# Patient Record
Sex: MALE | Race: WHITE | NOT HISPANIC OR LATINO | Employment: OTHER | URBAN - NONMETROPOLITAN AREA
[De-identification: names, ages, dates, MRNs, and addresses within clinical notes are randomized per-mention and may not be internally consistent; named-entity substitution may affect disease eponyms.]

---

## 2020-01-01 ENCOUNTER — APPOINTMENT (INPATIENT)
Dept: GASTROENTEROLOGY | Facility: HOSPITAL | Age: 85
DRG: 329 | End: 2020-01-01
Attending: INTERNAL MEDICINE
Payer: MEDICARE

## 2020-01-01 ENCOUNTER — APPOINTMENT (INPATIENT)
Dept: CT IMAGING | Facility: HOSPITAL | Age: 85
DRG: 329 | End: 2020-01-01
Payer: MEDICARE

## 2020-01-01 ENCOUNTER — APPOINTMENT (INPATIENT)
Dept: ULTRASOUND IMAGING | Facility: HOSPITAL | Age: 85
DRG: 329 | End: 2020-01-01
Payer: MEDICARE

## 2020-01-01 ENCOUNTER — ANESTHESIA EVENT (INPATIENT)
Dept: GASTROENTEROLOGY | Facility: HOSPITAL | Age: 85
DRG: 329 | End: 2020-01-01
Payer: MEDICARE

## 2020-01-01 ENCOUNTER — APPOINTMENT (INPATIENT)
Dept: RADIOLOGY | Facility: HOSPITAL | Age: 85
DRG: 329 | End: 2020-01-01
Payer: MEDICARE

## 2020-01-01 ENCOUNTER — ANESTHESIA (INPATIENT)
Dept: GASTROENTEROLOGY | Facility: HOSPITAL | Age: 85
DRG: 329 | End: 2020-01-01
Payer: MEDICARE

## 2020-01-01 ENCOUNTER — ANESTHESIA (INPATIENT)
Dept: PERIOP | Facility: HOSPITAL | Age: 85
DRG: 329 | End: 2020-01-01
Payer: MEDICARE

## 2020-01-01 ENCOUNTER — ANESTHESIA EVENT (INPATIENT)
Dept: PERIOP | Facility: HOSPITAL | Age: 85
DRG: 329 | End: 2020-01-01
Payer: MEDICARE

## 2020-01-01 ENCOUNTER — HOSPITAL ENCOUNTER (INPATIENT)
Facility: HOSPITAL | Age: 85
LOS: 25 days | DRG: 329 | End: 2020-07-11
Attending: EMERGENCY MEDICINE | Admitting: STUDENT IN AN ORGANIZED HEALTH CARE EDUCATION/TRAINING PROGRAM
Payer: MEDICARE

## 2020-01-01 DIAGNOSIS — K92.2 GASTROINTESTINAL HEMORRHAGE, UNSPECIFIED GASTROINTESTINAL HEMORRHAGE TYPE: ICD-10-CM

## 2020-01-01 DIAGNOSIS — K56.600 PARTIAL BOWEL OBSTRUCTION (HCC): ICD-10-CM

## 2020-01-01 DIAGNOSIS — D64.9 SYMPTOMATIC ANEMIA: ICD-10-CM

## 2020-01-01 DIAGNOSIS — K57.10 DUODENAL DIVERTICULUM: ICD-10-CM

## 2020-01-01 DIAGNOSIS — K31.5 DUODENAL OBSTRUCTION: ICD-10-CM

## 2020-01-01 DIAGNOSIS — N17.9 ACUTE KIDNEY INJURY (HCC): ICD-10-CM

## 2020-01-01 DIAGNOSIS — I46.9 CARDIAC ARREST (HCC): ICD-10-CM

## 2020-01-01 DIAGNOSIS — R93.5 ABNORMAL CT OF THE ABDOMEN: Primary | ICD-10-CM

## 2020-01-01 DIAGNOSIS — D49.0 COLON NEOPLASM: ICD-10-CM

## 2020-01-01 DIAGNOSIS — T17.908A ASPIRATION INTO AIRWAY, INITIAL ENCOUNTER: ICD-10-CM

## 2020-01-01 LAB
ABO GROUP BLD BPU: NORMAL
ABO GROUP BLD: NORMAL
ABO GROUP BLD: NORMAL
ALBUMIN SERPL BCP-MCNC: 1.8 G/DL (ref 3.5–5)
ALBUMIN SERPL BCP-MCNC: 1.9 G/DL (ref 3.5–5)
ALBUMIN SERPL BCP-MCNC: 2 G/DL (ref 3.5–5)
ALBUMIN SERPL BCP-MCNC: 2 G/DL (ref 3.5–5)
ALBUMIN SERPL BCP-MCNC: 2.1 G/DL (ref 3.5–5)
ALBUMIN SERPL BCP-MCNC: 2.1 G/DL (ref 3.5–5)
ALBUMIN SERPL BCP-MCNC: 2.2 G/DL (ref 3.5–5)
ALBUMIN SERPL BCP-MCNC: 2.3 G/DL (ref 3.5–5)
ALBUMIN SERPL BCP-MCNC: 2.8 G/DL (ref 3.5–5)
ALBUMIN SERPL BCP-MCNC: 2.9 G/DL (ref 3.5–5)
ALP SERPL-CCNC: 48 U/L (ref 46–116)
ALP SERPL-CCNC: 50 U/L (ref 46–116)
ALP SERPL-CCNC: 51 U/L (ref 46–116)
ALP SERPL-CCNC: 54 U/L (ref 46–116)
ALP SERPL-CCNC: 55 U/L (ref 46–116)
ALP SERPL-CCNC: 59 U/L (ref 46–116)
ALP SERPL-CCNC: 61 U/L (ref 46–116)
ALP SERPL-CCNC: 65 U/L (ref 46–116)
ALP SERPL-CCNC: 67 U/L (ref 46–116)
ALP SERPL-CCNC: 73 U/L (ref 46–116)
ALP SERPL-CCNC: 79 U/L (ref 46–116)
ALP SERPL-CCNC: 83 U/L (ref 46–116)
ALT SERPL W P-5'-P-CCNC: 12 U/L (ref 12–78)
ALT SERPL W P-5'-P-CCNC: 14 U/L (ref 12–78)
ALT SERPL W P-5'-P-CCNC: 15 U/L (ref 12–78)
ALT SERPL W P-5'-P-CCNC: 15 U/L (ref 12–78)
ALT SERPL W P-5'-P-CCNC: 16 U/L (ref 12–78)
ALT SERPL W P-5'-P-CCNC: 17 U/L (ref 12–78)
ALT SERPL W P-5'-P-CCNC: 17 U/L (ref 12–78)
ALT SERPL W P-5'-P-CCNC: 20 U/L (ref 12–78)
ALT SERPL W P-5'-P-CCNC: 8 U/L (ref 12–78)
ALT SERPL W P-5'-P-CCNC: 9 U/L (ref 12–78)
ANION GAP SERPL CALCULATED.3IONS-SCNC: 10 MMOL/L (ref 4–13)
ANION GAP SERPL CALCULATED.3IONS-SCNC: 11 MMOL/L (ref 4–13)
ANION GAP SERPL CALCULATED.3IONS-SCNC: 12 MMOL/L (ref 4–13)
ANION GAP SERPL CALCULATED.3IONS-SCNC: 13 MMOL/L (ref 4–13)
ANION GAP SERPL CALCULATED.3IONS-SCNC: 14 MMOL/L (ref 4–13)
ANION GAP SERPL CALCULATED.3IONS-SCNC: 4 MMOL/L (ref 4–13)
ANION GAP SERPL CALCULATED.3IONS-SCNC: 5 MMOL/L (ref 4–13)
ANION GAP SERPL CALCULATED.3IONS-SCNC: 6 MMOL/L (ref 4–13)
ANION GAP SERPL CALCULATED.3IONS-SCNC: 7 MMOL/L (ref 4–13)
ANION GAP SERPL CALCULATED.3IONS-SCNC: 8 MMOL/L (ref 4–13)
ANION GAP SERPL CALCULATED.3IONS-SCNC: 9 MMOL/L (ref 4–13)
APTT PPP: 152 SECONDS (ref 23–37)
APTT PPP: 36 SECONDS (ref 23–37)
AST SERPL W P-5'-P-CCNC: 10 U/L (ref 5–45)
AST SERPL W P-5'-P-CCNC: 10 U/L (ref 5–45)
AST SERPL W P-5'-P-CCNC: 11 U/L (ref 5–45)
AST SERPL W P-5'-P-CCNC: 12 U/L (ref 5–45)
AST SERPL W P-5'-P-CCNC: 12 U/L (ref 5–45)
AST SERPL W P-5'-P-CCNC: 13 U/L (ref 5–45)
AST SERPL W P-5'-P-CCNC: 19 U/L (ref 5–45)
AST SERPL W P-5'-P-CCNC: 20 U/L (ref 5–45)
AST SERPL W P-5'-P-CCNC: 25 U/L (ref 5–45)
AST SERPL W P-5'-P-CCNC: 25 U/L (ref 5–45)
AST SERPL W P-5'-P-CCNC: 30 U/L (ref 5–45)
AST SERPL W P-5'-P-CCNC: 54 U/L (ref 5–45)
ATRIAL RATE: 394 BPM
BACTERIA UR QL AUTO: ABNORMAL /HPF
BASE EXCESS BLDA CALC-SCNC: -8 MMOL/L (ref -2–3)
BASOPHILS # BLD AUTO: 0.01 THOUSANDS/ΜL (ref 0–0.1)
BASOPHILS # BLD AUTO: 0.02 THOUSANDS/ΜL (ref 0–0.1)
BASOPHILS # BLD MANUAL: 0 THOUSAND/UL (ref 0–0.1)
BASOPHILS NFR BLD AUTO: 0 % (ref 0–1)
BASOPHILS NFR BLD AUTO: 1 % (ref 0–1)
BASOPHILS NFR BLD AUTO: 1 % (ref 0–1)
BASOPHILS NFR MAR MANUAL: 0 % (ref 0–1)
BILIRUB SERPL-MCNC: 0.51 MG/DL (ref 0.2–1)
BILIRUB SERPL-MCNC: 0.53 MG/DL (ref 0.2–1)
BILIRUB SERPL-MCNC: 0.58 MG/DL (ref 0.2–1)
BILIRUB SERPL-MCNC: 0.59 MG/DL (ref 0.2–1)
BILIRUB SERPL-MCNC: 0.61 MG/DL (ref 0.2–1)
BILIRUB SERPL-MCNC: 0.65 MG/DL (ref 0.2–1)
BILIRUB SERPL-MCNC: 0.7 MG/DL (ref 0.2–1)
BILIRUB SERPL-MCNC: 0.7 MG/DL (ref 0.2–1)
BILIRUB SERPL-MCNC: 0.74 MG/DL (ref 0.2–1)
BILIRUB SERPL-MCNC: 0.79 MG/DL (ref 0.2–1)
BILIRUB SERPL-MCNC: 0.93 MG/DL (ref 0.2–1)
BILIRUB SERPL-MCNC: 1.5 MG/DL (ref 0.2–1)
BILIRUB UR QL STRIP: NEGATIVE
BLD GP AB SCN SERPL QL: NEGATIVE
BLD GP AB SCN SERPL QL: NEGATIVE
BPU ID: NORMAL
BUN SERPL-MCNC: 10 MG/DL (ref 5–25)
BUN SERPL-MCNC: 12 MG/DL (ref 5–25)
BUN SERPL-MCNC: 13 MG/DL (ref 5–25)
BUN SERPL-MCNC: 18 MG/DL (ref 5–25)
BUN SERPL-MCNC: 20 MG/DL (ref 5–25)
BUN SERPL-MCNC: 20 MG/DL (ref 5–25)
BUN SERPL-MCNC: 21 MG/DL (ref 5–25)
BUN SERPL-MCNC: 22 MG/DL (ref 5–25)
BUN SERPL-MCNC: 28 MG/DL (ref 5–25)
BUN SERPL-MCNC: 29 MG/DL (ref 5–25)
BUN SERPL-MCNC: 30 MG/DL (ref 5–25)
BUN SERPL-MCNC: 30 MG/DL (ref 5–25)
BUN SERPL-MCNC: 31 MG/DL (ref 5–25)
BUN SERPL-MCNC: 31 MG/DL (ref 5–25)
BUN SERPL-MCNC: 33 MG/DL (ref 5–25)
BUN SERPL-MCNC: 33 MG/DL (ref 5–25)
BUN SERPL-MCNC: 35 MG/DL (ref 5–25)
BUN SERPL-MCNC: 35 MG/DL (ref 5–25)
BUN SERPL-MCNC: 36 MG/DL (ref 5–25)
BUN SERPL-MCNC: 36 MG/DL (ref 5–25)
BUN SERPL-MCNC: 37 MG/DL (ref 5–25)
BUN SERPL-MCNC: 38 MG/DL (ref 5–25)
BUN SERPL-MCNC: 39 MG/DL (ref 5–25)
BUN SERPL-MCNC: 41 MG/DL (ref 5–25)
BUN SERPL-MCNC: 42 MG/DL (ref 5–25)
BUN SERPL-MCNC: 42 MG/DL (ref 5–25)
BUN SERPL-MCNC: 43 MG/DL (ref 5–25)
BUN SERPL-MCNC: 49 MG/DL (ref 5–25)
BUN SERPL-MCNC: 58 MG/DL (ref 5–25)
BUN SERPL-MCNC: 61 MG/DL (ref 5–25)
CA-I BLD-SCNC: 1.2 MMOL/L (ref 1.12–1.32)
CALCIUM SERPL-MCNC: 7.4 MG/DL (ref 8.3–10.1)
CALCIUM SERPL-MCNC: 7.5 MG/DL (ref 8.3–10.1)
CALCIUM SERPL-MCNC: 7.5 MG/DL (ref 8.3–10.1)
CALCIUM SERPL-MCNC: 7.7 MG/DL (ref 8.3–10.1)
CALCIUM SERPL-MCNC: 7.8 MG/DL (ref 8.3–10.1)
CALCIUM SERPL-MCNC: 7.9 MG/DL (ref 8.3–10.1)
CALCIUM SERPL-MCNC: 7.9 MG/DL (ref 8.3–10.1)
CALCIUM SERPL-MCNC: 8 MG/DL (ref 8.3–10.1)
CALCIUM SERPL-MCNC: 8.1 MG/DL (ref 8.3–10.1)
CALCIUM SERPL-MCNC: 8.2 MG/DL (ref 8.3–10.1)
CALCIUM SERPL-MCNC: 8.4 MG/DL (ref 8.3–10.1)
CALCIUM SERPL-MCNC: 8.4 MG/DL (ref 8.3–10.1)
CALCIUM SERPL-MCNC: 8.6 MG/DL (ref 8.3–10.1)
CEA SERPL-MCNC: 17.9 NG/ML (ref 0–3)
CHLORIDE SERPL-SCNC: 101 MMOL/L (ref 100–108)
CHLORIDE SERPL-SCNC: 101 MMOL/L (ref 100–108)
CHLORIDE SERPL-SCNC: 103 MMOL/L (ref 100–108)
CHLORIDE SERPL-SCNC: 103 MMOL/L (ref 100–108)
CHLORIDE SERPL-SCNC: 104 MMOL/L (ref 100–108)
CHLORIDE SERPL-SCNC: 104 MMOL/L (ref 100–108)
CHLORIDE SERPL-SCNC: 105 MMOL/L (ref 100–108)
CHLORIDE SERPL-SCNC: 106 MMOL/L (ref 100–108)
CHLORIDE SERPL-SCNC: 107 MMOL/L (ref 100–108)
CHLORIDE SERPL-SCNC: 108 MMOL/L (ref 100–108)
CHLORIDE SERPL-SCNC: 109 MMOL/L (ref 100–108)
CHLORIDE SERPL-SCNC: 109 MMOL/L (ref 100–108)
CHLORIDE SERPL-SCNC: 110 MMOL/L (ref 100–108)
CHLORIDE SERPL-SCNC: 111 MMOL/L (ref 100–108)
CHLORIDE SERPL-SCNC: 99 MMOL/L (ref 100–108)
CLARITY UR: CLEAR
CO2 SERPL-SCNC: 20 MMOL/L (ref 21–32)
CO2 SERPL-SCNC: 20 MMOL/L (ref 21–32)
CO2 SERPL-SCNC: 21 MMOL/L (ref 21–32)
CO2 SERPL-SCNC: 21 MMOL/L (ref 21–32)
CO2 SERPL-SCNC: 22 MMOL/L (ref 21–32)
CO2 SERPL-SCNC: 22 MMOL/L (ref 21–32)
CO2 SERPL-SCNC: 23 MMOL/L (ref 21–32)
CO2 SERPL-SCNC: 24 MMOL/L (ref 21–32)
CO2 SERPL-SCNC: 24 MMOL/L (ref 21–32)
CO2 SERPL-SCNC: 25 MMOL/L (ref 21–32)
CO2 SERPL-SCNC: 26 MMOL/L (ref 21–32)
CO2 SERPL-SCNC: 27 MMOL/L (ref 21–32)
CO2 SERPL-SCNC: 29 MMOL/L (ref 21–32)
CO2 SERPL-SCNC: 30 MMOL/L (ref 21–32)
CO2 SERPL-SCNC: 30 MMOL/L (ref 21–32)
CO2 SERPL-SCNC: 31 MMOL/L (ref 21–32)
CO2 SERPL-SCNC: 31 MMOL/L (ref 21–32)
CO2 SERPL-SCNC: 33 MMOL/L (ref 21–32)
CO2 SERPL-SCNC: 33 MMOL/L (ref 21–32)
COLOR UR: YELLOW
CREAT SERPL-MCNC: 1.3 MG/DL (ref 0.6–1.3)
CREAT SERPL-MCNC: 1.33 MG/DL (ref 0.6–1.3)
CREAT SERPL-MCNC: 1.45 MG/DL (ref 0.6–1.3)
CREAT SERPL-MCNC: 1.45 MG/DL (ref 0.6–1.3)
CREAT SERPL-MCNC: 1.51 MG/DL (ref 0.6–1.3)
CREAT SERPL-MCNC: 1.59 MG/DL (ref 0.6–1.3)
CREAT SERPL-MCNC: 1.76 MG/DL (ref 0.6–1.3)
CREAT SERPL-MCNC: 1.88 MG/DL (ref 0.6–1.3)
CREAT SERPL-MCNC: 1.93 MG/DL (ref 0.6–1.3)
CREAT SERPL-MCNC: 1.95 MG/DL (ref 0.6–1.3)
CREAT SERPL-MCNC: 1.98 MG/DL (ref 0.6–1.3)
CREAT SERPL-MCNC: 2.01 MG/DL (ref 0.6–1.3)
CREAT SERPL-MCNC: 2.06 MG/DL (ref 0.6–1.3)
CREAT SERPL-MCNC: 2.13 MG/DL (ref 0.6–1.3)
CREAT SERPL-MCNC: 2.14 MG/DL (ref 0.6–1.3)
CREAT SERPL-MCNC: 2.18 MG/DL (ref 0.6–1.3)
CREAT SERPL-MCNC: 2.18 MG/DL (ref 0.6–1.3)
CREAT SERPL-MCNC: 2.19 MG/DL (ref 0.6–1.3)
CREAT SERPL-MCNC: 2.19 MG/DL (ref 0.6–1.3)
CREAT SERPL-MCNC: 2.21 MG/DL (ref 0.6–1.3)
CREAT SERPL-MCNC: 2.21 MG/DL (ref 0.6–1.3)
CREAT SERPL-MCNC: 2.26 MG/DL (ref 0.6–1.3)
CREAT SERPL-MCNC: 2.3 MG/DL (ref 0.6–1.3)
CREAT SERPL-MCNC: 2.3 MG/DL (ref 0.6–1.3)
CREAT SERPL-MCNC: 2.31 MG/DL (ref 0.6–1.3)
CREAT SERPL-MCNC: 2.33 MG/DL (ref 0.6–1.3)
CREAT SERPL-MCNC: 2.38 MG/DL (ref 0.6–1.3)
CREAT SERPL-MCNC: 2.46 MG/DL (ref 0.6–1.3)
CREAT SERPL-MCNC: 2.81 MG/DL (ref 0.6–1.3)
CREAT SERPL-MCNC: 3.05 MG/DL (ref 0.6–1.3)
CREAT UR-MCNC: 29.6 MG/DL
CREAT UR-MCNC: 73 MG/DL
CROSSMATCH: NORMAL
EOSINOPHIL # BLD AUTO: 0.01 THOUSAND/ΜL (ref 0–0.61)
EOSINOPHIL # BLD AUTO: 0.04 THOUSAND/ΜL (ref 0–0.61)
EOSINOPHIL # BLD AUTO: 0.05 THOUSAND/ΜL (ref 0–0.61)
EOSINOPHIL # BLD AUTO: 0.05 THOUSAND/ΜL (ref 0–0.61)
EOSINOPHIL # BLD AUTO: 0.07 THOUSAND/ΜL (ref 0–0.61)
EOSINOPHIL # BLD AUTO: 0.08 THOUSAND/ΜL (ref 0–0.61)
EOSINOPHIL # BLD AUTO: 0.1 THOUSAND/ΜL (ref 0–0.61)
EOSINOPHIL # BLD AUTO: 0.11 THOUSAND/ΜL (ref 0–0.61)
EOSINOPHIL # BLD AUTO: 0.12 THOUSAND/ΜL (ref 0–0.61)
EOSINOPHIL # BLD AUTO: 0.14 THOUSAND/ΜL (ref 0–0.61)
EOSINOPHIL # BLD AUTO: 0.15 THOUSAND/ΜL (ref 0–0.61)
EOSINOPHIL # BLD MANUAL: 0 THOUSAND/UL (ref 0–0.4)
EOSINOPHIL NFR BLD AUTO: 0 % (ref 0–6)
EOSINOPHIL NFR BLD AUTO: 1 % (ref 0–6)
EOSINOPHIL NFR BLD AUTO: 2 % (ref 0–6)
EOSINOPHIL NFR BLD AUTO: 3 % (ref 0–6)
EOSINOPHIL NFR BLD AUTO: 4 % (ref 0–6)
EOSINOPHIL NFR BLD AUTO: 5 % (ref 0–6)
EOSINOPHIL NFR BLD MANUAL: 0 % (ref 0–6)
EOSINOPHIL NFR URNS MANUAL: 0 %
ERYTHROCYTE [DISTWIDTH] IN BLOOD BY AUTOMATED COUNT: 15.9 % (ref 11.6–15.1)
ERYTHROCYTE [DISTWIDTH] IN BLOOD BY AUTOMATED COUNT: 17.5 % (ref 11.6–15.1)
ERYTHROCYTE [DISTWIDTH] IN BLOOD BY AUTOMATED COUNT: 18.4 % (ref 11.6–15.1)
ERYTHROCYTE [DISTWIDTH] IN BLOOD BY AUTOMATED COUNT: 19 % (ref 11.6–15.1)
ERYTHROCYTE [DISTWIDTH] IN BLOOD BY AUTOMATED COUNT: 19.1 % (ref 11.6–15.1)
ERYTHROCYTE [DISTWIDTH] IN BLOOD BY AUTOMATED COUNT: 19.1 % (ref 11.6–15.1)
ERYTHROCYTE [DISTWIDTH] IN BLOOD BY AUTOMATED COUNT: 19.4 % (ref 11.6–15.1)
ERYTHROCYTE [DISTWIDTH] IN BLOOD BY AUTOMATED COUNT: 20 % (ref 11.6–15.1)
ERYTHROCYTE [DISTWIDTH] IN BLOOD BY AUTOMATED COUNT: 20.2 % (ref 11.6–15.1)
ERYTHROCYTE [DISTWIDTH] IN BLOOD BY AUTOMATED COUNT: 20.3 % (ref 11.6–15.1)
ERYTHROCYTE [DISTWIDTH] IN BLOOD BY AUTOMATED COUNT: 20.3 % (ref 11.6–15.1)
ERYTHROCYTE [DISTWIDTH] IN BLOOD BY AUTOMATED COUNT: 20.6 % (ref 11.6–15.1)
ERYTHROCYTE [DISTWIDTH] IN BLOOD BY AUTOMATED COUNT: 21.1 % (ref 11.6–15.1)
ERYTHROCYTE [DISTWIDTH] IN BLOOD BY AUTOMATED COUNT: 22.3 % (ref 11.6–15.1)
ERYTHROCYTE [DISTWIDTH] IN BLOOD BY AUTOMATED COUNT: 23.4 % (ref 11.6–15.1)
ERYTHROCYTE [DISTWIDTH] IN BLOOD BY AUTOMATED COUNT: 23.8 % (ref 11.6–15.1)
ERYTHROCYTE [DISTWIDTH] IN BLOOD BY AUTOMATED COUNT: 23.8 % (ref 11.6–15.1)
ERYTHROCYTE [DISTWIDTH] IN BLOOD BY AUTOMATED COUNT: 23.9 % (ref 11.6–15.1)
ERYTHROCYTE [DISTWIDTH] IN BLOOD BY AUTOMATED COUNT: 24 % (ref 11.6–15.1)
ERYTHROCYTE [DISTWIDTH] IN BLOOD BY AUTOMATED COUNT: 24.4 % (ref 11.6–15.1)
ERYTHROCYTE [DISTWIDTH] IN BLOOD BY AUTOMATED COUNT: 24.6 % (ref 11.6–15.1)
EST. AVERAGE GLUCOSE BLD GHB EST-MCNC: 189 MG/DL
FERRITIN SERPL-MCNC: 11 NG/ML (ref 8–388)
FIO2 GAS DIL.REBREATH: 100 L
FOLATE SERPL-MCNC: >20 NG/ML (ref 3.1–17.5)
GFR SERPL CREATININE-BSD FRML MDRD: 18 ML/MIN/1.73SQ M
GFR SERPL CREATININE-BSD FRML MDRD: 20 ML/MIN/1.73SQ M
GFR SERPL CREATININE-BSD FRML MDRD: 23 ML/MIN/1.73SQ M
GFR SERPL CREATININE-BSD FRML MDRD: 24 ML/MIN/1.73SQ M
GFR SERPL CREATININE-BSD FRML MDRD: 25 ML/MIN/1.73SQ M
GFR SERPL CREATININE-BSD FRML MDRD: 26 ML/MIN/1.73SQ M
GFR SERPL CREATININE-BSD FRML MDRD: 27 ML/MIN/1.73SQ M
GFR SERPL CREATININE-BSD FRML MDRD: 29 ML/MIN/1.73SQ M
GFR SERPL CREATININE-BSD FRML MDRD: 29 ML/MIN/1.73SQ M
GFR SERPL CREATININE-BSD FRML MDRD: 30 ML/MIN/1.73SQ M
GFR SERPL CREATININE-BSD FRML MDRD: 31 ML/MIN/1.73SQ M
GFR SERPL CREATININE-BSD FRML MDRD: 32 ML/MIN/1.73SQ M
GFR SERPL CREATININE-BSD FRML MDRD: 34 ML/MIN/1.73SQ M
GFR SERPL CREATININE-BSD FRML MDRD: 39 ML/MIN/1.73SQ M
GFR SERPL CREATININE-BSD FRML MDRD: 42 ML/MIN/1.73SQ M
GFR SERPL CREATININE-BSD FRML MDRD: 44 ML/MIN/1.73SQ M
GFR SERPL CREATININE-BSD FRML MDRD: 44 ML/MIN/1.73SQ M
GFR SERPL CREATININE-BSD FRML MDRD: 48 ML/MIN/1.73SQ M
GFR SERPL CREATININE-BSD FRML MDRD: 50 ML/MIN/1.73SQ M
GLUCOSE SERPL-MCNC: 100 MG/DL (ref 65–140)
GLUCOSE SERPL-MCNC: 103 MG/DL (ref 65–140)
GLUCOSE SERPL-MCNC: 104 MG/DL (ref 65–140)
GLUCOSE SERPL-MCNC: 106 MG/DL (ref 65–140)
GLUCOSE SERPL-MCNC: 1070 MG/DL (ref 65–140)
GLUCOSE SERPL-MCNC: 112 MG/DL (ref 65–140)
GLUCOSE SERPL-MCNC: 114 MG/DL (ref 65–140)
GLUCOSE SERPL-MCNC: 116 MG/DL (ref 65–140)
GLUCOSE SERPL-MCNC: 119 MG/DL (ref 65–140)
GLUCOSE SERPL-MCNC: 124 MG/DL (ref 65–140)
GLUCOSE SERPL-MCNC: 129 MG/DL (ref 65–140)
GLUCOSE SERPL-MCNC: 130 MG/DL (ref 65–140)
GLUCOSE SERPL-MCNC: 131 MG/DL (ref 65–140)
GLUCOSE SERPL-MCNC: 131 MG/DL (ref 65–140)
GLUCOSE SERPL-MCNC: 132 MG/DL (ref 65–140)
GLUCOSE SERPL-MCNC: 138 MG/DL (ref 65–140)
GLUCOSE SERPL-MCNC: 139 MG/DL (ref 65–140)
GLUCOSE SERPL-MCNC: 139 MG/DL (ref 65–140)
GLUCOSE SERPL-MCNC: 145 MG/DL (ref 65–140)
GLUCOSE SERPL-MCNC: 145 MG/DL (ref 65–140)
GLUCOSE SERPL-MCNC: 147 MG/DL (ref 65–140)
GLUCOSE SERPL-MCNC: 152 MG/DL (ref 65–140)
GLUCOSE SERPL-MCNC: 153 MG/DL (ref 65–140)
GLUCOSE SERPL-MCNC: 153 MG/DL (ref 65–140)
GLUCOSE SERPL-MCNC: 154 MG/DL (ref 65–140)
GLUCOSE SERPL-MCNC: 155 MG/DL (ref 65–140)
GLUCOSE SERPL-MCNC: 157 MG/DL (ref 65–140)
GLUCOSE SERPL-MCNC: 161 MG/DL (ref 65–140)
GLUCOSE SERPL-MCNC: 162 MG/DL (ref 65–140)
GLUCOSE SERPL-MCNC: 163 MG/DL (ref 65–140)
GLUCOSE SERPL-MCNC: 164 MG/DL (ref 65–140)
GLUCOSE SERPL-MCNC: 164 MG/DL (ref 65–140)
GLUCOSE SERPL-MCNC: 165 MG/DL (ref 65–140)
GLUCOSE SERPL-MCNC: 167 MG/DL (ref 65–140)
GLUCOSE SERPL-MCNC: 168 MG/DL (ref 65–140)
GLUCOSE SERPL-MCNC: 170 MG/DL (ref 65–140)
GLUCOSE SERPL-MCNC: 171 MG/DL (ref 65–140)
GLUCOSE SERPL-MCNC: 175 MG/DL (ref 65–140)
GLUCOSE SERPL-MCNC: 175 MG/DL (ref 65–140)
GLUCOSE SERPL-MCNC: 178 MG/DL (ref 65–140)
GLUCOSE SERPL-MCNC: 178 MG/DL (ref 65–140)
GLUCOSE SERPL-MCNC: 179 MG/DL (ref 65–140)
GLUCOSE SERPL-MCNC: 182 MG/DL (ref 65–140)
GLUCOSE SERPL-MCNC: 183 MG/DL (ref 65–140)
GLUCOSE SERPL-MCNC: 188 MG/DL (ref 65–140)
GLUCOSE SERPL-MCNC: 188 MG/DL (ref 65–140)
GLUCOSE SERPL-MCNC: 190 MG/DL (ref 65–140)
GLUCOSE SERPL-MCNC: 190 MG/DL (ref 65–140)
GLUCOSE SERPL-MCNC: 191 MG/DL (ref 65–140)
GLUCOSE SERPL-MCNC: 194 MG/DL (ref 65–140)
GLUCOSE SERPL-MCNC: 201 MG/DL (ref 65–140)
GLUCOSE SERPL-MCNC: 201 MG/DL (ref 65–140)
GLUCOSE SERPL-MCNC: 202 MG/DL (ref 65–140)
GLUCOSE SERPL-MCNC: 202 MG/DL (ref 65–140)
GLUCOSE SERPL-MCNC: 203 MG/DL (ref 65–140)
GLUCOSE SERPL-MCNC: 204 MG/DL (ref 65–140)
GLUCOSE SERPL-MCNC: 206 MG/DL (ref 65–140)
GLUCOSE SERPL-MCNC: 208 MG/DL (ref 65–140)
GLUCOSE SERPL-MCNC: 209 MG/DL (ref 65–140)
GLUCOSE SERPL-MCNC: 209 MG/DL (ref 65–140)
GLUCOSE SERPL-MCNC: 210 MG/DL (ref 65–140)
GLUCOSE SERPL-MCNC: 213 MG/DL (ref 65–140)
GLUCOSE SERPL-MCNC: 215 MG/DL (ref 65–140)
GLUCOSE SERPL-MCNC: 215 MG/DL (ref 65–140)
GLUCOSE SERPL-MCNC: 216 MG/DL (ref 65–140)
GLUCOSE SERPL-MCNC: 216 MG/DL (ref 65–140)
GLUCOSE SERPL-MCNC: 217 MG/DL (ref 65–140)
GLUCOSE SERPL-MCNC: 218 MG/DL (ref 65–140)
GLUCOSE SERPL-MCNC: 218 MG/DL (ref 65–140)
GLUCOSE SERPL-MCNC: 219 MG/DL (ref 65–140)
GLUCOSE SERPL-MCNC: 221 MG/DL (ref 65–140)
GLUCOSE SERPL-MCNC: 221 MG/DL (ref 65–140)
GLUCOSE SERPL-MCNC: 222 MG/DL (ref 65–140)
GLUCOSE SERPL-MCNC: 223 MG/DL (ref 65–140)
GLUCOSE SERPL-MCNC: 226 MG/DL (ref 65–140)
GLUCOSE SERPL-MCNC: 232 MG/DL (ref 65–140)
GLUCOSE SERPL-MCNC: 232 MG/DL (ref 65–140)
GLUCOSE SERPL-MCNC: 234 MG/DL (ref 65–140)
GLUCOSE SERPL-MCNC: 235 MG/DL (ref 65–140)
GLUCOSE SERPL-MCNC: 236 MG/DL (ref 65–140)
GLUCOSE SERPL-MCNC: 236 MG/DL (ref 65–140)
GLUCOSE SERPL-MCNC: 238 MG/DL (ref 65–140)
GLUCOSE SERPL-MCNC: 239 MG/DL (ref 65–140)
GLUCOSE SERPL-MCNC: 240 MG/DL (ref 65–140)
GLUCOSE SERPL-MCNC: 242 MG/DL (ref 65–140)
GLUCOSE SERPL-MCNC: 244 MG/DL (ref 65–140)
GLUCOSE SERPL-MCNC: 245 MG/DL (ref 65–140)
GLUCOSE SERPL-MCNC: 246 MG/DL (ref 65–140)
GLUCOSE SERPL-MCNC: 246 MG/DL (ref 65–140)
GLUCOSE SERPL-MCNC: 248 MG/DL (ref 65–140)
GLUCOSE SERPL-MCNC: 249 MG/DL (ref 65–140)
GLUCOSE SERPL-MCNC: 253 MG/DL (ref 65–140)
GLUCOSE SERPL-MCNC: 255 MG/DL (ref 65–140)
GLUCOSE SERPL-MCNC: 256 MG/DL (ref 65–140)
GLUCOSE SERPL-MCNC: 257 MG/DL (ref 65–140)
GLUCOSE SERPL-MCNC: 260 MG/DL (ref 65–140)
GLUCOSE SERPL-MCNC: 261 MG/DL (ref 65–140)
GLUCOSE SERPL-MCNC: 261 MG/DL (ref 65–140)
GLUCOSE SERPL-MCNC: 263 MG/DL (ref 65–140)
GLUCOSE SERPL-MCNC: 265 MG/DL (ref 65–140)
GLUCOSE SERPL-MCNC: 267 MG/DL (ref 65–140)
GLUCOSE SERPL-MCNC: 269 MG/DL (ref 65–140)
GLUCOSE SERPL-MCNC: 270 MG/DL (ref 65–140)
GLUCOSE SERPL-MCNC: 270 MG/DL (ref 65–140)
GLUCOSE SERPL-MCNC: 271 MG/DL (ref 65–140)
GLUCOSE SERPL-MCNC: 276 MG/DL (ref 65–140)
GLUCOSE SERPL-MCNC: 277 MG/DL (ref 65–140)
GLUCOSE SERPL-MCNC: 28 MG/DL (ref 65–140)
GLUCOSE SERPL-MCNC: 280 MG/DL (ref 65–140)
GLUCOSE SERPL-MCNC: 282 MG/DL (ref 65–140)
GLUCOSE SERPL-MCNC: 283 MG/DL (ref 65–140)
GLUCOSE SERPL-MCNC: 286 MG/DL (ref 65–140)
GLUCOSE SERPL-MCNC: 289 MG/DL (ref 65–140)
GLUCOSE SERPL-MCNC: 290 MG/DL (ref 65–140)
GLUCOSE SERPL-MCNC: 294 MG/DL (ref 65–140)
GLUCOSE SERPL-MCNC: 295 MG/DL (ref 65–140)
GLUCOSE SERPL-MCNC: 296 MG/DL (ref 65–140)
GLUCOSE SERPL-MCNC: 297 MG/DL (ref 65–140)
GLUCOSE SERPL-MCNC: 298 MG/DL (ref 65–140)
GLUCOSE SERPL-MCNC: 298 MG/DL (ref 65–140)
GLUCOSE SERPL-MCNC: 302 MG/DL (ref 65–140)
GLUCOSE SERPL-MCNC: 304 MG/DL (ref 65–140)
GLUCOSE SERPL-MCNC: 304 MG/DL (ref 65–140)
GLUCOSE SERPL-MCNC: 305 MG/DL (ref 65–140)
GLUCOSE SERPL-MCNC: 310 MG/DL (ref 65–140)
GLUCOSE SERPL-MCNC: 317 MG/DL (ref 65–140)
GLUCOSE SERPL-MCNC: 322 MG/DL (ref 65–140)
GLUCOSE SERPL-MCNC: 326 MG/DL (ref 65–140)
GLUCOSE SERPL-MCNC: 327 MG/DL (ref 65–140)
GLUCOSE SERPL-MCNC: 341 MG/DL (ref 65–140)
GLUCOSE SERPL-MCNC: 344 MG/DL (ref 65–140)
GLUCOSE SERPL-MCNC: 348 MG/DL (ref 65–140)
GLUCOSE SERPL-MCNC: 383 MG/DL (ref 65–140)
GLUCOSE SERPL-MCNC: 384 MG/DL (ref 65–140)
GLUCOSE SERPL-MCNC: 427 MG/DL (ref 65–140)
GLUCOSE SERPL-MCNC: 49 MG/DL (ref 65–140)
GLUCOSE SERPL-MCNC: 510 MG/DL (ref 65–140)
GLUCOSE SERPL-MCNC: 669 MG/DL (ref 65–140)
GLUCOSE SERPL-MCNC: 76 MG/DL (ref 65–140)
GLUCOSE SERPL-MCNC: 85 MG/DL (ref 65–140)
GLUCOSE SERPL-MCNC: 87 MG/DL (ref 65–140)
GLUCOSE SERPL-MCNC: 95 MG/DL (ref 65–140)
GLUCOSE SERPL-MCNC: 968 MG/DL (ref 65–140)
GLUCOSE SERPL-MCNC: >500 MG/DL (ref 65–140)
GLUCOSE SERPL-MCNC: >500 MG/DL (ref 65–140)
GLUCOSE UR STRIP-MCNC: NEGATIVE MG/DL
HBA1C MFR BLD: 8.2 %
HCO3 BLDA-SCNC: 19.5 MMOL/L (ref 22–28)
HCT VFR BLD AUTO: 25.8 % (ref 36.5–49.3)
HCT VFR BLD AUTO: 28.4 % (ref 36.5–49.3)
HCT VFR BLD AUTO: 30.3 % (ref 36.5–49.3)
HCT VFR BLD AUTO: 32 % (ref 36.5–49.3)
HCT VFR BLD AUTO: 32 % (ref 36.5–49.3)
HCT VFR BLD AUTO: 32.9 % (ref 36.5–49.3)
HCT VFR BLD AUTO: 33 % (ref 36.5–49.3)
HCT VFR BLD AUTO: 33.8 % (ref 36.5–49.3)
HCT VFR BLD AUTO: 34 % (ref 36.5–49.3)
HCT VFR BLD AUTO: 34 % (ref 36.5–49.3)
HCT VFR BLD AUTO: 34.4 % (ref 36.5–49.3)
HCT VFR BLD AUTO: 35.5 % (ref 36.5–49.3)
HCT VFR BLD AUTO: 35.6 % (ref 36.5–49.3)
HCT VFR BLD AUTO: 36.6 % (ref 36.5–49.3)
HCT VFR BLD AUTO: 36.7 % (ref 36.5–49.3)
HCT VFR BLD AUTO: 37 % (ref 36.5–49.3)
HCT VFR BLD AUTO: 37.2 % (ref 36.5–49.3)
HCT VFR BLD AUTO: 37.8 % (ref 36.5–49.3)
HCT VFR BLD AUTO: 38.1 % (ref 36.5–49.3)
HCT VFR BLD AUTO: 38.2 % (ref 36.5–49.3)
HCT VFR BLD AUTO: 38.5 % (ref 36.5–49.3)
HCT VFR BLD AUTO: 41.8 % (ref 36.5–49.3)
HCT VFR BLD CALC: 32 % (ref 36.5–49.3)
HGB BLD-MCNC: 10 G/DL (ref 12–17)
HGB BLD-MCNC: 10.2 G/DL (ref 12–17)
HGB BLD-MCNC: 10.3 G/DL (ref 12–17)
HGB BLD-MCNC: 10.4 G/DL (ref 12–17)
HGB BLD-MCNC: 10.5 G/DL (ref 12–17)
HGB BLD-MCNC: 10.8 G/DL (ref 12–17)
HGB BLD-MCNC: 10.8 G/DL (ref 12–17)
HGB BLD-MCNC: 11 G/DL (ref 12–17)
HGB BLD-MCNC: 11.1 G/DL (ref 12–17)
HGB BLD-MCNC: 11.9 G/DL (ref 12–17)
HGB BLD-MCNC: 7.1 G/DL (ref 12–17)
HGB BLD-MCNC: 8.2 G/DL (ref 12–17)
HGB BLD-MCNC: 8.9 G/DL (ref 12–17)
HGB BLD-MCNC: 9.3 G/DL (ref 12–17)
HGB BLD-MCNC: 9.5 G/DL (ref 12–17)
HGB BLD-MCNC: 9.8 G/DL (ref 12–17)
HGB BLD-MCNC: 9.8 G/DL (ref 12–17)
HGB BLD-MCNC: 9.9 G/DL (ref 12–17)
HGB BLD-MCNC: 9.9 G/DL (ref 12–17)
HGB BLDA-MCNC: 10.9 G/DL (ref 12–17)
HGB UR QL STRIP.AUTO: ABNORMAL
HOLD SPECIMEN: NORMAL
HYPERCHROMIA BLD QL SMEAR: PRESENT
IMM GRANULOCYTES # BLD AUTO: 0.02 THOUSAND/UL (ref 0–0.2)
IMM GRANULOCYTES # BLD AUTO: 0.03 THOUSAND/UL (ref 0–0.2)
IMM GRANULOCYTES # BLD AUTO: 0.04 THOUSAND/UL (ref 0–0.2)
IMM GRANULOCYTES # BLD AUTO: 0.04 THOUSAND/UL (ref 0–0.2)
IMM GRANULOCYTES # BLD AUTO: 0.06 THOUSAND/UL (ref 0–0.2)
IMM GRANULOCYTES # BLD AUTO: 0.07 THOUSAND/UL (ref 0–0.2)
IMM GRANULOCYTES NFR BLD AUTO: 0 % (ref 0–2)
IMM GRANULOCYTES NFR BLD AUTO: 0 % (ref 0–2)
IMM GRANULOCYTES NFR BLD AUTO: 1 % (ref 0–2)
IMM GRANULOCYTES NFR BLD AUTO: 2 % (ref 0–2)
INR PPP: 1.15 (ref 0.84–1.19)
INR PPP: 1.22 (ref 0.84–1.19)
INR PPP: 1.41 (ref 0.84–1.19)
IRON SATN MFR SERPL: 8 %
IRON SERPL-MCNC: 29 UG/DL (ref 65–175)
KETONES UR STRIP-MCNC: NEGATIVE MG/DL
LACTATE SERPL-SCNC: 4.3 MMOL/L (ref 0.5–2)
LEUKOCYTE ESTERASE UR QL STRIP: NEGATIVE
LIPASE SERPL-CCNC: 123 U/L (ref 73–393)
LIPASE SERPL-CCNC: 247 U/L (ref 73–393)
LYMPHOCYTES # BLD AUTO: 0.5 THOUSANDS/ΜL (ref 0.6–4.47)
LYMPHOCYTES # BLD AUTO: 0.56 THOUSANDS/ΜL (ref 0.6–4.47)
LYMPHOCYTES # BLD AUTO: 0.57 THOUSANDS/ΜL (ref 0.6–4.47)
LYMPHOCYTES # BLD AUTO: 0.6 THOUSANDS/ΜL (ref 0.6–4.47)
LYMPHOCYTES # BLD AUTO: 0.63 THOUSANDS/ΜL (ref 0.6–4.47)
LYMPHOCYTES # BLD AUTO: 0.64 THOUSANDS/ΜL (ref 0.6–4.47)
LYMPHOCYTES # BLD AUTO: 0.66 THOUSANDS/ΜL (ref 0.6–4.47)
LYMPHOCYTES # BLD AUTO: 0.67 THOUSANDS/ΜL (ref 0.6–4.47)
LYMPHOCYTES # BLD AUTO: 0.72 THOUSANDS/ΜL (ref 0.6–4.47)
LYMPHOCYTES # BLD AUTO: 0.77 THOUSANDS/ΜL (ref 0.6–4.47)
LYMPHOCYTES # BLD AUTO: 0.77 THOUSANDS/ΜL (ref 0.6–4.47)
LYMPHOCYTES # BLD AUTO: 0.82 THOUSANDS/ΜL (ref 0.6–4.47)
LYMPHOCYTES # BLD AUTO: 0.85 THOUSANDS/ΜL (ref 0.6–4.47)
LYMPHOCYTES # BLD AUTO: 0.94 THOUSANDS/ΜL (ref 0.6–4.47)
LYMPHOCYTES # BLD AUTO: 3.65 THOUSAND/UL (ref 0.6–4.47)
LYMPHOCYTES # BLD AUTO: 32 % (ref 14–44)
LYMPHOCYTES NFR BLD AUTO: 11 % (ref 14–44)
LYMPHOCYTES NFR BLD AUTO: 13 % (ref 14–44)
LYMPHOCYTES NFR BLD AUTO: 13 % (ref 14–44)
LYMPHOCYTES NFR BLD AUTO: 14 % (ref 14–44)
LYMPHOCYTES NFR BLD AUTO: 15 % (ref 14–44)
LYMPHOCYTES NFR BLD AUTO: 17 % (ref 14–44)
LYMPHOCYTES NFR BLD AUTO: 17 % (ref 14–44)
LYMPHOCYTES NFR BLD AUTO: 18 % (ref 14–44)
LYMPHOCYTES NFR BLD AUTO: 18 % (ref 14–44)
LYMPHOCYTES NFR BLD AUTO: 19 % (ref 14–44)
LYMPHOCYTES NFR BLD AUTO: 21 % (ref 14–44)
MAGNESIUM SERPL-MCNC: 1.7 MG/DL (ref 1.6–2.6)
MAGNESIUM SERPL-MCNC: 1.8 MG/DL (ref 1.6–2.6)
MAGNESIUM SERPL-MCNC: 1.9 MG/DL (ref 1.6–2.6)
MAGNESIUM SERPL-MCNC: 2 MG/DL (ref 1.6–2.6)
MAGNESIUM SERPL-MCNC: 2.2 MG/DL (ref 1.6–2.6)
MAGNESIUM SERPL-MCNC: 2.3 MG/DL (ref 1.6–2.6)
MAGNESIUM SERPL-MCNC: 2.3 MG/DL (ref 1.6–2.6)
MCH RBC QN AUTO: 19.8 PG (ref 26.8–34.3)
MCH RBC QN AUTO: 22.1 PG (ref 26.8–34.3)
MCH RBC QN AUTO: 22.1 PG (ref 26.8–34.3)
MCH RBC QN AUTO: 22.2 PG (ref 26.8–34.3)
MCH RBC QN AUTO: 22.3 PG (ref 26.8–34.3)
MCH RBC QN AUTO: 22.4 PG (ref 26.8–34.3)
MCH RBC QN AUTO: 22.5 PG (ref 26.8–34.3)
MCH RBC QN AUTO: 22.6 PG (ref 26.8–34.3)
MCH RBC QN AUTO: 22.7 PG (ref 26.8–34.3)
MCH RBC QN AUTO: 22.8 PG (ref 26.8–34.3)
MCH RBC QN AUTO: 22.9 PG (ref 26.8–34.3)
MCH RBC QN AUTO: 22.9 PG (ref 26.8–34.3)
MCH RBC QN AUTO: 23 PG (ref 26.8–34.3)
MCH RBC QN AUTO: 23.1 PG (ref 26.8–34.3)
MCH RBC QN AUTO: 23.4 PG (ref 26.8–34.3)
MCHC RBC AUTO-ENTMCNC: 27.5 G/DL (ref 31.4–37.4)
MCHC RBC AUTO-ENTMCNC: 28.3 G/DL (ref 31.4–37.4)
MCHC RBC AUTO-ENTMCNC: 28.4 G/DL (ref 31.4–37.4)
MCHC RBC AUTO-ENTMCNC: 28.5 G/DL (ref 31.4–37.4)
MCHC RBC AUTO-ENTMCNC: 28.6 G/DL (ref 31.4–37.4)
MCHC RBC AUTO-ENTMCNC: 28.8 G/DL (ref 31.4–37.4)
MCHC RBC AUTO-ENTMCNC: 29 G/DL (ref 31.4–37.4)
MCHC RBC AUTO-ENTMCNC: 29.1 G/DL (ref 31.4–37.4)
MCHC RBC AUTO-ENTMCNC: 29.4 G/DL (ref 31.4–37.4)
MCHC RBC AUTO-ENTMCNC: 29.4 G/DL (ref 31.4–37.4)
MCHC RBC AUTO-ENTMCNC: 29.5 G/DL (ref 31.4–37.4)
MCHC RBC AUTO-ENTMCNC: 29.7 G/DL (ref 31.4–37.4)
MCHC RBC AUTO-ENTMCNC: 30 G/DL (ref 31.4–37.4)
MCHC RBC AUTO-ENTMCNC: 30 G/DL (ref 31.4–37.4)
MCHC RBC AUTO-ENTMCNC: 30.1 G/DL (ref 31.4–37.4)
MCV RBC AUTO: 72 FL (ref 82–98)
MCV RBC AUTO: 74 FL (ref 82–98)
MCV RBC AUTO: 74 FL (ref 82–98)
MCV RBC AUTO: 76 FL (ref 82–98)
MCV RBC AUTO: 77 FL (ref 82–98)
MCV RBC AUTO: 78 FL (ref 82–98)
MCV RBC AUTO: 79 FL (ref 82–98)
MCV RBC AUTO: 79 FL (ref 82–98)
MCV RBC AUTO: 81 FL (ref 82–98)
MCV RBC AUTO: 81 FL (ref 82–98)
MCV RBC AUTO: 82 FL (ref 82–98)
METAMYELOCYTES NFR BLD MANUAL: 2 % (ref 0–1)
MICROCYTES BLD QL AUTO: PRESENT
MONOCYTES # BLD AUTO: 0.26 THOUSAND/ΜL (ref 0.17–1.22)
MONOCYTES # BLD AUTO: 0.31 THOUSAND/ΜL (ref 0.17–1.22)
MONOCYTES # BLD AUTO: 0.33 THOUSAND/ΜL (ref 0.17–1.22)
MONOCYTES # BLD AUTO: 0.34 THOUSAND/UL (ref 0–1.22)
MONOCYTES # BLD AUTO: 0.37 THOUSAND/ΜL (ref 0.17–1.22)
MONOCYTES # BLD AUTO: 0.41 THOUSAND/ΜL (ref 0.17–1.22)
MONOCYTES # BLD AUTO: 0.43 THOUSAND/ΜL (ref 0.17–1.22)
MONOCYTES # BLD AUTO: 0.46 THOUSAND/ΜL (ref 0.17–1.22)
MONOCYTES # BLD AUTO: 0.47 THOUSAND/ΜL (ref 0.17–1.22)
MONOCYTES # BLD AUTO: 0.49 THOUSAND/ΜL (ref 0.17–1.22)
MONOCYTES # BLD AUTO: 0.51 THOUSAND/ΜL (ref 0.17–1.22)
MONOCYTES # BLD AUTO: 0.57 THOUSAND/ΜL (ref 0.17–1.22)
MONOCYTES # BLD AUTO: 0.57 THOUSAND/ΜL (ref 0.17–1.22)
MONOCYTES NFR BLD AUTO: 10 % (ref 4–12)
MONOCYTES NFR BLD AUTO: 11 % (ref 4–12)
MONOCYTES NFR BLD AUTO: 11 % (ref 4–12)
MONOCYTES NFR BLD AUTO: 12 % (ref 4–12)
MONOCYTES NFR BLD AUTO: 7 % (ref 4–12)
MONOCYTES NFR BLD AUTO: 7 % (ref 4–12)
MONOCYTES NFR BLD AUTO: 8 % (ref 4–12)
MONOCYTES NFR BLD AUTO: 9 % (ref 4–12)
MONOCYTES NFR BLD: 3 % (ref 4–12)
MYELOCYTES NFR BLD MANUAL: 1 % (ref 0–1)
NEUTROPHILS # BLD AUTO: 1.94 THOUSANDS/ΜL (ref 1.85–7.62)
NEUTROPHILS # BLD AUTO: 2.02 THOUSANDS/ΜL (ref 1.85–7.62)
NEUTROPHILS # BLD AUTO: 2.35 THOUSANDS/ΜL (ref 1.85–7.62)
NEUTROPHILS # BLD AUTO: 2.77 THOUSANDS/ΜL (ref 1.85–7.62)
NEUTROPHILS # BLD AUTO: 2.84 THOUSANDS/ΜL (ref 1.85–7.62)
NEUTROPHILS # BLD AUTO: 2.87 THOUSANDS/ΜL (ref 1.85–7.62)
NEUTROPHILS # BLD AUTO: 3.21 THOUSANDS/ΜL (ref 1.85–7.62)
NEUTROPHILS # BLD AUTO: 3.24 THOUSANDS/ΜL (ref 1.85–7.62)
NEUTROPHILS # BLD AUTO: 3.34 THOUSANDS/ΜL (ref 1.85–7.62)
NEUTROPHILS # BLD AUTO: 3.79 THOUSANDS/ΜL (ref 1.85–7.62)
NEUTROPHILS # BLD AUTO: 3.81 THOUSANDS/ΜL (ref 1.85–7.62)
NEUTROPHILS # BLD AUTO: 3.98 THOUSANDS/ΜL (ref 1.85–7.62)
NEUTROPHILS # BLD AUTO: 4.1 THOUSANDS/ΜL (ref 1.85–7.62)
NEUTROPHILS # BLD AUTO: 4.59 THOUSANDS/ΜL (ref 1.85–7.62)
NEUTROPHILS # BLD MANUAL: 7.08 THOUSAND/UL (ref 1.85–7.62)
NEUTS BAND NFR BLD MANUAL: 2 % (ref 0–8)
NEUTS SEG NFR BLD AUTO: 60 % (ref 43–75)
NEUTS SEG NFR BLD AUTO: 62 % (ref 43–75)
NEUTS SEG NFR BLD AUTO: 64 % (ref 43–75)
NEUTS SEG NFR BLD AUTO: 68 % (ref 43–75)
NEUTS SEG NFR BLD AUTO: 70 % (ref 43–75)
NEUTS SEG NFR BLD AUTO: 71 % (ref 43–75)
NEUTS SEG NFR BLD AUTO: 72 % (ref 43–75)
NEUTS SEG NFR BLD AUTO: 74 % (ref 43–75)
NEUTS SEG NFR BLD AUTO: 74 % (ref 43–75)
NEUTS SEG NFR BLD AUTO: 75 % (ref 43–75)
NEUTS SEG NFR BLD AUTO: 75 % (ref 43–75)
NEUTS SEG NFR BLD AUTO: 76 % (ref 43–75)
NEUTS SEG NFR BLD AUTO: 79 % (ref 43–75)
NITRITE UR QL STRIP: NEGATIVE
NON-SQ EPI CELLS URNS QL MICRO: ABNORMAL /HPF
NRBC BLD AUTO-RTO: 0 /100 WBCS
NRBC BLD AUTO-RTO: 1 /100 WBCS
NT-PROBNP SERPL-MCNC: 4028 PG/ML
OB PNL GAST: POSITIVE
OVALOCYTES BLD QL SMEAR: PRESENT
PCO2 BLD: 21 MMOL/L (ref 21–32)
PCO2 BLD: 45.4 MM HG (ref 36–44)
PH BLD: 7.24 [PH] (ref 7.35–7.45)
PH UR STRIP.AUTO: 5 [PH]
PHOSPHATE SERPL-MCNC: 2.7 MG/DL (ref 2.3–4.1)
PHOSPHATE SERPL-MCNC: 2.9 MG/DL (ref 2.3–4.1)
PHOSPHATE SERPL-MCNC: 3.1 MG/DL (ref 2.3–4.1)
PHOSPHATE SERPL-MCNC: 3.2 MG/DL (ref 2.3–4.1)
PHOSPHATE SERPL-MCNC: 3.2 MG/DL (ref 2.3–4.1)
PHOSPHATE SERPL-MCNC: 3.3 MG/DL (ref 2.3–4.1)
PHOSPHATE SERPL-MCNC: 3.5 MG/DL (ref 2.3–4.1)
PHOSPHATE SERPL-MCNC: 3.6 MG/DL (ref 2.3–4.1)
PHOSPHATE SERPL-MCNC: 5.7 MG/DL (ref 2.3–4.1)
PHOSPHATE SERPL-MCNC: 6.1 MG/DL (ref 2.3–4.1)
PHOSPHATE SERPL-MCNC: 7.5 MG/DL (ref 2.3–4.1)
PLATELET # BLD AUTO: 165 THOUSANDS/UL (ref 149–390)
PLATELET # BLD AUTO: 171 THOUSANDS/UL (ref 149–390)
PLATELET # BLD AUTO: 174 THOUSANDS/UL (ref 149–390)
PLATELET # BLD AUTO: 177 THOUSANDS/UL (ref 149–390)
PLATELET # BLD AUTO: 184 THOUSANDS/UL (ref 149–390)
PLATELET # BLD AUTO: 187 THOUSANDS/UL (ref 149–390)
PLATELET # BLD AUTO: 189 THOUSANDS/UL (ref 149–390)
PLATELET # BLD AUTO: 198 THOUSANDS/UL (ref 149–390)
PLATELET # BLD AUTO: 202 THOUSANDS/UL (ref 149–390)
PLATELET # BLD AUTO: 206 THOUSANDS/UL (ref 149–390)
PLATELET # BLD AUTO: 208 THOUSANDS/UL (ref 149–390)
PLATELET # BLD AUTO: 209 THOUSANDS/UL (ref 149–390)
PLATELET # BLD AUTO: 212 THOUSANDS/UL (ref 149–390)
PLATELET # BLD AUTO: 215 THOUSANDS/UL (ref 149–390)
PLATELET # BLD AUTO: 216 THOUSANDS/UL (ref 149–390)
PLATELET # BLD AUTO: 222 THOUSANDS/UL (ref 149–390)
PLATELET # BLD AUTO: 236 THOUSANDS/UL (ref 149–390)
PLATELET # BLD AUTO: 239 THOUSANDS/UL (ref 149–390)
PLATELET # BLD AUTO: 253 THOUSANDS/UL (ref 149–390)
PLATELET # BLD AUTO: 256 THOUSANDS/UL (ref 149–390)
PLATELET # BLD AUTO: 260 THOUSANDS/UL (ref 149–390)
PLATELET BLD QL SMEAR: ADEQUATE
PMV BLD AUTO: 10.3 FL (ref 8.9–12.7)
PMV BLD AUTO: 8.5 FL (ref 8.9–12.7)
PMV BLD AUTO: 8.8 FL (ref 8.9–12.7)
PMV BLD AUTO: 8.9 FL (ref 8.9–12.7)
PMV BLD AUTO: 8.9 FL (ref 8.9–12.7)
PMV BLD AUTO: 9 FL (ref 8.9–12.7)
PMV BLD AUTO: 9.1 FL (ref 8.9–12.7)
PMV BLD AUTO: 9.2 FL (ref 8.9–12.7)
PMV BLD AUTO: 9.2 FL (ref 8.9–12.7)
PMV BLD AUTO: 9.3 FL (ref 8.9–12.7)
PMV BLD AUTO: 9.3 FL (ref 8.9–12.7)
PMV BLD AUTO: 9.4 FL (ref 8.9–12.7)
PMV BLD AUTO: 9.4 FL (ref 8.9–12.7)
PMV BLD AUTO: 9.5 FL (ref 8.9–12.7)
PMV BLD AUTO: 9.6 FL (ref 8.9–12.7)
PMV BLD AUTO: 9.8 FL (ref 8.9–12.7)
PMV BLD AUTO: 9.9 FL (ref 8.9–12.7)
PO2 BLD: 48 MM HG (ref 75–129)
POLYCHROMASIA BLD QL SMEAR: PRESENT
POTASSIUM BLD-SCNC: 3.7 MMOL/L (ref 3.5–5.3)
POTASSIUM SERPL-SCNC: 3.1 MMOL/L (ref 3.5–5.3)
POTASSIUM SERPL-SCNC: 3.5 MMOL/L (ref 3.5–5.3)
POTASSIUM SERPL-SCNC: 3.6 MMOL/L (ref 3.5–5.3)
POTASSIUM SERPL-SCNC: 3.7 MMOL/L (ref 3.5–5.3)
POTASSIUM SERPL-SCNC: 3.7 MMOL/L (ref 3.5–5.3)
POTASSIUM SERPL-SCNC: 3.8 MMOL/L (ref 3.5–5.3)
POTASSIUM SERPL-SCNC: 3.9 MMOL/L (ref 3.5–5.3)
POTASSIUM SERPL-SCNC: 4 MMOL/L (ref 3.5–5.3)
POTASSIUM SERPL-SCNC: 4.1 MMOL/L (ref 3.5–5.3)
POTASSIUM SERPL-SCNC: 4.2 MMOL/L (ref 3.5–5.3)
POTASSIUM SERPL-SCNC: 4.3 MMOL/L (ref 3.5–5.3)
POTASSIUM SERPL-SCNC: 4.5 MMOL/L (ref 3.5–5.3)
POTASSIUM SERPL-SCNC: 4.6 MMOL/L (ref 3.5–5.3)
POTASSIUM SERPL-SCNC: 4.6 MMOL/L (ref 3.5–5.3)
POTASSIUM SERPL-SCNC: 4.7 MMOL/L (ref 3.5–5.3)
POTASSIUM SERPL-SCNC: 4.7 MMOL/L (ref 3.5–5.3)
POTASSIUM SERPL-SCNC: 5 MMOL/L (ref 3.5–5.3)
POTASSIUM SERPL-SCNC: 5.4 MMOL/L (ref 3.5–5.3)
POTASSIUM SERPL-SCNC: 6.9 MMOL/L (ref 3.5–5.3)
POTASSIUM SERPL-SCNC: 7 MMOL/L (ref 3.5–5.3)
PROCALCITONIN SERPL-MCNC: 0.47 NG/ML
PROT SERPL-MCNC: 4.6 G/DL (ref 6.4–8.2)
PROT SERPL-MCNC: 4.7 G/DL (ref 6.4–8.2)
PROT SERPL-MCNC: 4.7 G/DL (ref 6.4–8.2)
PROT SERPL-MCNC: 4.8 G/DL (ref 6.4–8.2)
PROT SERPL-MCNC: 5.1 G/DL (ref 6.4–8.2)
PROT SERPL-MCNC: 5.1 G/DL (ref 6.4–8.2)
PROT SERPL-MCNC: 5.2 G/DL (ref 6.4–8.2)
PROT SERPL-MCNC: 5.2 G/DL (ref 6.4–8.2)
PROT SERPL-MCNC: 5.4 G/DL (ref 6.4–8.2)
PROT SERPL-MCNC: 5.5 G/DL (ref 6.4–8.2)
PROT SERPL-MCNC: 6.4 G/DL (ref 6.4–8.2)
PROT SERPL-MCNC: 6.5 G/DL (ref 6.4–8.2)
PROT UR STRIP-MCNC: NEGATIVE MG/DL
PROTHROMBIN TIME: 14.7 SECONDS (ref 11.6–14.5)
PROTHROMBIN TIME: 15.4 SECONDS (ref 11.6–14.5)
PROTHROMBIN TIME: 17.3 SECONDS (ref 11.6–14.5)
QRS AXIS: -11 DEGREES
QRS AXIS: -12 DEGREES
QRS AXIS: -12 DEGREES
QRS AXIS: 16 DEGREES
QRSD INTERVAL: 68 MS
QRSD INTERVAL: 72 MS
QT INTERVAL: 338 MS
QT INTERVAL: 338 MS
QT INTERVAL: 348 MS
QT INTERVAL: 372 MS
QTC INTERVAL: 426 MS
QTC INTERVAL: 427 MS
QTC INTERVAL: 427 MS
QTC INTERVAL: 439 MS
RBC # BLD AUTO: 3.59 MILLION/UL (ref 3.88–5.62)
RBC # BLD AUTO: 4.01 MILLION/UL (ref 3.88–5.62)
RBC # BLD AUTO: 4.15 MILLION/UL (ref 3.88–5.62)
RBC # BLD AUTO: 4.2 MILLION/UL (ref 3.88–5.62)
RBC # BLD AUTO: 4.32 MILLION/UL (ref 3.88–5.62)
RBC # BLD AUTO: 4.35 MILLION/UL (ref 3.88–5.62)
RBC # BLD AUTO: 4.4 MILLION/UL (ref 3.88–5.62)
RBC # BLD AUTO: 4.42 MILLION/UL (ref 3.88–5.62)
RBC # BLD AUTO: 4.45 MILLION/UL (ref 3.88–5.62)
RBC # BLD AUTO: 4.47 MILLION/UL (ref 3.88–5.62)
RBC # BLD AUTO: 4.47 MILLION/UL (ref 3.88–5.62)
RBC # BLD AUTO: 4.62 MILLION/UL (ref 3.88–5.62)
RBC # BLD AUTO: 4.64 MILLION/UL (ref 3.88–5.62)
RBC # BLD AUTO: 4.69 MILLION/UL (ref 3.88–5.62)
RBC # BLD AUTO: 4.76 MILLION/UL (ref 3.88–5.62)
RBC # BLD AUTO: 4.8 MILLION/UL (ref 3.88–5.62)
RBC # BLD AUTO: 4.82 MILLION/UL (ref 3.88–5.62)
RBC # BLD AUTO: 4.84 MILLION/UL (ref 3.88–5.62)
RBC # BLD AUTO: 4.92 MILLION/UL (ref 3.88–5.62)
RBC # BLD AUTO: 4.94 MILLION/UL (ref 3.88–5.62)
RBC # BLD AUTO: 5.29 MILLION/UL (ref 3.88–5.62)
RBC #/AREA URNS AUTO: ABNORMAL /HPF
RBC MORPH BLD: PRESENT
RH BLD: POSITIVE
RH BLD: POSITIVE
SAO2 % BLD FROM PO2: 76 % (ref 60–85)
SARS-COV-2 RNA RESP QL NAA+PROBE: NEGATIVE
SARS-COV-2 RNA RESP QL NAA+PROBE: NEGATIVE
SODIUM 24H UR-SCNC: 69 MOL/L
SODIUM BLD-SCNC: 140 MMOL/L (ref 136–145)
SODIUM SERPL-SCNC: 134 MMOL/L (ref 136–145)
SODIUM SERPL-SCNC: 136 MMOL/L (ref 136–145)
SODIUM SERPL-SCNC: 137 MMOL/L (ref 136–145)
SODIUM SERPL-SCNC: 138 MMOL/L (ref 136–145)
SODIUM SERPL-SCNC: 139 MMOL/L (ref 136–145)
SODIUM SERPL-SCNC: 140 MMOL/L (ref 136–145)
SODIUM SERPL-SCNC: 141 MMOL/L (ref 136–145)
SODIUM SERPL-SCNC: 141 MMOL/L (ref 136–145)
SODIUM SERPL-SCNC: 142 MMOL/L (ref 136–145)
SODIUM SERPL-SCNC: 143 MMOL/L (ref 136–145)
SODIUM SERPL-SCNC: 144 MMOL/L (ref 136–145)
SODIUM SERPL-SCNC: 144 MMOL/L (ref 136–145)
SODIUM SERPL-SCNC: 145 MMOL/L (ref 136–145)
SODIUM SERPL-SCNC: 146 MMOL/L (ref 136–145)
SODIUM SERPL-SCNC: 146 MMOL/L (ref 136–145)
SP GR UR STRIP.AUTO: 1.01 (ref 1–1.03)
SPECIMEN EXPIRATION DATE: NORMAL
SPECIMEN EXPIRATION DATE: NORMAL
SPECIMEN SOURCE: ABNORMAL
T WAVE AXIS: 23 DEGREES
T WAVE AXIS: 23 DEGREES
T WAVE AXIS: 28 DEGREES
T WAVE AXIS: 35 DEGREES
TIBC SERPL-MCNC: 355 UG/DL (ref 250–450)
TOTAL CELLS COUNTED SPEC: 100
TRIGL SERPL-MCNC: 118 MG/DL
TROPONIN I SERPL-MCNC: <0.02 NG/ML
TROPONIN I SERPL-MCNC: <0.02 NG/ML
UNIT DISPENSE STATUS: NORMAL
UNIT PRODUCT CODE: NORMAL
UNIT RH: NORMAL
UROBILINOGEN UR QL STRIP.AUTO: 0.2 E.U./DL
UUN 24H UR-MCNC: 127 MG/DL
UUN 24H UR-MCNC: 322 MG/DL
VENTRICULAR RATE: 79 BPM
VENTRICULAR RATE: 96 BPM
VIT B12 SERPL-MCNC: 816 PG/ML (ref 100–900)
WBC # BLD AUTO: 11.42 THOUSAND/UL (ref 4.31–10.16)
WBC # BLD AUTO: 3.11 THOUSAND/UL (ref 4.31–10.16)
WBC # BLD AUTO: 3.14 THOUSAND/UL (ref 4.31–10.16)
WBC # BLD AUTO: 3.29 THOUSAND/UL (ref 4.31–10.16)
WBC # BLD AUTO: 3.7 THOUSAND/UL (ref 4.31–10.16)
WBC # BLD AUTO: 4.03 THOUSAND/UL (ref 4.31–10.16)
WBC # BLD AUTO: 4.09 THOUSAND/UL (ref 4.31–10.16)
WBC # BLD AUTO: 4.23 THOUSAND/UL (ref 4.31–10.16)
WBC # BLD AUTO: 4.38 THOUSAND/UL (ref 4.31–10.16)
WBC # BLD AUTO: 4.41 THOUSAND/UL (ref 4.31–10.16)
WBC # BLD AUTO: 4.75 THOUSAND/UL (ref 4.31–10.16)
WBC # BLD AUTO: 4.76 THOUSAND/UL (ref 4.31–10.16)
WBC # BLD AUTO: 5 THOUSAND/UL (ref 4.31–10.16)
WBC # BLD AUTO: 5.03 THOUSAND/UL (ref 4.31–10.16)
WBC # BLD AUTO: 5.24 THOUSAND/UL (ref 4.31–10.16)
WBC # BLD AUTO: 5.31 THOUSAND/UL (ref 4.31–10.16)
WBC # BLD AUTO: 5.39 THOUSAND/UL (ref 4.31–10.16)
WBC # BLD AUTO: 5.48 THOUSAND/UL (ref 4.31–10.16)
WBC # BLD AUTO: 5.53 THOUSAND/UL (ref 4.31–10.16)
WBC # BLD AUTO: 5.83 THOUSAND/UL (ref 4.31–10.16)
WBC # BLD AUTO: 8.32 THOUSAND/UL (ref 4.31–10.16)
WBC #/AREA URNS AUTO: ABNORMAL /HPF

## 2020-01-01 PROCEDURE — 85025 COMPLETE CBC W/AUTO DIFF WBC: CPT

## 2020-01-01 PROCEDURE — 84484 ASSAY OF TROPONIN QUANT: CPT | Performed by: PHYSICIAN ASSISTANT

## 2020-01-01 PROCEDURE — 84100 ASSAY OF PHOSPHORUS: CPT | Performed by: NURSE PRACTITIONER

## 2020-01-01 PROCEDURE — 86920 COMPATIBILITY TEST SPIN: CPT

## 2020-01-01 PROCEDURE — 82728 ASSAY OF FERRITIN: CPT | Performed by: FAMILY MEDICINE

## 2020-01-01 PROCEDURE — 82948 REAGENT STRIP/BLOOD GLUCOSE: CPT

## 2020-01-01 PROCEDURE — C9113 INJ PANTOPRAZOLE SODIUM, VIA: HCPCS | Performed by: STUDENT IN AN ORGANIZED HEALTH CARE EDUCATION/TRAINING PROGRAM

## 2020-01-01 PROCEDURE — 97116 GAIT TRAINING THERAPY: CPT

## 2020-01-01 PROCEDURE — 36569 INSJ PICC 5 YR+ W/O IMAGING: CPT | Performed by: INTERNAL MEDICINE

## 2020-01-01 PROCEDURE — 99232 SBSQ HOSP IP/OBS MODERATE 35: CPT | Performed by: NURSE PRACTITIONER

## 2020-01-01 PROCEDURE — P9016 RBC LEUKOCYTES REDUCED: HCPCS

## 2020-01-01 PROCEDURE — 3E0436Z INTRODUCTION OF NUTRITIONAL SUBSTANCE INTO CENTRAL VEIN, PERCUTANEOUS APPROACH: ICD-10-PCS | Performed by: FAMILY MEDICINE

## 2020-01-01 PROCEDURE — 99232 SBSQ HOSP IP/OBS MODERATE 35: CPT | Performed by: FAMILY MEDICINE

## 2020-01-01 PROCEDURE — 99233 SBSQ HOSP IP/OBS HIGH 50: CPT | Performed by: INTERNAL MEDICINE

## 2020-01-01 PROCEDURE — 85014 HEMATOCRIT: CPT | Performed by: FAMILY MEDICINE

## 2020-01-01 PROCEDURE — 99232 SBSQ HOSP IP/OBS MODERATE 35: CPT | Performed by: PHYSICIAN ASSISTANT

## 2020-01-01 PROCEDURE — 83036 HEMOGLOBIN GLYCOSYLATED A1C: CPT | Performed by: FAMILY MEDICINE

## 2020-01-01 PROCEDURE — 99232 SBSQ HOSP IP/OBS MODERATE 35: CPT | Performed by: INTERNAL MEDICINE

## 2020-01-01 PROCEDURE — 85025 COMPLETE CBC W/AUTO DIFF WBC: CPT | Performed by: NURSE PRACTITIONER

## 2020-01-01 PROCEDURE — 83735 ASSAY OF MAGNESIUM: CPT | Performed by: NURSE PRACTITIONER

## 2020-01-01 PROCEDURE — 74176 CT ABD & PELVIS W/O CONTRAST: CPT

## 2020-01-01 PROCEDURE — 80053 COMPREHEN METABOLIC PANEL: CPT | Performed by: NURSE PRACTITIONER

## 2020-01-01 PROCEDURE — NC001 PR NO CHARGE: Performed by: INTERNAL MEDICINE

## 2020-01-01 PROCEDURE — 97535 SELF CARE MNGMENT TRAINING: CPT

## 2020-01-01 PROCEDURE — 80048 BASIC METABOLIC PNL TOTAL CA: CPT

## 2020-01-01 PROCEDURE — C9113 INJ PANTOPRAZOLE SODIUM, VIA: HCPCS | Performed by: FAMILY MEDICINE

## 2020-01-01 PROCEDURE — 85025 COMPLETE CBC W/AUTO DIFF WBC: CPT | Performed by: INTERNAL MEDICINE

## 2020-01-01 PROCEDURE — 80048 BASIC METABOLIC PNL TOTAL CA: CPT | Performed by: PHYSICIAN ASSISTANT

## 2020-01-01 PROCEDURE — NC001 PR NO CHARGE: Performed by: PHYSICIAN ASSISTANT

## 2020-01-01 PROCEDURE — 82330 ASSAY OF CALCIUM: CPT

## 2020-01-01 PROCEDURE — 71250 CT THORAX DX C-: CPT

## 2020-01-01 PROCEDURE — 88341 IMHCHEM/IMCYTCHM EA ADD ANTB: CPT | Performed by: PATHOLOGY

## 2020-01-01 PROCEDURE — 80053 COMPREHEN METABOLIC PANEL: CPT | Performed by: INTERNAL MEDICINE

## 2020-01-01 PROCEDURE — 85027 COMPLETE CBC AUTOMATED: CPT | Performed by: NURSE PRACTITIONER

## 2020-01-01 PROCEDURE — 88305 TISSUE EXAM BY PATHOLOGIST: CPT | Performed by: PATHOLOGY

## 2020-01-01 PROCEDURE — 88309 TISSUE EXAM BY PATHOLOGIST: CPT | Performed by: PATHOLOGY

## 2020-01-01 PROCEDURE — 82570 ASSAY OF URINE CREATININE: CPT | Performed by: NURSE PRACTITIONER

## 2020-01-01 PROCEDURE — 99233 SBSQ HOSP IP/OBS HIGH 50: CPT | Performed by: NURSE PRACTITIONER

## 2020-01-01 PROCEDURE — 97164 PT RE-EVAL EST PLAN CARE: CPT

## 2020-01-01 PROCEDURE — 99222 1ST HOSP IP/OBS MODERATE 55: CPT | Performed by: PHYSICIAN ASSISTANT

## 2020-01-01 PROCEDURE — 85025 COMPLETE CBC W/AUTO DIFF WBC: CPT | Performed by: PHYSICIAN ASSISTANT

## 2020-01-01 PROCEDURE — 97166 OT EVAL MOD COMPLEX 45 MIN: CPT

## 2020-01-01 PROCEDURE — 85730 THROMBOPLASTIN TIME PARTIAL: CPT | Performed by: INTERNAL MEDICINE

## 2020-01-01 PROCEDURE — 0DTF0ZZ RESECTION OF RIGHT LARGE INTESTINE, OPEN APPROACH: ICD-10-PCS | Performed by: STUDENT IN AN ORGANIZED HEALTH CARE EDUCATION/TRAINING PROGRAM

## 2020-01-01 PROCEDURE — 94002 VENT MGMT INPAT INIT DAY: CPT

## 2020-01-01 PROCEDURE — 71045 X-RAY EXAM CHEST 1 VIEW: CPT

## 2020-01-01 PROCEDURE — 02H633Z INSERTION OF INFUSION DEVICE INTO RIGHT ATRIUM, PERCUTANEOUS APPROACH: ICD-10-PCS | Performed by: ANESTHESIOLOGY

## 2020-01-01 PROCEDURE — 0DBK8ZX EXCISION OF ASCENDING COLON, VIA NATURAL OR ARTIFICIAL OPENING ENDOSCOPIC, DIAGNOSTIC: ICD-10-PCS | Performed by: INTERNAL MEDICINE

## 2020-01-01 PROCEDURE — 85027 COMPLETE CBC AUTOMATED: CPT | Performed by: FAMILY MEDICINE

## 2020-01-01 PROCEDURE — 97163 PT EVAL HIGH COMPLEX 45 MIN: CPT

## 2020-01-01 PROCEDURE — 99292 CRITICAL CARE ADDL 30 MIN: CPT | Performed by: PHYSICIAN ASSISTANT

## 2020-01-01 PROCEDURE — 99291 CRITICAL CARE FIRST HOUR: CPT | Performed by: PHYSICIAN ASSISTANT

## 2020-01-01 PROCEDURE — 84540 ASSAY OF URINE/UREA-N: CPT | Performed by: NURSE PRACTITIONER

## 2020-01-01 PROCEDURE — 76770 US EXAM ABDO BACK WALL COMP: CPT

## 2020-01-01 PROCEDURE — 85025 COMPLETE CBC W/AUTO DIFF WBC: CPT | Performed by: EMERGENCY MEDICINE

## 2020-01-01 PROCEDURE — 93010 ELECTROCARDIOGRAM REPORT: CPT | Performed by: INTERNAL MEDICINE

## 2020-01-01 PROCEDURE — 83690 ASSAY OF LIPASE: CPT | Performed by: EMERGENCY MEDICINE

## 2020-01-01 PROCEDURE — 86900 BLOOD TYPING SEROLOGIC ABO: CPT | Performed by: EMERGENCY MEDICINE

## 2020-01-01 PROCEDURE — 94760 N-INVAS EAR/PLS OXIMETRY 1: CPT

## 2020-01-01 PROCEDURE — 82271 OCCULT BLOOD OTHER SOURCES: CPT

## 2020-01-01 PROCEDURE — 36415 COLL VENOUS BLD VENIPUNCTURE: CPT | Performed by: EMERGENCY MEDICINE

## 2020-01-01 PROCEDURE — 85018 HEMOGLOBIN: CPT | Performed by: FAMILY MEDICINE

## 2020-01-01 PROCEDURE — 84132 ASSAY OF SERUM POTASSIUM: CPT

## 2020-01-01 PROCEDURE — 83880 ASSAY OF NATRIURETIC PEPTIDE: CPT | Performed by: EMERGENCY MEDICINE

## 2020-01-01 PROCEDURE — 82803 BLOOD GASES ANY COMBINATION: CPT

## 2020-01-01 PROCEDURE — 88342 IMHCHEM/IMCYTCHM 1ST ANTB: CPT | Performed by: PATHOLOGY

## 2020-01-01 PROCEDURE — 99024 POSTOP FOLLOW-UP VISIT: CPT

## 2020-01-01 PROCEDURE — 86900 BLOOD TYPING SEROLOGIC ABO: CPT | Performed by: STUDENT IN AN ORGANIZED HEALTH CARE EDUCATION/TRAINING PROGRAM

## 2020-01-01 PROCEDURE — 80048 BASIC METABOLIC PNL TOTAL CA: CPT | Performed by: NURSE PRACTITIONER

## 2020-01-01 PROCEDURE — 80048 BASIC METABOLIC PNL TOTAL CA: CPT | Performed by: FAMILY MEDICINE

## 2020-01-01 PROCEDURE — 85014 HEMATOCRIT: CPT

## 2020-01-01 PROCEDURE — 3E0436Z INTRODUCTION OF NUTRITIONAL SUBSTANCE INTO CENTRAL VEIN, PERCUTANEOUS APPROACH: ICD-10-PCS | Performed by: INTERNAL MEDICINE

## 2020-01-01 PROCEDURE — 84100 ASSAY OF PHOSPHORUS: CPT | Performed by: PHYSICIAN ASSISTANT

## 2020-01-01 PROCEDURE — 83605 ASSAY OF LACTIC ACID: CPT | Performed by: PHYSICIAN ASSISTANT

## 2020-01-01 PROCEDURE — 83735 ASSAY OF MAGNESIUM: CPT | Performed by: INTERNAL MEDICINE

## 2020-01-01 PROCEDURE — 80048 BASIC METABOLIC PNL TOTAL CA: CPT | Performed by: INTERNAL MEDICINE

## 2020-01-01 PROCEDURE — 85610 PROTHROMBIN TIME: CPT | Performed by: INTERNAL MEDICINE

## 2020-01-01 PROCEDURE — 94640 AIRWAY INHALATION TREATMENT: CPT

## 2020-01-01 PROCEDURE — 87635 SARS-COV-2 COVID-19 AMP PRB: CPT | Performed by: INTERNAL MEDICINE

## 2020-01-01 PROCEDURE — 5A1935Z RESPIRATORY VENTILATION, LESS THAN 24 CONSECUTIVE HOURS: ICD-10-PCS | Performed by: STUDENT IN AN ORGANIZED HEALTH CARE EDUCATION/TRAINING PROGRAM

## 2020-01-01 PROCEDURE — 99285 EMERGENCY DEPT VISIT HI MDM: CPT

## 2020-01-01 PROCEDURE — 83550 IRON BINDING TEST: CPT | Performed by: FAMILY MEDICINE

## 2020-01-01 PROCEDURE — 97110 THERAPEUTIC EXERCISES: CPT

## 2020-01-01 PROCEDURE — 0DB68ZX EXCISION OF STOMACH, VIA NATURAL OR ARTIFICIAL OPENING ENDOSCOPIC, DIAGNOSTIC: ICD-10-PCS | Performed by: INTERNAL MEDICINE

## 2020-01-01 PROCEDURE — 83540 ASSAY OF IRON: CPT | Performed by: FAMILY MEDICINE

## 2020-01-01 PROCEDURE — 84484 ASSAY OF TROPONIN QUANT: CPT | Performed by: EMERGENCY MEDICINE

## 2020-01-01 PROCEDURE — 85025 COMPLETE CBC W/AUTO DIFF WBC: CPT | Performed by: STUDENT IN AN ORGANIZED HEALTH CARE EDUCATION/TRAINING PROGRAM

## 2020-01-01 PROCEDURE — 0D968ZX DRAINAGE OF STOMACH, VIA NATURAL OR ARTIFICIAL OPENING ENDOSCOPIC, DIAGNOSTIC: ICD-10-PCS | Performed by: INTERNAL MEDICINE

## 2020-01-01 PROCEDURE — 85610 PROTHROMBIN TIME: CPT | Performed by: STUDENT IN AN ORGANIZED HEALTH CARE EDUCATION/TRAINING PROGRAM

## 2020-01-01 PROCEDURE — 44160 REMOVAL OF COLON: CPT | Performed by: PHYSICIAN ASSISTANT

## 2020-01-01 PROCEDURE — 97530 THERAPEUTIC ACTIVITIES: CPT

## 2020-01-01 PROCEDURE — 0BH18EZ INSERTION OF ENDOTRACHEAL AIRWAY INTO TRACHEA, VIA NATURAL OR ARTIFICIAL OPENING ENDOSCOPIC: ICD-10-PCS | Performed by: STUDENT IN AN ORGANIZED HEALTH CARE EDUCATION/TRAINING PROGRAM

## 2020-01-01 PROCEDURE — 84300 ASSAY OF URINE SODIUM: CPT | Performed by: INTERNAL MEDICINE

## 2020-01-01 PROCEDURE — 82378 CARCINOEMBRYONIC ANTIGEN: CPT

## 2020-01-01 PROCEDURE — 85610 PROTHROMBIN TIME: CPT | Performed by: FAMILY MEDICINE

## 2020-01-01 PROCEDURE — 80053 COMPREHEN METABOLIC PANEL: CPT | Performed by: EMERGENCY MEDICINE

## 2020-01-01 PROCEDURE — 87635 SARS-COV-2 COVID-19 AMP PRB: CPT | Performed by: FAMILY MEDICINE

## 2020-01-01 PROCEDURE — C9290 INJ, BUPIVACAINE LIPOSOME: HCPCS | Performed by: STUDENT IN AN ORGANIZED HEALTH CARE EDUCATION/TRAINING PROGRAM

## 2020-01-01 PROCEDURE — 81210 BRAF GENE: CPT | Performed by: PATHOLOGY

## 2020-01-01 PROCEDURE — 83735 ASSAY OF MAGNESIUM: CPT | Performed by: FAMILY MEDICINE

## 2020-01-01 PROCEDURE — 93005 ELECTROCARDIOGRAM TRACING: CPT

## 2020-01-01 PROCEDURE — 99223 1ST HOSP IP/OBS HIGH 75: CPT | Performed by: NURSE PRACTITIONER

## 2020-01-01 PROCEDURE — 85007 BL SMEAR W/DIFF WBC COUNT: CPT | Performed by: PHYSICIAN ASSISTANT

## 2020-01-01 PROCEDURE — 74022 RADEX COMPL AQT ABD SERIES: CPT

## 2020-01-01 PROCEDURE — 31500 INSERT EMERGENCY AIRWAY: CPT | Performed by: PHYSICIAN ASSISTANT

## 2020-01-01 PROCEDURE — 81001 URINALYSIS AUTO W/SCOPE: CPT | Performed by: NURSE PRACTITIONER

## 2020-01-01 PROCEDURE — 85025 COMPLETE CBC W/AUTO DIFF WBC: CPT | Performed by: FAMILY MEDICINE

## 2020-01-01 PROCEDURE — 84145 PROCALCITONIN (PCT): CPT | Performed by: PHYSICIAN ASSISTANT

## 2020-01-01 PROCEDURE — 82947 ASSAY GLUCOSE BLOOD QUANT: CPT

## 2020-01-01 PROCEDURE — 80053 COMPREHEN METABOLIC PANEL: CPT | Performed by: FAMILY MEDICINE

## 2020-01-01 PROCEDURE — 87205 SMEAR GRAM STAIN: CPT | Performed by: INTERNAL MEDICINE

## 2020-01-01 PROCEDURE — 99233 SBSQ HOSP IP/OBS HIGH 50: CPT | Performed by: FAMILY MEDICINE

## 2020-01-01 PROCEDURE — 86901 BLOOD TYPING SEROLOGIC RH(D): CPT | Performed by: EMERGENCY MEDICINE

## 2020-01-01 PROCEDURE — 83735 ASSAY OF MAGNESIUM: CPT | Performed by: PHYSICIAN ASSISTANT

## 2020-01-01 PROCEDURE — 36430 TRANSFUSION BLD/BLD COMPNT: CPT

## 2020-01-01 PROCEDURE — 82570 ASSAY OF URINE CREATININE: CPT | Performed by: INTERNAL MEDICINE

## 2020-01-01 PROCEDURE — 85027 COMPLETE CBC AUTOMATED: CPT | Performed by: PHYSICIAN ASSISTANT

## 2020-01-01 PROCEDURE — 83690 ASSAY OF LIPASE: CPT | Performed by: FAMILY MEDICINE

## 2020-01-01 PROCEDURE — 86923 COMPATIBILITY TEST ELECTRIC: CPT

## 2020-01-01 PROCEDURE — 97168 OT RE-EVAL EST PLAN CARE: CPT

## 2020-01-01 PROCEDURE — 86850 RBC ANTIBODY SCREEN: CPT | Performed by: EMERGENCY MEDICINE

## 2020-01-01 PROCEDURE — 99223 1ST HOSP IP/OBS HIGH 75: CPT | Performed by: FAMILY MEDICINE

## 2020-01-01 PROCEDURE — 82746 ASSAY OF FOLIC ACID SERUM: CPT | Performed by: FAMILY MEDICINE

## 2020-01-01 PROCEDURE — 84478 ASSAY OF TRIGLYCERIDES: CPT | Performed by: INTERNAL MEDICINE

## 2020-01-01 PROCEDURE — 86850 RBC ANTIBODY SCREEN: CPT | Performed by: STUDENT IN AN ORGANIZED HEALTH CARE EDUCATION/TRAINING PROGRAM

## 2020-01-01 PROCEDURE — 84295 ASSAY OF SERUM SODIUM: CPT

## 2020-01-01 PROCEDURE — 86901 BLOOD TYPING SEROLOGIC RH(D): CPT | Performed by: STUDENT IN AN ORGANIZED HEALTH CARE EDUCATION/TRAINING PROGRAM

## 2020-01-01 PROCEDURE — 99285 EMERGENCY DEPT VISIT HI MDM: CPT | Performed by: EMERGENCY MEDICINE

## 2020-01-01 PROCEDURE — 82607 VITAMIN B-12: CPT | Performed by: FAMILY MEDICINE

## 2020-01-01 PROCEDURE — 99232 SBSQ HOSP IP/OBS MODERATE 35: CPT

## 2020-01-01 PROCEDURE — 92950 HEART/LUNG RESUSCITATION CPR: CPT | Performed by: PHYSICIAN ASSISTANT

## 2020-01-01 RX ORDER — ACETAMINOPHEN 325 MG/1
650 TABLET ORAL EVERY 6 HOURS PRN
Status: DISCONTINUED | OUTPATIENT
Start: 2020-01-01 | End: 2020-01-01

## 2020-01-01 RX ORDER — METOPROLOL TARTRATE 5 MG/5ML
2.5 INJECTION INTRAVENOUS EVERY 6 HOURS
Status: DISCONTINUED | OUTPATIENT
Start: 2020-01-01 | End: 2020-01-01

## 2020-01-01 RX ORDER — MAGNESIUM SULFATE 1 G/100ML
1 INJECTION INTRAVENOUS ONCE
Status: COMPLETED | OUTPATIENT
Start: 2020-01-01 | End: 2020-01-01

## 2020-01-01 RX ORDER — INSULIN GLARGINE 100 [IU]/ML
30 INJECTION, SOLUTION SUBCUTANEOUS EVERY 12 HOURS SCHEDULED
Status: DISCONTINUED | OUTPATIENT
Start: 2020-01-01 | End: 2020-01-01

## 2020-01-01 RX ORDER — HYDROCODONE BITARTRATE AND ACETAMINOPHEN 5; 325 MG/1; MG/1
1 TABLET ORAL EVERY 4 HOURS PRN
Status: DISCONTINUED | OUTPATIENT
Start: 2020-01-01 | End: 2020-01-01

## 2020-01-01 RX ORDER — INSULIN GLARGINE 100 [IU]/ML
10 INJECTION, SOLUTION SUBCUTANEOUS DAILY
Status: DISCONTINUED | OUTPATIENT
Start: 2020-01-01 | End: 2020-01-01

## 2020-01-01 RX ORDER — METOPROLOL SUCCINATE 25 MG/1
25 TABLET, EXTENDED RELEASE ORAL DAILY
Status: DISCONTINUED | OUTPATIENT
Start: 2020-01-01 | End: 2020-01-01

## 2020-01-01 RX ORDER — PROPOFOL 10 MG/ML
INJECTION, EMULSION INTRAVENOUS CONTINUOUS PRN
Status: DISCONTINUED | OUTPATIENT
Start: 2020-01-01 | End: 2020-01-01 | Stop reason: SURG

## 2020-01-01 RX ORDER — FUROSEMIDE 10 MG/ML
60 INJECTION INTRAMUSCULAR; INTRAVENOUS
Status: DISCONTINUED | OUTPATIENT
Start: 2020-01-01 | End: 2020-01-01

## 2020-01-01 RX ORDER — ONDANSETRON 2 MG/ML
4 INJECTION INTRAMUSCULAR; INTRAVENOUS ONCE
Status: DISCONTINUED | OUTPATIENT
Start: 2020-01-01 | End: 2020-01-01

## 2020-01-01 RX ORDER — INSULIN GLARGINE 100 [IU]/ML
25 INJECTION, SOLUTION SUBCUTANEOUS EVERY MORNING
Status: DISCONTINUED | OUTPATIENT
Start: 2020-01-01 | End: 2020-01-01

## 2020-01-01 RX ORDER — HYDROMORPHONE HCL/PF 1 MG/ML
0.5 SYRINGE (ML) INJECTION ONCE
Status: COMPLETED | OUTPATIENT
Start: 2020-01-01 | End: 2020-01-01

## 2020-01-01 RX ORDER — LORATADINE 10 MG/1
10 TABLET ORAL DAILY
Status: DISCONTINUED | OUTPATIENT
Start: 2020-01-01 | End: 2020-01-01

## 2020-01-01 RX ORDER — INSULIN GLARGINE 100 [IU]/ML
20 INJECTION, SOLUTION SUBCUTANEOUS DAILY
Status: DISCONTINUED | OUTPATIENT
Start: 2020-01-01 | End: 2020-01-01

## 2020-01-01 RX ORDER — XYLITOL/YERBA SANTA
5 AEROSOL, SPRAY WITH PUMP (ML) MUCOUS MEMBRANE ONCE
Status: COMPLETED | OUTPATIENT
Start: 2020-01-01 | End: 2020-01-01

## 2020-01-01 RX ORDER — POLYETHYLENE GLYCOL 3350 17 G/17G
238 POWDER, FOR SOLUTION ORAL
Status: DISCONTINUED | OUTPATIENT
Start: 2020-01-01 | End: 2020-01-01

## 2020-01-01 RX ORDER — SODIUM CHLORIDE, SODIUM GLUCONATE, SODIUM ACETATE, POTASSIUM CHLORIDE, MAGNESIUM CHLORIDE, SODIUM PHOSPHATE, DIBASIC, AND POTASSIUM PHOSPHATE .53; .5; .37; .037; .03; .012; .00082 G/100ML; G/100ML; G/100ML; G/100ML; G/100ML; G/100ML; G/100ML
50 INJECTION, SOLUTION INTRAVENOUS CONTINUOUS
Status: DISCONTINUED | OUTPATIENT
Start: 2020-01-01 | End: 2020-01-01

## 2020-01-01 RX ORDER — INSULIN GLARGINE 100 [IU]/ML
13 INJECTION, SOLUTION SUBCUTANEOUS EVERY 12 HOURS SCHEDULED
Status: DISCONTINUED | OUTPATIENT
Start: 2020-01-01 | End: 2020-01-01

## 2020-01-01 RX ORDER — INSULIN GLARGINE 100 [IU]/ML
15 INJECTION, SOLUTION SUBCUTANEOUS EVERY MORNING
Status: DISCONTINUED | OUTPATIENT
Start: 2020-01-01 | End: 2020-01-01

## 2020-01-01 RX ORDER — DEXTROSE MONOHYDRATE 25 G/50ML
25 INJECTION, SOLUTION INTRAVENOUS ONCE
Status: COMPLETED | OUTPATIENT
Start: 2020-01-01 | End: 2020-01-01

## 2020-01-01 RX ORDER — XYLITOL/YERBA SANTA
5 AEROSOL, SPRAY WITH PUMP (ML) MUCOUS MEMBRANE 4 TIMES DAILY
Status: DISCONTINUED | OUTPATIENT
Start: 2020-01-01 | End: 2020-01-01

## 2020-01-01 RX ORDER — INSULIN GLARGINE 100 [IU]/ML
25 INJECTION, SOLUTION SUBCUTANEOUS DAILY
Status: DISCONTINUED | OUTPATIENT
Start: 2020-01-01 | End: 2020-01-01

## 2020-01-01 RX ORDER — SUCCINYLCHOLINE/SOD CL,ISO/PF 100 MG/5ML
SYRINGE (ML) INTRAVENOUS AS NEEDED
Status: DISCONTINUED | OUTPATIENT
Start: 2020-01-01 | End: 2020-01-01 | Stop reason: SURG

## 2020-01-01 RX ORDER — INSULIN GLARGINE 100 [IU]/ML
18 INJECTION, SOLUTION SUBCUTANEOUS EVERY 12 HOURS SCHEDULED
Status: DISCONTINUED | OUTPATIENT
Start: 2020-01-01 | End: 2020-01-01

## 2020-01-01 RX ORDER — POTASSIUM CHLORIDE 20 MEQ/1
40 TABLET, EXTENDED RELEASE ORAL DAILY
Status: DISCONTINUED | OUTPATIENT
Start: 2020-01-01 | End: 2020-01-01

## 2020-01-01 RX ORDER — CALCIUM GLUCONATE 20 MG/ML
1 INJECTION, SOLUTION INTRAVENOUS ONCE
Status: DISCONTINUED | OUTPATIENT
Start: 2020-01-01 | End: 2020-01-01

## 2020-01-01 RX ORDER — ALBUMIN, HUMAN INJ 5% 5 %
12.5 SOLUTION INTRAVENOUS ONCE
Status: COMPLETED | OUTPATIENT
Start: 2020-01-01 | End: 2020-01-01

## 2020-01-01 RX ORDER — GUAIFENESIN 600 MG
600 TABLET, EXTENDED RELEASE 12 HR ORAL EVERY 12 HOURS SCHEDULED
Status: DISCONTINUED | OUTPATIENT
Start: 2020-01-01 | End: 2020-01-01

## 2020-01-01 RX ORDER — FUROSEMIDE 40 MG/1
40 TABLET ORAL DAILY
COMMUNITY
Start: 2020-01-01 | End: 2020-01-01 | Stop reason: HOSPADM

## 2020-01-01 RX ORDER — FUROSEMIDE 80 MG
80 TABLET ORAL DAILY
Status: DISCONTINUED | OUTPATIENT
Start: 2020-01-01 | End: 2020-01-01

## 2020-01-01 RX ORDER — FUROSEMIDE 10 MG/ML
40 INJECTION INTRAMUSCULAR; INTRAVENOUS ONCE
Status: DISCONTINUED | OUTPATIENT
Start: 2020-01-01 | End: 2020-01-01

## 2020-01-01 RX ORDER — FENTANYL CITRATE 50 UG/ML
INJECTION, SOLUTION INTRAMUSCULAR; INTRAVENOUS AS NEEDED
Status: DISCONTINUED | OUTPATIENT
Start: 2020-01-01 | End: 2020-01-01 | Stop reason: SURG

## 2020-01-01 RX ORDER — INSULIN GLARGINE 100 [IU]/ML
20 INJECTION, SOLUTION SUBCUTANEOUS EVERY MORNING
Status: DISCONTINUED | OUTPATIENT
Start: 2020-01-01 | End: 2020-01-01

## 2020-01-01 RX ORDER — PROPOFOL 10 MG/ML
INJECTION, EMULSION INTRAVENOUS AS NEEDED
Status: DISCONTINUED | OUTPATIENT
Start: 2020-01-01 | End: 2020-01-01 | Stop reason: SURG

## 2020-01-01 RX ORDER — FUROSEMIDE 10 MG/ML
40 INJECTION INTRAMUSCULAR; INTRAVENOUS ONCE
Status: COMPLETED | OUTPATIENT
Start: 2020-01-01 | End: 2020-01-01

## 2020-01-01 RX ORDER — FUROSEMIDE 10 MG/ML
40 INJECTION INTRAMUSCULAR; INTRAVENOUS
Status: DISCONTINUED | OUTPATIENT
Start: 2020-01-01 | End: 2020-01-01

## 2020-01-01 RX ORDER — METFORMIN HYDROCHLORIDE 500 MG/1
500 TABLET, EXTENDED RELEASE ORAL DAILY
COMMUNITY
Start: 2019-01-01 | End: 2020-01-01 | Stop reason: HOSPADM

## 2020-01-01 RX ORDER — POTASSIUM CHLORIDE 14.9 MG/ML
20 INJECTION INTRAVENOUS ONCE
Status: COMPLETED | OUTPATIENT
Start: 2020-01-01 | End: 2020-01-01

## 2020-01-01 RX ORDER — SIMVASTATIN 40 MG
40 TABLET ORAL DAILY
COMMUNITY
Start: 2019-02-06 | End: 2020-01-01 | Stop reason: HOSPADM

## 2020-01-01 RX ORDER — ONDANSETRON 2 MG/ML
4 INJECTION INTRAMUSCULAR; INTRAVENOUS ONCE AS NEEDED
Status: COMPLETED | OUTPATIENT
Start: 2020-01-01 | End: 2020-01-01

## 2020-01-01 RX ORDER — XYLITOL/YERBA SANTA
5 AEROSOL, SPRAY WITH PUMP (ML) MUCOUS MEMBRANE 4 TIMES DAILY PRN
Status: DISCONTINUED | OUTPATIENT
Start: 2020-01-01 | End: 2020-01-01

## 2020-01-01 RX ORDER — HEPARIN SODIUM 10000 [USP'U]/100ML
3-20 INJECTION, SOLUTION INTRAVENOUS
Status: DISCONTINUED | OUTPATIENT
Start: 2020-01-01 | End: 2020-01-01

## 2020-01-01 RX ORDER — SODIUM CHLORIDE 450 MG/100ML
60 INJECTION, SOLUTION INTRAVENOUS CONTINUOUS
Status: DISCONTINUED | OUTPATIENT
Start: 2020-01-01 | End: 2020-01-01

## 2020-01-01 RX ORDER — MORPHINE SULFATE 4 MG/ML
4 INJECTION, SOLUTION INTRAMUSCULAR; INTRAVENOUS
Status: DISCONTINUED | OUTPATIENT
Start: 2020-01-01 | End: 2020-01-01

## 2020-01-01 RX ORDER — LIDOCAINE HYDROCHLORIDE 10 MG/ML
INJECTION, SOLUTION EPIDURAL; INFILTRATION; INTRACAUDAL; PERINEURAL AS NEEDED
Status: DISCONTINUED | OUTPATIENT
Start: 2020-01-01 | End: 2020-01-01 | Stop reason: SURG

## 2020-01-01 RX ORDER — PROPOFOL 10 MG/ML
INJECTION, EMULSION INTRAVENOUS AS NEEDED
Status: DISCONTINUED | OUTPATIENT
Start: 2020-01-01 | End: 2020-01-01

## 2020-01-01 RX ORDER — HYDROCODONE BITARTRATE AND ACETAMINOPHEN 5; 325 MG/1; MG/1
1 TABLET ORAL 4 TIMES DAILY PRN
COMMUNITY
Start: 2019-01-01 | End: 2020-01-01 | Stop reason: HOSPADM

## 2020-01-01 RX ORDER — FENTANYL CITRATE/PF 50 MCG/ML
25 SYRINGE (ML) INJECTION
Status: DISCONTINUED | OUTPATIENT
Start: 2020-01-01 | End: 2020-01-01

## 2020-01-01 RX ORDER — OLANZAPINE 10 MG/1
2.5 INJECTION, POWDER, LYOPHILIZED, FOR SOLUTION INTRAMUSCULAR ONCE
Status: COMPLETED | OUTPATIENT
Start: 2020-01-01 | End: 2020-01-01

## 2020-01-01 RX ORDER — ONDANSETRON 2 MG/ML
4 INJECTION INTRAMUSCULAR; INTRAVENOUS EVERY 8 HOURS PRN
Status: DISCONTINUED | OUTPATIENT
Start: 2020-01-01 | End: 2020-01-01

## 2020-01-01 RX ORDER — OXYCODONE HYDROCHLORIDE 5 MG/1
5 TABLET ORAL EVERY 4 HOURS PRN
Status: DISCONTINUED | OUTPATIENT
Start: 2020-01-01 | End: 2020-01-01

## 2020-01-01 RX ORDER — SODIUM CHLORIDE, SODIUM GLUCONATE, SODIUM ACETATE, POTASSIUM CHLORIDE, MAGNESIUM CHLORIDE, SODIUM PHOSPHATE, DIBASIC, AND POTASSIUM PHOSPHATE .53; .5; .37; .037; .03; .012; .00082 G/100ML; G/100ML; G/100ML; G/100ML; G/100ML; G/100ML; G/100ML
500 INJECTION, SOLUTION INTRAVENOUS ONCE
Status: COMPLETED | OUTPATIENT
Start: 2020-01-01 | End: 2020-01-01

## 2020-01-01 RX ORDER — FLUCONAZOLE 200 MG/100ML
100 INJECTION, SOLUTION INTRAVENOUS EVERY 24 HOURS
Status: DISCONTINUED | OUTPATIENT
Start: 2020-01-01 | End: 2020-01-01

## 2020-01-01 RX ORDER — GLIPIZIDE 10 MG/1
10 TABLET ORAL DAILY
COMMUNITY
Start: 2020-01-01 | End: 2020-01-01 | Stop reason: HOSPADM

## 2020-01-01 RX ORDER — METOPROLOL TARTRATE 5 MG/5ML
5 INJECTION INTRAVENOUS ONCE
Status: COMPLETED | OUTPATIENT
Start: 2020-01-01 | End: 2020-01-01

## 2020-01-01 RX ORDER — HEPARIN SODIUM 5000 [USP'U]/ML
5000 INJECTION, SOLUTION INTRAVENOUS; SUBCUTANEOUS EVERY 8 HOURS SCHEDULED
Status: DISCONTINUED | OUTPATIENT
Start: 2020-01-01 | End: 2020-01-01

## 2020-01-01 RX ORDER — BUPIVACAINE HYDROCHLORIDE 5 MG/ML
INJECTION, SOLUTION PERINEURAL
Status: DISCONTINUED | OUTPATIENT
Start: 2020-01-01 | End: 2020-01-01 | Stop reason: SURG

## 2020-01-01 RX ORDER — PANTOPRAZOLE SODIUM 40 MG/1
40 INJECTION, POWDER, FOR SOLUTION INTRAVENOUS EVERY 12 HOURS SCHEDULED
Status: DISCONTINUED | OUTPATIENT
Start: 2020-01-01 | End: 2020-01-01

## 2020-01-01 RX ORDER — PRAVASTATIN SODIUM 40 MG
40 TABLET ORAL
Status: DISCONTINUED | OUTPATIENT
Start: 2020-01-01 | End: 2020-01-01

## 2020-01-01 RX ORDER — DILTIAZEM HYDROCHLORIDE 5 MG/ML
15 INJECTION INTRAVENOUS ONCE
Status: COMPLETED | OUTPATIENT
Start: 2020-01-01 | End: 2020-01-01

## 2020-01-01 RX ORDER — EPINEPHRINE 0.1 MG/ML
SYRINGE (ML) INJECTION CODE/TRAUMA/SEDATION MEDICATION
Status: COMPLETED | OUTPATIENT
Start: 2020-01-01 | End: 2020-01-01

## 2020-01-01 RX ORDER — ONDANSETRON 2 MG/ML
4 INJECTION INTRAMUSCULAR; INTRAVENOUS EVERY 6 HOURS PRN
Status: DISCONTINUED | OUTPATIENT
Start: 2020-01-01 | End: 2020-01-01

## 2020-01-01 RX ORDER — NEOSTIGMINE METHYLSULFATE 1 MG/ML
INJECTION INTRAVENOUS AS NEEDED
Status: DISCONTINUED | OUTPATIENT
Start: 2020-01-01 | End: 2020-01-01 | Stop reason: SURG

## 2020-01-01 RX ORDER — ALBUMIN, HUMAN INJ 5% 5 %
SOLUTION INTRAVENOUS CONTINUOUS PRN
Status: DISCONTINUED | OUTPATIENT
Start: 2020-01-01 | End: 2020-01-01 | Stop reason: SURG

## 2020-01-01 RX ORDER — ONDANSETRON 2 MG/ML
4 INJECTION INTRAMUSCULAR; INTRAVENOUS ONCE
Status: COMPLETED | OUTPATIENT
Start: 2020-01-01 | End: 2020-01-01

## 2020-01-01 RX ORDER — MAGNESIUM HYDROXIDE 1200 MG/15ML
LIQUID ORAL AS NEEDED
Status: DISCONTINUED | OUTPATIENT
Start: 2020-01-01 | End: 2020-01-01 | Stop reason: HOSPADM

## 2020-01-01 RX ORDER — INSULIN GLARGINE 100 [IU]/ML
10 INJECTION, SOLUTION SUBCUTANEOUS
Status: DISCONTINUED | OUTPATIENT
Start: 2020-01-01 | End: 2020-01-01

## 2020-01-01 RX ORDER — LEVALBUTEROL 1.25 MG/.5ML
1.25 SOLUTION, CONCENTRATE RESPIRATORY (INHALATION) EVERY 8 HOURS PRN
Status: DISCONTINUED | OUTPATIENT
Start: 2020-01-01 | End: 2020-01-01

## 2020-01-01 RX ORDER — FUROSEMIDE 40 MG/1
40 TABLET ORAL
Status: DISCONTINUED | OUTPATIENT
Start: 2020-01-01 | End: 2020-01-01

## 2020-01-01 RX ORDER — HYDROMORPHONE HCL/PF 1 MG/ML
0.2 SYRINGE (ML) INJECTION
Status: DISCONTINUED | OUTPATIENT
Start: 2020-01-01 | End: 2020-01-01

## 2020-01-01 RX ORDER — POLYETHYLENE GLYCOL 3350 17 G/17G
238 POWDER, FOR SOLUTION ORAL
Status: COMPLETED | OUTPATIENT
Start: 2020-01-01 | End: 2020-01-01

## 2020-01-01 RX ORDER — KETAMINE HCL IN NACL, ISO-OSM 100MG/10ML
SYRINGE (ML) INJECTION AS NEEDED
Status: DISCONTINUED | OUTPATIENT
Start: 2020-01-01 | End: 2020-01-01 | Stop reason: SURG

## 2020-01-01 RX ORDER — METOLAZONE 5 MG/1
5 TABLET ORAL
Status: DISCONTINUED | OUTPATIENT
Start: 2020-01-01 | End: 2020-01-01

## 2020-01-01 RX ORDER — ONDANSETRON 2 MG/ML
INJECTION INTRAMUSCULAR; INTRAVENOUS AS NEEDED
Status: DISCONTINUED | OUTPATIENT
Start: 2020-01-01 | End: 2020-01-01 | Stop reason: SURG

## 2020-01-01 RX ORDER — ETOMIDATE 2 MG/ML
INJECTION INTRAVENOUS CODE/TRAUMA/SEDATION MEDICATION
Status: COMPLETED | OUTPATIENT
Start: 2020-01-01 | End: 2020-01-01

## 2020-01-01 RX ORDER — MORPHINE SULFATE 4 MG/ML
4 INJECTION, SOLUTION INTRAMUSCULAR; INTRAVENOUS ONCE
Status: DISCONTINUED | OUTPATIENT
Start: 2020-01-01 | End: 2020-01-01

## 2020-01-01 RX ORDER — FUROSEMIDE 10 MG/ML
60 INJECTION INTRAMUSCULAR; INTRAVENOUS ONCE
Status: COMPLETED | OUTPATIENT
Start: 2020-01-01 | End: 2020-01-01

## 2020-01-01 RX ORDER — METOCLOPRAMIDE HYDROCHLORIDE 5 MG/ML
10 INJECTION INTRAMUSCULAR; INTRAVENOUS EVERY 6 HOURS SCHEDULED
Status: DISCONTINUED | OUTPATIENT
Start: 2020-01-01 | End: 2020-01-01

## 2020-01-01 RX ORDER — IPRATROPIUM BROMIDE 42 UG/1
2 SPRAY, METERED NASAL
COMMUNITY
Start: 2019-02-06 | End: 2020-01-01 | Stop reason: HOSPADM

## 2020-01-01 RX ORDER — INSULIN GLARGINE 100 [IU]/ML
15 INJECTION, SOLUTION SUBCUTANEOUS EVERY 12 HOURS SCHEDULED
Status: DISCONTINUED | OUTPATIENT
Start: 2020-01-01 | End: 2020-01-01

## 2020-01-01 RX ORDER — ATORVASTATIN CALCIUM 40 MG/1
40 TABLET, FILM COATED ORAL
Status: DISCONTINUED | OUTPATIENT
Start: 2020-01-01 | End: 2020-01-01

## 2020-01-01 RX ORDER — SIMVASTATIN 80 MG
40 TABLET ORAL DAILY
Status: ON HOLD | COMMUNITY
End: 2020-01-01

## 2020-01-01 RX ORDER — SODIUM CHLORIDE, SODIUM LACTATE, POTASSIUM CHLORIDE, CALCIUM CHLORIDE 600; 310; 30; 20 MG/100ML; MG/100ML; MG/100ML; MG/100ML
INJECTION, SOLUTION INTRAVENOUS CONTINUOUS PRN
Status: DISCONTINUED | OUTPATIENT
Start: 2020-01-01 | End: 2020-01-01 | Stop reason: SURG

## 2020-01-01 RX ORDER — INSULIN GLARGINE 100 [IU]/ML
15 INJECTION, SOLUTION SUBCUTANEOUS ONCE
Status: COMPLETED | OUTPATIENT
Start: 2020-01-01 | End: 2020-01-01

## 2020-01-01 RX ORDER — INSULIN GLARGINE 100 [IU]/ML
12 INJECTION, SOLUTION SUBCUTANEOUS
Status: DISCONTINUED | OUTPATIENT
Start: 2020-01-01 | End: 2020-01-01

## 2020-01-01 RX ORDER — FUROSEMIDE 10 MG/ML
20 INJECTION INTRAMUSCULAR; INTRAVENOUS ONCE
Status: COMPLETED | OUTPATIENT
Start: 2020-01-01 | End: 2020-01-01

## 2020-01-01 RX ORDER — BISACODYL 10 MG
10 SUPPOSITORY, RECTAL RECTAL DAILY
Status: DISCONTINUED | OUTPATIENT
Start: 2020-01-01 | End: 2020-01-01

## 2020-01-01 RX ORDER — SUCCINYLCHOLINE/SOD CL,ISO/PF 100 MG/5ML
SYRINGE (ML) INTRAVENOUS CODE/TRAUMA/SEDATION MEDICATION
Status: COMPLETED | OUTPATIENT
Start: 2020-01-01 | End: 2020-01-01

## 2020-01-01 RX ORDER — ALBUMIN (HUMAN) 12.5 G/50ML
12.5 SOLUTION INTRAVENOUS ONCE
Status: COMPLETED | OUTPATIENT
Start: 2020-01-01 | End: 2020-01-01

## 2020-01-01 RX ORDER — METOPROLOL TARTRATE 5 MG/5ML
2.5 INJECTION INTRAVENOUS EVERY 6 HOURS PRN
Status: DISCONTINUED | OUTPATIENT
Start: 2020-01-01 | End: 2020-01-01

## 2020-01-01 RX ORDER — SUCRALFATE ORAL 1 G/10ML
1000 SUSPENSION ORAL 2 TIMES DAILY
Status: DISCONTINUED | OUTPATIENT
Start: 2020-01-01 | End: 2020-01-01

## 2020-01-01 RX ORDER — LEVALBUTEROL INHALATION SOLUTION 0.63 MG/3ML
0.63 SOLUTION RESPIRATORY (INHALATION) EVERY 8 HOURS PRN
Status: DISCONTINUED | OUTPATIENT
Start: 2020-01-01 | End: 2020-01-01

## 2020-01-01 RX ORDER — OXYCODONE HYDROCHLORIDE 5 MG/1
5 TABLET ORAL EVERY 6 HOURS PRN
Status: DISCONTINUED | OUTPATIENT
Start: 2020-01-01 | End: 2020-01-01

## 2020-01-01 RX ORDER — SODIUM BICARBONATE 84 MG/ML
INJECTION, SOLUTION INTRAVENOUS CODE/TRAUMA/SEDATION MEDICATION
Status: COMPLETED | OUTPATIENT
Start: 2020-01-01 | End: 2020-01-01

## 2020-01-01 RX ORDER — VECURONIUM BROMIDE 1 MG/ML
10 INJECTION, POWDER, LYOPHILIZED, FOR SOLUTION INTRAVENOUS ONCE
Status: COMPLETED | OUTPATIENT
Start: 2020-01-01 | End: 2020-01-01

## 2020-01-01 RX ORDER — HYDROMORPHONE HCL/PF 1 MG/ML
0.2 SYRINGE (ML) INJECTION EVERY 4 HOURS PRN
Status: DISCONTINUED | OUTPATIENT
Start: 2020-01-01 | End: 2020-01-01

## 2020-01-01 RX ORDER — ASPIRIN 81 MG/1
81 TABLET ORAL DAILY
COMMUNITY
End: 2020-01-01 | Stop reason: HOSPADM

## 2020-01-01 RX ORDER — SODIUM CHLORIDE 9 MG/ML
75 INJECTION, SOLUTION INTRAVENOUS CONTINUOUS
Status: DISCONTINUED | OUTPATIENT
Start: 2020-01-01 | End: 2020-01-01

## 2020-01-01 RX ORDER — CIPROFLOXACIN 2 MG/ML
400 INJECTION, SOLUTION INTRAVENOUS EVERY 12 HOURS
Status: DISCONTINUED | OUTPATIENT
Start: 2020-01-01 | End: 2020-01-01

## 2020-01-01 RX ORDER — FEXOFENADINE HCL 180 MG/1
180 TABLET ORAL
COMMUNITY
Start: 2018-12-05 | End: 2020-01-01 | Stop reason: HOSPADM

## 2020-01-01 RX ORDER — ROCURONIUM BROMIDE 10 MG/ML
INJECTION, SOLUTION INTRAVENOUS AS NEEDED
Status: DISCONTINUED | OUTPATIENT
Start: 2020-01-01 | End: 2020-01-01 | Stop reason: SURG

## 2020-01-01 RX ORDER — DEXTROSE AND SODIUM CHLORIDE 5; .45 G/100ML; G/100ML
50 INJECTION, SOLUTION INTRAVENOUS CONTINUOUS
Status: DISCONTINUED | OUTPATIENT
Start: 2020-01-01 | End: 2020-01-01

## 2020-01-01 RX ORDER — HYDROMORPHONE HCL/PF 1 MG/ML
0.2 SYRINGE (ML) INJECTION ONCE
Status: COMPLETED | OUTPATIENT
Start: 2020-01-01 | End: 2020-01-01

## 2020-01-01 RX ORDER — FENTANYL CITRATE-0.9 % NACL/PF 10 MCG/ML
50 PLASTIC BAG, INJECTION (ML) INTRAVENOUS CONTINUOUS
Status: DISCONTINUED | OUTPATIENT
Start: 2020-01-01 | End: 2020-01-01

## 2020-01-01 RX ORDER — PROPOFOL 10 MG/ML
5-50 INJECTION, EMULSION INTRAVENOUS
Status: DISCONTINUED | OUTPATIENT
Start: 2020-01-01 | End: 2020-01-01

## 2020-01-01 RX ORDER — POTASSIUM CHLORIDE 20MEQ/15ML
40 LIQUID (ML) ORAL ONCE
Status: COMPLETED | OUTPATIENT
Start: 2020-01-01 | End: 2020-01-01

## 2020-01-01 RX ORDER — METOPROLOL SUCCINATE 25 MG/1
25 TABLET, EXTENDED RELEASE ORAL DAILY
COMMUNITY
Start: 2020-01-01 | End: 2020-01-01 | Stop reason: HOSPADM

## 2020-01-01 RX ORDER — GLYCOPYRROLATE 0.2 MG/ML
INJECTION INTRAMUSCULAR; INTRAVENOUS AS NEEDED
Status: DISCONTINUED | OUTPATIENT
Start: 2020-01-01 | End: 2020-01-01 | Stop reason: SURG

## 2020-01-01 RX ORDER — INSULIN GLARGINE 100 [IU]/ML
5 INJECTION, SOLUTION SUBCUTANEOUS
Status: DISCONTINUED | OUTPATIENT
Start: 2020-01-01 | End: 2020-01-01

## 2020-01-01 RX ORDER — INSULIN GLARGINE 100 [IU]/ML
15 INJECTION, SOLUTION SUBCUTANEOUS
Status: DISCONTINUED | OUTPATIENT
Start: 2020-01-01 | End: 2020-01-01

## 2020-01-01 RX ORDER — PANTOPRAZOLE SODIUM 40 MG/1
40 INJECTION, POWDER, FOR SOLUTION INTRAVENOUS EVERY 12 HOURS SCHEDULED
Status: DISCONTINUED | OUTPATIENT
Start: 2020-01-01 | End: 2020-01-01 | Stop reason: SDUPTHER

## 2020-01-01 RX ORDER — SODIUM CHLORIDE, SODIUM GLUCONATE, SODIUM ACETATE, POTASSIUM CHLORIDE, MAGNESIUM CHLORIDE, SODIUM PHOSPHATE, DIBASIC, AND POTASSIUM PHOSPHATE .53; .5; .37; .037; .03; .012; .00082 G/100ML; G/100ML; G/100ML; G/100ML; G/100ML; G/100ML; G/100ML
75 INJECTION, SOLUTION INTRAVENOUS CONTINUOUS
Status: DISCONTINUED | OUTPATIENT
Start: 2020-01-01 | End: 2020-01-01

## 2020-01-01 RX ORDER — INSULIN GLARGINE 100 [IU]/ML
17 INJECTION, SOLUTION SUBCUTANEOUS
Status: DISCONTINUED | OUTPATIENT
Start: 2020-01-01 | End: 2020-01-01

## 2020-01-01 RX ORDER — ASPIRIN 81 MG/1
81 TABLET ORAL DAILY
Status: DISCONTINUED | OUTPATIENT
Start: 2020-01-01 | End: 2020-01-01

## 2020-01-01 RX ADMIN — INSULIN GLARGINE 12 UNITS: 100 INJECTION, SOLUTION SUBCUTANEOUS at 22:07

## 2020-01-01 RX ADMIN — METOCLOPRAMIDE HYDROCHLORIDE 10 MG: 5 INJECTION INTRAMUSCULAR; INTRAVENOUS at 18:16

## 2020-01-01 RX ADMIN — IRON SUCROSE 200 MG: 20 INJECTION, SOLUTION INTRAVENOUS at 09:19

## 2020-01-01 RX ADMIN — METOPROLOL TARTRATE 2.5 MG: 5 INJECTION, SOLUTION INTRAVENOUS at 18:23

## 2020-01-01 RX ADMIN — METOPROLOL TARTRATE 2.5 MG: 5 INJECTION, SOLUTION INTRAVENOUS at 12:35

## 2020-01-01 RX ADMIN — APIXABAN 2.5 MG: 2.5 TABLET, FILM COATED ORAL at 09:03

## 2020-01-01 RX ADMIN — METRONIDAZOLE 500 MG: 500 INJECTION, SOLUTION INTRAVENOUS at 17:26

## 2020-01-01 RX ADMIN — Medication: at 21:06

## 2020-01-01 RX ADMIN — HEPARIN SODIUM 5000 UNITS: 5000 INJECTION INTRAVENOUS; SUBCUTANEOUS at 14:13

## 2020-01-01 RX ADMIN — ONDANSETRON 4 MG: 2 INJECTION INTRAMUSCULAR; INTRAVENOUS at 13:05

## 2020-01-01 RX ADMIN — PROPOFOL 20 MG: 10 INJECTION, EMULSION INTRAVENOUS at 14:33

## 2020-01-01 RX ADMIN — APIXABAN 2.5 MG: 2.5 TABLET, FILM COATED ORAL at 10:27

## 2020-01-01 RX ADMIN — HEPARIN SODIUM 5000 UNITS: 5000 INJECTION INTRAVENOUS; SUBCUTANEOUS at 05:22

## 2020-01-01 RX ADMIN — Medication 5 SPRAY: at 12:36

## 2020-01-01 RX ADMIN — SUCRALFATE 1000 MG: 1 SUSPENSION ORAL at 08:47

## 2020-01-01 RX ADMIN — FUROSEMIDE 40 MG: 10 INJECTION, SOLUTION INTRAMUSCULAR; INTRAVENOUS at 15:00

## 2020-01-01 RX ADMIN — METOPROLOL SUCCINATE 25 MG: 25 TABLET, EXTENDED RELEASE ORAL at 08:31

## 2020-01-01 RX ADMIN — SODIUM CHLORIDE, SODIUM GLUCONATE, SODIUM ACETATE, POTASSIUM CHLORIDE, MAGNESIUM CHLORIDE, SODIUM PHOSPHATE, DIBASIC, AND POTASSIUM PHOSPHATE 75 ML/HR: .53; .5; .37; .037; .03; .012; .00082 INJECTION, SOLUTION INTRAVENOUS at 16:21

## 2020-01-01 RX ADMIN — METOCLOPRAMIDE HYDROCHLORIDE 10 MG: 5 INJECTION INTRAMUSCULAR; INTRAVENOUS at 12:35

## 2020-01-01 RX ADMIN — HEPARIN SODIUM 5000 UNITS: 5000 INJECTION INTRAVENOUS; SUBCUTANEOUS at 06:10

## 2020-01-01 RX ADMIN — METOPROLOL SUCCINATE 25 MG: 25 TABLET, EXTENDED RELEASE ORAL at 09:03

## 2020-01-01 RX ADMIN — METRONIDAZOLE 500 MG: 500 INJECTION, SOLUTION INTRAVENOUS at 10:54

## 2020-01-01 RX ADMIN — APIXABAN 2.5 MG: 2.5 TABLET, FILM COATED ORAL at 17:03

## 2020-01-01 RX ADMIN — PROPOFOL 20 MG: 10 INJECTION, EMULSION INTRAVENOUS at 14:24

## 2020-01-01 RX ADMIN — METOPROLOL TARTRATE 2.5 MG: 5 INJECTION, SOLUTION INTRAVENOUS at 21:22

## 2020-01-01 RX ADMIN — BISACODYL 20 MG: 5 TABLET, COATED ORAL at 14:54

## 2020-01-01 RX ADMIN — INSULIN LISPRO 2 UNITS: 100 INJECTION, SOLUTION INTRAVENOUS; SUBCUTANEOUS at 12:10

## 2020-01-01 RX ADMIN — PROPOFOL 20 MG: 10 INJECTION, EMULSION INTRAVENOUS at 14:31

## 2020-01-01 RX ADMIN — MORPHINE SULFATE 2 MG: 2 INJECTION, SOLUTION INTRAMUSCULAR; INTRAVENOUS at 01:56

## 2020-01-01 RX ADMIN — POTASSIUM CHLORIDE 20 MEQ: 14.9 INJECTION, SOLUTION INTRAVENOUS at 14:58

## 2020-01-01 RX ADMIN — APIXABAN 2.5 MG: 2.5 TABLET, FILM COATED ORAL at 16:56

## 2020-01-01 RX ADMIN — PANTOPRAZOLE SODIUM 40 MG: 40 INJECTION, POWDER, FOR SOLUTION INTRAVENOUS at 20:23

## 2020-01-01 RX ADMIN — INSULIN LISPRO 4 UNITS: 100 INJECTION, SOLUTION INTRAVENOUS; SUBCUTANEOUS at 18:34

## 2020-01-01 RX ADMIN — METOPROLOL TARTRATE 2.5 MG: 5 INJECTION, SOLUTION INTRAVENOUS at 17:01

## 2020-01-01 RX ADMIN — LEVALBUTEROL HYDROCHLORIDE 1.25 MG: 1.25 SOLUTION, CONCENTRATE RESPIRATORY (INHALATION) at 09:11

## 2020-01-01 RX ADMIN — INSULIN LISPRO 1 UNITS: 100 INJECTION, SOLUTION INTRAVENOUS; SUBCUTANEOUS at 12:04

## 2020-01-01 RX ADMIN — Medication 5 SPRAY: at 21:27

## 2020-01-01 RX ADMIN — PANTOPRAZOLE SODIUM 40 MG: 40 INJECTION, POWDER, FOR SOLUTION INTRAVENOUS at 21:06

## 2020-01-01 RX ADMIN — BISACODYL 10 MG: 10 SUPPOSITORY RECTAL at 11:25

## 2020-01-01 RX ADMIN — ATORVASTATIN CALCIUM 40 MG: 40 TABLET, FILM COATED ORAL at 16:55

## 2020-01-01 RX ADMIN — EPINEPHRINE 1 MG: 0.1 INJECTION, SOLUTION ENDOTRACHEAL; INTRACARDIAC; INTRAVENOUS at 04:39

## 2020-01-01 RX ADMIN — HEPARIN SODIUM 5000 UNITS: 5000 INJECTION INTRAVENOUS; SUBCUTANEOUS at 22:32

## 2020-01-01 RX ADMIN — ASPIRIN 81 MG: 81 TABLET, COATED ORAL at 11:31

## 2020-01-01 RX ADMIN — CIPROFLOXACIN 400 MG: 2 INJECTION, SOLUTION INTRAVENOUS at 21:22

## 2020-01-01 RX ADMIN — PANTOPRAZOLE SODIUM 40 MG: 40 INJECTION, POWDER, FOR SOLUTION INTRAVENOUS at 21:16

## 2020-01-01 RX ADMIN — INSULIN LISPRO 6 UNITS: 100 INJECTION, SOLUTION INTRAVENOUS; SUBCUTANEOUS at 12:13

## 2020-01-01 RX ADMIN — SODIUM CHLORIDE 40 ML/HR: 0.9 INJECTION, SOLUTION INTRAVENOUS at 16:07

## 2020-01-01 RX ADMIN — METOPROLOL TARTRATE 2.5 MG: 5 INJECTION, SOLUTION INTRAVENOUS at 23:58

## 2020-01-01 RX ADMIN — PANTOPRAZOLE SODIUM 40 MG: 40 INJECTION, POWDER, FOR SOLUTION INTRAVENOUS at 09:09

## 2020-01-01 RX ADMIN — INSULIN LISPRO 6 UNITS: 100 INJECTION, SOLUTION INTRAVENOUS; SUBCUTANEOUS at 05:36

## 2020-01-01 RX ADMIN — HEPARIN SODIUM 5000 UNITS: 5000 INJECTION INTRAVENOUS; SUBCUTANEOUS at 21:42

## 2020-01-01 RX ADMIN — OXYCODONE HYDROCHLORIDE 5 MG: 5 TABLET ORAL at 16:10

## 2020-01-01 RX ADMIN — PHENYLEPHRINE HYDROCHLORIDE 25 MCG/MIN: 10 INJECTION INTRAVENOUS at 04:48

## 2020-01-01 RX ADMIN — METOPROLOL TARTRATE 2.5 MG: 5 INJECTION, SOLUTION INTRAVENOUS at 16:20

## 2020-01-01 RX ADMIN — INSULIN LISPRO 2 UNITS: 100 INJECTION, SOLUTION INTRAVENOUS; SUBCUTANEOUS at 13:06

## 2020-01-01 RX ADMIN — METOCLOPRAMIDE HYDROCHLORIDE 10 MG: 5 INJECTION INTRAMUSCULAR; INTRAVENOUS at 05:22

## 2020-01-01 RX ADMIN — INSULIN LISPRO 1 UNITS: 100 INJECTION, SOLUTION INTRAVENOUS; SUBCUTANEOUS at 00:22

## 2020-01-01 RX ADMIN — SUCRALFATE 1000 MG: 1 SUSPENSION ORAL at 18:16

## 2020-01-01 RX ADMIN — INSULIN LISPRO 12 UNITS: 100 INJECTION, SOLUTION INTRAVENOUS; SUBCUTANEOUS at 12:17

## 2020-01-01 RX ADMIN — OXYCODONE HYDROCHLORIDE 5 MG: 5 TABLET ORAL at 20:25

## 2020-01-01 RX ADMIN — LIDOCAINE HYDROCHLORIDE 50 MG: 10 INJECTION, SOLUTION EPIDURAL; INFILTRATION; INTRACAUDAL; PERINEURAL at 10:18

## 2020-01-01 RX ADMIN — Medication 5 SPRAY: at 08:03

## 2020-01-01 RX ADMIN — INSULIN GLARGINE 10 UNITS: 100 INJECTION, SOLUTION SUBCUTANEOUS at 12:13

## 2020-01-01 RX ADMIN — INSULIN LISPRO 3 UNITS: 100 INJECTION, SOLUTION INTRAVENOUS; SUBCUTANEOUS at 06:25

## 2020-01-01 RX ADMIN — SODIUM CHLORIDE 40 ML/HR: 0.9 INJECTION, SOLUTION INTRAVENOUS at 15:03

## 2020-01-01 RX ADMIN — Medication 0.04 UNITS/MIN: at 04:28

## 2020-01-01 RX ADMIN — DEXTROSE MONOHYDRATE 25 ML: 25 INJECTION, SOLUTION INTRAVENOUS at 18:01

## 2020-01-01 RX ADMIN — Medication 5 SPRAY: at 21:37

## 2020-01-01 RX ADMIN — METOPROLOL SUCCINATE 25 MG: 25 TABLET, EXTENDED RELEASE ORAL at 08:06

## 2020-01-01 RX ADMIN — Medication 5 SPRAY: at 17:26

## 2020-01-01 RX ADMIN — POTASSIUM CHLORIDE 20 MEQ: 14.9 INJECTION, SOLUTION INTRAVENOUS at 11:54

## 2020-01-01 RX ADMIN — PROPOFOL 20 MG: 10 INJECTION, EMULSION INTRAVENOUS at 14:36

## 2020-01-01 RX ADMIN — SODIUM BICARBONATE 50 MEQ: 84 INJECTION, SOLUTION INTRAVENOUS at 02:33

## 2020-01-01 RX ADMIN — PANTOPRAZOLE SODIUM 40 MG: 40 INJECTION, POWDER, FOR SOLUTION INTRAVENOUS at 20:53

## 2020-01-01 RX ADMIN — SUCRALFATE 1000 MG: 1 SUSPENSION ORAL at 17:38

## 2020-01-01 RX ADMIN — ONDANSETRON 4 MG: 2 INJECTION INTRAMUSCULAR; INTRAVENOUS at 02:21

## 2020-01-01 RX ADMIN — PROPOFOL 150 MG: 10 INJECTION, EMULSION INTRAVENOUS at 13:59

## 2020-01-01 RX ADMIN — SODIUM CHLORIDE 60 ML/HR: 0.45 INJECTION, SOLUTION INTRAVENOUS at 19:52

## 2020-01-01 RX ADMIN — PANTOPRAZOLE SODIUM 40 MG: 40 INJECTION, POWDER, FOR SOLUTION INTRAVENOUS at 21:08

## 2020-01-01 RX ADMIN — METOCLOPRAMIDE HYDROCHLORIDE 10 MG: 5 INJECTION INTRAMUSCULAR; INTRAVENOUS at 06:10

## 2020-01-01 RX ADMIN — TOPICAL ANESTHETIC 2 SPRAY: 200 SPRAY DENTAL; PERIODONTAL at 01:34

## 2020-01-01 RX ADMIN — HEPARIN SODIUM 5000 UNITS: 5000 INJECTION INTRAVENOUS; SUBCUTANEOUS at 13:00

## 2020-01-01 RX ADMIN — SODIUM CHLORIDE 100 ML/HR: 0.9 INJECTION, SOLUTION INTRAVENOUS at 04:11

## 2020-01-01 RX ADMIN — INSULIN LISPRO 4 UNITS: 100 INJECTION, SOLUTION INTRAVENOUS; SUBCUTANEOUS at 05:33

## 2020-01-01 RX ADMIN — Medication 100 MG: at 16:17

## 2020-01-01 RX ADMIN — POTASSIUM CHLORIDE 40 MEQ: 1500 TABLET, EXTENDED RELEASE ORAL at 11:47

## 2020-01-01 RX ADMIN — ASPIRIN 81 MG: 81 TABLET, COATED ORAL at 08:05

## 2020-01-01 RX ADMIN — FUROSEMIDE 20 MG: 10 INJECTION, SOLUTION INTRAMUSCULAR; INTRAVENOUS at 18:28

## 2020-01-01 RX ADMIN — EPINEPHRINE 1 MG: 0.1 INJECTION, SOLUTION ENDOTRACHEAL; INTRACARDIAC; INTRAVENOUS at 04:26

## 2020-01-01 RX ADMIN — MORPHINE SULFATE 4 MG: 4 INJECTION INTRAVENOUS at 04:58

## 2020-01-01 RX ADMIN — INSULIN GLARGINE 15 UNITS: 100 INJECTION, SOLUTION SUBCUTANEOUS at 21:38

## 2020-01-01 RX ADMIN — POTASSIUM CHLORIDE 40 MEQ: 1500 TABLET, EXTENDED RELEASE ORAL at 08:02

## 2020-01-01 RX ADMIN — HEPARIN SODIUM 5000 UNITS: 5000 INJECTION INTRAVENOUS; SUBCUTANEOUS at 14:10

## 2020-01-01 RX ADMIN — INSULIN LISPRO 5 UNITS: 100 INJECTION, SOLUTION INTRAVENOUS; SUBCUTANEOUS at 08:00

## 2020-01-01 RX ADMIN — METOPROLOL TARTRATE 2.5 MG: 5 INJECTION, SOLUTION INTRAVENOUS at 15:10

## 2020-01-01 RX ADMIN — PANTOPRAZOLE SODIUM 40 MG: 40 INJECTION, POWDER, FOR SOLUTION INTRAVENOUS at 08:56

## 2020-01-01 RX ADMIN — SODIUM CHLORIDE, SODIUM GLUCONATE, SODIUM ACETATE, POTASSIUM CHLORIDE, MAGNESIUM CHLORIDE, SODIUM PHOSPHATE, DIBASIC, AND POTASSIUM PHOSPHATE 500 ML: .53; .5; .37; .037; .03; .012; .00082 INJECTION, SOLUTION INTRAVENOUS at 17:28

## 2020-01-01 RX ADMIN — INSULIN LISPRO 10 UNITS: 100 INJECTION, SOLUTION INTRAVENOUS; SUBCUTANEOUS at 08:09

## 2020-01-01 RX ADMIN — METOPROLOL TARTRATE 2.5 MG: 5 INJECTION, SOLUTION INTRAVENOUS at 08:55

## 2020-01-01 RX ADMIN — HEPARIN SODIUM 5000 UNITS: 5000 INJECTION INTRAVENOUS; SUBCUTANEOUS at 13:22

## 2020-01-01 RX ADMIN — APIXABAN 2.5 MG: 2.5 TABLET, FILM COATED ORAL at 08:02

## 2020-01-01 RX ADMIN — SODIUM CHLORIDE, SODIUM LACTATE, POTASSIUM CHLORIDE, AND CALCIUM CHLORIDE: .6; .31; .03; .02 INJECTION, SOLUTION INTRAVENOUS at 14:06

## 2020-01-01 RX ADMIN — FUROSEMIDE 20 MG: 10 INJECTION, SOLUTION INTRAMUSCULAR; INTRAVENOUS at 02:25

## 2020-01-01 RX ADMIN — METOPROLOL TARTRATE 2.5 MG: 5 INJECTION, SOLUTION INTRAVENOUS at 03:09

## 2020-01-01 RX ADMIN — INSULIN LISPRO 2 UNITS: 100 INJECTION, SOLUTION INTRAVENOUS; SUBCUTANEOUS at 17:54

## 2020-01-01 RX ADMIN — METOPROLOL SUCCINATE 25 MG: 25 TABLET, EXTENDED RELEASE ORAL at 09:19

## 2020-01-01 RX ADMIN — APIXABAN 2.5 MG: 2.5 TABLET, FILM COATED ORAL at 16:55

## 2020-01-01 RX ADMIN — ASPIRIN 81 MG: 81 TABLET, COATED ORAL at 09:18

## 2020-01-01 RX ADMIN — METOCLOPRAMIDE HYDROCHLORIDE 10 MG: 5 INJECTION INTRAMUSCULAR; INTRAVENOUS at 23:55

## 2020-01-01 RX ADMIN — FENTANYL CITRATE 25 MCG: 50 INJECTION, SOLUTION INTRAMUSCULAR; INTRAVENOUS at 11:12

## 2020-01-01 RX ADMIN — SODIUM CHLORIDE, SODIUM LACTATE, POTASSIUM CHLORIDE, AND CALCIUM CHLORIDE: .6; .31; .03; .02 INJECTION, SOLUTION INTRAVENOUS at 10:28

## 2020-01-01 RX ADMIN — METOPROLOL TARTRATE 2.5 MG: 5 INJECTION, SOLUTION INTRAVENOUS at 00:11

## 2020-01-01 RX ADMIN — METOPROLOL TARTRATE 2.5 MG: 5 INJECTION, SOLUTION INTRAVENOUS at 17:37

## 2020-01-01 RX ADMIN — ROCURONIUM BROMIDE 10 MG: 10 INJECTION, SOLUTION INTRAVENOUS at 11:19

## 2020-01-01 RX ADMIN — FUROSEMIDE 60 MG: 10 INJECTION, SOLUTION INTRAMUSCULAR; INTRAVENOUS at 08:06

## 2020-01-01 RX ADMIN — POTASSIUM CHLORIDE 40 MEQ: 1500 TABLET, EXTENDED RELEASE ORAL at 08:18

## 2020-01-01 RX ADMIN — Medication 5 SPRAY: at 18:12

## 2020-01-01 RX ADMIN — INSULIN GLARGINE 15 UNITS: 100 INJECTION, SOLUTION SUBCUTANEOUS at 08:47

## 2020-01-01 RX ADMIN — METOPROLOL TARTRATE 2.5 MG: 5 INJECTION, SOLUTION INTRAVENOUS at 22:00

## 2020-01-01 RX ADMIN — MORPHINE SULFATE 2 MG: 2 INJECTION, SOLUTION INTRAMUSCULAR; INTRAVENOUS at 02:22

## 2020-01-01 RX ADMIN — PANTOPRAZOLE SODIUM 40 MG: 40 INJECTION, POWDER, FOR SOLUTION INTRAVENOUS at 21:22

## 2020-01-01 RX ADMIN — INSULIN LISPRO 1 UNITS: 100 INJECTION, SOLUTION INTRAVENOUS; SUBCUTANEOUS at 21:26

## 2020-01-01 RX ADMIN — OXYCODONE HYDROCHLORIDE 5 MG: 5 TABLET ORAL at 09:02

## 2020-01-01 RX ADMIN — METRONIDAZOLE 500 MG: 500 INJECTION, SOLUTION INTRAVENOUS at 03:06

## 2020-01-01 RX ADMIN — MORPHINE SULFATE 2 MG: 2 INJECTION, SOLUTION INTRAMUSCULAR; INTRAVENOUS at 13:00

## 2020-01-01 RX ADMIN — Medication 5 SPRAY: at 08:27

## 2020-01-01 RX ADMIN — CIPROFLOXACIN: 2 INJECTION, SOLUTION INTRAVENOUS at 10:28

## 2020-01-01 RX ADMIN — LORATADINE 10 MG: 10 TABLET ORAL at 08:31

## 2020-01-01 RX ADMIN — DEXTROSE AND SODIUM CHLORIDE 50 ML/HR: 5; .45 INJECTION, SOLUTION INTRAVENOUS at 14:14

## 2020-01-01 RX ADMIN — INSULIN LISPRO 2 UNITS: 100 INJECTION, SOLUTION INTRAVENOUS; SUBCUTANEOUS at 08:39

## 2020-01-01 RX ADMIN — CIPROFLOXACIN 400 MG: 2 INJECTION, SOLUTION INTRAVENOUS at 09:21

## 2020-01-01 RX ADMIN — PANTOPRAZOLE SODIUM 40 MG: 40 INJECTION, POWDER, FOR SOLUTION INTRAVENOUS at 08:27

## 2020-01-01 RX ADMIN — ASPIRIN 81 MG: 81 TABLET, COATED ORAL at 08:18

## 2020-01-01 RX ADMIN — INSULIN LISPRO 2 UNITS: 100 INJECTION, SOLUTION INTRAVENOUS; SUBCUTANEOUS at 00:07

## 2020-01-01 RX ADMIN — OXYCODONE HYDROCHLORIDE 5 MG: 5 TABLET ORAL at 04:04

## 2020-01-01 RX ADMIN — INSULIN LISPRO 3 UNITS: 100 INJECTION, SOLUTION INTRAVENOUS; SUBCUTANEOUS at 16:44

## 2020-01-01 RX ADMIN — POTASSIUM CHLORIDE 20 MEQ: 14.9 INJECTION, SOLUTION INTRAVENOUS at 14:54

## 2020-01-01 RX ADMIN — PANTOPRAZOLE SODIUM 40 MG: 40 INJECTION, POWDER, FOR SOLUTION INTRAVENOUS at 21:29

## 2020-01-01 RX ADMIN — MORPHINE SULFATE 2 MG: 2 INJECTION, SOLUTION INTRAMUSCULAR; INTRAVENOUS at 13:08

## 2020-01-01 RX ADMIN — PANTOPRAZOLE SODIUM 40 MG: 40 INJECTION, POWDER, FOR SOLUTION INTRAVENOUS at 21:02

## 2020-01-01 RX ADMIN — ONDANSETRON 4 MG: 2 INJECTION INTRAMUSCULAR; INTRAVENOUS at 13:09

## 2020-01-01 RX ADMIN — INSULIN LISPRO 1 UNITS: 100 INJECTION, SOLUTION INTRAVENOUS; SUBCUTANEOUS at 05:12

## 2020-01-01 RX ADMIN — HEPARIN SODIUM 5000 UNITS: 5000 INJECTION INTRAVENOUS; SUBCUTANEOUS at 21:02

## 2020-01-01 RX ADMIN — ONDANSETRON 4 MG: 2 INJECTION INTRAMUSCULAR; INTRAVENOUS at 00:11

## 2020-01-01 RX ADMIN — HEPARIN SODIUM 5000 UNITS: 5000 INJECTION INTRAVENOUS; SUBCUTANEOUS at 05:49

## 2020-01-01 RX ADMIN — HEPARIN SODIUM 5000 UNITS: 5000 INJECTION INTRAVENOUS; SUBCUTANEOUS at 09:43

## 2020-01-01 RX ADMIN — HEPARIN SODIUM 5000 UNITS: 5000 INJECTION INTRAVENOUS; SUBCUTANEOUS at 05:38

## 2020-01-01 RX ADMIN — METOPROLOL TARTRATE 2.5 MG: 5 INJECTION, SOLUTION INTRAVENOUS at 10:27

## 2020-01-01 RX ADMIN — METOPROLOL TARTRATE 2.5 MG: 5 INJECTION, SOLUTION INTRAVENOUS at 05:10

## 2020-01-01 RX ADMIN — INSULIN LISPRO 5 UNITS: 100 INJECTION, SOLUTION INTRAVENOUS; SUBCUTANEOUS at 16:23

## 2020-01-01 RX ADMIN — PROPOFOL 50 MCG/KG/MIN: 10 INJECTION, EMULSION INTRAVENOUS at 14:05

## 2020-01-01 RX ADMIN — ATORVASTATIN CALCIUM 40 MG: 40 TABLET, FILM COATED ORAL at 16:11

## 2020-01-01 RX ADMIN — ONDANSETRON 4 MG: 2 INJECTION INTRAMUSCULAR; INTRAVENOUS at 06:04

## 2020-01-01 RX ADMIN — ONDANSETRON 4 MG: 2 INJECTION INTRAMUSCULAR; INTRAVENOUS at 05:26

## 2020-01-01 RX ADMIN — MORPHINE SULFATE 2 MG: 2 INJECTION, SOLUTION INTRAMUSCULAR; INTRAVENOUS at 16:55

## 2020-01-01 RX ADMIN — FUROSEMIDE 60 MG: 10 INJECTION, SOLUTION INTRAMUSCULAR; INTRAVENOUS at 09:00

## 2020-01-01 RX ADMIN — PANTOPRAZOLE SODIUM 40 MG: 40 INJECTION, POWDER, FOR SOLUTION INTRAVENOUS at 09:03

## 2020-01-01 RX ADMIN — IRON SUCROSE 200 MG: 20 INJECTION, SOLUTION INTRAVENOUS at 16:21

## 2020-01-01 RX ADMIN — METOPROLOL SUCCINATE 25 MG: 25 TABLET, EXTENDED RELEASE ORAL at 08:18

## 2020-01-01 RX ADMIN — FUROSEMIDE 60 MG: 10 INJECTION, SOLUTION INTRAMUSCULAR; INTRAVENOUS at 16:21

## 2020-01-01 RX ADMIN — INSULIN LISPRO 6 UNITS: 100 INJECTION, SOLUTION INTRAVENOUS; SUBCUTANEOUS at 17:25

## 2020-01-01 RX ADMIN — DEXTROSE 50 % IN WATER (D50W) INTRAVENOUS SYRINGE 25 ML: at 14:06

## 2020-01-01 RX ADMIN — ASPIRIN 81 MG: 81 TABLET, COATED ORAL at 09:03

## 2020-01-01 RX ADMIN — Medication 5 SPRAY: at 14:58

## 2020-01-01 RX ADMIN — ATORVASTATIN CALCIUM 40 MG: 40 TABLET, FILM COATED ORAL at 16:45

## 2020-01-01 RX ADMIN — METOPROLOL TARTRATE 2.5 MG: 5 INJECTION, SOLUTION INTRAVENOUS at 08:25

## 2020-01-01 RX ADMIN — HYDROMORPHONE HYDROCHLORIDE 0.2 MG: 1 INJECTION, SOLUTION INTRAMUSCULAR; INTRAVENOUS; SUBCUTANEOUS at 23:39

## 2020-01-01 RX ADMIN — MORPHINE SULFATE 2 MG: 2 INJECTION, SOLUTION INTRAMUSCULAR; INTRAVENOUS at 00:17

## 2020-01-01 RX ADMIN — CIPROFLOXACIN 400 MG: 2 INJECTION, SOLUTION INTRAVENOUS at 08:56

## 2020-01-01 RX ADMIN — INSULIN LISPRO 4 UNITS: 100 INJECTION, SOLUTION INTRAVENOUS; SUBCUTANEOUS at 12:08

## 2020-01-01 RX ADMIN — METOPROLOL TARTRATE 2.5 MG: 5 INJECTION, SOLUTION INTRAVENOUS at 11:35

## 2020-01-01 RX ADMIN — OXYCODONE HYDROCHLORIDE 5 MG: 5 TABLET ORAL at 08:04

## 2020-01-01 RX ADMIN — INSULIN LISPRO 3 UNITS: 100 INJECTION, SOLUTION INTRAVENOUS; SUBCUTANEOUS at 00:05

## 2020-01-01 RX ADMIN — METOCLOPRAMIDE HYDROCHLORIDE 10 MG: 5 INJECTION INTRAMUSCULAR; INTRAVENOUS at 00:10

## 2020-01-01 RX ADMIN — Medication: at 17:18

## 2020-01-01 RX ADMIN — METOPROLOL TARTRATE 2.5 MG: 5 INJECTION, SOLUTION INTRAVENOUS at 11:25

## 2020-01-01 RX ADMIN — FUROSEMIDE 40 MG: 10 INJECTION, SOLUTION INTRAMUSCULAR; INTRAVENOUS at 15:09

## 2020-01-01 RX ADMIN — PANTOPRAZOLE SODIUM 40 MG: 40 INJECTION, POWDER, FOR SOLUTION INTRAVENOUS at 08:47

## 2020-01-01 RX ADMIN — HEPARIN SODIUM 5000 UNITS: 5000 INJECTION INTRAVENOUS; SUBCUTANEOUS at 13:23

## 2020-01-01 RX ADMIN — INSULIN LISPRO 1 UNITS: 100 INJECTION, SOLUTION INTRAVENOUS; SUBCUTANEOUS at 05:20

## 2020-01-01 RX ADMIN — PANTOPRAZOLE SODIUM 40 MG: 40 INJECTION, POWDER, FOR SOLUTION INTRAVENOUS at 22:32

## 2020-01-01 RX ADMIN — INSULIN LISPRO 1 UNITS: 100 INJECTION, SOLUTION INTRAVENOUS; SUBCUTANEOUS at 05:01

## 2020-01-01 RX ADMIN — INSULIN GLARGINE 5 UNITS: 100 INJECTION, SOLUTION SUBCUTANEOUS at 22:11

## 2020-01-01 RX ADMIN — EPINEPHRINE 1 MG: 0.1 INJECTION, SOLUTION ENDOTRACHEAL; INTRACARDIAC; INTRAVENOUS at 04:43

## 2020-01-01 RX ADMIN — EPINEPHRINE 1 MG: 0.1 INJECTION, SOLUTION ENDOTRACHEAL; INTRACARDIAC; INTRAVENOUS at 02:24

## 2020-01-01 RX ADMIN — PANTOPRAZOLE SODIUM 40 MG: 40 INJECTION, POWDER, FOR SOLUTION INTRAVENOUS at 10:26

## 2020-01-01 RX ADMIN — METOCLOPRAMIDE HYDROCHLORIDE 10 MG: 5 INJECTION INTRAMUSCULAR; INTRAVENOUS at 18:37

## 2020-01-01 RX ADMIN — METOPROLOL TARTRATE 2.5 MG: 5 INJECTION, SOLUTION INTRAVENOUS at 11:54

## 2020-01-01 RX ADMIN — OXYCODONE HYDROCHLORIDE 5 MG: 5 TABLET ORAL at 11:27

## 2020-01-01 RX ADMIN — HEPARIN SODIUM 5000 UNITS: 5000 INJECTION INTRAVENOUS; SUBCUTANEOUS at 15:00

## 2020-01-01 RX ADMIN — METOPROLOL TARTRATE 2.5 MG: 5 INJECTION, SOLUTION INTRAVENOUS at 18:37

## 2020-01-01 RX ADMIN — BISACODYL 10 MG: 10 SUPPOSITORY RECTAL at 08:03

## 2020-01-01 RX ADMIN — INSULIN GLARGINE 18 UNITS: 100 INJECTION, SOLUTION SUBCUTANEOUS at 21:07

## 2020-01-01 RX ADMIN — INSULIN LISPRO 2 UNITS: 100 INJECTION, SOLUTION INTRAVENOUS; SUBCUTANEOUS at 12:38

## 2020-01-01 RX ADMIN — Medication 20 MG: at 11:31

## 2020-01-01 RX ADMIN — Medication: at 16:49

## 2020-01-01 RX ADMIN — METOPROLOL SUCCINATE 25 MG: 25 TABLET, EXTENDED RELEASE ORAL at 08:02

## 2020-01-01 RX ADMIN — METOPROLOL TARTRATE 2.5 MG: 5 INJECTION, SOLUTION INTRAVENOUS at 13:16

## 2020-01-01 RX ADMIN — METRONIDAZOLE 500 MG: 500 INJECTION, SOLUTION INTRAVENOUS at 10:48

## 2020-01-01 RX ADMIN — Medication 5 SPRAY: at 09:00

## 2020-01-01 RX ADMIN — METOPROLOL TARTRATE 2.5 MG: 5 INJECTION, SOLUTION INTRAVENOUS at 04:10

## 2020-01-01 RX ADMIN — Medication 5 SPRAY: at 11:25

## 2020-01-01 RX ADMIN — APIXABAN 2.5 MG: 2.5 TABLET, FILM COATED ORAL at 08:18

## 2020-01-01 RX ADMIN — APIXABAN 2.5 MG: 2.5 TABLET, FILM COATED ORAL at 17:15

## 2020-01-01 RX ADMIN — BUPIVACAINE HYDROCHLORIDE 20 ML: 5 INJECTION, SOLUTION PERINEURAL at 14:02

## 2020-01-01 RX ADMIN — OXYCODONE HYDROCHLORIDE 5 MG: 5 TABLET ORAL at 11:18

## 2020-01-01 RX ADMIN — METOPROLOL TARTRATE 2.5 MG: 5 INJECTION, SOLUTION INTRAVENOUS at 10:33

## 2020-01-01 RX ADMIN — Medication 99.7 MG: at 02:29

## 2020-01-01 RX ADMIN — INSULIN GLARGINE 20 UNITS: 100 INJECTION, SOLUTION SUBCUTANEOUS at 08:08

## 2020-01-01 RX ADMIN — METOPROLOL TARTRATE 2.5 MG: 5 INJECTION, SOLUTION INTRAVENOUS at 14:47

## 2020-01-01 RX ADMIN — INSULIN LISPRO 2 UNITS: 100 INJECTION, SOLUTION INTRAVENOUS; SUBCUTANEOUS at 00:23

## 2020-01-01 RX ADMIN — INSULIN GLARGINE 15 UNITS: 100 INJECTION, SOLUTION SUBCUTANEOUS at 21:14

## 2020-01-01 RX ADMIN — ONDANSETRON 4 MG: 2 INJECTION INTRAMUSCULAR; INTRAVENOUS at 13:08

## 2020-01-01 RX ADMIN — METOPROLOL TARTRATE 2.5 MG: 5 INJECTION, SOLUTION INTRAVENOUS at 15:27

## 2020-01-01 RX ADMIN — INSULIN LISPRO 2 UNITS: 100 INJECTION, SOLUTION INTRAVENOUS; SUBCUTANEOUS at 16:28

## 2020-01-01 RX ADMIN — APIXABAN 2.5 MG: 2.5 TABLET, FILM COATED ORAL at 17:26

## 2020-01-01 RX ADMIN — METOPROLOL TARTRATE 2.5 MG: 5 INJECTION, SOLUTION INTRAVENOUS at 15:54

## 2020-01-01 RX ADMIN — APIXABAN 2.5 MG: 2.5 TABLET, FILM COATED ORAL at 08:05

## 2020-01-01 RX ADMIN — METOPROLOL TARTRATE 2.5 MG: 5 INJECTION, SOLUTION INTRAVENOUS at 18:14

## 2020-01-01 RX ADMIN — MORPHINE SULFATE 2 MG: 2 INJECTION, SOLUTION INTRAMUSCULAR; INTRAVENOUS at 11:40

## 2020-01-01 RX ADMIN — SODIUM CHLORIDE 40 ML/HR: 0.9 INJECTION, SOLUTION INTRAVENOUS at 14:02

## 2020-01-01 RX ADMIN — INSULIN LISPRO 2 UNITS: 100 INJECTION, SOLUTION INTRAVENOUS; SUBCUTANEOUS at 12:26

## 2020-01-01 RX ADMIN — INSULIN LISPRO 2 UNITS: 100 INJECTION, SOLUTION INTRAVENOUS; SUBCUTANEOUS at 18:32

## 2020-01-01 RX ADMIN — METOPROLOL TARTRATE 2.5 MG: 5 INJECTION, SOLUTION INTRAVENOUS at 12:56

## 2020-01-01 RX ADMIN — PANTOPRAZOLE SODIUM 40 MG: 40 INJECTION, POWDER, FOR SOLUTION INTRAVENOUS at 09:12

## 2020-01-01 RX ADMIN — METRONIDAZOLE 500 MG: 500 INJECTION, SOLUTION INTRAVENOUS at 02:45

## 2020-01-01 RX ADMIN — INSULIN LISPRO 5 UNITS: 100 INJECTION, SOLUTION INTRAVENOUS; SUBCUTANEOUS at 09:37

## 2020-01-01 RX ADMIN — OLANZAPINE 2.5 MG: 10 INJECTION, POWDER, FOR SOLUTION INTRAMUSCULAR at 05:09

## 2020-01-01 RX ADMIN — INSULIN LISPRO 4 UNITS: 100 INJECTION, SOLUTION INTRAVENOUS; SUBCUTANEOUS at 06:10

## 2020-01-01 RX ADMIN — PROPOFOL 50 MG: 10 INJECTION, EMULSION INTRAVENOUS at 14:05

## 2020-01-01 RX ADMIN — METOPROLOL TARTRATE 2.5 MG: 5 INJECTION, SOLUTION INTRAVENOUS at 05:33

## 2020-01-01 RX ADMIN — ASPIRIN 81 MG: 81 TABLET, COATED ORAL at 08:06

## 2020-01-01 RX ADMIN — HEPARIN SODIUM 5000 UNITS: 5000 INJECTION INTRAVENOUS; SUBCUTANEOUS at 05:26

## 2020-01-01 RX ADMIN — OXYCODONE HYDROCHLORIDE 5 MG: 5 TABLET ORAL at 18:54

## 2020-01-01 RX ADMIN — OXYCODONE HYDROCHLORIDE 5 MG: 5 TABLET ORAL at 23:39

## 2020-01-01 RX ADMIN — PANTOPRAZOLE SODIUM 40 MG: 40 INJECTION, POWDER, FOR SOLUTION INTRAVENOUS at 22:00

## 2020-01-01 RX ADMIN — POTASSIUM CHLORIDE 40 MEQ: 20 SOLUTION ORAL at 11:53

## 2020-01-01 RX ADMIN — METOPROLOL TARTRATE 2.5 MG: 5 INJECTION, SOLUTION INTRAVENOUS at 16:49

## 2020-01-01 RX ADMIN — SODIUM CHLORIDE 40 ML/HR: 0.9 INJECTION, SOLUTION INTRAVENOUS at 18:52

## 2020-01-01 RX ADMIN — INSULIN GLARGINE 10 UNITS: 100 INJECTION, SOLUTION SUBCUTANEOUS at 21:42

## 2020-01-01 RX ADMIN — METOPROLOL TARTRATE 2.5 MG: 5 INJECTION, SOLUTION INTRAVENOUS at 05:22

## 2020-01-01 RX ADMIN — PANTOPRAZOLE SODIUM 40 MG: 40 INJECTION, POWDER, FOR SOLUTION INTRAVENOUS at 08:31

## 2020-01-01 RX ADMIN — METOPROLOL TARTRATE 2.5 MG: 5 INJECTION, SOLUTION INTRAVENOUS at 00:10

## 2020-01-01 RX ADMIN — METOPROLOL TARTRATE 2.5 MG: 5 INJECTION, SOLUTION INTRAVENOUS at 03:17

## 2020-01-01 RX ADMIN — INSULIN LISPRO 10 UNITS: 100 INJECTION, SOLUTION INTRAVENOUS; SUBCUTANEOUS at 12:36

## 2020-01-01 RX ADMIN — FUROSEMIDE 80 MG: 80 TABLET ORAL at 08:31

## 2020-01-01 RX ADMIN — FUROSEMIDE 20 MG: 10 INJECTION, SOLUTION INTRAMUSCULAR; INTRAVENOUS at 09:43

## 2020-01-01 RX ADMIN — APIXABAN 2.5 MG: 2.5 TABLET, FILM COATED ORAL at 09:18

## 2020-01-01 RX ADMIN — Medication 5 SPRAY: at 21:29

## 2020-01-01 RX ADMIN — INSULIN GLARGINE 15 UNITS: 100 INJECTION, SOLUTION SUBCUTANEOUS at 09:36

## 2020-01-01 RX ADMIN — PANTOPRAZOLE SODIUM 40 MG: 40 INJECTION, POWDER, FOR SOLUTION INTRAVENOUS at 09:04

## 2020-01-01 RX ADMIN — APIXABAN 2.5 MG: 2.5 TABLET, FILM COATED ORAL at 09:13

## 2020-01-01 RX ADMIN — FUROSEMIDE 60 MG: 10 INJECTION, SOLUTION INTRAMUSCULAR; INTRAVENOUS at 16:11

## 2020-01-01 RX ADMIN — METOCLOPRAMIDE HYDROCHLORIDE 10 MG: 5 INJECTION INTRAMUSCULAR; INTRAVENOUS at 11:25

## 2020-01-01 RX ADMIN — Medication 100 MG: at 15:40

## 2020-01-01 RX ADMIN — INSULIN LISPRO 1 UNITS: 100 INJECTION, SOLUTION INTRAVENOUS; SUBCUTANEOUS at 08:14

## 2020-01-01 RX ADMIN — METOCLOPRAMIDE HYDROCHLORIDE 10 MG: 5 INJECTION INTRAMUSCULAR; INTRAVENOUS at 05:38

## 2020-01-01 RX ADMIN — ONDANSETRON 4 MG: 2 INJECTION INTRAMUSCULAR; INTRAVENOUS at 03:21

## 2020-01-01 RX ADMIN — APIXABAN 2.5 MG: 2.5 TABLET, FILM COATED ORAL at 08:06

## 2020-01-01 RX ADMIN — ATORVASTATIN CALCIUM 40 MG: 40 TABLET, FILM COATED ORAL at 17:03

## 2020-01-01 RX ADMIN — SODIUM CHLORIDE 1000 ML: 0.9 INJECTION, SOLUTION INTRAVENOUS at 00:55

## 2020-01-01 RX ADMIN — FENTANYL CITRATE 25 MCG: 50 INJECTION, SOLUTION INTRAMUSCULAR; INTRAVENOUS at 12:46

## 2020-01-01 RX ADMIN — Medication 5 SPRAY: at 15:37

## 2020-01-01 RX ADMIN — OXYCODONE HYDROCHLORIDE 5 MG: 5 TABLET ORAL at 22:11

## 2020-01-01 RX ADMIN — ONDANSETRON 4 MG: 2 INJECTION INTRAMUSCULAR; INTRAVENOUS at 12:08

## 2020-01-01 RX ADMIN — INSULIN LISPRO 2 UNITS: 100 INJECTION, SOLUTION INTRAVENOUS; SUBCUTANEOUS at 18:29

## 2020-01-01 RX ADMIN — METOCLOPRAMIDE HYDROCHLORIDE 10 MG: 5 INJECTION INTRAMUSCULAR; INTRAVENOUS at 23:58

## 2020-01-01 RX ADMIN — INSULIN LISPRO 1 UNITS: 100 INJECTION, SOLUTION INTRAVENOUS; SUBCUTANEOUS at 17:36

## 2020-01-01 RX ADMIN — HEPARIN SODIUM 5000 UNITS: 5000 INJECTION INTRAVENOUS; SUBCUTANEOUS at 05:10

## 2020-01-01 RX ADMIN — METOPROLOL TARTRATE 2.5 MG: 5 INJECTION, SOLUTION INTRAVENOUS at 00:14

## 2020-01-01 RX ADMIN — VECURONIUM BROMIDE 10 MG: 1 INJECTION, POWDER, LYOPHILIZED, FOR SOLUTION INTRAVENOUS at 04:05

## 2020-01-01 RX ADMIN — HEPARIN SODIUM 5000 UNITS: 5000 INJECTION INTRAVENOUS; SUBCUTANEOUS at 05:46

## 2020-01-01 RX ADMIN — INSULIN LISPRO 4 UNITS: 100 INJECTION, SOLUTION INTRAVENOUS; SUBCUTANEOUS at 09:20

## 2020-01-01 RX ADMIN — HEPARIN SODIUM 5000 UNITS: 5000 INJECTION INTRAVENOUS; SUBCUTANEOUS at 14:58

## 2020-01-01 RX ADMIN — Medication: at 18:47

## 2020-01-01 RX ADMIN — PANTOPRAZOLE SODIUM 40 MG: 40 INJECTION, POWDER, FOR SOLUTION INTRAVENOUS at 21:13

## 2020-01-01 RX ADMIN — METOPROLOL TARTRATE 2.5 MG: 5 INJECTION, SOLUTION INTRAVENOUS at 05:37

## 2020-01-01 RX ADMIN — INSULIN LISPRO 3 UNITS: 100 INJECTION, SOLUTION INTRAVENOUS; SUBCUTANEOUS at 12:41

## 2020-01-01 RX ADMIN — HEPARIN SODIUM 5000 UNITS: 5000 INJECTION INTRAVENOUS; SUBCUTANEOUS at 21:04

## 2020-01-01 RX ADMIN — METOPROLOL TARTRATE 2.5 MG: 5 INJECTION, SOLUTION INTRAVENOUS at 04:11

## 2020-01-01 RX ADMIN — INSULIN LISPRO 10 UNITS: 100 INJECTION, SOLUTION INTRAVENOUS; SUBCUTANEOUS at 06:46

## 2020-01-01 RX ADMIN — PANTOPRAZOLE SODIUM 40 MG: 40 INJECTION, POWDER, FOR SOLUTION INTRAVENOUS at 09:01

## 2020-01-01 RX ADMIN — INSULIN LISPRO 6 UNITS: 100 INJECTION, SOLUTION INTRAVENOUS; SUBCUTANEOUS at 11:53

## 2020-01-01 RX ADMIN — INSULIN LISPRO 6 UNITS: 100 INJECTION, SOLUTION INTRAVENOUS; SUBCUTANEOUS at 08:09

## 2020-01-01 RX ADMIN — METOPROLOL TARTRATE 2.5 MG: 5 INJECTION, SOLUTION INTRAVENOUS at 21:23

## 2020-01-01 RX ADMIN — INSULIN LISPRO 2 UNITS: 100 INJECTION, SOLUTION INTRAVENOUS; SUBCUTANEOUS at 00:55

## 2020-01-01 RX ADMIN — INSULIN LISPRO 3 UNITS: 100 INJECTION, SOLUTION INTRAVENOUS; SUBCUTANEOUS at 21:38

## 2020-01-01 RX ADMIN — INSULIN LISPRO 1 UNITS: 100 INJECTION, SOLUTION INTRAVENOUS; SUBCUTANEOUS at 00:18

## 2020-01-01 RX ADMIN — METOPROLOL TARTRATE 2.5 MG: 5 INJECTION, SOLUTION INTRAVENOUS at 05:38

## 2020-01-01 RX ADMIN — MORPHINE SULFATE 2 MG: 2 INJECTION, SOLUTION INTRAMUSCULAR; INTRAVENOUS at 12:42

## 2020-01-01 RX ADMIN — ALBUMIN (HUMAN) 12.5 G: 12.5 INJECTION, SOLUTION INTRAVENOUS at 09:43

## 2020-01-01 RX ADMIN — PROPOFOL 10 MCG/KG/MIN: 10 INJECTION, EMULSION INTRAVENOUS at 03:42

## 2020-01-01 RX ADMIN — HEPARIN SODIUM AND DEXTROSE 11.1 UNITS/KG/HR: 10000; 5 INJECTION INTRAVENOUS at 10:47

## 2020-01-01 RX ADMIN — SODIUM CHLORIDE 60 ML/HR: 0.45 INJECTION, SOLUTION INTRAVENOUS at 15:26

## 2020-01-01 RX ADMIN — POLYETHYLENE GLYCOL 3350 238 G: 17 POWDER, FOR SOLUTION ORAL at 16:56

## 2020-01-01 RX ADMIN — HYDROMORPHONE HYDROCHLORIDE 0.2 MG: 1 INJECTION, SOLUTION INTRAMUSCULAR; INTRAVENOUS; SUBCUTANEOUS at 14:03

## 2020-01-01 RX ADMIN — LORATADINE 10 MG: 10 TABLET ORAL at 08:12

## 2020-01-01 RX ADMIN — Medication: at 21:02

## 2020-01-01 RX ADMIN — Medication: at 21:15

## 2020-01-01 RX ADMIN — METOPROLOL TARTRATE 2.5 MG: 5 INJECTION, SOLUTION INTRAVENOUS at 00:27

## 2020-01-01 RX ADMIN — ASPIRIN 81 MG: 81 TABLET, COATED ORAL at 08:02

## 2020-01-01 RX ADMIN — METOPROLOL TARTRATE 2.5 MG: 5 INJECTION, SOLUTION INTRAVENOUS at 08:10

## 2020-01-01 RX ADMIN — MORPHINE SULFATE 4 MG: 4 INJECTION INTRAVENOUS at 19:10

## 2020-01-01 RX ADMIN — POTASSIUM CHLORIDE 40 MEQ: 1500 TABLET, EXTENDED RELEASE ORAL at 09:18

## 2020-01-01 RX ADMIN — Medication 5 SPRAY: at 17:38

## 2020-01-01 RX ADMIN — ONDANSETRON 4 MG: 2 INJECTION INTRAMUSCULAR; INTRAVENOUS at 12:50

## 2020-01-01 RX ADMIN — ETOMIDATE 20 MG: 2 INJECTION, SOLUTION INTRAVENOUS at 02:29

## 2020-01-01 RX ADMIN — SODIUM CHLORIDE 100 ML/HR: 0.9 INJECTION, SOLUTION INTRAVENOUS at 14:07

## 2020-01-01 RX ADMIN — ONDANSETRON 4 MG: 2 INJECTION INTRAMUSCULAR; INTRAVENOUS at 14:10

## 2020-01-01 RX ADMIN — INSULIN LISPRO 6 UNITS: 100 INJECTION, SOLUTION INTRAVENOUS; SUBCUTANEOUS at 17:43

## 2020-01-01 RX ADMIN — FUROSEMIDE 20 MG: 10 INJECTION, SOLUTION INTRAMUSCULAR; INTRAVENOUS at 16:16

## 2020-01-01 RX ADMIN — Medication 100 MG: at 13:59

## 2020-01-01 RX ADMIN — METOPROLOL TARTRATE 2.5 MG: 5 INJECTION, SOLUTION INTRAVENOUS at 15:53

## 2020-01-01 RX ADMIN — METRONIDAZOLE 500 MG: 500 INJECTION, SOLUTION INTRAVENOUS at 17:29

## 2020-01-01 RX ADMIN — PROPOFOL 20 MG: 10 INJECTION, EMULSION INTRAVENOUS at 14:20

## 2020-01-01 RX ADMIN — HEPARIN SODIUM 5000 UNITS: 5000 INJECTION INTRAVENOUS; SUBCUTANEOUS at 15:33

## 2020-01-01 RX ADMIN — SUCRALFATE 1000 MG: 1 SUSPENSION ORAL at 08:55

## 2020-01-01 RX ADMIN — NEOSTIGMINE METHYLSULFATE 3 MG: 1 INJECTION, SOLUTION INTRAVENOUS at 13:22

## 2020-01-01 RX ADMIN — INSULIN LISPRO 4 UNITS: 100 INJECTION, SOLUTION INTRAVENOUS; SUBCUTANEOUS at 00:11

## 2020-01-01 RX ADMIN — ATORVASTATIN CALCIUM 40 MG: 40 TABLET, FILM COATED ORAL at 17:15

## 2020-01-01 RX ADMIN — MORPHINE SULFATE 2 MG: 2 INJECTION, SOLUTION INTRAMUSCULAR; INTRAVENOUS at 05:32

## 2020-01-01 RX ADMIN — SUCRALFATE 1000 MG: 1 SUSPENSION ORAL at 08:34

## 2020-01-01 RX ADMIN — METRONIDAZOLE 500 MG: 500 INJECTION, SOLUTION INTRAVENOUS at 17:32

## 2020-01-01 RX ADMIN — OXYCODONE HYDROCHLORIDE 5 MG: 5 TABLET ORAL at 06:07

## 2020-01-01 RX ADMIN — OXYCODONE HYDROCHLORIDE 5 MG: 5 TABLET ORAL at 21:08

## 2020-01-01 RX ADMIN — FENTANYL CITRATE 25 MCG: 50 INJECTION, SOLUTION INTRAMUSCULAR; INTRAVENOUS at 10:52

## 2020-01-01 RX ADMIN — METOPROLOL TARTRATE 2.5 MG: 5 INJECTION, SOLUTION INTRAVENOUS at 23:39

## 2020-01-01 RX ADMIN — SUCRALFATE 1000 MG: 1 SUSPENSION ORAL at 08:02

## 2020-01-01 RX ADMIN — Medication 5 SPRAY: at 18:23

## 2020-01-01 RX ADMIN — FUROSEMIDE 40 MG: 10 INJECTION, SOLUTION INTRAMUSCULAR; INTRAVENOUS at 09:05

## 2020-01-01 RX ADMIN — PANTOPRAZOLE SODIUM 40 MG: 40 INJECTION, POWDER, FOR SOLUTION INTRAVENOUS at 22:07

## 2020-01-01 RX ADMIN — INSULIN LISPRO 5 UNITS: 100 INJECTION, SOLUTION INTRAVENOUS; SUBCUTANEOUS at 11:38

## 2020-01-01 RX ADMIN — PANTOPRAZOLE SODIUM 40 MG: 40 INJECTION, POWDER, FOR SOLUTION INTRAVENOUS at 21:38

## 2020-01-01 RX ADMIN — HYDROMORPHONE HYDROCHLORIDE 0.5 MG: 1 INJECTION, SOLUTION INTRAMUSCULAR; INTRAVENOUS; SUBCUTANEOUS at 20:53

## 2020-01-01 RX ADMIN — INSULIN GLARGINE 10 UNITS: 100 INJECTION, SOLUTION SUBCUTANEOUS at 21:29

## 2020-01-01 RX ADMIN — METOPROLOL TARTRATE 2.5 MG: 5 INJECTION, SOLUTION INTRAVENOUS at 09:01

## 2020-01-01 RX ADMIN — INSULIN GLARGINE 10 UNITS: 100 INJECTION, SOLUTION SUBCUTANEOUS at 21:02

## 2020-01-01 RX ADMIN — PRAVASTATIN SODIUM 40 MG: 40 TABLET ORAL at 18:29

## 2020-01-01 RX ADMIN — INSULIN LISPRO 3 UNITS: 100 INJECTION, SOLUTION INTRAVENOUS; SUBCUTANEOUS at 14:41

## 2020-01-01 RX ADMIN — METOCLOPRAMIDE HYDROCHLORIDE 10 MG: 5 INJECTION INTRAMUSCULAR; INTRAVENOUS at 17:36

## 2020-01-01 RX ADMIN — Medication 5 SPRAY: at 21:26

## 2020-01-01 RX ADMIN — PROPOFOL 20 MG: 10 INJECTION, EMULSION INTRAVENOUS at 14:28

## 2020-01-01 RX ADMIN — SODIUM CHLORIDE 60 ML/HR: 0.45 INJECTION, SOLUTION INTRAVENOUS at 08:35

## 2020-01-01 RX ADMIN — FUROSEMIDE 60 MG: 10 INJECTION, SOLUTION INTRAMUSCULAR; INTRAVENOUS at 17:15

## 2020-01-01 RX ADMIN — MORPHINE SULFATE 2 MG: 2 INJECTION, SOLUTION INTRAMUSCULAR; INTRAVENOUS at 21:23

## 2020-01-01 RX ADMIN — OXYCODONE HYDROCHLORIDE 5 MG: 5 TABLET ORAL at 22:10

## 2020-01-01 RX ADMIN — MORPHINE SULFATE 1 MG: 2 INJECTION, SOLUTION INTRAMUSCULAR; INTRAVENOUS at 13:59

## 2020-01-01 RX ADMIN — APIXABAN 2.5 MG: 2.5 TABLET, FILM COATED ORAL at 08:27

## 2020-01-01 RX ADMIN — INSULIN LISPRO 3 UNITS: 100 INJECTION, SOLUTION INTRAVENOUS; SUBCUTANEOUS at 16:38

## 2020-01-01 RX ADMIN — Medication 5 SPRAY: at 22:38

## 2020-01-01 RX ADMIN — INSULIN GLARGINE 10 UNITS: 100 INJECTION, SOLUTION SUBCUTANEOUS at 08:00

## 2020-01-01 RX ADMIN — METOPROLOL TARTRATE 2.5 MG: 5 INJECTION, SOLUTION INTRAVENOUS at 12:18

## 2020-01-01 RX ADMIN — Medication 100 MG: at 15:31

## 2020-01-01 RX ADMIN — HEPARIN SODIUM 5000 UNITS: 5000 INJECTION INTRAVENOUS; SUBCUTANEOUS at 21:13

## 2020-01-01 RX ADMIN — PANTOPRAZOLE SODIUM 40 MG: 40 INJECTION, POWDER, FOR SOLUTION INTRAVENOUS at 08:12

## 2020-01-01 RX ADMIN — METOCLOPRAMIDE HYDROCHLORIDE 10 MG: 5 INJECTION INTRAMUSCULAR; INTRAVENOUS at 11:54

## 2020-01-01 RX ADMIN — INSULIN LISPRO 4 UNITS: 100 INJECTION, SOLUTION INTRAVENOUS; SUBCUTANEOUS at 23:58

## 2020-01-01 RX ADMIN — OXYCODONE HYDROCHLORIDE 5 MG: 5 TABLET ORAL at 11:26

## 2020-01-01 RX ADMIN — INSULIN LISPRO 1 UNITS: 100 INJECTION, SOLUTION INTRAVENOUS; SUBCUTANEOUS at 00:55

## 2020-01-01 RX ADMIN — FENTANYL CITRATE 50 MCG: 50 INJECTION, SOLUTION INTRAMUSCULAR; INTRAVENOUS at 13:42

## 2020-01-01 RX ADMIN — SODIUM CHLORIDE: 234 INJECTION INTRAMUSCULAR; INTRAVENOUS; SUBCUTANEOUS at 15:43

## 2020-01-01 RX ADMIN — SUCRALFATE 1000 MG: 1 SUSPENSION ORAL at 17:25

## 2020-01-01 RX ADMIN — POTASSIUM CHLORIDE 40 MEQ: 1500 TABLET, EXTENDED RELEASE ORAL at 09:02

## 2020-01-01 RX ADMIN — FENTANYL CITRATE 50 MCG: 50 INJECTION, SOLUTION INTRAMUSCULAR; INTRAVENOUS at 10:18

## 2020-01-01 RX ADMIN — HEPARIN SODIUM 5000 UNITS: 5000 INJECTION INTRAVENOUS; SUBCUTANEOUS at 21:38

## 2020-01-01 RX ADMIN — DILTIAZEM HYDROCHLORIDE 15 MG: 5 INJECTION INTRAVENOUS at 03:05

## 2020-01-01 RX ADMIN — PANTOPRAZOLE SODIUM 40 MG: 40 INJECTION, POWDER, FOR SOLUTION INTRAVENOUS at 21:14

## 2020-01-01 RX ADMIN — METOPROLOL TARTRATE 2.5 MG: 5 INJECTION, SOLUTION INTRAVENOUS at 09:21

## 2020-01-01 RX ADMIN — Medication 5 SPRAY: at 12:05

## 2020-01-01 RX ADMIN — LEVALBUTEROL HYDROCHLORIDE 0.63 MG: 0.63 SOLUTION RESPIRATORY (INHALATION) at 15:47

## 2020-01-01 RX ADMIN — PANTOPRAZOLE SODIUM 40 MG: 40 INJECTION, POWDER, FOR SOLUTION INTRAVENOUS at 08:02

## 2020-01-01 RX ADMIN — PANTOPRAZOLE SODIUM 40 MG: 40 INJECTION, POWDER, FOR SOLUTION INTRAVENOUS at 20:38

## 2020-01-01 RX ADMIN — NOREPINEPHRINE BITARTRATE 10 MCG/MIN: 1 INJECTION, SOLUTION, CONCENTRATE INTRAVENOUS at 03:18

## 2020-01-01 RX ADMIN — METOPROLOL SUCCINATE 25 MG: 25 TABLET, EXTENDED RELEASE ORAL at 08:12

## 2020-01-01 RX ADMIN — Medication: at 18:16

## 2020-01-01 RX ADMIN — METOPROLOL TARTRATE 2.5 MG: 5 INJECTION, SOLUTION INTRAVENOUS at 21:42

## 2020-01-01 RX ADMIN — HEPARIN SODIUM 5000 UNITS: 5000 INJECTION INTRAVENOUS; SUBCUTANEOUS at 05:36

## 2020-01-01 RX ADMIN — MORPHINE SULFATE 2 MG: 2 INJECTION, SOLUTION INTRAMUSCULAR; INTRAVENOUS at 08:58

## 2020-01-01 RX ADMIN — METOPROLOL TARTRATE 2.5 MG: 5 INJECTION, SOLUTION INTRAVENOUS at 00:26

## 2020-01-01 RX ADMIN — METOPROLOL TARTRATE 2.5 MG: 5 INJECTION, SOLUTION INTRAVENOUS at 22:02

## 2020-01-01 RX ADMIN — INSULIN LISPRO 2 UNITS: 100 INJECTION, SOLUTION INTRAVENOUS; SUBCUTANEOUS at 06:19

## 2020-01-01 RX ADMIN — ONDANSETRON 4 MG: 2 INJECTION INTRAMUSCULAR; INTRAVENOUS at 13:18

## 2020-01-01 RX ADMIN — OXYCODONE HYDROCHLORIDE 5 MG: 5 TABLET ORAL at 00:43

## 2020-01-01 RX ADMIN — PANTOPRAZOLE SODIUM 40 MG: 40 INJECTION, POWDER, FOR SOLUTION INTRAVENOUS at 21:27

## 2020-01-01 RX ADMIN — Medication 5 SPRAY: at 13:51

## 2020-01-01 RX ADMIN — PROPOFOL 110 MG: 10 INJECTION, EMULSION INTRAVENOUS at 10:18

## 2020-01-01 RX ADMIN — METOPROLOL TARTRATE 2.5 MG: 5 INJECTION, SOLUTION INTRAVENOUS at 12:44

## 2020-01-01 RX ADMIN — ONDANSETRON 4 MG: 2 INJECTION INTRAMUSCULAR; INTRAVENOUS at 14:18

## 2020-01-01 RX ADMIN — PANTOPRAZOLE SODIUM 40 MG: 40 INJECTION, POWDER, FOR SOLUTION INTRAVENOUS at 21:23

## 2020-01-01 RX ADMIN — INSULIN GLARGINE 25 UNITS: 100 INJECTION, SOLUTION SUBCUTANEOUS at 09:08

## 2020-01-01 RX ADMIN — LIDOCAINE HYDROCHLORIDE 50 MG: 10 INJECTION, SOLUTION EPIDURAL; INFILTRATION; INTRACAUDAL; PERINEURAL at 14:12

## 2020-01-01 RX ADMIN — PANTOPRAZOLE SODIUM 40 MG: 40 INJECTION, POWDER, FOR SOLUTION INTRAVENOUS at 21:32

## 2020-01-01 RX ADMIN — Medication 5 SPRAY: at 18:36

## 2020-01-01 RX ADMIN — SODIUM CHLORIDE 500 ML: 0.9 INJECTION, SOLUTION INTRAVENOUS at 17:24

## 2020-01-01 RX ADMIN — PANTOPRAZOLE SODIUM 40 MG: 40 INJECTION, POWDER, FOR SOLUTION INTRAVENOUS at 08:34

## 2020-01-01 RX ADMIN — EPINEPHRINE 1 MG: 0.1 INJECTION, SOLUTION ENDOTRACHEAL; INTRACARDIAC; INTRAVENOUS at 02:27

## 2020-01-01 RX ADMIN — INSULIN LISPRO 6 UNITS: 100 INJECTION, SOLUTION INTRAVENOUS; SUBCUTANEOUS at 18:23

## 2020-01-01 RX ADMIN — ASPIRIN 81 MG: 81 TABLET, COATED ORAL at 08:27

## 2020-01-01 RX ADMIN — Medication 100 MG: at 16:39

## 2020-01-01 RX ADMIN — INSULIN LISPRO 5 UNITS: 100 INJECTION, SOLUTION INTRAVENOUS; SUBCUTANEOUS at 11:39

## 2020-01-01 RX ADMIN — ONDANSETRON 4 MG: 2 INJECTION INTRAMUSCULAR; INTRAVENOUS at 05:40

## 2020-01-01 RX ADMIN — HYDROMORPHONE HYDROCHLORIDE 0.5 MG: 1 INJECTION, SOLUTION INTRAMUSCULAR; INTRAVENOUS; SUBCUTANEOUS at 00:21

## 2020-01-01 RX ADMIN — APIXABAN 2.5 MG: 2.5 TABLET, FILM COATED ORAL at 09:05

## 2020-01-01 RX ADMIN — ROCURONIUM BROMIDE 30 MG: 10 INJECTION, SOLUTION INTRAVENOUS at 10:34

## 2020-01-01 RX ADMIN — PANTOPRAZOLE SODIUM 40 MG: 40 INJECTION, POWDER, FOR SOLUTION INTRAVENOUS at 08:06

## 2020-01-01 RX ADMIN — ALBUMIN (HUMAN) 12.5 G: 0.25 INJECTION, SOLUTION INTRAVENOUS at 06:12

## 2020-01-01 RX ADMIN — Medication 50 MCG/HR: at 04:18

## 2020-01-01 RX ADMIN — METOPROLOL TARTRATE 2.5 MG: 5 INJECTION, SOLUTION INTRAVENOUS at 21:40

## 2020-01-01 RX ADMIN — MORPHINE SULFATE 2 MG: 2 INJECTION, SOLUTION INTRAMUSCULAR; INTRAVENOUS at 03:21

## 2020-01-01 RX ADMIN — INSULIN LISPRO 1 UNITS: 100 INJECTION, SOLUTION INTRAVENOUS; SUBCUTANEOUS at 18:11

## 2020-01-01 RX ADMIN — FUROSEMIDE 40 MG: 10 INJECTION, SOLUTION INTRAMUSCULAR; INTRAVENOUS at 12:59

## 2020-01-01 RX ADMIN — FUROSEMIDE 40 MG: 10 INJECTION, SOLUTION INTRAMUSCULAR; INTRAVENOUS at 16:50

## 2020-01-01 RX ADMIN — OXYCODONE HYDROCHLORIDE 5 MG: 5 TABLET ORAL at 00:11

## 2020-01-01 RX ADMIN — INSULIN LISPRO 4 UNITS: 100 INJECTION, SOLUTION INTRAVENOUS; SUBCUTANEOUS at 17:35

## 2020-01-01 RX ADMIN — METOPROLOL TARTRATE 2.5 MG: 5 INJECTION, SOLUTION INTRAVENOUS at 00:59

## 2020-01-01 RX ADMIN — Medication 5 SPRAY: at 08:47

## 2020-01-01 RX ADMIN — FUROSEMIDE 60 MG: 10 INJECTION, SOLUTION INTRAMUSCULAR; INTRAVENOUS at 17:42

## 2020-01-01 RX ADMIN — INSULIN LISPRO 3 UNITS: 100 INJECTION, SOLUTION INTRAVENOUS; SUBCUTANEOUS at 18:48

## 2020-01-01 RX ADMIN — INSULIN GLARGINE 17 UNITS: 100 INJECTION, SOLUTION SUBCUTANEOUS at 21:16

## 2020-01-01 RX ADMIN — METOPROLOL TARTRATE 2.5 MG: 5 INJECTION, SOLUTION INTRAVENOUS at 19:53

## 2020-01-01 RX ADMIN — INSULIN LISPRO 2 UNITS: 100 INJECTION, SOLUTION INTRAVENOUS; SUBCUTANEOUS at 00:39

## 2020-01-01 RX ADMIN — FUROSEMIDE 40 MG: 10 INJECTION, SOLUTION INTRAMUSCULAR; INTRAVENOUS at 10:26

## 2020-01-01 RX ADMIN — PROPOFOL 20 MG: 10 INJECTION, EMULSION INTRAVENOUS at 14:15

## 2020-01-01 RX ADMIN — HEPARIN SODIUM 5000 UNITS: 5000 INJECTION INTRAVENOUS; SUBCUTANEOUS at 05:33

## 2020-01-01 RX ADMIN — Medication 5 SPRAY: at 05:09

## 2020-01-01 RX ADMIN — FUROSEMIDE 40 MG: 10 INJECTION, SOLUTION INTRAMUSCULAR; INTRAVENOUS at 17:03

## 2020-01-01 RX ADMIN — ATORVASTATIN CALCIUM 40 MG: 40 TABLET, FILM COATED ORAL at 15:10

## 2020-01-01 RX ADMIN — INSULIN GLARGINE 15 UNITS: 100 INJECTION, SOLUTION SUBCUTANEOUS at 22:32

## 2020-01-01 RX ADMIN — INSULIN LISPRO 1 UNITS: 100 INJECTION, SOLUTION INTRAVENOUS; SUBCUTANEOUS at 21:32

## 2020-01-01 RX ADMIN — INSULIN LISPRO 2 UNITS: 100 INJECTION, SOLUTION INTRAVENOUS; SUBCUTANEOUS at 00:59

## 2020-01-01 RX ADMIN — Medication: at 18:20

## 2020-01-01 RX ADMIN — MORPHINE SULFATE 2 MG: 2 INJECTION, SOLUTION INTRAMUSCULAR; INTRAVENOUS at 01:48

## 2020-01-01 RX ADMIN — PANTOPRAZOLE SODIUM 40 MG: 40 INJECTION, POWDER, FOR SOLUTION INTRAVENOUS at 08:08

## 2020-01-01 RX ADMIN — INSULIN LISPRO 3 UNITS: 100 INJECTION, SOLUTION INTRAVENOUS; SUBCUTANEOUS at 12:55

## 2020-01-01 RX ADMIN — METOPROLOL TARTRATE 2.5 MG: 5 INJECTION, SOLUTION INTRAVENOUS at 03:05

## 2020-01-01 RX ADMIN — MORPHINE SULFATE 2 MG: 2 INJECTION, SOLUTION INTRAMUSCULAR; INTRAVENOUS at 18:53

## 2020-01-01 RX ADMIN — GLYCOPYRROLATE 0.4 MG: 0.2 INJECTION, SOLUTION INTRAMUSCULAR; INTRAVENOUS at 13:20

## 2020-01-01 RX ADMIN — MAGNESIUM SULFATE HEPTAHYDRATE 1 G: 1 INJECTION, SOLUTION INTRAVENOUS at 11:47

## 2020-01-01 RX ADMIN — HEPARIN SODIUM 5000 UNITS: 5000 INJECTION INTRAVENOUS; SUBCUTANEOUS at 21:06

## 2020-01-01 RX ADMIN — ALBUMIN (HUMAN): 12.5 SOLUTION INTRAVENOUS at 10:53

## 2020-01-01 RX ADMIN — SODIUM CHLORIDE 60 ML/HR: 0.45 INJECTION, SOLUTION INTRAVENOUS at 10:38

## 2020-01-01 RX ADMIN — SODIUM CHLORIDE, SODIUM GLUCONATE, SODIUM ACETATE, POTASSIUM CHLORIDE, MAGNESIUM CHLORIDE, SODIUM PHOSPHATE, DIBASIC, AND POTASSIUM PHOSPHATE 500 ML: .53; .5; .37; .037; .03; .012; .00082 INJECTION, SOLUTION INTRAVENOUS at 00:27

## 2020-01-01 RX ADMIN — MORPHINE SULFATE 2 MG: 2 INJECTION, SOLUTION INTRAMUSCULAR; INTRAVENOUS at 17:12

## 2020-01-01 RX ADMIN — PANTOPRAZOLE SODIUM 40 MG: 40 INJECTION, POWDER, FOR SOLUTION INTRAVENOUS at 08:57

## 2020-01-01 RX ADMIN — APIXABAN 2.5 MG: 2.5 TABLET, FILM COATED ORAL at 16:45

## 2020-01-01 RX ADMIN — INSULIN LISPRO 1 UNITS: 100 INJECTION, SOLUTION INTRAVENOUS; SUBCUTANEOUS at 05:49

## 2020-01-01 RX ADMIN — METOCLOPRAMIDE HYDROCHLORIDE 10 MG: 5 INJECTION INTRAMUSCULAR; INTRAVENOUS at 11:36

## 2020-01-01 RX ADMIN — ENOXAPARIN SODIUM 30 MG: 30 INJECTION SUBCUTANEOUS at 08:08

## 2020-01-01 RX ADMIN — ALBUMIN (HUMAN) 12.5 G: 12.5 INJECTION, SOLUTION INTRAVENOUS at 12:03

## 2020-01-01 RX ADMIN — SUCRALFATE 1000 MG: 1 SUSPENSION ORAL at 18:36

## 2020-01-01 RX ADMIN — METOPROLOL TARTRATE 5 MG: 5 INJECTION INTRAVENOUS at 03:32

## 2020-01-01 RX ADMIN — FENTANYL CITRATE 25 MCG: 50 INJECTION, SOLUTION INTRAMUSCULAR; INTRAVENOUS at 13:30

## 2020-01-01 RX ADMIN — PANTOPRAZOLE SODIUM 40 MG: 40 INJECTION, POWDER, FOR SOLUTION INTRAVENOUS at 08:05

## 2020-01-01 RX ADMIN — OXYCODONE HYDROCHLORIDE 5 MG: 5 TABLET ORAL at 15:41

## 2020-01-01 RX ADMIN — INSULIN LISPRO 5 UNITS: 100 INJECTION, SOLUTION INTRAVENOUS; SUBCUTANEOUS at 10:20

## 2020-01-01 RX ADMIN — MORPHINE SULFATE 4 MG: 4 INJECTION INTRAVENOUS at 09:44

## 2020-01-01 RX ADMIN — EPINEPHRINE 1 MG: 0.1 INJECTION, SOLUTION ENDOTRACHEAL; INTRACARDIAC; INTRAVENOUS at 02:26

## 2020-01-01 RX ADMIN — PROPOFOL 100 MG: 10 INJECTION, EMULSION INTRAVENOUS at 14:12

## 2020-01-01 RX ADMIN — METOPROLOL SUCCINATE 25 MG: 25 TABLET, EXTENDED RELEASE ORAL at 08:30

## 2020-01-01 RX ADMIN — IRON SUCROSE 200 MG: 20 INJECTION, SOLUTION INTRAVENOUS at 09:18

## 2020-01-01 RX ADMIN — PROPOFOL 20 MG: 10 INJECTION, EMULSION INTRAVENOUS at 14:17

## 2020-01-01 RX ADMIN — MORPHINE SULFATE 2 MG: 2 INJECTION, SOLUTION INTRAMUSCULAR; INTRAVENOUS at 05:49

## 2020-01-01 RX ADMIN — Medication 100 MG: at 16:44

## 2020-01-01 RX ADMIN — INSULIN GLARGINE 15 UNITS: 100 INJECTION, SOLUTION SUBCUTANEOUS at 21:08

## 2020-01-01 RX ADMIN — METOPROLOL TARTRATE 2.5 MG: 5 INJECTION, SOLUTION INTRAVENOUS at 18:16

## 2020-01-01 RX ADMIN — INSULIN GLARGINE 15 UNITS: 100 INJECTION, SOLUTION SUBCUTANEOUS at 13:22

## 2020-01-01 RX ADMIN — PANTOPRAZOLE SODIUM 40 MG: 40 INJECTION, POWDER, FOR SOLUTION INTRAVENOUS at 09:21

## 2020-01-01 RX ADMIN — IRON SUCROSE 200 MG: 20 INJECTION, SOLUTION INTRAVENOUS at 09:30

## 2020-01-01 RX ADMIN — METRONIDAZOLE 500 MG: 500 INJECTION, SOLUTION INTRAVENOUS at 10:41

## 2020-01-01 RX ADMIN — METOCLOPRAMIDE HYDROCHLORIDE 10 MG: 5 INJECTION INTRAMUSCULAR; INTRAVENOUS at 00:26

## 2020-01-01 RX ADMIN — FUROSEMIDE 60 MG: 10 INJECTION, SOLUTION INTRAMUSCULAR; INTRAVENOUS at 09:19

## 2020-01-01 RX ADMIN — HYDROMORPHONE HYDROCHLORIDE 0.2 MG: 1 INJECTION, SOLUTION INTRAMUSCULAR; INTRAVENOUS; SUBCUTANEOUS at 01:42

## 2020-01-01 RX ADMIN — PANTOPRAZOLE SODIUM 40 MG: 40 INJECTION, POWDER, FOR SOLUTION INTRAVENOUS at 08:18

## 2020-01-01 RX ADMIN — ONDANSETRON 4 MG: 2 INJECTION INTRAMUSCULAR; INTRAVENOUS at 02:27

## 2020-01-01 RX ADMIN — MORPHINE SULFATE 2 MG: 2 INJECTION, SOLUTION INTRAMUSCULAR; INTRAVENOUS at 22:41

## 2020-01-01 RX ADMIN — SODIUM BICARBONATE 50 MEQ: 84 INJECTION, SOLUTION INTRAVENOUS at 04:46

## 2020-01-01 RX ADMIN — METOPROLOL TARTRATE 2.5 MG: 5 INJECTION, SOLUTION INTRAVENOUS at 06:10

## 2020-01-01 RX ADMIN — ALTEPLASE 2 MG: 2.2 INJECTION, POWDER, LYOPHILIZED, FOR SOLUTION INTRAVENOUS at 17:22

## 2020-01-01 RX ADMIN — METOPROLOL TARTRATE 2.5 MG: 5 INJECTION, SOLUTION INTRAVENOUS at 23:55

## 2020-01-01 RX ADMIN — INSULIN LISPRO 2 UNITS: 100 INJECTION, SOLUTION INTRAVENOUS; SUBCUTANEOUS at 18:14

## 2020-01-01 RX ADMIN — INSULIN GLARGINE 15 UNITS: 100 INJECTION, SOLUTION SUBCUTANEOUS at 08:32

## 2020-01-01 RX ADMIN — SODIUM CHLORIDE 500 ML: 0.9 INJECTION, SOLUTION INTRAVENOUS at 12:54

## 2020-01-01 RX ADMIN — MORPHINE SULFATE 4 MG: 4 INJECTION INTRAVENOUS at 00:06

## 2020-01-01 RX ADMIN — ATORVASTATIN CALCIUM 40 MG: 40 TABLET, FILM COATED ORAL at 16:21

## 2020-01-01 RX ADMIN — METOPROLOL TARTRATE 2.5 MG: 5 INJECTION, SOLUTION INTRAVENOUS at 03:39

## 2020-01-01 RX ADMIN — OXYCODONE HYDROCHLORIDE 5 MG: 5 TABLET ORAL at 09:59

## 2020-01-01 RX ADMIN — MORPHINE SULFATE 2 MG: 2 INJECTION, SOLUTION INTRAMUSCULAR; INTRAVENOUS at 01:37

## 2020-01-01 RX ADMIN — PANTOPRAZOLE SODIUM 40 MG: 40 INJECTION, POWDER, FOR SOLUTION INTRAVENOUS at 09:19

## 2020-01-01 RX ADMIN — OXYCODONE HYDROCHLORIDE 5 MG: 5 TABLET ORAL at 16:32

## 2020-01-01 RX ADMIN — OXYCODONE HYDROCHLORIDE 5 MG: 5 TABLET ORAL at 22:16

## 2020-01-01 RX ADMIN — METOPROLOL TARTRATE 2.5 MG: 5 INJECTION, SOLUTION INTRAVENOUS at 14:54

## 2020-01-01 RX ADMIN — PANTOPRAZOLE SODIUM 40 MG: 40 INJECTION, POWDER, FOR SOLUTION INTRAVENOUS at 21:04

## 2020-01-01 RX ADMIN — INSULIN LISPRO 1 UNITS: 100 INJECTION, SOLUTION INTRAVENOUS; SUBCUTANEOUS at 17:58

## 2020-01-01 RX ADMIN — PANTOPRAZOLE SODIUM 40 MG: 40 INJECTION, POWDER, FOR SOLUTION INTRAVENOUS at 08:00

## 2020-01-01 RX ADMIN — OXYCODONE HYDROCHLORIDE 5 MG: 5 TABLET ORAL at 05:40

## 2020-01-01 RX ADMIN — APIXABAN 2.5 MG: 2.5 TABLET, FILM COATED ORAL at 18:57

## 2020-01-01 RX ADMIN — HEPARIN SODIUM 5000 UNITS: 5000 INJECTION INTRAVENOUS; SUBCUTANEOUS at 21:41

## 2020-01-01 RX ADMIN — OXYCODONE HYDROCHLORIDE 5 MG: 5 TABLET ORAL at 15:09

## 2020-01-01 RX ADMIN — METOCLOPRAMIDE HYDROCHLORIDE 10 MG: 5 INJECTION INTRAMUSCULAR; INTRAVENOUS at 17:25

## 2020-01-01 RX ADMIN — PANTOPRAZOLE SODIUM 40 MG: 40 INJECTION, POWDER, FOR SOLUTION INTRAVENOUS at 21:42

## 2020-01-01 RX ADMIN — IRON SUCROSE 200 MG: 20 INJECTION, SOLUTION INTRAVENOUS at 10:26

## 2020-01-01 RX ADMIN — METOPROLOL TARTRATE 2.5 MG: 5 INJECTION, SOLUTION INTRAVENOUS at 05:26

## 2020-01-01 RX ADMIN — MORPHINE SULFATE 2 MG: 2 INJECTION, SOLUTION INTRAMUSCULAR; INTRAVENOUS at 05:14

## 2020-01-01 RX ADMIN — AZITHROMYCIN MONOHYDRATE 500 MG: 500 INJECTION, POWDER, LYOPHILIZED, FOR SOLUTION INTRAVENOUS at 03:44

## 2020-01-01 RX ADMIN — INSULIN LISPRO 3 UNITS: 100 INJECTION, SOLUTION INTRAVENOUS; SUBCUTANEOUS at 17:26

## 2020-01-01 RX ADMIN — POLYETHYLENE GLYCOL 3350 238 G: 17 POWDER, FOR SOLUTION ORAL at 03:40

## 2020-01-01 RX ADMIN — Medication 100 MG: at 10:18

## 2020-01-01 RX ADMIN — DEXTROSE MONOHYDRATE 25 ML: 25 INJECTION, SOLUTION INTRAVENOUS at 19:00

## 2020-01-01 RX ADMIN — HEPARIN SODIUM 5000 UNITS: 5000 INJECTION INTRAVENOUS; SUBCUTANEOUS at 14:46

## 2020-01-01 RX ADMIN — METOPROLOL TARTRATE 2.5 MG: 5 INJECTION, SOLUTION INTRAVENOUS at 21:04

## 2020-01-01 RX ADMIN — FUROSEMIDE 40 MG: 10 INJECTION, SOLUTION INTRAMUSCULAR; INTRAVENOUS at 08:05

## 2020-01-01 RX ADMIN — PANTOPRAZOLE SODIUM 40 MG: 40 INJECTION, POWDER, FOR SOLUTION INTRAVENOUS at 21:37

## 2020-01-01 RX ADMIN — FUROSEMIDE 60 MG: 10 INJECTION, SOLUTION INTRAMUSCULAR; INTRAVENOUS at 08:02

## 2020-01-01 RX ADMIN — Medication: at 18:07

## 2020-01-01 RX ADMIN — METOPROLOL TARTRATE 2.5 MG: 5 INJECTION, SOLUTION INTRAVENOUS at 17:25

## 2020-01-01 RX ADMIN — INSULIN LISPRO 4 UNITS: 100 INJECTION, SOLUTION INTRAVENOUS; SUBCUTANEOUS at 12:35

## 2020-01-01 RX ADMIN — OXYCODONE HYDROCHLORIDE 5 MG: 5 TABLET ORAL at 21:42

## 2020-01-01 RX ADMIN — CIPROFLOXACIN 400 MG: 2 INJECTION, SOLUTION INTRAVENOUS at 08:06

## 2020-01-01 RX ADMIN — INSULIN LISPRO 6 UNITS: 100 INJECTION, SOLUTION INTRAVENOUS; SUBCUTANEOUS at 13:10

## 2020-01-01 RX ADMIN — CIPROFLOXACIN 400 MG: 2 INJECTION, SOLUTION INTRAVENOUS at 09:46

## 2020-01-01 RX ADMIN — HEPARIN SODIUM 5000 UNITS: 5000 INJECTION INTRAVENOUS; SUBCUTANEOUS at 22:40

## 2020-01-01 RX ADMIN — APIXABAN 2.5 MG: 2.5 TABLET, FILM COATED ORAL at 17:43

## 2020-01-01 RX ADMIN — INSULIN GLARGINE 17 UNITS: 100 INJECTION, SOLUTION SUBCUTANEOUS at 21:42

## 2020-01-01 RX ADMIN — METRONIDAZOLE 500 MG: 500 INJECTION, SOLUTION INTRAVENOUS at 08:21

## 2020-01-01 RX ADMIN — CIPROFLOXACIN 400 MG: 2 INJECTION, SOLUTION INTRAVENOUS at 21:02

## 2020-01-01 RX ADMIN — HEPARIN SODIUM 5000 UNITS: 5000 INJECTION INTRAVENOUS; SUBCUTANEOUS at 05:37

## 2020-06-16 PROBLEM — E66.01 MORBID OBESITY (HCC): Status: ACTIVE | Noted: 2018-07-23

## 2020-06-16 PROBLEM — E66.9 DIABETES MELLITUS TYPE 2 IN OBESE (HCC): Status: ACTIVE | Noted: 2020-01-01

## 2020-06-16 PROBLEM — I50.9 CHF (CONGESTIVE HEART FAILURE) (HCC): Status: ACTIVE | Noted: 2020-01-01

## 2020-06-16 PROBLEM — N18.30 CHRONIC KIDNEY DISEASE, STAGE 3 (HCC): Status: ACTIVE | Noted: 2020-01-01

## 2020-06-16 PROBLEM — D64.9 SYMPTOMATIC ANEMIA: Status: ACTIVE | Noted: 2020-01-01

## 2020-06-16 PROBLEM — E11.69 DIABETES MELLITUS TYPE 2 IN OBESE (HCC): Status: ACTIVE | Noted: 2020-01-01

## 2020-06-16 PROBLEM — I10 ESSENTIAL HYPERTENSION: Status: ACTIVE | Noted: 2019-03-06

## 2020-06-16 PROBLEM — I48.91 ATRIAL FIBRILLATION (HCC): Status: ACTIVE | Noted: 2020-01-01

## 2020-06-16 PROBLEM — I25.10 CORONARY ARTERY DISEASE: Status: ACTIVE | Noted: 2019-03-06

## 2020-06-16 NOTE — H&P
H&P- Lizette Asa 1935, 80 y o  male MRN: [id number]    Unit/Bed#: -01 Encounter: [id number]    Primary Care Provider: Daniela Daley DO   Date and time admitted to hospital: 6/16/2020 11:14 AM        * Symptomatic anemia  Assessment & Plan  · Patient was evaluated by PCP secondary to dizziness and fatigue and has blood pressure done as an outpatient  His hemoglobin was found to be 6 2 and was sent to the emergency room  · Today hemoglobin in the emergency room was 7 1 and receiving 2 units of packed RBCs  · Iron panel  · Patient gives history of dark stools-likely upper GI bleed patient is on aspirin and Eliquis  · Hold antiplatelet and anticoagulation for  Now  · PPI b i d   · GI evaluation  · NPO after midnight    Atrial fibrillation [facility])  Assessment & Plan  · Patient with known history of atrial fibrillation, status post HUMBERTO and cardioversion in January of this year in [facility] but unfortunately patient is in chronic atrial fibrillation per cardiology progress note, anticoagulated with Eliquis  · Slow ventricular response  · Heart rate is controlled  · Continue with metoprolol  · Eliquis on hold secondary to suspected GI bleed  · Will discussed with Gastroenterology and Cardiology regarding restarting back on the anticoagulation after the EGD    Morbid obesity ([address] )  Assessment & Plan  · TLC as an outpatient    Hyperlipidemia  Assessment & Plan  · Continue with the simvastatin-auto substituted to pravastatin in the hospital    Essential hypertension  Assessment & Plan  · Continue with the metoprolol      Diabetes mellitus type 2 in obese [facility])  Assessment & Plan  Lab Results   Component Value Date    HGBA1C 6 1 01/08/2018       No results for input(s): POCGLU in the last 72 hours      Blood Sugar Average: Last 72 hrs:   type 2 diabetes mellitus on oral hypoglycemic agent  Hold metformin and glipizide in the hospital  Insulin sliding scale  Patient is going to be NPO after midnight    Coronary artery disease  Assessment & Plan  · Patient with known history of coronary artery disease status post CABG in 1997 in [facility]  · Patient is on beta-blocker aspirin and statin  · Hold aspirin for now    Chronic kidney disease, stage 3 ([address] )  Assessment & Plan  · Baseline creatinine appears to be between 1 4 and 1 6  · Creatinine at baseline  · Monitor creatinine    CHF (congestive heart failure) ([facility])  Assessment & Plan  Wt Readings from Last 3 Encounters:   06/16/20 102 kg (225 lb 12 oz)   Patient with known history of chronic congestive heart failure and was admitted to [facility] in January  Patient was on 40 mg of Lasix b i d  As an outpatient and secondary to dizziness and orthostasis his Lasix dose was decreased to 40 mg daily during the recent hospital stay  Appears to be euvolemic  IV Lasix x1 after the blood draw  Hold further Lasix while inpatient  Ejection fraction 65% during the last echo 1/20            VTE Prophylaxis: On hold secondary to suspected GI bleed  / sequential compression device   Code Status: full code  POLST: POLST form is not discussed and not completed at this time  Discussion with family: significant other updated    Anticipated Length of Stay:  Patient will be admitted on an Inpatient basis with an anticipated length of stay of > 2 midnights  Justification for Hospital Stay:  Gi bleed    Total Time for Visit, including Counseling / Coordination of Care: 1 hour  Greater than 50% of this total time spent on direct patient counseling and coordination of care  Chief Complaint:    dizziness    History of Present Illness:    Amy Richards is a 80 y o  male who was sent to the emergency room by PCP  Patient was seen by PCP yesterday for dizziness and lightheadedness and also fatigue  He has outpatient blood work done showed the presence of anemia and he was sent to the emergency room for further evaluation    His outpatient hemoglobin was 6 2 today in the emergency room hemoglobin was 7 1  Patient is scheduled to receive 2 units of packed RBCs  Patient is on Eliquis and aspirin as an outpatient  Patient was admitted to [facility] in January of this year with CHF and also found to have atrial fibrillation with slow ventricular response  Status post HUMBERTO and cardioversion and started on Eliquis  Unfortunately patient went back to atrial fibrillation and was maintained on the Eliquis  Patient noted that recently he was getting dizzy lightheaded and feeling more tired  Dark stools present  Patient denied any bright red blood in his stool  Patient reported that he had colonoscopy more than 5 years ago  Patient was guaiac positive in emergency room   Patient also reported that he has trouble ambulating recently and he has been falling more frequently    Review of Systems:    Review of Systems   Constitutional: Positive for fatigue  HENT: Negative  Eyes: Negative for visual disturbance  Respiratory: Negative  Cardiovascular: Negative  Gastrointestinal: Positive for blood in stool  Black stool   Endocrine: Negative  Genitourinary: Negative  Musculoskeletal: Positive for gait problem  Skin: Positive for wound  Neurological: Positive for dizziness  Negative for headaches  Hematological: Negative  Psychiatric/Behavioral: Negative  Past Medical and Surgical History:     Past Medical History:   Diagnosis Date    A-fib [facility])     Cardiac disease     Diabetes mellitus [address] [address]     MI, old        Past Surgical History:   Procedure Laterality Date    CARDIAC SURGERY         Meds/Allergies:    Prior to Admission medications    Medication Sig Start Date End Date Taking?  Authorizing Provider   apixaban (ELIQUIS) 5 mg Take 5 mg by mouth daily 1/28/20  Yes Historical Provider, MD   aspirin (Aspir-Low) 81 mg EC tablet Take 81 mg by mouth daily   Yes Historical Provider, MD   bisacodyl (DULCOLAX) 5 mg EC tablet Take 10 mg by mouth 7/23/18  Yes Historical Provider, MD   fexofenadine (ALLEGRA) 180 MG tablet Take 180 mg by mouth 12/5/18  Yes Historical Provider, MD   furosemide (LASIX) 40 mg tablet Take 40 mg by mouth daily 5/27/20  Yes Historical Provider, MD   glipiZIDE (GLUCOTROL) 10 mg tablet Take 10 mg by mouth daily 6/15/20  Yes Historical Provider, MD   HYDROcodone-acetaminophen (NORCO) 5-325 mg per tablet Take 1 tablet by mouth 4 (four) times a day as needed 8/23/19  Yes Historical Provider, MD   ipratropium (ATROVENT) 0 06 % nasal spray 2 sprays into each nostril 2/6/19  Yes Historical Provider, MD   metFORMIN (GLUCOPHAGE-XR) 500 mg 24 hr tablet Take 500 mg by mouth daily 8/23/19  Yes Historical Provider, MD   metoprolol succinate (TOPROL-XL) 25 mg 24 hr tablet Take 25 mg by mouth daily 1/15/20  Yes Historical Provider, MD   simvastatin (ZOCOR) 40 mg tablet Take 40 mg by mouth daily 2/6/19  Yes Historical Provider, MD   Aspirin Buf,CaCarb-MgCarb-MgO, 81 MG TABS Take by mouth 1/25/07 6/16/20 Yes Historical Provider, MD   simvastatin (ZOCOR) 80 mg tablet Take 40 mg by mouth daily  6/16/20  Historical Provider, MD     I have reviewed home medications with patient personally  /reviewed through PCP's record    Allergies: No Known Allergies    Social History:     Marital Status:     Occupation: retired  Patient Pre-hospital Living Situation:  Lives at home independently  Patient Pre-hospital Level of Mobility:  Uses a cane or a walker at home  Patient Pre-hospital Diet Restrictions:   Substance Use History:   Social History     Substance and Sexual Activity   Alcohol Use Not Currently     Social History     Tobacco Use   Smoking Status Never Smoker   Smokeless Tobacco Never Used     Social History     Substance and Sexual Activity   Drug Use Not Currently       Family History:    Family History   Problem Relation Age of Onset    Diabetes Mother        Physical Exam:     Vitals:   Blood Pressure: 131/53 (06/16/20 1541)  Pulse: 56 (06/16/20 1541)  Temperature: 99 °F (37 2 °C) (06/16/20 1541)  Temp Source: Oral (06/16/20 1541)  Respirations: 20 (06/16/20 1541)  Height: 5' 4" (162 6 cm) (06/16/20 1117)  Weight - Scale: 102 kg (225 lb 12 oz) (06/16/20 1117)  SpO2: 98 % (06/16/20 1541)    Physical Exam   Constitutional: He appears well-developed  No distress  HENT:   Head: Normocephalic and atraumatic  Eyes: Right eye exhibits no discharge  Left eye exhibits no discharge  Neck: No JVD present  Cardiovascular:   Irregular   Pulmonary/Chest: Effort normal  No respiratory distress  Abdominal: Soft  Bowel sounds are normal  He exhibits no distension  Musculoskeletal: He exhibits no edema  Neurological: He is alert  No cranial nerve deficit  Coordination normal    Skin: Skin is warm  Wound covered with dressing in the left elbow           Additional Data:     Lab Results: I have personally reviewed pertinent reports  Results from last 7 days   Lab Units 06/16/20  1122   WBC Thousand/uL 5 39   HEMOGLOBIN g/dL 7 1*   HEMATOCRIT % 25 8*   PLATELETS Thousands/uL 256   NEUTROS PCT % 71   LYMPHS PCT % 17   MONOS PCT % 9   EOS PCT % 2     Results from last 7 days   Lab Units 06/16/20  1122   SODIUM mmol/L 136   POTASSIUM mmol/L 4 5   CHLORIDE mmol/L 101   CO2 mmol/L 23   BUN mg/dL 20   CREATININE mg/dL 1 59*   ANION GAP mmol/L 12   CALCIUM mg/dL 8 1*   ALBUMIN g/dL 2 8*   TOTAL BILIRUBIN mg/dL 0 70   ALK PHOS U/L 83   ALT U/L 17   AST U/L 12   GLUCOSE RANDOM mg/dL 242*                       Imaging: I have personally reviewed pertinent reports  No orders to display       EKG, Pathology, and Other Studies Reviewed on Admission:   · EKG:     Allscripts / Epic Records Reviewed: Yes     ** Please Note: This note has been constructed using a voice recognition system   **

## 2020-06-16 NOTE — ASSESSMENT & PLAN NOTE
· Patient was evaluated by PCP secondary to dizziness and fatigue and has blood pressure done as an outpatient    His hemoglobin was found to be 6 2 and was sent to the emergency room  · Today hemoglobin in the emergency room was 7 1 and receiving 2 units of packed RBCs  · Iron panel  · Patient gives history of dark stools-likely upper GI bleed patient is on aspirin and Eliquis  · Hold antiplatelet and anticoagulation for  Now  · PPI b i d   · GI evaluation  · NPO after midnight

## 2020-06-16 NOTE — ASSESSMENT & PLAN NOTE
· Baseline creatinine appears to be between 1 4 and 1 6  · Creatinine at baseline  · Monitor creatinine

## 2020-06-16 NOTE — ASSESSMENT & PLAN NOTE
Lab Results   Component Value Date    HGBA1C 6 1 01/08/2018       No results for input(s): POCGLU in the last 72 hours      Blood Sugar Average: Last 72 hrs:   type 2 diabetes mellitus on oral hypoglycemic agent  Hold metformin and glipizide in the hospital  Insulin sliding scale  Patient is going to be NPO after midnight

## 2020-06-16 NOTE — PLAN OF CARE
Problem: Potential for Falls  Goal: Patient will remain free of falls  Description  INTERVENTIONS:  - Assess patient frequently for physical needs  -  Identify cognitive and physical deficits and behaviors that affect risk of falls    -  [place] fall precautions as indicated by assessment   - Educate patient/family on patient safety including physical limitations  - Instruct patient to call for assistance with activity based on assessment  - Modify environment to reduce risk of injury  - Consider OT/PT consult to assist with strengthening/mobility  Outcome: Progressing     Problem: CARDIOVASCULAR - ADULT  Goal: Maintains optimal cardiac output and hemodynamic stability  Description  INTERVENTIONS:  - Monitor I/O, vital signs and rhythm  - Monitor for S/S and trends of decreased cardiac output  - Administer and titrate ordered vasoactive medications to optimize hemodynamic stability  - Assess quality of pulses, skin color and temperature  - Assess for signs of decreased coronary artery perfusion  - Instruct patient to report change in severity of symptoms  Outcome: Progressing  Goal: Absence of cardiac dysrhythmias or at baseline rhythm  Description  INTERVENTIONS:  - Continuous cardiac monitoring, vital signs, obtain 12 lead EKG if ordered  - Administer antiarrhythmic and heart rate control medications as ordered  - Monitor electrolytes and administer replacement therapy as ordered  Outcome: Progressing     Problem: METABOLIC, FLUID AND ELECTROLYTES - ADULT  Goal: Glucose maintained within target range  Description  INTERVENTIONS:  - Monitor Blood Glucose as ordered  - Assess for signs and symptoms of hyperglycemia and hypoglycemia  - Administer ordered medications to maintain glucose within target range  - Assess nutritional intake and initiate nutrition service referral as needed  Outcome: Progressing     Problem: SKIN/TISSUE INTEGRITY - ADULT  Goal: Skin integrity remains intact  Description  INTERVENTIONS  - Identify patients at risk for skin breakdown  - Assess and monitor skin integrity  - Assess and monitor nutrition and hydration status  - Monitor labs (i e  albumin)  - Assess for incontinence   - Turn and reposition patient  - Assist with mobility/ambulation  - Relieve pressure over bony prominences  - Avoid friction and shearing  - Provide appropriate hygiene as needed including keeping skin clean and dry  - Evaluate need for skin moisturizer/barrier cream  - Collaborate with interdisciplinary team (i e  Nutrition, Rehabilitation, etc )   - Patient/family teaching  Outcome: Progressing     Problem: HEMATOLOGIC - ADULT  Goal: Maintains hematologic stability  Description  INTERVENTIONS  - Assess for signs and symptoms of bleeding or hemorrhage  - Monitor labs  - Administer supportive blood products/factors as ordered and appropriate  Outcome: Progressing     Problem: MUSCULOSKELETAL - ADULT  Goal: Maintain or return mobility to safest level of function  Description  INTERVENTIONS:  - Assess patient's ability to carry out ADLs; assess patient's baseline for ADL function and identify physical deficits which impact ability to perform ADLs (bathing, care of mouth/teeth, toileting, grooming, dressing, etc )  - Assess/evaluate cause of self-care deficits   - Assess range of motion  - Assess patient's mobility  - Assess patient's need for assistive devices and provide as appropriate  - Encourage maximum independence but intervene and supervise when necessary  - Involve family in performance of ADLs  - Assess for home care needs following discharge   - Consider OT consult to assist with ADL evaluation and planning for discharge  - Provide patient education as appropriate  Outcome: Progressing

## 2020-06-16 NOTE — ASSESSMENT & PLAN NOTE
Wt Readings from Last 3 Encounters:   06/16/20 102 kg (225 lb 12 oz)   Patient with known history of chronic congestive heart failure and was admitted to [facility] in January  Patient was on 40 mg of Lasix b i d   As an outpatient and secondary to dizziness and orthostasis his Lasix dose was decreased to 40 mg daily during the recent hospital stay  Appears to be euvolemic  IV Lasix x1 after the blood draw  Hold further Lasix while inpatient  Ejection fraction 65% during the last echo 1/20

## 2020-06-16 NOTE — ASSESSMENT & PLAN NOTE
· Patient with known history of coronary artery disease status post CABG in 1997 in [facility]  · Patient is on beta-blocker aspirin and statin  · Hold aspirin for now

## 2020-06-16 NOTE — ED PROVIDER NOTES
History  Chief Complaint   Patient presents with    Weakness - Generalized     Pt was sent here by PCP due to low hgb, pt reports dizziness/weakness     Patient with history of atrial fibrillation, on Eliquis, complains of several days of dyspnea on exertion and lightheadedness  Had outpatient blood work yesterday which revealed hemoglobin of 6 2  Patient not normally anemic  Patient denies chest pain or shortness of breath at rest   Denies abdominal pain  Denies frankly bloody stools  No other complaints  History provided by:  Patient   used: No    Dizziness   Quality:  Lightheadedness  Severity:  Moderate  Onset quality:  Gradual  Timing:  Intermittent  Chronicity:  New  Context: standing up    Relieved by:  Nothing  Worsened by:  Nothing  Ineffective treatments:  None tried  Associated symptoms: no blood in stool, no chest pain, no diarrhea, no headaches, no hearing loss, no nausea, no palpitations, no shortness of breath, no syncope, no vision changes, no vomiting and no weakness    Associated symptoms comment:  No shortness of breath at rest but admits to dyspnea on exertion      Prior to Admission Medications   Prescriptions Last Dose Informant Patient Reported? Taking? apixaban (ELIQUIS) 5 mg   Yes Yes   Sig: Take 5 mg by mouth daily   aspirin (Aspir-Low) 81 mg EC tablet   Yes Yes   Sig: Take 81 mg by mouth daily   glipiZIDE (GLUCOTROL) 10 mg tablet   Yes Yes   Sig: Take 10 mg by mouth daily   metoprolol succinate (TOPROL-XL) 25 mg 24 hr tablet   Yes Yes   Sig: Take 25 mg by mouth daily   simvastatin (ZOCOR) 40 mg tablet   Yes Yes   Sig: Take 40 mg by mouth daily      Facility-Administered Medications: None       Past Medical History:   Diagnosis Date    A[facility]fib ([facility] )     Cardiac disease     Diabetes mellitus ([facility] )     MI, old        Past Surgical History:   Procedure Laterality Date    CARDIAC SURGERY         History reviewed  No pertinent family history    I have reviewed and agree with the history as documented  E-Cigarette/Vaping     E-Cigarette/Vaping Substances     Social History     Tobacco Use    Smoking status: Never Smoker    Smokeless tobacco: Never Used   Substance Use Topics    Alcohol use: Not Currently    Drug use: Not Currently       Review of Systems   Constitutional: Negative for chills and fever  HENT: Negative for ear pain, hearing loss, sore throat, trouble swallowing and voice change  Eyes: Negative for pain and discharge  Respiratory: Negative for cough, shortness of breath and wheezing  Cardiovascular: Negative for chest pain, palpitations and syncope  Gastrointestinal: Negative for abdominal pain, blood in stool, constipation, diarrhea, nausea and vomiting  Genitourinary: Negative for dysuria, flank pain, frequency and hematuria  Musculoskeletal: Negative for joint swelling, neck pain and neck stiffness  Skin: Negative for rash and wound  Neurological: Positive for dizziness  Negative for seizures, syncope, facial asymmetry, weakness and headaches  Psychiatric/Behavioral: Negative for hallucinations, self-injury and suicidal ideas  All other systems reviewed and are negative  Physical Exam  Physical Exam   Constitutional: He is oriented to person, place, and time  He appears well-developed and well-nourished  No distress  HENT:   Head: Normocephalic and atraumatic  Right Ear: External ear normal    Left Ear: External ear normal    Eyes: Pupils are equal, round, and reactive to light  Conjunctivae and EOM are normal    Neck: Normal range of motion  Neck supple  Cardiovascular: Normal rate, regular rhythm and normal heart sounds  No murmur heard  Pulmonary/Chest: Effort normal and breath sounds normal  He has no wheezes  He has no rales  Abdominal: Soft  Bowel sounds are normal  He exhibits no distension  There is no tenderness  There is no rebound and no guarding     Genitourinary: Prostate normal  Rectal exam shows guaiac positive stool  Musculoskeletal: Normal range of motion  He exhibits no deformity  Neurological: He is alert and oriented to person, place, and time  No cranial nerve deficit  Skin: Skin is warm and dry  No rash noted  Psychiatric: He has a normal mood and affect  His behavior is normal    Nursing note and vitals reviewed        Vital Signs  ED Triage Vitals [06/16/20 1117]   Temperature Pulse Respirations Blood Pressure SpO2   98 4 °F (36 9 °C) 84 22 151/67 95 %      Temp Source Heart Rate Source Patient Position - Orthostatic VS BP Location FiO2 (%)   Oral Monitor Lying Right arm --      Pain Score       2           Vitals:    06/16/20 1117 06/16/20 1130   BP: 151/67 151/67   Pulse: 84 75   Patient Position - Orthostatic VS: Lying Lying         Visual Acuity      ED Medications  Medications - No data to display    Diagnostic Studies  Results Reviewed     Procedure Component Value Units Date/Time    Comprehensive metabolic panel [[id number]]  (Abnormal) Collected:  06/16/20 1122    Lab Status:  Final result Specimen:  Blood from Arm, Left Updated:  06/16/20 1155     Sodium 136 mmol/L      Potassium 4 5 mmol/L      Chloride 101 mmol/L      CO2 23 mmol/L      ANION GAP 12 mmol/L      BUN 20 mg/dL      Creatinine 1 59 mg/dL      Glucose 242 mg/dL      Calcium 8 1 mg/dL      AST 12 U/L      ALT 17 U/L      Alkaline Phosphatase 83 U/L      Total Protein 6 4 g/dL      Albumin 2 8 g/dL      Total Bilirubin 0 70 mg/dL      eGFR 39 ml/min/1 73sq m     Narrative:       [place] guidelines for Chronic Kidney Disease (CKD):     Stage 1 with normal or high GFR (GFR > 90 mL/min/1 73 square meters)    Stage 2 Mild CKD (GFR = 60-89 mL/min/1 73 square meters)    Stage 3A Moderate CKD (GFR = 45-59 mL/min/1 73 square meters)    Stage 3B Moderate CKD (GFR = 30-44 mL/min/1 73 square meters)    Stage 4 Severe CKD (GFR = 15-29 mL/min/1 73 square meters)    Stage 5 End Stage CKD (GFR <15 mL/min/1 73 square meters)  Note: GFR calculation is accurate only with a steady state creatinine    Lipase [[id number]]  (Normal) Collected:  06/16/20 1122    Lab Status:  Final result Specimen:  Blood from Arm, Left Updated:  06/16/20 1155     Lipase 123 u/L     Troponin I [[id number]]  (Normal) Collected:  06/16/20 1131    Lab Status:  Final result Specimen:  Blood from Arm, Left Updated:  06/16/20 1155     Troponin I <0 02 ng/mL     NT-BNP PRO [[id number]]  (Abnormal) Collected:  06/16/20 1122    Lab Status:  Final result Specimen:  Blood from Arm, Left Updated:  06/16/20 1155     NT-proBNP 4,028 pg/mL     [place] draw [[id number]] Collected:  06/16/20 1122    Lab Status:  Final result Specimen:  Blood from Arm, Left Updated:  06/16/20 1143    Narrative: The following orders were created for panel order [place] draw  Procedure                               Abnormality         Status                     ---------                               -----------         ------                     Francee Banker Top on GRDQ[[id number]]                           Final result               Green / Black tube on PKSO[[id number]]                       Final result               Lavender Top 7ml on ISYC[[id number]]                         Final result                 Please view results for these tests on the individual orders      CBC and differential [[id number]]  (Abnormal) Collected:  06/16/20 1122    Lab Status:  Final result Specimen:  Blood from Arm, Left Updated:  06/16/20 1131     WBC 5 39 Thousand/uL      RBC 3 59 Million/uL      Hemoglobin 7 1 g/dL      Hematocrit 25 8 %      MCV 72 fL      MCH 19 8 pg      MCHC 27 5 g/dL      RDW 15 9 %      MPV 9 0 fL      Platelets 063 Thousands/uL      nRBC 0 /100 WBCs      Neutrophils Relative 71 %      Immat GRANS % 1 %      Lymphocytes Relative 17 %      Monocytes Relative 9 %      Eosinophils Relative 2 %      Basophils Relative 0 %      Neutrophils Absolute 3 81 Thousands/µL Immature Grans Absolute 0 03 Thousand/uL      Lymphocytes Absolute 0 94 Thousands/µL      Monocytes Absolute 0 47 Thousand/µL      Eosinophils Absolute 0 12 Thousand/µL      Basophils Absolute 0 02 Thousands/µL                  No orders to display              Procedures  ECG 12 Lead Documentation Only  Date/Time: 6/16/2020 11:19 AM  Performed by: Monique House MD  Authorized by: Monique House MD     ECG reviewed by me, the ED Provider: yes    Patient location:  ED  Previous ECG:     Previous ECG:  Unavailable  Interpretation:     Interpretation: abnormal    Rate:     ECG rate:  79    ECG rate assessment: normal    Rhythm:     Rhythm: atrial fibrillation    Ectopy:     Ectopy: none    QRS:     QRS axis:  Normal    QRS intervals:  Normal  Conduction:     Conduction: normal    ST segments:     ST segments:  Normal  T waves:     T waves: normal               ED Course       US AUDIT      Most Recent Value   Initial Alcohol Screen: US AUDIT-C    1  How often do you have a drink containing alcohol?  0 Filed at: 06/16/2020 1118   2  How many drinks containing alcohol do you have on a typical day you are drinking? 0 Filed at: 06/16/2020 1118   3a  Male UNDER 65: How often do you have five or more drinks on one occasion? 0 Filed at: 06/16/2020 1118   3b  FEMALE Any Age, or MALE 65+: How often do you have 4 or more drinks on one occassion? 0 Filed at: 06/16/2020 1118   Audit-C Score  0 Filed at: 06/16/2020 1118   Full Alcohol Screen: US AUDIT   4  How often during the last year have you found that you were not able to stop drinking once you had started?    5  How often during past year have you failed to do what was normally expected of you because of drinking?    6  How often in past year have you needed a first drink in the morning to get yourself going after a heavy drinking session?    7  How often in past year have you had feeling of guilt or remorse after drinking?    8   How often in past year have you been unable to remember what happened night before because you had been drinking?    9  Have you or someone else been injured as a result of your drinking?    10  Has a relative, friend, doctor or other health worker been concerned about your drinking and suggested you cut down?    AUDIT Total Score                    SAHARA/DAST-10      Most Recent Value   How many times in the past year have you    Used an illegal drug or used a prescription medication for non-medical reasons? Never Filed at: 06/16/2020 1118   In the past 12 months      1  Have you used drugs other than those required for medical reasons?    2  Do you use more than one drug at a time?    3  Have you had medical problems as a result of your drug use (e g , memory loss, hepatitis, convulsions, bleeding, etc )?     4  Have you had "blackouts" or "flashbacks" as a result of drug use? YesNo     5  Do you ever feel bad or guilty about your drug use?    6  Does your spouse (or parent) ever complain about your involvement with drugs?    7  Have you neglected your family because of your use of drugs?    8  Have you engaged in illegal activities in order to obtain drugs?    9  Have you ever experienced withdrawal symptoms (felt sick) when you stopped taking drugs?    10  Are you always able to stop using drugs when you want to?      DAST-10 Score                                  MDM  Number of Diagnoses or Management Options     Amount and/or Complexity of Data Reviewed  Clinical lab tests: ordered and reviewed  Tests in the radiology section of CPT®: ordered and reviewed  Decide to obtain previous medical records or to obtain history from someone other than the patient: yes  Review and summarize past medical records: yes  Independent visualization of images, tracings, or specimens: yes          Disposition  Final diagnoses:   Gastrointestinal hemorrhage, unspecified gastrointestinal hemorrhage type   Symptomatic anemia Time reflects when diagnosis was documented in both MDM as applicable and the Disposition within this note     Time User Action Codes Description Comment    6/16/2020 12:27 PM Cozuhair Pancoast Add [K92 2] Gastrointestinal hemorrhage, unspecified gastrointestinal hemorrhage type     6/16/2020 12:28 PM Pati Ashbyoast Add [D64 9] Symptomatic anemia       ED Disposition     ED Disposition Condition Date/Time Comment    Admit Stable Tue Jun 16, 2020 12:27 PM Case was discussed with Dr Javier Jennings and the patient's admission status was agreed to be Admission Status: inpatient status to the service of Dr Javier Jennings     Follow-up Information    None         Patient's Medications   Discharge Prescriptions    No medications on file     No discharge procedures on file      PDMP Review     None          ED Provider  Electronically Signed by           Vito Arcos MD  06/16/20 Fede Mohamud MD  06/16/20 2525

## 2020-06-16 NOTE — ASSESSMENT & PLAN NOTE
· Patient with known history of atrial fibrillation, status post HUMBERTO and cardioversion in January of this year in [facility] but unfortunately patient is in chronic atrial fibrillation per cardiology progress note, anticoagulated with Eliquis  · Slow ventricular response  · Heart rate is controlled  · Continue with metoprolol  · Eliquis on hold secondary to suspected GI bleed  · Will discussed with Gastroenterology and Cardiology regarding restarting back on the anticoagulation after the EGD

## 2020-06-17 PROBLEM — K56.600 PARTIAL BOWEL OBSTRUCTION (HCC): Status: ACTIVE | Noted: 2020-01-01

## 2020-06-17 NOTE — PHYSICAL THERAPY NOTE
PHYSICAL THERAPY EVALUATION  NAME:  John Platt  DATE: 06/17/20    AGE:   80 y o  Mrn:   [id number]  ADMIT DX:  Dizziness [R42]  Symptomatic anemia [D64 9]  Gastrointestinal hemorrhage, unspecified gastrointestinal hemorrhage type [K92 2]    Past Medical History:   Diagnosis Date    A-fib [facility])     Cardiac disease     CHF (congestive heart failure) ([facility] )     Diabetes mellitus ([facility] )     MI, old     Myocardial infarction ([facility] )      Length Of Stay: 1  Performed at least 2 patient identifiers during session: Name and Birthday  PHYSICAL THERAPY EVALUATION :      06/17/20 0957   Note Type   Note type Eval only   Pain Assessment   Pain Assessment Tool 0-10   Pain Score No Pain   Home Living   Type of Home House  (1 RADHA)   Home Layout One level   Bathroom Shower/Tub Walk-in shower   Bathroom Toilet Standard   Bathroom Equipment Grab bars around toilet   Home Equipment Cane;Walker  (using walker due to recent fall )   Additional Comments Pt reports living in a [facility] with 1 RADHA  Prior Function   Level of [place] Independent with ADLs and functional mobility   Lives With Alone   Receives Help From Friend(s)   ADL Assistance Independent   IADLs Independent   Falls in the last 6 months 1 to 4   Comments Pt reports being independent with RW  Has cane present in room  Reports being independent with ADLs and IADLs  + driving  Restrictions/Precautions   Other Precautions Fall Risk;Telemetry; Chair Alarm; Bed Alarm  (NPO; sips with meds, procedure EGD today)   General   Family/Caregiver Present Yes  (friend and POA, Megan Callaway)   Cognition   Orientation Level Oriented X4   Following Commands Follows all commands and directions without difficulty   RLE Assessment   RLE Assessment WFL  (4+/5)   LLE Assessment   LLE Assessment WFL  (4+/5)   Coordination   Movements are Fluid and Coordinated 1   Sensation   (increased B LE edema)   Light Touch   RLE Light Touch Grossly intact   LLE Light Touch Grossly intact   Bed Mobility   Additional Comments Pt sitting OOB in recliner chair upon arrival and at end of session with all needs wihtin reach  Transfers   Sit to Stand 6  Modified independent   Additional items Increased time required   Stand to Sit 6  Modified independent   Additional items Increased time required   Stand pivot 6  Modified independent   Additional items Increased time required   Toilet transfer 6  Modified independent   Additional items Increased time required   Additional Comments use of RW for transfers  pt reports having 2 walker at home at this time preferring to use RW versus cane  completed sit<>stand transfers modified independently with hads on RW without difficulty  spt with RW modified independent  Ambulation/Elevation   Gait pattern WNL   Gait Assistance 6  Modified independent   Assistive Device Rolling walker   Distance 23'x1 with RW modified independent and 115'x1 with RW modified independent  no LOB, path deviation noted  Pt with good step length and sarah  declined further ambulation  Balance   Static Sitting Normal   Dynamic Sitting Good   Static Standing Good   Dynamic Standing Fair +  (with/without UE support)   Ambulatory Fair +  (B UE support on RW)   Endurance Deficit   Endurance Deficit Description pt declined further ambulation at this time  Activity Tolerance   Activity Tolerance Patient tolerated treatment well   Medical Staff Made Aware Nati HYATT   Nurse Made Aware RNAnnita   Goals   Patient Goals "Go home "   Recommendation   PT Discharge Recommendation   (discontinue PT services)   Equipment Recommended   (pt has RW)   PT - OK to Discharge Yes   Additional Comments [facility] 6 clicks 24/69     (Please find full objective findings from PT assessment regarding body systems outlined above)  Assessment: Pt is a 80 y o  male seen for PT evaluation s/p admission to [address] [place] [address] 18 on 6/16/2020 with Symptomatic anemia   Hemoglobin 7 1 on admission, now 9 9 s/p transfusion  Pt with elevated BNP, abnormal glucose and iron saturation  Pt with h/o fall at home  Order placed for PT services  Pt with PMHx Afib, CAD, DM, HTN, CKD III, CHF, obesity and h/o MI  Upon evaluation: Pt is presenting at a modified independent level of functional mobility with use of RW  He was using spc, which he has present with him at this facility, however reports he has been using a RW and/or a rollator walker and plans to continue to use a RW upon D/C  Prefers to use RW with all mobility  Declined ambulation with spc  With use of RW, patient is modified independent for transfers and ambulation  He declined further mobility this date  He feels he is ambulating at his PLOF without need for continued PT services  Based on patient's performance with RW, he appears to be at his PLOF at this time without skilled PT services in acute care warranted  He had no LOB or knee buckling or path deviation with mobility  He verbalized understanding to inform nursing and/or physician should he has increased difficulty with mobility  Pt is in agreement with evaluation  D/C PT services at this time with patient at his PLOF       Colin Guo, PT,DPT

## 2020-06-17 NOTE — ASSESSMENT & PLAN NOTE
On EGD 6/17/2020, there is concern for possible bowel obstruction vs severe gastroparesis, given large amount of food and dark bilious fluid in stomach which could not be cleared  Placed on NG tube  Surgical consult  On PPI IV b i d  Performed abdominal imaging  Given NPO status, will hold Lasix and give gentle fluid

## 2020-06-17 NOTE — ASSESSMENT & PLAN NOTE
· Patient with known history of coronary artery disease status post CABG in 1997 in [facility]  · Patient is on beta-blocker aspirin and statin  · Hold aspirin for now

## 2020-06-17 NOTE — ASSESSMENT & PLAN NOTE
Wt Readings from Last 3 Encounters:   06/17/20 101 kg (222 lb)   Patient with known history of chronic congestive heart failure and was admitted to [facility] in January  Patient was on 40 mg of Lasix b i d  As an outpatient and secondary to dizziness and orthostasis his Lasix dose was decreased to 40 mg daily during the recent hospital stay  Appears to be euvolemic  IV Lasix x1 after the blood draw  Hold further Lasix while inpatient  Patient is NPO  Will place on gentle IVF  Monitor closely     Ejection fraction 65% during the last echo 1/20

## 2020-06-17 NOTE — PROGRESS NOTES
GI brief note  EGD 6/17/2020    IMPRESSION:  Large amount of food and dark bilious fluid in the stomach, which could not be cleared endoscopically  750 cc fluid lavaged before anesthesia requested to stop case due to aspiration risk  No active bleeding sites or blood clots seen  No gastric outlet obstruction  No duodenal obstruction to extent of scope, likely D3      RECOMMENDATION:  NG tube placed by anesthesia service, keep to low intermittent or continuous suction for now  Stat surgical consultation, discussed with primary team and surgery team   Concern for possible partial or complete  bowel obstruction vs severe gastroparesis vs other cause of findings  No evidence of active GI bleeding  Needs abdominal imaging as next step  Continue PPI IV BID   Attempted to call patient's emergency contact Nader Alvarado but no answer on phone

## 2020-06-17 NOTE — OCCUPATIONAL THERAPY NOTE
Occupational Therapy Evaluation     Patient Name: Purnima Pierce  Today's Date: 6/17/2020  Problem List  Principal Problem:    Symptomatic anemia  Active Problems:    CHF (congestive heart failure) (HCC)    Chronic kidney disease, stage 3 (HCC)    Coronary artery disease    Diabetes mellitus type 2 in obese [facility])    Essential hypertension    Hyperlipidemia    Morbid obesity ([facility] )    Atrial fibrillation [facility])    Past Medical History  Past Medical History:   Diagnosis Date    A-fib [facility])     Cardiac disease     Diabetes mellitus ([facility] )     MI, old      Past Surgical History  Past Surgical History:   Procedure Laterality Date    CARDIAC SURGERY           06/17/20 0958   Note Type   Note type Eval only   Restrictions/Precautions   Other Precautions Chair Alarm; Bed Alarm; Fall Risk;Telemetry   Pain Assessment   Pain Assessment Tool 0-10   Pain Score No Pain   Home Living   Type of Home House  (1 RADHA)   Home Layout One level   Bathroom Shower/Tub Walk-in shower   Bathroom Toilet Standard   Bathroom Equipment Grab bars around toilet   Home Equipment Cane;Walker  (currently using a RW)   Additional Comments Pt reports living in a 1 story home with 1 RADHA  Pt ambulates with RW and also has a cane at home   Prior Function   Level of [place] Independent with ADLs and functional mobility   Lives With Alone   Receives Help From Friend(s)   ADL Assistance Independent   IADLs Independent   Falls in the last 6 months 1 to 4   Comments Pt reports being Mod I for ADLs, IADLs, functional mobility and community mobility + driving  Pt lives alone but has a close friend who is his POA and assist PRN   ADL   Where Assessed Chair   Grooming Assistance 6  Modified Independent   Grooming Deficit Wash/dry hands   UB Dressing Assistance 6  Modified independent   LB Dressing Assistance 6  Modified independent   Toileting Assistance  6  Modified independent   Additional Comments Pt completing ADL tasks with use of RW for UB support  vc'ing required PRN for safety and technique  Pt having difficulty with LB dressing (donning socks) d/t "my bed is lower at home"  Pt deferred from attempting LB drssing with use of compensatory strategies at this time  Pt thoroughly educated on use of sock aide and where to purchase if Pt decides he requires increased assistance/increased difficulty at home  Transfers   Sit to Stand 6  Modified independent   Additional items Verbal cues   Stand to Sit 6  Modified independent   Additional items Verbal cues   Stand pivot 6  Modified independent   Additional items Verbal cues   Additional Comments Pt completing functional transfers with use of RW for UB support  vc'ing PRN for safety and technique   Balance   Static Sitting Good   Dynamic Sitting Fair +   Static Standing Fair +   Dynamic Standing Fair   Activity Tolerance   Activity Tolerance Patient tolerated treatment well   Medical Staff Made Aware Spoke with PT, Miguel Aviles   Nurse Made Aware Spoke with RN, Annita   RUCRISTIANO Assessment   RUE Assessment WFL   LUE Assessment   LUE Assessment WFL   Hand Function   Gross Motor Coordination Functional   Fine Motor Coordination Functional   Sensation   Light Touch No apparent deficits   Cognition   Overall Cognitive Status WFL   Arousal/Participation Alert; Responsive; Cooperative   Attention Within functional limits   Orientation Level Oriented X4   Memory Within functional limits   Following Commands Follows all commands and directions without difficulty   Assessment   Prognosis Good   Assessment Pt is an 79 y/o M admitted to [address] [place] [address] 6/16/2020 d/t experiencing dizziness and fatigue  Dx: symptomatic anemia  Pt with PMHx impacting performance during functional tasks including: a-fib, DM, MI, hyperlipidemia, HTN, CAD  Pt reports living in a 1 story home with 1 RADHA  Pt reports more recently with RW d/t fall ~3wks ago  Pt completing ADLs, functional mobility, IADLs, and community mobility + driving @ Mod I   Pt has support from a friend who is his POA who assist PRN  On evaluation, Pt is A&Ox4  Pt competing functional transfers and short distance ambulation with use of RW @ Mod I  toileting tasks, UB dressing, LB dressing and grooming while standing at sinkside @ Mod I  Pt with increased difficulty during LB dressing d/t sitting on high surface, although Pt declining to attempt from another surface at this time, reports "I can do it at home on my bed because its low to the ground, its hard sometimes but I can do it"  Pt educated on use of sock aide for further use if Pt decides LB dressing is becoming too difficult  Pt requiring occasional vc'ing for safety and technique with use of RW  Pt BUE ROM and MS WFL  Pt scoring 100/100 on Barthel Index  Pt reports feeling at his baseline at this time  Pt does not require any further OT services at this time  D/c OT services effective 6/17/2020  If new concerns arise, please re-consult      Plan   OT Frequency Eval only   Recommendation   Recommendation   (home with family support PRN)   OT Discharge Recommendation Return to previous environment with no needs   Barthel Index   Feeding 10   Bathing 5   Grooming Score 5   Dressing Score 10   Bladder Score 10   Bowels Score 10   Toilet Use Score 10   Transfers (Bed/Chair) Score 15   Mobility (Level Surface) Score 15   Stairs Score 10   Barthel Index Score 100     Lorin Barillas, OTR/L

## 2020-06-17 NOTE — PROGRESS NOTES
Progress Note - Ulysses Forbes 1935, 80 y o  male MRN: [id number]  Unit/Bed#: -01 Encounter: [id number]  Primary Care Provider: Devora Kincaid DO   Date and time admitted to hospital: 6/16/2020 11:14 AM    * Symptomatic anemia  Assessment & Plan  · Patient was evaluated by PCP secondary to dizziness and fatigue and has blood pressure done as an outpatient  His hemoglobin was found to be 6 2 and was sent to the emergency room  · Today hemoglobin in the emergency room was 7 1 and receiving 2 units of packed RBCs  · Iron panel  · Patient gives history of dark stools-likely upper GI bleed patient is on aspirin and Eliquis  · Hold antiplatelet and anticoagulation for  Now  · PPI b i d   · GI performed EGD 06/17/2020  Finding suspicious for possible bowel obstruction/gastroparesis  See plan below  Partial bowel obstruction ([facility] )  Assessment & Plan  On EGD 6/17/2020, there is concern for possible bowel obstruction vs severe gastroparesis, given large amount of food and dark bilious fluid in stomach which could not be cleared  Placed on NG tube  Surgical consult  On PPI IV b i d  Performed abdominal imaging  Given NPO status, will hold Lasix and give gentle fluid      Atrial fibrillation [facility])  Assessment & Plan  · Patient with known history of atrial fibrillation, status post HUMBERTO and cardioversion in January of this year in [facility] but unfortunately patient is in chronic atrial fibrillation per cardiology progress note, anticoagulated with Eliquis  · Slow ventricular response  · Heart rate is controlled  · Continue with metoprolol  · Eliquis on hold secondary to suspected GI bleed  · Will discuss with Gastroenterology and Cardiology regarding restarting back on the anticoagulation after the EGD    Morbid obesity ([facility] )  Assessment & Plan  · TLC as an outpatient    Hyperlipidemia  Assessment & Plan  · Continue with the simvastatin-auto substituted to pravastatin in the hospital    Essential hypertension  Assessment & Plan  · Continue with the metoprolol      Diabetes mellitus type 2 in obese [facility])  Assessment & Plan  Lab Results   Component Value Date    HGBA1C 6 1 01/08/2018       Recent Labs     06/16/20  1720 06/16/20  2109 06/17/20  0716 06/17/20  1121   POCGLU 236* 168* 240* 248*       Blood Sugar Average: Last 72 hrs:  (P) 223 type 2 diabetes mellitus on oral hypoglycemic agent  Hold metformin and glipizide in the hospital  Insulin sliding scale  Adjust as necessary while NPO    Coronary artery disease  Assessment & Plan  · Patient with known history of coronary artery disease status post CABG in 1997 in [facility]  · Patient is on beta-blocker aspirin and statin  · Hold aspirin for now    Chronic kidney disease, stage 3 (HCC)  Assessment & Plan  · Baseline creatinine appears to be between 1 4 and 1 6  · Creatinine at baseline  · Monitor creatinine    CHF (congestive heart failure) ([facility] )  Assessment & Plan  Wt Readings from Last 3 Encounters:   06/17/20 101 kg (222 lb)   Patient with known history of chronic congestive heart failure and was admitted to [facility] in January  Patient was on 40 mg of Lasix b i d  As an outpatient and secondary to dizziness and orthostasis his Lasix dose was decreased to 40 mg daily during the recent hospital stay  Appears to be euvolemic  IV Lasix x1 after the blood draw  Hold further Lasix while inpatient  Patient is NPO  Will place on gentle IVF  Monitor closely  Ejection fraction 65% during the last echo 1/20            VTE Pharmacologic Prophylaxis:   Pharmacologic: Pharmacologic VTE Prophylaxis contraindicated due to anemia   Mechanical VTE Prophylaxis in Place: Yes    Patient Centered Rounds: I have performed bedside rounds with nursing staff today      Discussions with Specialists or Other Care Team Provider: d/w IM, GI, nursing     Education and Discussions with Family / Patient: d/w patient and family present at bedside     Time Spent for Care: 45 minutes  More than 50% of total time spent on counseling and coordination of care as described above  Current Length of Stay: 1 day(s)    Current Patient Status: Inpatient   Certification Statement: The patient will continue to require additional inpatient hospital stay due to NG tube, monitoring hgb    Discharge Plan:  Possible 48 hours, not medically ready    Code Status: Level 1 - Full Code      Subjective:   Patient did have another episode in the middle the night of abdominal pain which led to vomiting dark substance  Adequate appetite  He is having bowel movements  Objective:     Vitals:   Temp (24hrs), Av 9 °F (37 2 °C), Min:97 9 °F (36 6 °C), Max:99 6 °F (37 6 °C)    Temp:  [97 9 °F (36 6 °C)-99 6 °F (37 6 °C)] 98 8 °F (37 1 °C)  HR:  [55-74] 67  Resp:  [16-22] 21  BP: ()/(46-85) 144/63  SpO2:  [95 %-100 %] 100 %  Body mass index is 37 52 kg/m²  Input and Output Summary (last 24 hours): Intake/Output Summary (Last 24 hours) at 2020 1532  Last data filed at 2020 1500  Gross per 24 hour   Intake 1275 ml   Output    Net 1275 ml       Physical Exam:     Physical Exam   Constitutional: He appears well-developed and well-nourished  No distress  HENT:   Head: Normocephalic and atraumatic  Mouth/Throat: No oropharyngeal exudate  Eyes: Right eye exhibits no discharge  Left eye exhibits no discharge  No scleral icterus  Neck: No JVD present  No tracheal deviation present  No thyromegaly present  Cardiovascular: Regular rhythm  Exam reveals no gallop and no friction rub  No murmur heard  Pulmonary/Chest: Effort normal and breath sounds normal  No respiratory distress  He has no wheezes  He has no rales  He exhibits no tenderness  Abdominal: Soft  Bowel sounds are normal  He exhibits no distension  There is no tenderness  There is no rebound  Obese abdomen    Musculoskeletal: He exhibits no edema, tenderness or deformity  Skin: Skin is warm and dry   He is not diaphoretic  No erythema  No pallor  Psychiatric: He has a normal mood and affect  His behavior is normal    Nursing note and vitals reviewed  Additional Data:     Labs:    Results from last 7 days   Lab Units 06/17/20  0509   WBC Thousand/uL 5 83   HEMOGLOBIN g/dL 9 9*   HEMATOCRIT % 33 0*   PLATELETS Thousands/uL 260   NEUTROS PCT % 79*   LYMPHS PCT % 13*   MONOS PCT % 7   EOS PCT % 0     Results from last 7 days   Lab Units 06/17/20  0504   SODIUM mmol/L 139   POTASSIUM mmol/L 4 6   CHLORIDE mmol/L 101   CO2 mmol/L 30   BUN mg/dL 21   CREATININE mg/dL 1 45*   ANION GAP mmol/L 8   CALCIUM mg/dL 8 2*   ALBUMIN g/dL 2 9*   TOTAL BILIRUBIN mg/dL 1 50*   ALK PHOS U/L 79   ALT U/L 15   AST U/L 11   GLUCOSE RANDOM mg/dL 232*     Results from last 7 days   Lab Units 06/17/20  0022   INR  1 41*     Results from last 7 days   Lab Units 06/17/20  1121 06/17/20  0716 06/16/20  2109 06/16/20  1720   POC GLUCOSE mg/dl 248* 240* 168* 236*                   * I Have Reviewed All Lab Data Listed Above  * Additional Pertinent Lab Tests Reviewed:  All Labs Within Last 24 Hours Reviewed      Recent Cultures (last 7 days):           Last 24 Hours Medication List:     Current Facility-Administered Medications:  acetaminophen 650 mg Oral Q6H PRN Rob Arthur MD   bisacodyl 10 mg Oral Daily PRN Rob Arthur MD   HYDROcodone-acetaminophen 1 tablet Oral Q4H PRN Rob Arthur MD   insulin lispro 1-5 Units Subcutaneous TID [facility] Rob Arthur MD   insulin lispro 1-5 Units Subcutaneous HS Rob Arthur MD   loratadine 10 mg Oral Daily Rob Arthur MD   metoprolol succinate 25 mg Oral Daily Rob Arthur MD   ondansetron 4 mg Intravenous Q8H PRN Jamison Krabbe, CRNP   pantoprazole 40 mg Intravenous Q12H [address] Rob Arthur MD   pravastatin 40 mg Oral Daily With Willian Gongora MD        Today, Patient Was Seen By: Devaughn Werner PA-C    ** Please Note: Dictation voice to text software may have been used in the creation of this document   **

## 2020-06-17 NOTE — PLAN OF CARE
Problem: PHYSICAL THERAPY ADULT  Goal: Performs mobility at highest level of function for planned discharge setting  See evaluation for individualized goals  Description  D/C PT   Equipment Recommended: (pt has RW)       See flowsheet documentation for full assessment, interventions and recommendations  Outcome: Completed  Note:         Assessment:  Pt is a 80 y o  male seen for PT evaluation s/p admission to [address] [place] [address] on 6/16/2020 with Symptomatic anemia  Hemoglobin 7 1 on admission, now 9 9 s/p transfusion  Pt with elevated BNP, abnormal glucose and iron saturation  Pt with h/o fall at home  Order placed for PT services  Pt with PMHx Afib, CAD, DM, HTN, CKD III, CHF, obesity and h/o MI  Upon evaluation: Pt is presenting at a modified independent level of functional mobility with use of RW  He was using spc, which he has present with him at this facility, however reports he has been using a RW and/or a rollator walker and plans to continue to use a RW upon D/C  Prefers to use RW with all mobility  Declined ambulation with spc  With use of RW, patient is modified independent for transfers and ambulation  He declined further mobility this date  He feels he is ambulating at his PLOF without need for continued PT services  Based on patient's performance with RW, he appears to be at his PLOF at this time without skilled PT services in acute care warranted  He had no LOB or knee buckling or path deviation with mobility  He verbalized understanding to inform nursing and/or physician should he has increased difficulty with mobility  Pt is in agreement with evaluation  D/C PT services at this time with patient at his PLOF  PT Discharge Recommendation: (discontinue PT services)     PT - OK to Discharge: Yes    See flowsheet documentation for full assessment

## 2020-06-17 NOTE — ASSESSMENT & PLAN NOTE
· Patient was evaluated by PCP secondary to dizziness and fatigue and has blood pressure done as an outpatient  His hemoglobin was found to be 6 2 and was sent to the emergency room  · Today hemoglobin in the emergency room was 7 1 and receiving 2 units of packed RBCs  · Iron panel  · Patient gives history of dark stools-likely upper GI bleed patient is on aspirin and Eliquis  · Hold antiplatelet and anticoagulation for  Now  · PPI b i d   · GI performed EGD 06/17/2020  Finding suspicious for possible bowel obstruction/gastroparesis  See plan below

## 2020-06-17 NOTE — PROGRESS NOTES
Chart reviewed:  Admit with Symptomatic anemia, iron deficient  hgb low on eliquis: GI to see for EGD      CM met with patient and his significant other at the bedside: baseline information  was obtained  CM discussed the role of CM in helping the patient develop a discharge plan and assist the patient in carry out their plan      06/17/20 9171   Patient Information   Mental Status Alert   Primary Caregiver Self   Support System Immediate family;Friends   Activities of Daily Living Prior to Admission   Functional Status Minimum assistance   Assistive Device Straight Cane   Living Arrangement House;Lives alone   Ambulation Minimum assistance   Income Information   Income Source SSI/SSD   Means of Transportation   Means of Transport to Appts: Drive Self        Patient has identified: Allison Emdra his significant other as his primary , who is available to assist upon dc  Allison Blackshear is the POA: Pt has an advance directive, requested patient to bring in a copy of their AD to be scanned into the chart      PCP: Becky Wellington                 Pt has a prescription plan and uses  [facility] or [facility] for his medications  Patient is a [place]:    Pt denies SA/MH history  No prior MH services        House set up:1st floor set up  Functional level PTA: Independent uses a cane, manages self care  DME use: cane, has 2 walkers  Prior use of Home Care or STR: none  Transportation: patient drives, and his s/o is available for [address]: none    CM reviewed d/c planning process including the following: identifying help at home, patient preference for d/c planning needs, availability of treatment team to discuss questions or concerns patient and/or family may have regarding understanding medications and recognizing signs and symptoms once discharged  CM also encouraged patient to follow up with all recommended appointments after discharge   Patient advised of importance for patient and family to participate in managing patients medical well being  Discussed dc plan and offered to make a PCP appointment he declined as he will as he needs to check his schedule as he has a upcoming Cardiology Consult  DC plan at this time is home, will follow

## 2020-06-17 NOTE — CONSULTS
Consultation - General Surgery   Radha Rashid 80 y o  male MRN: [id number]  Unit/Bed#: -01 Encounter: [id number]    Assessment/Plan     Assessment:  Abdominal distention  Nausea/vomitting  CT findings of "Significant adenopathy in the right lower quadrant with an area of relative thickening in the colon could be related to an occult mass in the colon or inflammation  "  Paroxysmal atrial fibrillation - on Eliquis and ASA  Congestive heart failure  Chronic ischemic heart disease  Hypertensive kidney disease with stage 3 chronic kidney disease Type 2 diabetes mellitus with peripheral vascular disease  Morbid obesity  Anemia - iron deficient    Plan: Dr Jena Broderick to see today as well  NPO  Insert NGT, attach to low intermittent wall suction  Recommend colonoscopy to evaluate ascending colon thickening  IVF hydration  Monitor I/Os  Follow qAM CBC and CMP  Medicate PRN pain/nausea  OOB ambulating with assistance  Serial abdominal exams  Management of medical comorbidities per primary team      History of Present Illness     HPI:  Radha Rashid is a 80 y o  male with a PMH of Paroxysmal atrial fibrillation; Congestive heart failure, Chronic ischemic heart disease; Hypertensive kidney disease with stage 3 chronic kidney disease; Type 2 diabetes mellitus with peripheral vascular disease; Morbid obesity;  and Anemia who presented to the [address] [place] [address] ED on 6 16 2020 with complaints of dizziness and weakness  He had been seen by his PCP the previous day and had outpatient CBC drawn which revealed Hgb 6 2, he was contacted at home with these results and instructed to come to the ED  Today, he was undergoing EGD by GI to evaluate possible coffee ground emesis  During procedure, over 1L of bilious, non bloody fluid was suctioned from the stomach  General surgery was then called to evaluate for possible obstruction  The patient is awake and alert at this time  He denies history of abdominal surgeries   Denies epigastric pain post prandially  He states he is due for a previously scheduled colonoscopy and he follows with a GI doctor in [address]  He does have a history of polyps  His last bowel movement was yesterday morning and described as semi solid / liquid in nature  He states that his stools are usually dark in color, his stool was guaiac positive in the ED  Inpatient consult to Acute Care Surgery  Consult performed by: Charles Paz PA-C  Consult ordered by: Rob Arthur MD        Review of Systems   Constitutional: Positive for fatigue  HENT: Negative  Eyes: Negative  Respiratory: Negative  Cardiovascular: Negative  Gastrointestinal:        States he normally moves his bowels every 2-3 days  Describes stool as "dark", no obvious blood   Endocrine: Negative  Genitourinary: Negative  Musculoskeletal: Negative  Skin: Negative  Allergic/Immunologic: Negative  Neurological: Positive for light-headedness  Hematological: Negative  Psychiatric/Behavioral: Negative          Historical Information   Past Medical History:   Diagnosis Date    [facility]fib [facility])     Cardiac disease     [facility]est[facility] [address] [facility]     [facility] mellitus [facility] )     MI, old     Myocardial infarction ([facility] )      Past Surgical History:   Procedure Laterality Date    CARDIAC SURGERY       Social History   Social History     Substance and Sexual Activity   Alcohol Use Not Currently     Social History     Substance and Sexual Activity   Drug Use Not Currently     E-Cigarette/Vaping    E-Cigarette Use Never User      E-Cigarette/Vaping Substances    Nicotine No     THC No     CBD No     Flavoring No     Other No     Unknown No      Social History     Tobacco Use   Smoking Status Never Smoker   Smokeless Tobacco Never Used     Family History: non-contributory    Meds/Allergies   all current active meds have been reviewed and current meds:   Current Facility-Administered Medications   Medication Dose Route Frequency  acetaminophen (TYLENOL) tablet 650 mg  650 mg Oral Q6H PRN    bisacodyl (DULCOLAX) EC tablet 10 mg  10 mg Oral Daily PRN    HYDROcodone-acetaminophen (NORCO) 5-325 mg per tablet 1 tablet  1 tablet Oral Q4H PRN    insulin lispro (HumaLOG) 100 units/mL subcutaneous injection 1-5 Units  1-5 Units Subcutaneous TID AC    insulin lispro (HumaLOG) 100 units/mL subcutaneous injection 1-5 Units  1-5 Units Subcutaneous HS    loratadine (CLARITIN) tablet 10 mg  10 mg Oral Daily    metoprolol succinate (TOPROL-XL) 24 hr tablet 25 mg  25 mg Oral Daily    ondansetron (ZOFRAN) injection 4 mg  4 mg Intravenous Q8H PRN    pantoprazole (PROTONIX) injection 40 mg  40 mg Intravenous Q12H [facility]    pravastatin (PRAVACHOL) tablet 40 mg  40 mg Oral Daily With Dinner     No Known Allergies    Objective   First Vitals:   Blood Pressure: 151/67 (06/16/20 1117)  Pulse: 84 (06/16/20 1117)  Temperature: 98 4 °F (36 9 °C) (06/16/20 1117)  Temp Source: Oral (06/16/20 1117)  Respirations: 22 (06/16/20 1117)  Height: 5' 4" (162 6 cm) (06/16/20 1117)  Weight - Scale: 102 kg (225 lb 12 oz) (06/16/20 1117)  SpO2: 95 % (06/16/20 1117)    Current Vitals:   Blood Pressure: 165/73 (06/17/20 1231)  Pulse: 68 (06/17/20 1231)  Temperature: 97 9 °F (36 6 °C) (06/17/20 1231)  Temp Source: Oral (06/17/20 1231)  Respirations: 21 (06/17/20 1237)  Height: 5' 4 5" (163 8 cm) (06/17/20 1231)  Weight - Scale: 101 kg (222 lb) (06/17/20 1231)  SpO2: 95 % (06/17/20 1241)      Intake/Output Summary (Last 24 hours) at 6/17/2020 1503  Last data filed at 6/17/2020 1500  Gross per 24 hour   Intake 1275 ml   Output    Net 1275 ml       Invasive Devices     Peripheral Intravenous Line            Peripheral IV 06/16/20 Left Antecubital 1 day          Drain            NG/OG/Enteral Tube Nasogastric 18 Fr Right nares less than 1 day                Physical Exam   Constitutional: He is oriented to person, place, and time  He appears well-developed and well-nourished   No distress  HENT:   Head: Normocephalic and atraumatic  Mouth/Throat: Oropharynx is clear and moist    NGT present in right nares  Eyes: Pupils are equal, round, and reactive to light  Conjunctivae and EOM are normal    Neck: Normal range of motion  Neck supple  No tracheal deviation present  Cardiovascular: Normal rate, regular rhythm, normal heart sounds and intact distal pulses  Abdominal: Soft  He exhibits no mass  There is no rebound and no guarding  Patient is mildly distended with mild diffuse pain on deep palpation  Bowel sounds are high pitched  Musculoskeletal: Normal range of motion  He exhibits no edema  Neurological: He is alert and oriented to person, place, and time  No cranial nerve deficit  Skin: Skin is warm and dry  Capillary refill takes less than 2 seconds  He is not diaphoretic  No erythema  Psychiatric: He has a normal mood and affect  His behavior is normal  Judgment and thought content normal    Nursing note and vitals reviewed  Lab Results:   I have personally reviewed pertinent lab results  CBC:   Lab Results   Component Value Date    WBC 5 83 06/17/2020    HGB 9 9 (L) 06/17/2020    HCT 33 0 (L) 06/17/2020    MCV 74 (L) 06/17/2020     06/17/2020    MCH 22 1 (L) 06/17/2020    MCHC 30 0 (L) 06/17/2020    RDW 17 5 (H) 06/17/2020    MPV 9 0 06/17/2020    NRBC 0 06/17/2020     CMP:   Lab Results   Component Value Date    SODIUM 139 06/17/2020    K 4 6 06/17/2020     06/17/2020    CO2 30 06/17/2020    BUN 21 06/17/2020    CREATININE 1 45 (H) 06/17/2020    CALCIUM 8 2 (L) 06/17/2020    AST 11 06/17/2020    ALT 15 06/17/2020    ALKPHOS 79 06/17/2020    EGFR 44 06/17/2020     Imaging: I have personally reviewed pertinent reports       CT abd/pelvis wo contrast, 6/17/2020  Impression:       Significant adenopathy in the right lower quadrant with an area of relative thickening in the colon could be related to an occult mass in the colon or inflammation  Arabella agrawal no thickening of the terminal ileum however   The appendix is not well seen     Further evaluation is advised   A study with oral and IV contrast may be useful as well as endoscopic evaluation of the colon given history of GI bleeding  Diffuse ascites without bowel dilatation   This is of uncertain etiology  Correlation with any signs of peritonitis is advised  Evidence of large duodenal diverticulum   The location and size is not typical for an ulcer   There is slight duodenal stranding and some inflammation could be present   No obvious distal gastric thickening can be seen  Gallstones       EKG, Pathology, and Other Studies: I have personally reviewed pertinent films in PACS    Counseling / Coordination of Care  Total floor / unit time spent today 40 minutes  Greater than 50% of total time was spent with the patient and / or family counseling and / or coordination of care  A description of the counseling / coordination of care: review of labs/imaging, obtaining history and performing physical, discussion of condition, treatment options and plan        Hortensia Pierce PA-C

## 2020-06-17 NOTE — PLAN OF CARE
Problem: Potential for Falls  Goal: Patient will remain free of falls  Description  INTERVENTIONS:  - Assess patient frequently for physical needs  -  Identify cognitive and physical deficits and behaviors that affect risk of falls    -  [place] fall precautions as indicated by assessment   - Educate patient/family on patient safety including physical limitations  - Instruct patient to call for assistance with activity based on assessment  - Modify environment to reduce risk of injury  - Consider OT/PT consult to assist with strengthening/mobility  Outcome: Progressing     Problem: CARDIOVASCULAR - ADULT  Goal: Maintains optimal cardiac output and hemodynamic stability  Description  INTERVENTIONS:  - Monitor I/O, vital signs and rhythm  - Monitor for S/S and trends of decreased cardiac output  - Administer and titrate ordered vasoactive medications to optimize hemodynamic stability  - Assess quality of pulses, skin color and temperature  - Assess for signs of decreased coronary artery perfusion  - Instruct patient to report change in severity of symptoms  Outcome: Progressing  Goal: Absence of cardiac dysrhythmias or at baseline rhythm  Description  INTERVENTIONS:  - Continuous cardiac monitoring, vital signs, obtain 12 lead EKG if ordered  - Administer antiarrhythmic and heart rate control medications as ordered  - Monitor electrolytes and administer replacement therapy as ordered  Outcome: Progressing     Problem: METABOLIC, FLUID AND ELECTROLYTES - ADULT  Goal: Glucose maintained within target range  Description  INTERVENTIONS:  - Monitor Blood Glucose as ordered  - Assess for signs and symptoms of hyperglycemia and hypoglycemia  - Administer ordered medications to maintain glucose within target range  - Assess nutritional intake and initiate nutrition service referral as needed  Outcome: Progressing     Problem: SKIN/TISSUE INTEGRITY - ADULT  Goal: Skin integrity remains intact  Description  INTERVENTIONS  - Identify patients at risk for skin breakdown  - Assess and monitor skin integrity  - Assess and monitor nutrition and hydration status  - Monitor labs (i e  albumin)  - Assess for incontinence   - Turn and reposition patient  - Assist with mobility/ambulation  - Relieve pressure over bony prominences  - Avoid friction and shearing  - Provide appropriate hygiene as needed including keeping skin clean and dry  - Evaluate need for skin moisturizer/barrier cream  - Collaborate with interdisciplinary team (i e  Nutrition, Rehabilitation, etc )   - Patient/family teaching  Outcome: Progressing     Problem: HEMATOLOGIC - ADULT  Goal: Maintains hematologic stability  Description  INTERVENTIONS  - Assess for signs and symptoms of bleeding or hemorrhage  - Monitor labs  - Administer supportive blood products/factors as ordered and appropriate  Outcome: Progressing     Problem: MUSCULOSKELETAL - ADULT  Goal: Maintain or return mobility to safest level of function  Description  INTERVENTIONS:  - Assess patient's ability to carry out ADLs; assess patient's baseline for ADL function and identify physical deficits which impact ability to perform ADLs (bathing, care of mouth/teeth, toileting, grooming, dressing, etc )  - Assess/evaluate cause of self-care deficits   - Assess range of motion  - Assess patient's mobility  - Assess patient's need for assistive devices and provide as appropriate  - Encourage maximum independence but intervene and supervise when necessary  - Involve family in performance of ADLs  - Assess for home care needs following discharge   - Consider OT consult to assist with ADL evaluation and planning for discharge  - Provide patient education as appropriate  Outcome: Progressing

## 2020-06-17 NOTE — ASSESSMENT & PLAN NOTE
· Patient with known history of atrial fibrillation, status post HUMBERTO and cardioversion in January of this year in [facility] but unfortunately patient is in chronic atrial fibrillation per cardiology progress note, anticoagulated with Eliquis  · Slow ventricular response  · Heart rate is controlled  · Continue with metoprolol  · Eliquis on hold secondary to suspected GI bleed  · Will discuss with Gastroenterology and Cardiology regarding restarting back on the anticoagulation after the EGD

## 2020-06-17 NOTE — ASSESSMENT & PLAN NOTE
Lab Results   Component Value Date    HGBA1C 6 1 01/08/2018       Recent Labs     06/16/20  1720 06/16/20  2109 06/17/20  0716 06/17/20  1121   POCGLU 236* 168* 240* 248*       Blood Sugar Average: Last 72 hrs:  (P) 223 type 2 diabetes mellitus on oral hypoglycemic agent  Hold metformin and glipizide in the hospital  Insulin sliding scale  Adjust as necessary while NPO

## 2020-06-17 NOTE — CONSULTS
Consultation - GI   Allen Uribe 80 y o  male MRN: [id number]  Unit/Bed#: -01 Encounter: [id number]      Assessment/Plan     Assessment:  Symptomatic anemia, iron deficient  Heme occult positive stool  H/o colon polyps  Anticoagulation eliquis and ASA  AFib    Plan:  EGD today, discussed risks, benefits, alternatives with patient and friend at bedside; their questions were answered  NPO for now  Continue IV PPI BID for now  No signs of active unstable bleeding, good Hgb response to PRBC  Due for colonoscopy per his recollection, h/o polyps, consider inpatient vs outpatient pending result  Eliquis and ASA held  Consider workup of hemoptysis as alternate cause of anemia  D/w Dr Saul Zavala  History of Present Illness   Physician Requesting Consult: Gaby Martinez MD  Reason for Consult / Principal Problem: Anemia  Hx and PE limited by:   HPI: Allen Uribe is a 80y o  year old male who presents with symptomatic anemia, lightheadedness, fatigue  Sent from PCP office for Hgb 6  He notes more sluggish stool, dark color, reportedly melena heme +  He c/o generalized abd pain  Takes ASA and Eliquis, denies NSAIDs  He has been coughing up blood and last night had episode of dark vomitus that relieved abd pain  He also had formed stool with occasional red blood on it  Never had EGD before  Last colon approx 5 years ago in [place], told polyps, due for repeat  Good Hgb response to PRBC  Review of Systems   Constitutional: Positive for fatigue  HENT: Negative  Eyes: Negative  Respiratory: Negative  Cardiovascular: Negative  Gastrointestinal:        See HPI   Endocrine: Negative  Genitourinary: Negative  Musculoskeletal: Negative  Skin: Negative  Allergic/Immunologic: Negative  Neurological: Positive for dizziness  Hematological: Negative  Psychiatric/Behavioral: Negative          Historical Information   Past Medical History:   Diagnosis Date    [facility] [address] [facility]     Cardiac disease     Diabetes mellitus ([facility] [address] )     MI, old      Past Surgical History:   Procedure Laterality Date    CARDIAC SURGERY       Social History   Social History     Substance and Sexual Activity   Alcohol Use Not Currently     Social History     Substance and Sexual Activity   Drug Use Not Currently     E-Cigarette/Vaping     E-Cigarette/Vaping Substances     Social History     Tobacco Use   Smoking Status Never Smoker   Smokeless Tobacco Never Used     Family History: non-contributory    Meds/Allergies   all current active meds have been reviewed    No Known Allergies    Objective       Intake/Output Summary (Last 24 hours) at 6/17/2020 1020  Last data filed at 6/16/2020 2353  Gross per 24 hour   Intake 875 ml   Output    Net 875 ml       Invasive Devices:   Peripheral IV 06/16/20 Left Antecubital (Active)   Site Assessment Clean;Dry; Intact 6/16/2020  9:32 PM   Dressing Type Transparent 6/16/2020  9:32 PM   Line Status Flushed; Infusing 6/16/2020  9:32 PM   Dressing Status Clean;Dry; Intact 6/16/2020  9:32 PM       Physical Exam   Constitutional: He is oriented to person, place, and time  He appears well-developed and well-nourished  HENT:   Head: Normocephalic and atraumatic  Eyes: Pupils are equal, round, and reactive to light  Neck: Normal range of motion  Cardiovascular: Normal heart sounds  Pulmonary/Chest: Effort normal    Abdominal:   Soft, mild distention, nontender, +BS   Musculoskeletal: Normal range of motion  Neurological: He is alert and oriented to person, place, and time  Skin: Skin is warm and dry  Psychiatric: He has a normal mood and affect  Lab Results: I have personally reviewed pertinent reports  Imaging Studies: I have personally reviewed pertinent reports

## 2020-06-18 PROBLEM — K57.10 DUODENAL DIVERTICULUM: Status: ACTIVE | Noted: 2020-01-01

## 2020-06-18 PROBLEM — D49.0 COLON NEOPLASM: Status: ACTIVE | Noted: 2020-01-01

## 2020-06-18 NOTE — ASSESSMENT & PLAN NOTE
· Patient with known history of atrial fibrillation, status post HUMBERTO and cardioversion in January of this year in [facility] but unfortunately patient is in chronic atrial fibrillation per cardiology progress note, anticoagulated with Eliquis  · Slow ventricular response  · Heart rate is controlled  · Continue with metoprolol  · Eliquis on hold secondary to suspected GI bleed  · Will discuss with Gastroenterology and Cardiology regarding restarting back on the anticoagulation after the EGD

## 2020-06-18 NOTE — ASSESSMENT & PLAN NOTE
Wt Readings from Last 3 Encounters:   06/18/20 102 kg (225 lb 12 oz)   Patient with known history of chronic congestive heart failure and was admitted to [facility] in January  Patient was on 40 mg of Lasix b i d  As an outpatient and secondary to dizziness and orthostasis his Lasix dose was decreased to 40 mg daily during the recent hospital stay  Appears to be euvolemic  IV Lasix x1 after the blood draw  Hold further Lasix while inpatient  Patient is NPO  Will place on gentle IVF  Monitor closely     Ejection fraction 65% during the last echo 1/20

## 2020-06-18 NOTE — ASSESSMENT & PLAN NOTE
Abdominal imaging shows right-sided colon inflammation versus neoplasm  Surgical service ordered Cipro Flagyl  Preparing for tomorrow  I discussed with Dr Arianna Yin and Wesly Aeljo who confirm

## 2020-06-18 NOTE — ASSESSMENT & PLAN NOTE
Lab Results   Component Value Date    HGBA1C 8 2 (H) 06/18/2020       Recent Labs     06/17/20  1607 06/17/20  2045 06/18/20  0723 06/18/20  1111   POCGLU 226* 161* 152* 183*       Blood Sugar Average: Last 72 hrs:  (P) 626 [id number] type 2 diabetes mellitus on oral hypoglycemic agent  Hold metformin and glipizide in the hospital  Insulin sliding scale  Adjust as necessary while NPO

## 2020-06-18 NOTE — PROGRESS NOTES
Progress Note - Singh Driscoll 1935, 80 y o  male MRN: [id number]  Unit/Bed#: -01 Encounter: [id number]  Primary Care Provider: Hang Grewal DO   Date and time admitted to hospital: 6/16/2020 11:14 AM    * Symptomatic anemia  Assessment & Plan  · Patient was evaluated by PCP secondary to dizziness and fatigue and has blood pressure done as an outpatient  His hemoglobin was found to be 6 2 and was sent to the emergency room  · Today hemoglobin in the emergency room was 7 1 and receiving 2 units of packed RBCs  · Iron panel  · Patient gives history of dark stools-suspicious for upper GI bleed patient is on aspirin and Eliquis  · Hold antiplatelet and anticoagulation for  Now  · PPI b i d   · GI performed EGD 06/17/2020  Finding suspicious for possible bowel obstruction/gastroparesis  See plan below  · Abdominal imaging shows R colon inflammation vs neoplasm which may contribute to anemia  · For colonoscopy 6/19/2020     Colon inflammation vs neoplasm  Assessment & Plan  Abdominal imaging shows right-sided colon inflammation versus neoplasm  Surgical service ordered Cipro Flagyl  Preparing for tomorrow  I discussed with Dr Meredith Bence and Rjei Mclain who confirm  Partial bowel obstruction ([facility] [address] )  Assessment & Plan  On EGD 6/17/2020, there was concern for possible bowel obstruction vs severe gastroparesis, given large amount of food and dark bilious fluid in stomach which could not be cleared  Placed on NG tube  Surgical consult appreciated  On PPI IV b i d     Abdominal imaging shows duodenal diverticulum which may be contributing to bile reflux in stomach  Given NPO status, will hold Lasix and give gentle fluid  Duodenal diverticulum  Assessment & Plan  Abdominal imaging shows inflamed duodenal diverticulum which might contribute to bile reflux and stomach    Surgery started on Cipro and Flagyl considering duodenal diverticulitis as well as possible right-sided colitis    Atrial fibrillation [facility])  Assessment & Plan  · Patient with known history of atrial fibrillation, status post HUMBERTO and cardioversion in January of this year in [facility] but unfortunately patient is in chronic atrial fibrillation per cardiology progress note, anticoagulated with Eliquis  · Slow ventricular response  · Heart rate is controlled  · Continue with metoprolol  · Eliquis on hold secondary to suspected GI bleed  · Will discuss with Gastroenterology and Cardiology regarding restarting back on the anticoagulation after the EGD    Morbid obesity ([address] )  Assessment & Plan  · TLC as an outpatient    Hyperlipidemia  Assessment & Plan  · Holding statin for now as patient is NPO  Essential hypertension  Assessment & Plan  · Metoprolol converted to IV as patient is NPO      Diabetes mellitus type 2 in obese [facility])  Assessment & Plan  Lab Results   Component Value Date    HGBA1C 8 2 (H) 06/18/2020       Recent Labs     06/17/20  1607 06/17/20  2045 06/18/20  0723 06/18/20  1111   POCGLU 226* 161* 152* 183*       Blood Sugar Average: Last 72 hrs:  (P) 805 [id number] type 2 diabetes mellitus on oral hypoglycemic agent  Hold metformin and glipizide in the hospital  Insulin sliding scale  Adjust as necessary while NPO    Coronary artery disease  Assessment & Plan  · Patient with known history of coronary artery disease status post CABG in 1997 in [facility]  · Patient is on beta-blocker aspirin and statin  · Hold aspirin for now given possible GI bleed  Holding statin as patient is NPO      Chronic kidney disease, stage 3 (HCC)  Assessment & Plan  · Baseline creatinine appears to be between 1 4 and 1 6  · Most recent better than baseline  · Monitor creatinine    CHF (congestive heart failure) (HCC)  Assessment & Plan  Wt Readings from Last 3 Encounters:   06/18/20 102 kg (225 lb 12 oz)   Patient with known history of chronic congestive heart failure and was admitted to [facility] in January  Patient was on 40 mg of Lasix b i d  As an outpatient and secondary to dizziness and orthostasis his Lasix dose was decreased to 40 mg daily during the recent hospital stay  Appears to be euvolemic  IV Lasix x1 after the blood draw  Hold further Lasix while inpatient  Patient is NPO  Will place on gentle IVF  Monitor closely  Ejection fraction 65% during the last echo           VTE Pharmacologic Prophylaxis:   Pharmacologic: GI bleed  Mechanical VTE Prophylaxis in Place: Yes    Patient Centered Rounds: I have performed bedside rounds with nursing staff today  Discussions with Specialists or Other Care Team Provider: d/w Gastroenterology, surgery, case management, nursing, internal Medicine  Education and Discussions with Family / Patient:  I discussed with patient and significant other at bedside  Time Spent for Care: 30 minutes  More than 50% of total time spent on counseling and coordination of care as described above  Current Length of Stay: 2 day(s)    Current Patient Status: Inpatient   Certification Statement: The patient will continue to require additional inpatient hospital stay due to Colonoscopy tomorrow    Discharge Plan:  Pending, not ready    Code Status: Level 1 - Full Code      Subjective:   Patient is uncomfortable with the NG tube  He wants to sleep  No chest pain or difficulty breathing  Objective:     Vitals:   Temp (24hrs), Av 5 °F (36 9 °C), Min:97 5 °F (36 4 °C), Max:99 1 °F (37 3 °C)    Temp:  [97 5 °F (36 4 °C)-99 1 °F (37 3 °C)] 99 1 °F (37 3 °C)  HR:  [59-82] 74  Resp:  [16-21] 16  BP: (127-144)/(57-64) 132/59  SpO2:  [91 %-100 %] 95 %  Body mass index is 38 15 kg/m²  Input and Output Summary (last 24 hours):        Intake/Output Summary (Last 24 hours) at 2020 1440  Last data filed at 2020 1054  Gross per 24 hour   Intake 827 ml   Output 250 ml   Net 577 ml       Physical Exam:     Physical Exam   Constitutional: He appears well-developed and well-nourished  No distress  HENT:   Head: Normocephalic and atraumatic  Mouth/Throat: No oropharyngeal exudate  Eyes: Right eye exhibits no discharge  Left eye exhibits no discharge  No scleral icterus  Neck: No JVD present  No tracheal deviation present  No thyromegaly present  Cardiovascular: Regular rhythm  Exam reveals no gallop and no friction rub  No murmur heard  Pulmonary/Chest: Effort normal and breath sounds normal  No respiratory distress  He has no wheezes  He has no rales  He exhibits no tenderness  Abdominal: Soft  Bowel sounds are normal  He exhibits distension  There is no tenderness  There is no rebound  NG tube in place  Musculoskeletal: He exhibits no edema, tenderness or deformity  Skin: Skin is warm and dry  He is not diaphoretic  No erythema  No pallor  Psychiatric: He has a normal mood and affect  His behavior is normal    Nursing note and vitals reviewed        Additional Data:     Labs:    Results from last 7 days   Lab Units 06/18/20  0458   WBC Thousand/uL 4 76   HEMOGLOBIN g/dL 9 3*   HEMATOCRIT % 32 0*   PLATELETS Thousands/uL 209   NEUTROS PCT % 71   LYMPHS PCT % 17   MONOS PCT % 10   EOS PCT % 2     Results from last 7 days   Lab Units 06/18/20  0458 06/17/20  0504   SODIUM mmol/L 138 139   POTASSIUM mmol/L 4 3 4 6   CHLORIDE mmol/L 105 101   CO2 mmol/L 29 30   BUN mg/dL 18 21   CREATININE mg/dL 1 30 1 45*   ANION GAP mmol/L 4 8   CALCIUM mg/dL 8 0* 8 2*   ALBUMIN g/dL  --  2 9*   TOTAL BILIRUBIN mg/dL  --  1 50*   ALK PHOS U/L  --  79   ALT U/L  --  15   AST U/L  --  11   GLUCOSE RANDOM mg/dL 147* 232*     Results from last 7 days   Lab Units 06/17/20  0022   INR  1 41*     Results from last 7 days   Lab Units 06/18/20  1111 06/18/20  0723 06/17/20  2045 06/17/20  1607 06/17/20  1230 06/17/20  1121 06/17/20  0716 06/16/20  2109 06/16/20  1720   POC GLUCOSE mg/dl 183* 152* 161* 226* 234* 248* 240* 168* 236*     Results from last 7 days   Lab Units 06/18/20  0458 HEMOGLOBIN A1C % 8 2*               * I Have Reviewed All Lab Data Listed Above  * Additional Pertinent Lab Tests Reviewed: All Labs Within Last 24 Hours Reviewed    Imaging:    Imaging Reports Reviewed Today Include: CT abdomen       Recent Cultures (last 7 days):           Last 24 Hours Medication List:     Current Facility-Administered Medications:  bisacodyl 10 mg Oral Daily PRN Lilia Palacio MD    bisacodyl 20 mg Oral Once Jason Jimenez,     ciprofloxacin 400 mg Intravenous Q12H Sears Pierre, DO Last Rate: 400 mg (06/18/20 0946)   insulin lispro 1-5 Units Subcutaneous TID AC Lilia Palacio MD    insulin lispro 1-5 Units Subcutaneous HS Lilia Palacio MD    metoprolol 2 5 mg Intravenous Q6H Lilia Palacio MD    metroNIDAZOLE 500 mg Intravenous Q8H Saers Pierre, DO Last Rate: 500 mg (06/18/20 1054)   morphine injection 2 mg Intravenous Q4H PRN Mary Dumont PA-C    ondansetron 4 mg Intravenous Q8H PRN BRANDON Rivero    pantoprazole 40 mg Intravenous Q12H [address] Lilia Palacio MD    phenol 1 spray Mouth/Throat Q2H PRN BRANDON Veloz    polyethylene glycol 238 g Oral Q10H Lilia Palacio MD    sodium chloride 40 mL/hr Intravenous Continuous Mary Dumont PA-C Last Rate: 40 mL/hr (06/17/20 1607)        Today, Patient Was Seen By: Oren Landau, PA-C    ** Please Note: Dictation voice to text software may have been used in the creation of this document   **

## 2020-06-18 NOTE — ASSESSMENT & PLAN NOTE
On EGD 6/17/2020, there was concern for possible bowel obstruction vs severe gastroparesis, given large amount of food and dark bilious fluid in stomach which could not be cleared  Placed on NG tube  Surgical consult appreciated  On PPI IV b i d     Abdominal imaging shows duodenal diverticulum which may be contributing to bile reflux in stomach  Given NPO status, will hold Lasix and give gentle fluid

## 2020-06-18 NOTE — PROGRESS NOTES
Progress Note - General Surgery   [facility] 80 y o  male MRN: [id number]  Unit/Bed#: -01 Encounter: [id number]    Assessment:  Patient stable at present  Hemoglobin 9 0  EGD could not be performed yesterday with large amount of food and dark bilious fluid noted in the stomach  Patient was considered an aspiration risk  CT scan of the abdomen pelvis done without contrast was performed  Found to have thickening and diffuse adenopathy noted in the area of the right colon, suspicious for malignancy  Diffuse ascites without bowel dilatation  Large duodenal diverticulum noted  Patient with paroxysmal atrial fibrillation, Eliquis currently being held  Congestive heart failure  Chronic kidney disease, stage III  Hypertension  Iron deficiency anemia, stable  Morbid obesity, BMI 38 15    Plan:  Discussed with Dr Lourdes Boss  NG tube to low wall suction  I&Os  Nothing by mouth  Patient   Right now no indication for urgent surgical intervention  Maintain IV fluids for hydration  Analgesics and antiemetics  Monitor CBC, BMP in a m  Patient warrants further investigation that would likely include gastroenterology evaluation and colonoscopy possibly for tomorrow  Concern about bowel preparation that would have to be administered through the NG tube, the patient is not obstructed and would be appropriate for bowel prep to be given through the NG tube  Discussed with the attending hospitalist provider, Dr Dandre Knowles  The gastroenterologist tomorrow is Dr Triston Ochoa and contact with him with regard to colonoscopy and bowel prep instructions would need to be established  Will obtain CEA level at this time  Continue to monitor hemoglobin, transfusion protocol if indicated  Air vent on NG tube to be replaced and patient placed back on low wall suction  Discussed with the attending hospitalist provider, patient should be changed to IV meds including metoprolol at this time  PPI therapy, Protonix, change from p o   To IV form     Subjective/Objective   Chief Complaint:  Patient has complaints that bile is leaking from his NG tube  He denies any abdominal pain at present  No nausea or vomiting  He is currently nothing by mouth  Subjective:  59-year-old white male with abdominal distention and complaints of bile leaking from the end of the NG tube  Apparently the tube was clamped for administration of p o  Metoprolol this morning  He really does not have any nausea or feel like anything stuck in his throat  He still has abdominal distention  His last bowel movement was 2 days ago  He is passing gas  The patient was bearing held from [place] he has history of atrial fibrillation  Patient's hemoglobin 2 days ago was 7 1, he received 2 units of packed red blood cells  This morning hemoglobin is 9 3  Yesterday EGD had to be aborted in the GI lab  He had CT scan performed without contrast of the abdomen pelvis  Suggesting large mass likely arising from the right side of the colon suspicious for malignancy        Scheduled Meds:  Current Facility-Administered Medications:  acetaminophen 650 mg Oral Q6H PRN Werner Quintero MD    bisacodyl 10 mg Oral Daily PRN Werner Quintero MD    HYDROcodone-acetaminophen 1 tablet Oral Q4H PRN Werner Quintero MD    insulin lispro 1-5 Units Subcutaneous TID [facility] Werner Quintero MD    insulin lispro 1-5 Units Subcutaneous HS Werner Quintero MD    loratadine 10 mg Oral Daily Werner Quintero MD    metoprolol succinate 25 mg Oral Daily Werner Quintero MD    ondansetron 4 mg Intravenous Q8H PRN BRANDON Mao    pantoprazole 40 mg Intravenous Q12H [address] Werner Quintero MD    phenol 1 spray Mouth/Throat Q2H PRN BRANDON Veloz    pravastatin 40 mg Oral Daily With Sabas Baxter MD    sodium chloride 40 mL/hr Intravenous Continuous Mary Dumont PA-C Last Rate: 40 mL/hr (06/17/20 1607)     Continuous Infusions:  sodium chloride 40 mL/hr Last Rate: 40 mL/hr (06/17/20 1607)     PRN Meds:   acetaminophen    bisacodyl    HYDROcodone-acetaminophen    ondansetron    phenol    Objective:     Blood pressure 132/58, pulse 82, temperature 98 4 °F (36 9 °C), resp  rate 16, height 5' 4 5" (1 638 m), weight 102 kg (225 lb 12 oz), SpO2 93 %  ,Body mass index is 38 15 kg/m²  Intake/Output Summary (Last 24 hours) at 6/18/2020 0818  Last data filed at 6/17/2020 2230  Gross per 24 hour   Intake 527 ml   Output 250 ml   Net 277 ml       Invasive Devices     Peripheral Intravenous Line            Peripheral IV 06/16/20 Left Antecubital 1 day          Drain            NG/OG/Enteral Tube Nasogastric 18 Fr Right nares less than 1 day                Physical Exam:  Patient is awake alert  No acute distress  NG tube clamped  The air event became dislodged on the tube and leaking some bile at present  ENT reveals oral mucosa pink and moist   Skin anicteric  No pallor  Heart irregular rate and rhythm  Lungs clear to auscultation  Back no CVA tenderness  Abdomen wide abdominal girth  Normoactive bowel sounds are heard  The abdomen is soft  No definite tenderness or masses are felt  Moves all 4 extremities well  Some venous discoloration noted in the lower extremities  Trace bilateral ankle edema  Moves all 4 extremities well  Mental status appropriate  Ambulation not observed  No tremor noted  Lab, Imaging and other studies:  I have personally reviewed pertinent lab results       CBC:   Lab Results   Component Value Date    WBC 4 76 06/18/2020    HGB 9 3 (L) 06/18/2020    HCT 32 0 (L) 06/18/2020    MCV 76 (L) 06/18/2020     06/18/2020    MCH 22 1 (L) 06/18/2020    MCHC 29 1 (L) 06/18/2020    RDW 18 4 (H) 06/18/2020    MPV 9 5 06/18/2020    NRBC 0 06/18/2020   , CMP:   Lab Results   Component Value Date    SODIUM 138 06/18/2020    K 4 3 06/18/2020     06/18/2020    CO2 29 06/18/2020    BUN 18 06/18/2020    CREATININE 1 30 06/18/2020    CALCIUM 8 0 (L) 06/18/2020    EGFR 50 06/18/2020   , Coagulation: No results found for: PT, INR, APTT     CT ABDOMEN AND PELVIS WITHOUT IV CONTRAST     INDICATION:   Abdominal distension  evaluate possible obstruction  Possible gastric outlet obstruction with significant nasogastric secretions      COMPARISON:  Lumbar spine from 8/23/2019     TECHNIQUE:  CT examination of the abdomen and pelvis was performed without intravenous contrast   Axial, sagittal, and coronal 2D reformatted images were created from the source data and submitted for interpretation       Radiation dose length product (DLP) for this visit:  736 mGy-cm   This examination, like all CT scans performed in the [facility], was performed utilizing techniques to minimize radiation dose exposure, including the use of iterative   reconstruction and automated exposure control       Enteric contrast was administered       FINDINGS:     ABDOMEN     LOWER CHEST: Bandlike atelectasis is noted at the bases      LIVER/BILIARY TREE:  The liver demonstrates no focal abnormality      GALLBLADDER:  Gallstones are seen      SPLEEN:  Unremarkable      PANCREAS:  Unremarkable      ADRENAL GLANDS:  Unremarkable      KIDNEYS/URETERS:  Unremarkable  No hydronephrosis      STOMACH AND BOWEL:  Nasogastric tube extends into the stomach  There appears to be a large diverticulum with air-fluid level arising from the 2nd portion of the duodenum  Slight stranding surrounds the duodenum suggesting some inflammation  The   appearance is not typical for an ulcer in this location  There is no small bowel dilatation seen or large bowel dilatation  There is however suggestion of increased density and focal thickening of the cecal region      Left colon diverticula are noted without CT evidence of diverticulitis      APPENDIX:  The appendix is not well seen      ABDOMINOPELVIC CAVITY: No free air is seen    There is perihepatic and splenic ascites present which extends into the gutters without much ascites in the pelvis      There is mesenteric adenopathy in the right lower quadrant nodes reaching 1 3 cm in short axis  Some smaller nodes are seen in the midline of the mesentery but still mildly enlarged  No retroperitoneal or pelvic adenopathy is seen        VESSELS:  Unremarkable for patient's age      PELVIS     REPRODUCTIVE ORGANS:  Unremarkable for patient's age      URINARY BLADDER:  Unremarkable      ABDOMINAL WALL/INGUINAL REGIONS:  Unremarkable      OSSEOUS STRUCTURES:  Postoperative fusion changes are seen in the spine with orthopedic hardware      IMPRESSION:     Significant adenopathy in the right lower quadrant with an area of relative thickening in the colon could be related to an occult mass in the colon or inflammation  There is no thickening of the terminal ileum however  The appendix is not well seen  Further evaluation is advised  A study with oral and IV contrast may be useful as well as endoscopic evaluation of the colon given history of GI bleeding      Diffuse ascites without bowel dilatation  This is of uncertain etiology  Correlation with any signs of peritonitis is advised      Evidence of large duodenal diverticulum  The location and size is not typical for an ulcer  There is slight duodenal stranding and some inflammation could be present    No obvious distal gastric thickening can be seen      Gallstones    VTE Pharmacologic Prophylaxis: Reason for no pharmacologic prophylaxis Eliquis has been on hold because of suspected GI bleed and anemia  VTE Mechanical Prophylaxis: sequential compression device     Jeff Bailey PA-C

## 2020-06-18 NOTE — PLAN OF CARE
Problem: Potential for Falls  Goal: Patient will remain free of falls  Description  INTERVENTIONS:  - Assess patient frequently for physical needs  -  Identify cognitive and physical deficits and behaviors that affect risk of falls    -  [place] fall precautions as indicated by assessment   - Educate patient/family on patient safety including physical limitations  - Instruct patient to call for assistance with activity based on assessment  - Modify environment to reduce risk of injury  - Consider OT/PT consult to assist with strengthening/mobility  Outcome: Progressing     Problem: CARDIOVASCULAR - ADULT  Goal: Maintains optimal cardiac output and hemodynamic stability  Description  INTERVENTIONS:  - Monitor I/O, vital signs and rhythm  - Monitor for S/S and trends of decreased cardiac output  - Administer and titrate ordered vasoactive medications to optimize hemodynamic stability  - Assess quality of pulses, skin color and temperature  - Assess for signs of decreased coronary artery perfusion  - Instruct patient to report change in severity of symptoms  Outcome: Progressing  Goal: Absence of cardiac dysrhythmias or at baseline rhythm  Description  INTERVENTIONS:  - Continuous cardiac monitoring, vital signs, obtain 12 lead EKG if ordered  - Administer antiarrhythmic and heart rate control medications as ordered  - Monitor electrolytes and administer replacement therapy as ordered  Outcome: Progressing     Problem: METABOLIC, FLUID AND ELECTROLYTES - ADULT  Goal: Glucose maintained within target range  Description  INTERVENTIONS:  - Monitor Blood Glucose as ordered  - Assess for signs and symptoms of hyperglycemia and hypoglycemia  - Administer ordered medications to maintain glucose within target range  - Assess nutritional intake and initiate nutrition service referral as needed  Outcome: Progressing     Problem: SKIN/TISSUE INTEGRITY - ADULT  Goal: Skin integrity remains intact  Description  INTERVENTIONS  - Identify patients at risk for skin breakdown  - Assess and monitor skin integrity  - Assess and monitor nutrition and hydration status  - Monitor labs (i e  albumin)  - Assess for incontinence   - Turn and reposition patient  - Assist with mobility/ambulation  - Relieve pressure over bony prominences  - Avoid friction and shearing  - Provide appropriate hygiene as needed including keeping skin clean and dry  - Evaluate need for skin moisturizer/barrier cream  - Collaborate with interdisciplinary team (i e  Nutrition, Rehabilitation, etc )   - Patient/family teaching  Outcome: Progressing     Problem: HEMATOLOGIC - ADULT  Goal: Maintains hematologic stability  Description  INTERVENTIONS  - Assess for signs and symptoms of bleeding or hemorrhage  - Monitor labs  - Administer supportive blood products/factors as ordered and appropriate  Outcome: Progressing     Problem: MUSCULOSKELETAL - ADULT  Goal: Maintain or return mobility to safest level of function  Description  INTERVENTIONS:  - Assess patient's ability to carry out ADLs; assess patient's baseline for ADL function and identify physical deficits which impact ability to perform ADLs (bathing, care of mouth/teeth, toileting, grooming, dressing, etc )  - Assess/evaluate cause of self-care deficits   - Assess range of motion  - Assess patient's mobility  - Assess patient's need for assistive devices and provide as appropriate  - Encourage maximum independence but intervene and supervise when necessary  - Involve family in performance of ADLs  - Assess for home care needs following discharge   - Consider OT consult to assist with ADL evaluation and planning for discharge  - Provide patient education as appropriate  Outcome: Progressing     Problem: Prexisting or High Potential for Compromised Skin Integrity  Goal: Skin integrity is maintained or improved  Description  INTERVENTIONS:  - Identify patients at risk for skin breakdown  - Assess and monitor skin integrity  - Assess and monitor nutrition and hydration status  - Monitor labs   - Assess for incontinence   - Turn and reposition patient  - Assist with mobility/ambulation  - Relieve pressure over bony prominences  - Avoid friction and shearing  - Provide appropriate hygiene as needed including keeping skin clean and dry  - Evaluate need for skin moisturizer/barrier cream  - Collaborate with interdisciplinary team   - Patient/family teaching  - Consider wound care consult   Outcome: Progressing

## 2020-06-18 NOTE — ASSESSMENT & PLAN NOTE
· Patient with known history of coronary artery disease status post CABG in 1997 in [facility]  · Patient is on beta-blocker aspirin and statin  · Hold aspirin for now given possible GI bleed  Holding statin as patient is NPO

## 2020-06-18 NOTE — NURSING NOTE
Message sent to Dr Mejía Bolds to make aware of patient's request for the oxycodone to be Q 4 hours ATC, not PRN  This is how he takes it at home  He said he would bring his in from home, if we don't give it the correct way

## 2020-06-18 NOTE — PLAN OF CARE
Problem: Potential for Falls  Goal: Patient will remain free of falls  Description  INTERVENTIONS:  - Assess patient frequently for physical needs  -  Identify cognitive and physical deficits and behaviors that affect risk of falls    -  [place] fall precautions as indicated by assessment   - Educate patient/family on patient safety including physical limitations  - Instruct patient to call for assistance with activity based on assessment  - Modify environment to reduce risk of injury  - Consider OT/PT consult to assist with strengthening/mobility  Outcome: Progressing     Problem: CARDIOVASCULAR - ADULT  Goal: Maintains optimal cardiac output and hemodynamic stability  Description  INTERVENTIONS:  - Monitor I/O, vital signs and rhythm  - Monitor for S/S and trends of decreased cardiac output  - Administer and titrate ordered vasoactive medications to optimize hemodynamic stability  - Assess quality of pulses, skin color and temperature  - Assess for signs of decreased coronary artery perfusion  - Instruct patient to report change in severity of symptoms  Outcome: Progressing  Goal: Absence of cardiac dysrhythmias or at baseline rhythm  Description  INTERVENTIONS:  - Continuous cardiac monitoring, vital signs, obtain 12 lead EKG if ordered  - Administer antiarrhythmic and heart rate control medications as ordered  - Monitor electrolytes and administer replacement therapy as ordered  Outcome: Progressing     Problem: METABOLIC, FLUID AND ELECTROLYTES - ADULT  Goal: Glucose maintained within target range  Description  INTERVENTIONS:  - Monitor Blood Glucose as ordered  - Assess for signs and symptoms of hyperglycemia and hypoglycemia  - Administer ordered medications to maintain glucose within target range  - Assess nutritional intake and initiate nutrition service referral as needed  Outcome: Progressing     Problem: SKIN/TISSUE INTEGRITY - ADULT  Goal: Skin integrity remains intact  Description  INTERVENTIONS  - Identify patients at risk for skin breakdown  - Assess and monitor skin integrity  - Assess and monitor nutrition and hydration status  - Monitor labs (i e  albumin)  - Assess for incontinence   - Turn and reposition patient  - Assist with mobility/ambulation  - Relieve pressure over bony prominences  - Avoid friction and shearing  - Provide appropriate hygiene as needed including keeping skin clean and dry  - Evaluate need for skin moisturizer/barrier cream  - Collaborate with interdisciplinary team (i e  Nutrition, Rehabilitation, etc )   - Patient/family teaching  Outcome: Progressing     Problem: HEMATOLOGIC - ADULT  Goal: Maintains hematologic stability  Description  INTERVENTIONS  - Assess for signs and symptoms of bleeding or hemorrhage  - Monitor labs  - Administer supportive blood products/factors as ordered and appropriate  Outcome: Progressing     Problem: MUSCULOSKELETAL - ADULT  Goal: Maintain or return mobility to safest level of function  Description  INTERVENTIONS:  - Assess patient's ability to carry out ADLs; assess patient's baseline for ADL function and identify physical deficits which impact ability to perform ADLs (bathing, care of mouth/teeth, toileting, grooming, dressing, etc )  - Assess/evaluate cause of self-care deficits   - Assess range of motion  - Assess patient's mobility  - Assess patient's need for assistive devices and provide as appropriate  - Encourage maximum independence but intervene and supervise when necessary  - Involve family in performance of ADLs  - Assess for home care needs following discharge   - Consider OT consult to assist with ADL evaluation and planning for discharge  - Provide patient education as appropriate  Outcome: Progressing     Problem: Prexisting or High Potential for Compromised Skin Integrity  Goal: Skin integrity is maintained or improved  Description  INTERVENTIONS:  - Identify patients at risk for skin breakdown  - Assess and monitor skin integrity  - Assess and monitor nutrition and hydration status  - Monitor labs   - Assess for incontinence   - Turn and reposition patient  - Assist with mobility/ambulation  - Relieve pressure over bony prominences  - Avoid friction and shearing  - Provide appropriate hygiene as needed including keeping skin clean and dry  - Evaluate need for skin moisturizer/barrier cream  - Collaborate with interdisciplinary team   - Patient/family teaching  - Consider wound care consult   Outcome: Progressing

## 2020-06-18 NOTE — TELEMEDICINE
Tiger text received from Dr Cholo Navarro of General Surgery regarding CT findings on patient  Appears to be no obstruction, but there is an area around the cecum that is suspicious for inflammation vs  Neoplasm  Plan for colonoscopy 6/19/2020

## 2020-06-18 NOTE — ASSESSMENT & PLAN NOTE
· Patient was evaluated by PCP secondary to dizziness and fatigue and has blood pressure done as an outpatient  His hemoglobin was found to be 6 2 and was sent to the emergency room  · Today hemoglobin in the emergency room was 7 1 and receiving 2 units of packed RBCs  · Iron panel  · Patient gives history of dark stools-suspicious for upper GI bleed patient is on aspirin and Eliquis  · Hold antiplatelet and anticoagulation for  Now  · PPI b i d   · GI performed EGD 06/17/2020  Finding suspicious for possible bowel obstruction/gastroparesis  See plan below  · Abdominal imaging shows R colon inflammation vs neoplasm which may contribute to anemia    · For colonoscopy 6/19/2020

## 2020-06-18 NOTE — ASSESSMENT & PLAN NOTE
· Baseline creatinine appears to be between 1 4 and 1 6  · Most recent better than baseline  · Monitor creatinine

## 2020-06-18 NOTE — NURSING NOTE
NG tube advanced 5 cm as instructed in the CXR report  Patient tolerated well  Lj Madrigal made aware that this was done  He does not need another CXR to confirm

## 2020-06-18 NOTE — ASSESSMENT & PLAN NOTE
Abdominal imaging shows inflamed duodenal diverticulum which might contribute to bile reflux and stomach    Surgery started on Cipro and Flagyl considering duodenal diverticulitis as well as possible right-sided colitis

## 2020-06-19 NOTE — PLAN OF CARE
Problem: Potential for Falls  Goal: Patient will remain free of falls  Description  INTERVENTIONS:  - Assess patient frequently for physical needs  -  Identify cognitive and physical deficits and behaviors that affect risk of falls    -  [place] fall precautions as indicated by assessment   - Educate patient/family on patient safety including physical limitations  - Instruct patient to call for assistance with activity based on assessment  - Modify environment to reduce risk of injury  - Consider OT/PT consult to assist with strengthening/mobility  Outcome: Progressing     Problem: CARDIOVASCULAR - ADULT  Goal: Maintains optimal cardiac output and hemodynamic stability  Description  INTERVENTIONS:  - Monitor I/O, vital signs and rhythm  - Monitor for S/S and trends of decreased cardiac output  - Administer and titrate ordered vasoactive medications to optimize hemodynamic stability  - Assess quality of pulses, skin color and temperature  - Assess for signs of decreased coronary artery perfusion  - Instruct patient to report change in severity of symptoms  Outcome: Progressing  Goal: Absence of cardiac dysrhythmias or at baseline rhythm  Description  INTERVENTIONS:  - Continuous cardiac monitoring, vital signs, obtain 12 lead EKG if ordered  - Administer antiarrhythmic and heart rate control medications as ordered  - Monitor electrolytes and administer replacement therapy as ordered  Outcome: Progressing     Problem: METABOLIC, FLUID AND ELECTROLYTES - ADULT  Goal: Glucose maintained within target range  Description  INTERVENTIONS:  - Monitor Blood Glucose as ordered  - Assess for signs and symptoms of hyperglycemia and hypoglycemia  - Administer ordered medications to maintain glucose within target range  - Assess nutritional intake and initiate nutrition service referral as needed  Outcome: Progressing     Problem: SKIN/TISSUE INTEGRITY - ADULT  Goal: Skin integrity remains intact  Description  INTERVENTIONS  - Identify patients at risk for skin breakdown  - Assess and monitor skin integrity  - Assess and monitor nutrition and hydration status  - Monitor labs (i e  albumin)  - Assess for incontinence   - Turn and reposition patient  - Assist with mobility/ambulation  - Relieve pressure over bony prominences  - Avoid friction and shearing  - Provide appropriate hygiene as needed including keeping skin clean and dry  - Evaluate need for skin moisturizer/barrier cream  - Collaborate with interdisciplinary team (i e  Nutrition, Rehabilitation, etc )   - Patient/family teaching  Outcome: Progressing     Problem: HEMATOLOGIC - ADULT  Goal: Maintains hematologic stability  Description  INTERVENTIONS  - Assess for signs and symptoms of bleeding or hemorrhage  - Monitor labs  - Administer supportive blood products/factors as ordered and appropriate  Outcome: Progressing     Problem: MUSCULOSKELETAL - ADULT  Goal: Maintain or return mobility to safest level of function  Description  INTERVENTIONS:  - Assess patient's ability to carry out ADLs; assess patient's baseline for ADL function and identify physical deficits which impact ability to perform ADLs (bathing, care of mouth/teeth, toileting, grooming, dressing, etc )  - Assess/evaluate cause of self-care deficits   - Assess range of motion  - Assess patient's mobility  - Assess patient's need for assistive devices and provide as appropriate  - Encourage maximum independence but intervene and supervise when necessary  - Involve family in performance of ADLs  - Assess for home care needs following discharge   - Consider OT consult to assist with ADL evaluation and planning for discharge  - Provide patient education as appropriate  Outcome: Progressing     Problem: Prexisting or High Potential for Compromised Skin Integrity  Goal: Skin integrity is maintained or improved  Description  INTERVENTIONS:  - Identify patients at risk for skin breakdown  - Assess and monitor skin integrity  - Assess and monitor nutrition and hydration status  - Monitor labs   - Assess for incontinence   - Turn and reposition patient  - Assist with mobility/ambulation  - Relieve pressure over bony prominences  - Avoid friction and shearing  - Provide appropriate hygiene as needed including keeping skin clean and dry  - Evaluate need for skin moisturizer/barrier cream  - Collaborate with interdisciplinary team   - Patient/family teaching  - Consider wound care consult   Outcome: Progressing

## 2020-06-19 NOTE — H&P
Procedure(s):  Colonoscopy with indication(s) of abnormal CT scan and anemia      Endoscopy Pre-Procedure Assessment:  Prior to the procedure, the patient is identified  The patient's history, medications, and allergies have been reviewed  The patient is competent  The risks and benefits of the procedure procedure and the planned sedation have been discussed with the patient  All questions have been answered and informed consent for the procedure has been obtained  Vitals:    06/19/20 0900   BP: 135/59   Pulse: 60   Resp:    Temp:    SpO2: 96%       Physical Exam:  Physical Exam   Constitutional: He is oriented to person, place, and time  HENT:   Head: Normocephalic and atraumatic  Eyes: EOM are normal    Neck: Normal range of motion  Neck supple  Cardiovascular: Normal rate, regular rhythm and normal heart sounds  Pulmonary/Chest: Effort normal and breath sounds normal    Abdominal: Soft  Bowel sounds are normal    Musculoskeletal: Normal range of motion  Neurological: He is alert and oriented to person, place, and time  Skin: Skin is warm and dry  ASA Grade:  ASA 3 - severe systemic disease    After reviewed the risks and benefits, the patient is deemed in satisfactory condition to undergo the procedure  The anesthesia plan is to use monitored anesthesia care      Zuly Humphrey [address], DO  06/19/20

## 2020-06-19 NOTE — PROGRESS NOTES
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  - Paroxysmal atrial fibrillation - on Eliquis and ASA (currently held for procedure)  - Congestive heart failure - EF 65% on echo 1/20  - Chronic ischemic heart disease  - Stage 3 chronic kidney disease: stable, Cr is at baseline 1 45 today  - Type 2 diabetes mellitus with peripheral vascular disease  - Morbid obesity  - Anemia - stable      Plan:  - NPO  - NGT  - Colonoscopy today  - Possible OR tomorrow if obstructive or hemorrhagic mass identified on colonoscopy  - Continue Cipro and Flagyl at this time  - IVF hydration  - Monitor I/Os  - Follow qAM CBC and CMP  - Medicate PRN pain/nausea  - OOB ambulating with assistance  - Serial abdominal exams  - Management of medical comorbidities per primary team      Subjective/Objective     Subjective: No acute events overnight  Looking forward to getting his colonoscopy today  States that he wants to go home as soon as possible  I explained that the colonoscopy will give us more information to determine the next step in treatment  He is understanding of this  Objective:     Blood pressure 158/90, pulse 67, temperature 98 °F (36 7 °C), resp  rate 16, height 5' 4 5" (1 638 m), weight 104 kg (229 lb 4 5 oz), SpO2 98 %  ,Body mass index is 38 75 kg/m²        Intake/Output Summary (Last 24 hours) at 6/19/2020 0728  Last data filed at 6/19/2020 0430  Gross per 24 hour   Intake 2380 ml   Output 650 ml   Net 1730 ml       Invasive Devices     Peripheral Intravenous Line            Peripheral IV 06/16/20 Left Antecubital 2 days          Drain            NG/OG/Enteral Tube Nasogastric 18 Fr Right nares 1 day                Physical Exam:   Gen: AxOx3  Heart: RRR  Lungs: CTA  Abd: obese, soft, minimal tenderness, non distended, bowel sounds present    Lab, Imaging and other studies:  I have personally reviewed pertinent lab results      CBC:   Lab Results   Component Value Date    WBC 5 53 06/19/2020    HGB 10 3 (L) 06/19/2020    HCT 35 5 (L) 06/19/2020    MCV 77 (L) 06/19/2020     06/19/2020    MCH 22 3 (L) 06/19/2020    MCHC 29 0 (L) 06/19/2020    RDW 19 1 (H) 06/19/2020    MPV 8 9 06/19/2020    NRBC 0 06/19/2020     CMP:   Lab Results   Component Value Date    SODIUM 138 06/19/2020    K 4 0 06/19/2020     06/19/2020    CO2 27 06/19/2020    BUN 12 06/19/2020    CREATININE 1 45 (H) 06/19/2020    CALCIUM 7 8 (L) 06/19/2020    EGFR 44 06/19/2020       VTE Pharmacologic Prophylaxis: Reason for no pharmacologic prophylaxis held for invasive procedure  VTE Mechanical Prophylaxis: sequential compression device       Camilo Carr PA-C

## 2020-06-19 NOTE — PROGRESS NOTES
Progress Note - Delmy Alexander 1935, 80 y o  male MRN: [id number]    Unit/Bed#: -01 Encounter: [id number]    Primary Care Provider: Adeel Shine DO   Date and time admitted to hospital: 6/16/2020 11:14 AM        * Symptomatic anemia  Assessment & Plan  · Patient was evaluated by PCP secondary to dizziness and fatigue and has blood pressure done as an outpatient  His hemoglobin was found to be 6 2 and was sent to the emergency room  · Today hemoglobin in the emergency room was 7 1 and receiving 2 units of packed RBCs  · Iron panel  · Patient gives history of dark stools-suspicious for upper GI bleed patient is on aspirin and Eliquis  · Hold antiplatelet and anticoagulation for  Now  · PPI b i d   · GI performed EGD 06/17/2020  Finding suspicious for possible bowel obstruction/gastroparesis  See plan below  · Abdominal imaging shows R colon inflammation vs neoplasm which may contribute to anemia  · Status post colonoscopy 6/19/2020 -cecal mass  Circumferential   Biopsies performed, elevated CEA  ·  Discussed with Gastroenterology and surgery-planning for colectomy during this hospital stay  Patient is NPO  · Will obtain a CT scan of the chest to rule out any pulmonary Mets      Colon  neoplasm  Assessment & Plan  Abdominal imaging shows right-sided colon inflammation versus neoplasm  Surgical service ordered Cipro Flagyl  Status post EGD colonoscopy today-circumferential cecal mass-elevated CEA concerning for malignancy  For colectomy  Keep NPO    Duodenal diverticulum  Assessment & Plan  Abdominal imaging shows inflamed duodenal diverticulum which might contribute to bile reflux and stomach    Surgery started on Cipro and Flagyl considering duodenal diverticulitis as well as possible right-sided colitis    Partial bowel obstruction ([address] )  Assessment & Plan  On EGD 6/17/2020, there was concern for possible bowel obstruction vs severe gastroparesis, given large amount of food and dark bilious fluid in stomach which could not be cleared  Placed on NG tube  Surgical consult appreciated  On PPI IV b i d     Abdominal imaging shows duodenal diverticulum which may be contributing to bile reflux in stomach  Given NPO status, will hold Lasix and give gentle fluid  Continue with the NG tube  Planning for surgery    Atrial fibrillation [facility])  Assessment & Plan  · Patient with known history of atrial fibrillation, status post HUMBERTO and cardioversion in January of this year in [facility] but unfortunately patient is in chronic atrial fibrillation per cardiology progress note, anticoagulated with Eliquis  · Slow ventricular response  · Heart rate is controlled  · Continue with metoprolol  · Eliquis on hold-will continue to hold the Eliquis since the patient need colectomy in near future    Morbid obesity ([facility] [address] )  Assessment & Plan  · TLC as an outpatient    Hyperlipidemia  Assessment & Plan  · Holding statin for now as patient is NPO  Essential hypertension  Assessment & Plan  · Metoprolol converted to IV as patient is NPO      Diabetes mellitus type 2 in obese [facility])  Assessment & Plan  Lab Results   Component Value Date    HGBA1C 8 2 (H) 06/18/2020       Recent Labs     06/18/20  1755 06/19/20  0018 06/19/20  0617 06/19/20  1149   POCGLU 162* 216* 239* 244*       Blood Sugar Average: Last 72 hrs:  (P) 202 [id number] type 2 diabetes mellitus on oral hypoglycemic agent  Hold metformin and glipizide in the hospital  Insulin sliding scale  Adjust as necessary while NPO-continue with the sliding scale    Coronary artery disease  Assessment & Plan  · Patient with known history of coronary artery disease status post CABG in 1997 in [facility]  · Patient is on beta-blocker aspirin and statin  · Hold aspirin for now given possible GI bleed  Holding statin as patient is NPO      Chronic kidney disease, stage 3 (HCC)  Assessment & Plan  · Baseline creatinine appears to be between 1 4 and 1 6  · Most recent better than baseline  · Monitor creatinine    CHF (congestive heart failure) ([facility])  Assessment & Plan  Wt Readings from Last 3 Encounters:   06/19/20 104 kg (229 lb)   Patient with known history of chronic congestive heart failure and was admitted to [facility] in January  Patient was on 40 mg of Lasix b i d  As an outpatient and secondary to dizziness and orthostasis his Lasix dose was decreased to 40 mg daily during the recent hospital stay  Appears to be euvolemic  IV Lasix x1 after the blood draw  Hold further Lasix while inpatient  Patient is NPO  Will place on gentle IVF  Monitor closely  Ejection fraction 65% during the last echo 1/20            VTE Pharmacologic Prophylaxis:   Pharmacologic: Other Medication: On hold secondary to suspected GI bleed  Mechanical VTE Prophylaxis in Place: Yes    Patient Centered Rounds: I have performed bedside rounds with nursing staff today  Discussions with Specialists or Other Care Team Provider:  Gastroenterology and surgery    Education and Discussions with Family / Patient:  Significant other in the room    Time Spent for Care: 30 minutes  More than 50% of total time spent on counseling and coordination of care as described above  Current Length of Stay: 3 day(s)    Current Patient Status: Inpatient   Certification Statement: The patient will continue to require additional inpatient hospital stay due to Cecal mass    Discharge Plan:     Code Status: Level 1 - Full Code      Subjective:   Patient seen and examined  Status post colonoscopy today  Colonoscopy findings reviewed with patient and also with the family  Since the patient has elevated CEA malignancy is highly likely  Will obtain a CT of the chest to rule out any pulmonary Mets  Patient with known history of coronary artery disease but status post CABG in 1997  Denies any chest pain or any shortness of breath with activities    Patient had a stress test as an year ago and as per cardiology note did not show any significant ischemia  No signs of decompensated congestive heart failure on examination  Given his age and comorbidities patient is definitely a high risk candidate for surgery  But given the circumstances it appears that the patient is at acceptable risk to proceed with the surgery    Objective:     Vitals:   Temp (24hrs), Av 5 °F (36 9 °C), Min:98 °F (36 7 °C), Max:99 4 °F (37 4 °C)    Temp:  [98 °F (36 7 °C)-99 4 °F (37 4 °C)] 98 2 °F (36 8 °C)  HR:  [60-82] 66  Resp:  [16-24] 20  BP: (100-158)/(43-90) 135/57  SpO2:  [95 %-99 %] 95 %  Body mass index is 38 7 kg/m²  Input and Output Summary (last 24 hours): Intake/Output Summary (Last 24 hours) at 2020 1628  Last data filed at 2020 1041  Gross per 24 hour   Intake 2380 ml   Output 650 ml   Net 1730 ml       Physical Exam:     Physical Exam   Constitutional: He appears well-developed  No distress  HENT:   Head: Normocephalic and atraumatic  NG tube present   Eyes: Right eye exhibits no discharge  Left eye exhibits no discharge  Neck: No JVD present  Cardiovascular: Normal rate  No murmur heard  Pulmonary/Chest: Effort normal  No stridor  No respiratory distress  Abdominal: Soft  He exhibits no distension  There is no tenderness  Musculoskeletal: Normal range of motion  He exhibits no edema  Neurological: He is alert  No cranial nerve deficit  Coordination normal    Skin: Skin is warm         Additional Data:     Labs:    Results from last 7 days   Lab Units 20  0450   WBC Thousand/uL 5 53   HEMOGLOBIN g/dL 10 3*   HEMATOCRIT % 35 5*   PLATELETS Thousands/uL 208   NEUTROS PCT % 74   LYMPHS PCT % 14   MONOS PCT % 10   EOS PCT % 1     Results from last 7 days   Lab Units 20  0450  20  0504   POTASSIUM mmol/L 4 0   < > 4 6   CHLORIDE mmol/L 103   < > 101   CO2 mmol/L 27   < > 30   BUN mg/dL 12   < > 21   CREATININE mg/dL 1 45*   < > 1 45*   CALCIUM mg/dL 7 8*   < > 8 2*   ALK PHOS U/L  --   --  79   ALT U/L  --   --  15   AST U/L  --   --  11    < > = values in this interval not displayed  Results from last 7 days   Lab Units 06/17/20  0022   INR  1 41*       * I Have Reviewed All Lab Data Listed Above  * Additional Pertinent Lab Tests Reviewed: [address] Admission Reviewed    Imaging:    Imaging Reports Reviewed Today Include:   Imaging Personally Reviewed by Myself Includes:      Recent Cultures (last 7 days):           Last 24 Hours Medication List:     Current Facility-Administered Medications:  bisacodyl 10 mg Oral Daily PRN Robson Kyle MD    ciprofloxacin 400 mg Intravenous Q12H Anuja Sites, DO Last Rate: 400 mg (06/19/20 0856)   insulin lispro 1-5 Units Subcutaneous Q6H [address] Mary Dumont PA-C    metoprolol 2 5 mg Intravenous Q6H Robson Kyle MD    metroNIDAZOLE 500 mg Intravenous Q8H Anuja Sites, DO Last Rate: 500 mg (06/19/20 1041)   morphine injection 2 mg Intravenous Q4H PRN Mary Dumont PA-C    ondansetron 4 mg Intravenous Q8H PRN Yoav Child, CRNP    pantoprazole 40 mg Intravenous Q12H [address] Robson Kyle MD    phenol 1 spray Mouth/Throat Q2H PRN Anusha S Rachel, CRNP    sodium chloride 40 mL/hr Intravenous Continuous Mary Dumont PA-C Last Rate: 40 mL/hr (06/19/20 1503)        Today, Patient Was Seen By: Robson Kyle MD    ** Please Note: Dictation voice to text software may have been used in the creation of this document   **

## 2020-06-19 NOTE — ASSESSMENT & PLAN NOTE
Lab Results   Component Value Date    HGBA1C 8 2 (H) 06/18/2020       Recent Labs     06/18/20  1755 06/19/20  0018 06/19/20  0617 06/19/20  1149   POCGLU 162* 216* 239* 244*       Blood Sugar Average: Last 72 hrs:  (P) 202 [id number] type 2 diabetes mellitus on oral hypoglycemic agent  Hold metformin and glipizide in the hospital  Insulin sliding scale  Adjust as necessary while NPO-continue with the sliding scale

## 2020-06-19 NOTE — NURSING NOTE
NG tube pulled out partially when he was OOB to [facility]  NG tube was advanced, and placement confirmed with air bolus  NG reconnected to LIS and is draining yellowish brown liquid  No areas of redness or skin breakdown noted in right nares mucosa  NG tube tape changed, after skin prep applied

## 2020-06-19 NOTE — ASSESSMENT & PLAN NOTE
· Patient with known history of atrial fibrillation, status post HUMBERTO and cardioversion in January of this year in [facility] but unfortunately patient is in chronic atrial fibrillation per cardiology progress note, anticoagulated with Eliquis  · Slow ventricular response  · Heart rate is controlled  · Continue with metoprolol  · Eliquis on hold-will continue to hold the Eliquis since the patient need colectomy in near future

## 2020-06-19 NOTE — ASSESSMENT & PLAN NOTE
Wt Readings from Last 3 Encounters:   06/19/20 104 kg (229 lb)   Patient with known history of chronic congestive heart failure and was admitted to [facility] in January  Patient was on 40 mg of Lasix b i d  As an outpatient and secondary to dizziness and orthostasis his Lasix dose was decreased to 40 mg daily during the recent hospital stay  Appears to be euvolemic  IV Lasix x1 after the blood draw  Hold further Lasix while inpatient  Patient is NPO  Will place on gentle IVF  Monitor closely     Ejection fraction 65% during the last echo 1/20

## 2020-06-19 NOTE — ASSESSMENT & PLAN NOTE
Abdominal imaging shows right-sided colon inflammation versus neoplasm  Surgical service ordered Cipro Flagyl    Status post EGD colonoscopy today-circumferential cecal mass-elevated CEA concerning for malignancy  For colectomy  Keep NPO

## 2020-06-19 NOTE — PROGRESS NOTES
Colonoscopy findings were reviewed with Gastroenterology  An inflamed and ulcerated mass is found in the cecum  Per Gastroenterology, this is also likely obstructing the terminal ileum  The patient's anemia is likely due to this mass  If the patient is to be put back on his Eliquis for anticoagulation due to AFib, the mass will likely once again bleed  It is also felt the mass may be intermittently causing obstruction leading to the patient requiring NG tube  Per Gastroenterology, they do not believe the NG tube should be removed with this colon mass present  I was also concerned the duodenal diverticulum could be contributing to the patient's reflux of bile into the stomach however, the duodenum was examined into the distal portion during EGD and no stricture or obstruction was noted  This makes it more likely that the partial obstruction is due to the colon mass  The patient's CEA level is 17, indicating the presence of a colon malignancy  Biopsy obtained during today's colonoscopy is pending  CT scan of the chest has been obtained for complete metastatic workup which showed only a small 5 mm nodule  The patient has been evaluated by the primary service  Ejection fraction 65% during last echocardiogram in January 2020  The patient also had a stress test last year with no significant findings  Plans for colectomy were also discussed with anesthesia  In the presence of an ulcerated bleeding mass with partial obstruction, I recommend right hemicolectomy even though the pathology is pending  I discussed the findings and the reasons for recommending colon resection with the patient and his family  They expressed understanding  Will plan for open right hemicolectomy with anastomosis, possible ostomy  The risks and benefits of the procedure were held with the patient  He expressed understanding and signed consent  All questions were answered to the patient and his family's satisfaction

## 2020-06-19 NOTE — ASSESSMENT & PLAN NOTE
· Patient with known history of coronary artery disease status post CABG in 1997 in [facility]  · Patient is on beta-blocker aspirin and statin  · Hold aspirin for now given possible GI bleed  Holding statin as patient is NPO

## 2020-06-19 NOTE — ASSESSMENT & PLAN NOTE
On EGD 6/17/2020, there was concern for possible bowel obstruction vs severe gastroparesis, given large amount of food and dark bilious fluid in stomach which could not be cleared  Placed on NG tube  Surgical consult appreciated  On PPI IV b i d     Abdominal imaging shows duodenal diverticulum which may be contributing to bile reflux in stomach  Given NPO status, will hold Lasix and give gentle fluid    Continue with the NG tube  Planning for surgery

## 2020-06-19 NOTE — ASSESSMENT & PLAN NOTE
· Patient was evaluated by PCP secondary to dizziness and fatigue and has blood pressure done as an outpatient  His hemoglobin was found to be 6 2 and was sent to the emergency room  · Today hemoglobin in the emergency room was 7 1 and receiving 2 units of packed RBCs  · Iron panel  · Patient gives history of dark stools-suspicious for upper GI bleed patient is on aspirin and Eliquis  · Hold antiplatelet and anticoagulation for  Now  · PPI b i d   · GI performed EGD 06/17/2020  Finding suspicious for possible bowel obstruction/gastroparesis  See plan below  · Abdominal imaging shows R colon inflammation vs neoplasm which may contribute to anemia  · Status post colonoscopy 6/19/2020 -cecal mass  Circumferential   Biopsies performed, elevated CEA  ·  Discussed with Gastroenterology and surgery-planning for colectomy during this hospital stay    Patient is NPO  · Will obtain a CT scan of the chest to rule out any pulmonary Mets

## 2020-06-20 NOTE — ASSESSMENT & PLAN NOTE
On EGD 6/17/2020, there was concern for possible bowel obstruction vs severe gastroparesis, given large amount of food and dark bilious fluid in stomach which could not be cleared  Placed on NG tube  Surgical consult appreciated  On PPI IV b i d     Abdominal imaging shows duodenal diverticulum which may be contributing to bile reflux in stomach  Given NPO status, will hold Lasix and give gentle fluid    Continue with the NG tube  Planning for surgery today

## 2020-06-20 NOTE — ASSESSMENT & PLAN NOTE
Lab Results   Component Value Date    HGBA1C 8 2 (H) 06/18/2020       Recent Labs     06/19/20  1149 06/19/20  1810 06/20/20  0035 06/20/20  0604   POCGLU 244* 179* 170* 171*       Blood Sugar Average: Last 72 hrs:  (P) 543 [id number] type 2 diabetes mellitus on oral hypoglycemic agent  Hold metformin and glipizide in the hospital  Insulin sliding scale  Adjust as necessary while NPO-continue with the sliding scale

## 2020-06-20 NOTE — ASSESSMENT & PLAN NOTE
Wt Readings from Last 3 Encounters:   06/20/20 104 kg (228 lb 13 4 oz)   Patient with known history of chronic congestive heart failure and was admitted to [facility] in January  Patient was on 40 mg of Lasix b i d  As an outpatient and secondary to dizziness and orthostasis his Lasix dose was decreased to 40 mg daily during the recent hospital stay  Appears to be euvolemic on examination  Hold further Lasix while inpatient  Patient is NPO  Will place on gentle IVF  Monitor closely     Ejection fraction 65% during the last echo 1/20  Monitor volume status

## 2020-06-20 NOTE — OP NOTE
OPERATIVE REPORT  PATIENT NAME: Amy Richards    :  1935  MRN: [id number]  Pt Location: [facility] OR ROOM 02    SURGERY DATE: 2020    Surgeon(s) and Role:     Jacinta Duffy,  - Primary     * Karrie Grigsby PA-C - Assisting    Preop Diagnosis:  Obstructing and ulcerated cecal mass    Post-Op Diagnosis Codes:   Same    Procedure(s) (LRB):  exploratory laparatomy with right hemicolectomy (N/A)    Specimen(s):  ID Type Source Tests Collected by Time Destination   1 : RIGHT COLON Tissue Large Intestine, Right/Ascending Colon TISSUE EXAM Patience Mariano DO 2020 12:18 PM        Estimated Blood Loss:   100 mL    Drains:  NG/OG/Enteral Tube Nasogastric 18 Fr Right nares (Active)   Placement Reverification Other (Comment) 2020 12:52 PM   Site Assessment Clean;Dry; Intact 2020  9:30 AM   Status Suction-low continuous 2020 12:52 PM   Drainage Appearance Bile 2020 12:52 PM   Intake (mL) 200 mL 2020  4:30 AM   Output (mL) 150 mL 2020  9:30 AM   Number of days: 3       Urethral Catheter Non-latex 16 Fr  (Active)   Number of days: 0       Anesthesia Type:   General    Operative Indications: The patient was admitted due to anemia  On colonoscopy was found to have a large mass in the cecum which was partially obstructing the terminal ileum and was ulcerated likely causing his anemia  Due to this urgent resection was recommended  A discussion of the procedure was held with the patient  He understood the risks and benefits and signed consent    Operative Findings:  Large mass in the cecum with edema of the terminal ileum, 2 5 L of ascites, no obvious metastatic disease to the liver, no signs of carcinomatosis, positive for enlarged lymph nodes within the mesentery    Complications:   None    Procedure and Technique:  The patient was brought to the operating  A time-out was held the patient identified and procedure verified    Appropriate antibiotic and DVT prophylaxis was used  General anesthesia was administered  The patient did already have an NG tube in place  A Bey catheter was placed  The area of the abdomen is prepped and draped in usual sterile fashion using chlorhexidine solution  An IO band was used  A midline incision was made using a 10 blade scalpel  Dissection was carried down through subcutaneous tissue using electrocautery  The fascia was encountered in the midline and incised  The peritoneum was then grasped and elevated  The peritoneum was then entered sharply  Immediately upon entering the abdominal cavity a large amount of ascites was obtained  The fascia was opened for the full length of the incision  Approximately 2 5 L of ascites was suctioned from the abdominal cavity  The abdomen was inspected with the findings as noted above  An [place] wound protector was placed  The firm mass of the cecum was freed from the lateral abdominal wall along the white line of [place] using electrocautery  This dissection was made difficult due to the firm mass as well as inflammatory reaction surrounding the colon  The right colon was then able to be retracted medially  The transverse colon was identified  The omentum was  from the proximal transverse colon using electrocautery  This dissection was carried out until the hepatic flexure was taken down  The colon was found to be densely adherent to the retroperitoneum in this area  Care was taken to avoid the duodenum  An area of healthy appearing terminal ileum was identified  This was approximately 75 cm from the ileocecal valve  The terminal ileum was divided in this area using a 75 mm blue load of the JOSE CARLOS stapler  The mesentery was then divided distally using the EnSeal   The ileocolic pedicle was found to be very edematous and with enlarged lymph nodes    The transverse colon mesentery was taken down using the EnSeal   A 75 mm blue load of JOSE CARLOS stapler was used to divide the transverse colon distal to the right colic artery  The ileocolic pedicle was thinned using electrocautery  A large Danielle clamp was then placed around the pedicle  Pedicle was divided using the EnSeal   A suture ligature of 2 0 Vicryl was then placed on the ileocolic pedicle  The pedicle appeared to be hemostatic  The remaining ileum and transverse colon were approximated using a suture of 3 0 Vicryl  Towels were placed around the bowel bowel clamps were also used  Colotomy and an enterotomy were made  A 75 mm blue load of the JOSE CARLOS stapler was used to create a side-to-side anastomosis  The common enterotomy was then approximated using Allis clamps  A 60 mm TA stapler was used to close the common enterotomy  This staple line was reinforced with figure-of-eight sutures of 3 0 silk  A suture of 3 0 silk was also placed at the distal end of the anastomosis  The anastomosis appeared to be patent and healthy  It all instruments with were used in the anastomosis were put to the side  The entire operative team changed gown and gloves  The abdomen was irrigated and suctioned until clean  Fascia was closed using running sutures of looped PDS  Subcutaneous tissue was irrigated  Hemostasis was achieved using electrocautery  Subcutaneous tissue was closed using 3 0 Vicryl and skin was closed using staples  The incision was covered with a Mepilex dressing  A tap block was administered by anesthesia  The the patient tolerated the procedure well was transferred to recovery in stable condition    At the end the procedure all needle instrument sponge counts were correct x2   A physician assistant was required during the procedure for retraction tissue handling,dissection and suturing    Patient Disposition:  PACU     SIGNATURE: Josiah Ferrer DO  DATE: June 20, 2020  TIME: 1:14 PM

## 2020-06-20 NOTE — ASSESSMENT & PLAN NOTE
· Patient with known history of coronary artery disease status post CABG in 1997 in [facility]  · Patient is on beta-blocker aspirin and statin  · Hold aspirin for now given possible GI bleed  Holding statin as patient is NPO

## 2020-06-20 NOTE — NURSING NOTE
Patient arrived in ICU for recovery at [phone], Patient had complaints of pain, pain meds given via nurse anethesia  Vitals signs obtained, patient made comfortable with repositioning  Recovery Ended 1425, patient comfortable, report given to leonard golden, patient transported back to [facility] with no complaints

## 2020-06-20 NOTE — ASSESSMENT & PLAN NOTE
· Patient with known history of atrial fibrillation, status post HUMBERTO and cardioversion in January of this year in [facility] but unfortunately patient is in chronic atrial fibrillation per cardiology progress note, anticoagulated with Eliquis  · Slow ventricular response  · Heart rate is controlled  · Continue with metoprolol  · Eliquis on hold-will continue to hold the Eliquis, may need heparin or Lovenox when cleared by surgery

## 2020-06-20 NOTE — ASSESSMENT & PLAN NOTE
Abdominal imaging shows right-sided colon inflammation versus neoplasm  Surgical service ordered Cipro Flagyl    Status post EGD colonoscopy today-circumferential cecal mass-elevated CEA concerning for malignancy  For colectomy today

## 2020-06-20 NOTE — NURSING NOTE
Report called to OR advised upon administration of antibiotics iv inRFA leaking removed, blood infusing/initiated in LAC, consent for surgery signed , anesthesiology    consent needs to be signed by patient, vs stable 98 4,20,130/58/82, denies reaction to blood transfusion, NG clamped patient voided and states moved bowels

## 2020-06-20 NOTE — ASSESSMENT & PLAN NOTE
· Patient was evaluated by PCP secondary to dizziness and fatigue and has blood pressure done as an outpatient  His hemoglobin was found to be 6 2 and was sent to the emergency room  · On the day of admission hemoglobin in the emergency room was 7 1 and receiving 2 units of packed RBCs  · Iron panel-  · Patient gives history of dark stools-suspicious for upper GI bleed patient is on aspirin and Eliquis  · Hold antiplatelet and anticoagulation   · PPI b i d   · GI performed EGD 06/17/2020  Finding suspicious for possible bowel obstruction/gastroparesis  · Abdominal imaging shows R colon inflammation vs neoplasm which may contribute to anemia  · Status post colonoscopy 6/19/2020 -cecal mass  Circumferential   Biopsies performed, elevated CEA  ·  Discussed with Gastroenterology and surgery-planning for colectomy during this hospital stay  Patient is NPO  · Will obtain a CT scan of the chest to rule out any pulmonary Mets-came back unremarkable  · Patient is scheduled for OR today  · Patient with known history of coronary artery disease status post CABG in 1997, had a stress test 3/19-fixed defect on the stress test which has been stable  Recent echocardiogram beginning of this year-ejection fraction 65%  Patient appears to be well compensated  from cardiac standpoint    Given his age and comorbidities patient is definitely a high risk for any surgical procedure but given the circumstances patient is at acceptable risk for the procedure

## 2020-06-20 NOTE — PROGRESS NOTES
Progress Note - General Surgery   Radha Rashid 80 y o  male MRN: [id number]  Unit/Bed#: -01 Encounter: [id number]    Assessment:  22-year-old male with an ulcerated obstructing cecal mass on colonoscopy    Plan:  Plan for open right hemicolectomy today  Hemoglobin is decreased to 8 9, will order 2 units PRBC  Continue NG tube in place  IV fluids  Continue antibiotics, will likely stop 24 hours postoperatively  Consent has been obtained and the patient's questions have been answered    Subjective/Objective       Subjective:  Patient feels a bit more bloated today  Denies nausea or pain    Objective:     Blood pressure 131/56, pulse 75, temperature 98 5 °F (36 9 °C), resp  rate 19, height 5' 4 5" (1 638 m), weight 104 kg (228 lb 13 4 oz), SpO2 93 %  ,Body mass index is 38 67 kg/m²        Intake/Output Summary (Last 24 hours) at 6/20/2020 0856  Last data filed at 6/19/2020 1832  Gross per 24 hour   Intake 650 ml   Output 350 ml   Net 300 ml       Invasive Devices     Peripheral Intravenous Line            Peripheral IV 06/16/20 Left Antecubital 3 days    Peripheral IV 06/19/20 Right Forearm less than 1 day          Drain            NG/OG/Enteral Tube Nasogastric 18 Fr Right nares 2 days                Physical Exam: General appearance: alert and oriented, in no acute distress  Head: Normocephalic, without obvious abnormality, atraumatic  Abdomen: Soft, slightly distended, nontender  Extremities: extremities normal, warm and well-perfused; no cyanosis, clubbing, or edema  Skin: Skin color, texture, turgor normal  No rashes or lesions  Neurologic: Grossly normal    Lab, Imaging and other studies:  CBC:   Lab Results   Component Value Date    WBC 4 09 (L) 06/20/2020    HGB 8 9 (L) 06/20/2020    HCT 30 3 (L) 06/20/2020    MCV 76 (L) 06/20/2020     06/20/2020    MCH 22 2 (L) 06/20/2020    MCHC 29 4 (L) 06/20/2020    RDW 19 0 (H) 06/20/2020    MPV 8 5 (L) 06/20/2020    NRBC 0 06/20/2020   , CMP:   Lab Results   Component Value Date    SODIUM 139 06/20/2020    K 4 1 06/20/2020     06/20/2020    CO2 25 06/20/2020    BUN 10 06/20/2020    CREATININE 1 33 (H) 06/20/2020    CALCIUM 7 7 (L) 06/20/2020    EGFR 48 06/20/2020   , Coagulation:   Lab Results   Component Value Date    INR 1 15 06/20/2020     VTE Pharmacologic Prophylaxis: Sequential compression device (Venodyne)   VTE Mechanical Prophylaxis: sequential compression device

## 2020-06-20 NOTE — NURSING NOTE
incentive spirometer provided to patient sleeping , will assist with spirometer when more aware (post op day l)

## 2020-06-20 NOTE — PROGRESS NOTES
Progress Note - Tamara Arambula 1935, 80 y o  male MRN: [id number]    Unit/Bed#: [facility] Encounter: [id number]    Primary Care Provider: Margie Espinoza DO   Date and time admitted to hospital: 6/16/2020 11:14 AM        * Symptomatic anemia  Assessment & Plan  · Patient was evaluated by PCP secondary to dizziness and fatigue and has blood pressure done as an outpatient  His hemoglobin was found to be 6 2 and was sent to the emergency room  · On the day of admission hemoglobin in the emergency room was 7 1 and receiving 2 units of packed RBCs  · Iron panel-  · Patient gives history of dark stools-suspicious for upper GI bleed patient is on aspirin and Eliquis  · Hold antiplatelet and anticoagulation   · PPI b i d   · GI performed EGD 06/17/2020  Finding suspicious for possible bowel obstruction/gastroparesis  · Abdominal imaging shows R colon inflammation vs neoplasm which may contribute to anemia  · Status post colonoscopy 6/19/2020 -cecal mass  Circumferential   Biopsies performed, elevated CEA  ·  Discussed with Gastroenterology and surgery-planning for colectomy during this hospital stay  Patient is NPO  · Will obtain a CT scan of the chest to rule out any pulmonary Mets-came back unremarkable  · Patient is scheduled for OR today  · Patient with known history of coronary artery disease status post CABG in 1997, had a stress test 3/19-fixed defect on the stress test which has been stable  Recent echocardiogram beginning of this year-ejection fraction 65%  Patient appears to be well compensated  from cardiac standpoint  Given his age and comorbidities patient is definitely a high risk for any surgical procedure but given the circumstances patient is at acceptable risk for the procedure      Colon  neoplasm  Assessment & Plan  Abdominal imaging shows right-sided colon inflammation versus neoplasm  Surgical service ordered Cipro Flagyl    Status post EGD colonoscopy today-circumferential cecal mass-elevated CEA concerning for malignancy  For colectomy today      Duodenal diverticulum  Assessment & Plan  Abdominal imaging shows inflamed duodenal diverticulum which might contribute to bile reflux and stomach  Surgery started on Cipro and Flagyl considering duodenal diverticulitis as well as possible right-sided colitis    Partial bowel obstruction ([facility] [address] )  Assessment & Plan  On EGD 6/17/2020, there was concern for possible bowel obstruction vs severe gastroparesis, given large amount of food and dark bilious fluid in stomach which could not be cleared  Placed on NG tube  Surgical consult appreciated  On PPI IV b i d     Abdominal imaging shows duodenal diverticulum which may be contributing to bile reflux in stomach  Given NPO status, will hold Lasix and give gentle fluid  Continue with the NG tube  Planning for surgery today    Atrial fibrillation [facility])  Assessment & Plan  · Patient with known history of atrial fibrillation, status post HUMBERTO and cardioversion in January of this year in [facility] but unfortunately patient is in chronic atrial fibrillation per cardiology progress note, anticoagulated with Eliquis  · Slow ventricular response  · Heart rate is controlled  · Continue with metoprolol  · Eliquis on hold-will continue to hold the Eliquis, may need heparin or Lovenox when cleared by surgery    Morbid obesity ([facility] )  Assessment & Plan  · TLC as an outpatient    Hyperlipidemia  Assessment & Plan  · Holding statin for now as patient is NPO      Essential hypertension  Assessment & Plan  · Metoprolol converted to IV as patient is NPO      Diabetes mellitus type 2 in obese [facility])  Assessment & Plan  Lab Results   Component Value Date    HGBA1C 8 2 (H) 06/18/2020       Recent Labs     06/19/20  1149 06/19/20  1810 06/20/20  0035 06/20/20  0604   POCGLU 244* 179* 170* 171*       Blood Sugar Average: Last 72 hrs:  (P) 814 [id number] type 2 diabetes mellitus on oral hypoglycemic agent  Hold metformin and glipizide in the hospital  Insulin sliding scale  Adjust as necessary while NPO-continue with the sliding scale    Coronary artery disease  Assessment & Plan  · Patient with known history of coronary artery disease status post CABG in 1997 in [facility]  · Patient is on beta-blocker aspirin and statin  · Hold aspirin for now given possible GI bleed  Holding statin as patient is NPO  Chronic kidney disease, stage 3 ([facility])  Assessment & Plan  · Baseline creatinine appears to be between 1 4 and 1 6  · Most recent better than baseline  · Monitor creatinine    CHF (congestive heart failure) ([facility])  Assessment & Plan  Wt Readings from Last 3 Encounters:   06/20/20 104 kg (228 lb 13 4 oz)   Patient with known history of chronic congestive heart failure and was admitted to [facility] in January  Patient was on 40 mg of Lasix b i d  As an outpatient and secondary to dizziness and orthostasis his Lasix dose was decreased to 40 mg daily during the recent hospital stay  Appears to be euvolemic on examination  Hold further Lasix while inpatient  Patient is NPO  Will place on gentle IVF  Monitor closely  Ejection fraction 65% during the last echo 1/20  Monitor volume status            VTE Pharmacologic Prophylaxis:   Pharmacologic: Currently on hold secondary to GI bleed and surgical intervention  Start on therapeutic anticoagulation when cleared by surgery  Mechanical VTE Prophylaxis in Place: Yes    Patient Centered Rounds: I have performed bedside rounds with nursing staff today  Discussions with Specialists or Other Care Team Provider:     Education and Discussions with Family / Patient:  Patient and son in the room    Time Spent for Care: 30 minutes  More than 50% of total time spent on counseling and coordination of care as described above      Current Length of Stay: 4 day(s)    Current Patient Status: Inpatient   Certification Statement: The patient will continue to require additional inpatient hospital stay due to For OR today    Discharge Plan:    Code Status: Level 1 - Full Code      Subjective:   Patient seen and examined  No specific complaints offered  NG tube in  Patient reported that he is ready for the surgery son updated in the room    Objective:     Vitals:   Temp (24hrs), Av 6 °F (37 °C), Min:98 2 °F (36 8 °C), Max:99 1 °F (37 3 °C)    Temp:  [98 2 °F (36 8 °C)-99 1 °F (37 3 °C)] 99 1 °F (37 3 °C)  HR:  [66-93] 89  Resp:  [18-24] 20  BP: (105-142)/(45-65) 136/64  SpO2:  [92 %-100 %] 98 %  Body mass index is 38 67 kg/m²  Input and Output Summary (last 24 hours): Intake/Output Summary (Last 24 hours) at 2020 1407  Last data filed at 2020 1346  Gross per 24 hour   Intake 1800 ml   Output 3420 ml   Net -1620 ml       Physical Exam:     Physical Exam   Constitutional: He appears well-developed  No distress  HENT:   Head: Normocephalic and atraumatic  Eyes: Right eye exhibits no discharge  Left eye exhibits no discharge  Neck: No JVD present  Cardiovascular: Normal rate and regular rhythm  Exam reveals no friction rub  No murmur heard  Pulmonary/Chest: Effort normal  No stridor  No respiratory distress  Abdominal: Soft  He exhibits distension  Musculoskeletal: Normal range of motion  Neurological: He is alert  Skin: Skin is warm         Additional Data:     Labs:    Results from last 7 days   Lab Units 20  0613   WBC Thousand/uL 4 09*   HEMOGLOBIN g/dL 8 9*   HEMATOCRIT % 30 3*   PLATELETS Thousands/uL 198   NEUTROS PCT % 71   LYMPHS PCT % 15   MONOS PCT % 11   EOS PCT % 2     Results from last 7 days   Lab Units 20  0613  20  0504   POTASSIUM mmol/L 4 1   < > 4 6   CHLORIDE mmol/L 105   < > 101   CO2 mmol/L 25   < > 30   BUN mg/dL 10   < > 21   CREATININE mg/dL 1 33*   < > 1 45*   CALCIUM mg/dL 7 7*   < > 8 2*   ALK PHOS U/L  --   --  79   ALT U/L  --   --  15   AST U/L  --   --  11    < > = values in this interval not displayed  Results from last 7 days   Lab Units 06/20/20  0613   INR  1 15       * I Have Reviewed All Lab Data Listed Above  * Additional Pertinent Lab Tests Reviewed: Paragingmartha 66 Admission Reviewed    Imaging:    Imaging Reports Reviewed Today Include:   Imaging Personally Reviewed by Myself Includes:      Recent Cultures (last 7 days):           Last 24 Hours Medication List:     Current Facility-Administered Medications:  ciprofloxacin 400 mg Intravenous Q12H Sheralyn Ends, DO Last Rate: Stopped (06/20/20 1045)   fentaNYL 25 mcg Intravenous Q5 Min PRN Wilmon Igor, CRNA    HYDROmorphone 0 2 mg Intravenous Q10 Min PRN Wilmon Igor, CRNA    insulin lispro 1-5 Units Subcutaneous Q6H [address] Elsy Springer, DO    metoprolol 2 5 mg Intravenous Q6H Sheralyn Ends, DO    metroNIDAZOLE 500 mg Intravenous Q8H Sheralyn Ends, DO Last Rate: 500 mg (06/20/20 0245)   morphine injection 4 mg Intravenous Q3H PRN Sheralyn Ends, DO    ondansetron 4 mg Intravenous Q8H PRN Sheralyn Ends, DO    ondansetron 4 mg Intravenous Once PRN Wilmon Igor, CRNA    pantoprazole 40 mg Intravenous Q12H [address] Gissell Gian, DO    phenol 1 spray Mouth/Throat Q2H PRN Sheralyn Ends, DO    sodium chloride 40 mL/hr Intravenous Continuous Sheralyn Ends, DO Last Rate: 40 mL/hr (06/20/20 1402)        Today, Patient Was Seen By: Jayme Lux MD    ** Please Note: Dictation voice to text software may have been used in the creation of this document   **

## 2020-06-21 NOTE — PROGRESS NOTES
Progress Note - Mariah Ortiz 1935, 80 y o  male MRN: [id number]    Unit/Bed#: -[facility] Encounter: [id number]    Primary Care Provider: Jessika Roberts DO   Date and time admitted to hospital: 6/16/2020 11:14 AM        * Symptomatic anemia  Assessment & Plan  · Patient was evaluated by PCP secondary to dizziness and fatigue and has blood pressure done as an outpatient  His hemoglobin was found to be 6 2 and was sent to the emergency room  · On the day of admission hemoglobin in the emergency room was 7 1 and receiving 2 units of packed RBCs  · Iron panel-  · Patient gives history of dark stools-suspicious for upper GI bleed patient is on aspirin and Eliquis  · Hold antiplatelet and anticoagulation   · PPI b i d   · GI performed EGD 06/17/2020  Finding suspicious for possible bowel obstruction/gastroparesis  · Abdominal imaging shows R colon inflammation vs neoplasm which may contribute to anemia  · Status post colonoscopy 6/19/2020 -cecal mass  Circumferential   Biopsies performed, elevated CEA  ·  Discussed with Gastroenterology and surgery-planning for colectomy during this hospital stay  Patient is NPO  · Will obtain a CT scan of the chest to rule out any pulmonary Mets-came back unremarkable  · Patient is scheduled for OR today  · Patient with known history of coronary artery disease status post CABG in 1997, had a stress test 3/19-fixed defect on the stress test which has been stable  Recent echocardiogram beginning of this year-ejection fraction 65%  Patient appears to be well compensated  from cardiac standpoint  Given his age and comorbidities patient is definitely a high risk for any surgical procedure but given the circumstances patient is at acceptable risk for the procedure  · Patient received blood transfusion preoperatively yesterday  Hemoglobin today is 11 1  · Monitor hemoglobin and transfuse p r n        Colon  neoplasm  Assessment & Plan  Abdominal imaging shows right-sided colon inflammation versus neoplasm  Surgical service ordered Cipro Flagyl  Status post EGD colonoscopy today-circumferential cecal mass-elevated CEA concerning for malignancy  Status post Exploratory laparotomy with right hemicolectomy  Postoperative day 1    Duodenal diverticulum  Assessment & Plan  Abdominal imaging shows inflamed duodenal diverticulum which might contribute to bile reflux and stomach  Surgery started on Cipro and Flagyl considering duodenal diverticulitis as well as possible right-sided colitis    Partial bowel obstruction ([facility] [address] )  Assessment & Plan  On EGD 6/17/2020, there was concern for possible bowel obstruction vs severe gastroparesis, given large amount of food and dark bilious fluid in stomach which could not be cleared  Placed on NG tube  Surgical consult appreciated  On PPI IV b i d     Abdominal imaging shows duodenal diverticulum which may be contributing to bile reflux in stomach  Given NPO status, will hold Lasix and give gentle fluid  Continue with the NG tube  Status post OR yesterday  -Exploratory laparotomy with right hemicolectomy, postoperative day 1  Surgery on board  NG tube in    Atrial fibrillation [facility])  Assessment & Plan  · Patient with known history of atrial fibrillation, status post HUMBERTO and cardioversion in January of this year in [facility] but unfortunately patient is in chronic atrial fibrillation per cardiology progress note, anticoagulated with Eliquis  · Slow ventricular response  · Heart rate is controlled  · Continue with metoprolol  · Eliquis on hold-will continue to hold the Eliquis, may need heparin or Lovenox when cleared by surgery    Morbid obesity ([facility] )  Assessment & Plan  · TLC as an outpatient    Hyperlipidemia  Assessment & Plan  · Holding statin for now as patient is NPO      Essential hypertension  Assessment & Plan  · Metoprolol converted to IV as patient is NPO      Diabetes mellitus type 2 in obese [facility])  Assessment & Plan  Lab Results Component Value Date    HGBA1C 8 2 (H) 06/18/2020       Recent Labs     06/20/20  1751 06/21/20  0000 06/21/20  0456 06/21/20  1151   POCGLU 269* 215* 194* 256*       Blood Sugar Average: Last 72 hrs:  (P) 203 4 type 2 diabetes mellitus on oral hypoglycemic agent  Hold metformin and glipizide in the hospital  Insulin sliding scale, will add Lantus at bedtime  Adjust as necessary while NPO-continue with the sliding scale    Coronary artery disease  Assessment & Plan  · Patient with known history of coronary artery disease status post CABG in 1997 in [facility]  · Patient is on beta-blocker aspirin and statin  · Hold aspirin for now given possible GI bleed  Holding statin as patient is NPO  Chronic kidney disease, stage 3 ([facility])  Assessment & Plan  · Baseline creatinine appears to be between 1 4 and 1 6  · Most recent better than baseline  · Creatinine today is 1 5  · Also noted to have decreased urine output     IV albumin and Lasix per surgery  Will give IV fluid bolus and increase the infusion rate  · Monitor urine output  · Monitor creatinine    CHF (congestive heart failure) ([facility])  Assessment & Plan  Wt Readings from Last 3 Encounters:   06/21/20 103 kg (227 lb 15 3 oz)   Patient with known history of chronic congestive heart failure and was admitted to [facility] in January  Patient was on 40 mg of Lasix b i d  As an outpatient and secondary to dizziness and orthostasis his Lasix dose was decreased to 40 mg daily during the recent hospital stay  Appears to be euvolemic on examination  Hold further Lasix while inpatient  Patient is NPO, continue with IV fluids  Ejection fraction 65% during the last echo 1/20  Monitor volume status          VTE Pharmacologic Prophylaxis:   Pharmacologic: Heparin  Mechanical VTE Prophylaxis in Place: Yes    Patient Centered Rounds: I have performed bedside rounds with nursing staff today      Discussions with Specialists or Other Care Team Provider:     Education and Discussions with Family / Patient:  Patient    Time Spent for Care: 30 minutes  More than 50% of total time spent on counseling and coordination of care as described above  Current Length of Stay: 5 day(s)    Current Patient Status: Inpatient   Certification Statement: The patient will continue to require additional inpatient hospital stay due to Postoperative day 1 ex lap with right hemicolectomy    Discharge Plan:     Code Status: Level 1 - Full Code      Subjective:   Patient seen and examined  No specific complaints offered  Sitting up in chair  Surgery progress note appreciated  Pain control    Objective:     Vitals:   Temp (24hrs), Av 4 °F (36 9 °C), Min:98 °F (36 7 °C), Max:98 9 °F (37 2 °C)    Temp:  [98 °F (36 7 °C)-98 9 °F (37 2 °C)] 98 9 °F (37 2 °C)  HR:  [75-86] 85  Resp:  [18-20] 20  BP: (114-128)/(57-61) 122/59  SpO2:  [92 %-96 %] 94 %  Body mass index is 38 52 kg/m²  Input and Output Summary (last 24 hours): Intake/Output Summary (Last 24 hours) at 2020 1439  Last data filed at 2020 1406  Gross per 24 hour   Intake 1220 ml   Output 575 ml   Net 645 ml       Physical Exam:     Physical Exam   Constitutional: He appears well-developed  No distress  HENT:   Head: Normocephalic and atraumatic  NG tube present   Eyes: Right eye exhibits no discharge  Left eye exhibits no discharge  Neck: No JVD present  Cardiovascular:   Irregular   Pulmonary/Chest: Effort normal    Decreased breath sounds bilateral   Abdominal: Soft  He exhibits no distension  There is tenderness (Appropriate tenderness)  There is no rebound  No hernia  Surgical site covered in dressing   Musculoskeletal: Normal range of motion  He exhibits no edema  Neurological: He is alert  No cranial nerve deficit  Skin: Skin is warm         Additional Data:     Labs:    Results from last 7 days   Lab Units 20  0457 20  0613   WBC Thousand/uL 5 48 4 09*   HEMOGLOBIN g/dL 11 1* 8 9* HEMATOCRIT % 38 1 30 3*   PLATELETS Thousands/uL 206 198   NEUTROS PCT %  --  71   LYMPHS PCT %  --  15   MONOS PCT %  --  11   EOS PCT %  --  2     Results from last 7 days   Lab Units 06/21/20  0457   POTASSIUM mmol/L 4 3   CHLORIDE mmol/L 108   CO2 mmol/L 25   BUN mg/dL 13   CREATININE mg/dL 1 51*   CALCIUM mg/dL 7 5*   ALK PHOS U/L 51   ALT U/L 9*   AST U/L 10     Results from last 7 days   Lab Units 06/20/20  0613   INR  1 15       * I Have Reviewed All Lab Data Listed Above  * Additional Pertinent Lab Tests Reviewed: Petey 66 Admission Reviewed    Imaging:    Imaging Reports Reviewed Today Include:   Imaging Personally Reviewed by Myself Includes:      Recent Cultures (last 7 days):           Last 24 Hours Medication List:     Current Facility-Administered Medications:  fentaNYL 25 mcg Intravenous Q5 Min PRN James Sotelo CRNA    heparin (porcine) 5,000 Units Subcutaneous [facility] Vani Tere, DO    HYDROmorphone 0 2 mg Intravenous Q10 Min PRN James Sotelo CRNA    insulin lispro 1-5 Units Subcutaneous Q6H [facility] Vani Tere, DO    metoprolol 2 5 mg Intravenous Q6H Vani Tere, DO    morphine injection 4 mg Intravenous Q3H PRN Vani Carranza, DO    ondansetron 4 mg Intravenous Q8H PRN Vani Carranza, DO    pantoprazole 40 mg Intravenous Q12H [facility] Gissell Springer, DO    phenol 1 spray Mouth/Throat Q2H PRN Vani Carranza, DO    sodium chloride 100 mL/hr Intravenous Continuous Emigdio Sahni MD Last Rate: 100 mL/hr (06/21/20 1407)        Today, Patient Was Seen By: Emigdio Sahni MD    ** Please Note: Dictation voice to text software may have been used in the creation of this document   **

## 2020-06-21 NOTE — ASSESSMENT & PLAN NOTE
Wt Readings from Last 3 Encounters:   06/21/20 103 kg (227 lb 15 3 oz)   Patient with known history of chronic congestive heart failure and was admitted to [facility] in January  Patient was on 40 mg of Lasix b i d  As an outpatient and secondary to dizziness and orthostasis his Lasix dose was decreased to 40 mg daily during the recent hospital stay  Appears to be euvolemic on examination  Hold further Lasix while inpatient   Patient is NPO, continue with IV fluids  Ejection fraction 65% during the last echo 1/20  Monitor volume status

## 2020-06-21 NOTE — ASSESSMENT & PLAN NOTE
· Patient with known history of atrial fibrillation, status post HUMBERTO and cardioversion in January of this year in [facility] but unfortunately patient is in chronic atrial fibrillation per cardiology progress note, anticoagulated with Eliquis  · Slow ventricular response  · Heart rate is controlled  · Continue with metoprolol  · Eliquis on hold-will continue to hold the Eliquis, may need heparin or Lovenox when cleared by surgery

## 2020-06-21 NOTE — ASSESSMENT & PLAN NOTE
· Baseline creatinine appears to be between 1 4 and 1 6  · Most recent better than baseline  · Creatinine today is 1 5  · Also noted to have decreased urine output     IV albumin and Lasix per surgery    Will give IV fluid bolus and increase the infusion rate  · Monitor urine output  · Monitor creatinine

## 2020-06-21 NOTE — NURSING NOTE
Notified dr Ines Goldsmith 50 ml output from bo (received bolus this afternoon of nss for low urine output)

## 2020-06-21 NOTE — ASSESSMENT & PLAN NOTE
On EGD 6/17/2020, there was concern for possible bowel obstruction vs severe gastroparesis, given large amount of food and dark bilious fluid in stomach which could not be cleared  Placed on NG tube  Surgical consult appreciated  On PPI IV b i d     Abdominal imaging shows duodenal diverticulum which may be contributing to bile reflux in stomach  Given NPO status, will hold Lasix and give gentle fluid  Continue with the NG tube  Status post OR yesterday  -Exploratory laparotomy with right hemicolectomy, postoperative day 1  Surgery on board  NG tube in

## 2020-06-21 NOTE — ASSESSMENT & PLAN NOTE
Lab Results   Component Value Date    HGBA1C 8 2 (H) 06/18/2020       Recent Labs     06/20/20  1751 06/21/20  0000 06/21/20  0456 06/21/20  1151   POCGLU 269* 215* 194* 256*       Blood Sugar Average: Last 72 hrs:  (P) 203 4 type 2 diabetes mellitus on oral hypoglycemic agent  Hold metformin and glipizide in the hospital  Insulin sliding scale, will add Lantus at bedtime  Adjust as necessary while NPO-continue with the sliding scale

## 2020-06-21 NOTE — PROGRESS NOTES
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    Plan:  Continue NPO, IV fluids  Urine output is low and creatinine is slightly elevated, continue Bey catheter for monitoring  The patient is becoming edematous, will give albumin followed by Lasix  Repeat a m  Labs  Out of bed as tolerated, patient is in the chair already this morning  Discontinue IV antibiotics as no evidence of intra-abdominal infection was noted  Pain control as needed  Encourage incentive spirometry  May resume Eliquis once NG tube removed  Begin DVT prophylaxis since hemoglobin is improved  Plan was discussed with the patient and family  Subjective/Objective       Subjective:  Patient states he is sore since getting out of bed this morning  Denies nausea  States he did pass some flatus since surgery    Objective:     Blood pressure 120/58, pulse 82, temperature 98 °F (36 7 °C), resp  rate 19, height 5' 4 5" (1 638 m), weight 103 kg (227 lb 15 3 oz), SpO2 93 %  ,Body mass index is 38 52 kg/m²        Intake/Output Summary (Last 24 hours) at 6/21/2020 0145  Last data filed at 6/21/2020 1826  Gross per 24 hour   Intake 1250 ml   Output 3535 ml   Net -2285 ml       Invasive Devices     Peripheral Intravenous Line            Peripheral IV 06/16/20 Left Antecubital 4 days    Peripheral IV 06/20/20 Right;Ventral (anterior) Wrist less than 1 day          Drain            NG/OG/Enteral Tube Nasogastric 18 Fr Right nares 3 days    Urethral Catheter Non-latex 16 Fr  less than 1 day                Physical Exam: General appearance: alert and oriented, in no acute distress  Head: Normocephalic, without obvious abnormality, atraumatic  Abdomen: Soft, slightly distended, appropriately tender, dressing is clean and intact  Extremities: extremities normal, warm and well-perfused; no cyanosis, clubbing, or edema   Skin: Skin color, texture, turgor normal  No rashes or lesions  Neurologic: Grossly normal    Lab, Imaging and other studies:  CBC:   Lab Results   Component Value Date    WBC 5 48 06/21/2020    HGB 11 1 (L) 06/21/2020    HCT 38 1 06/21/2020    MCV 77 (L) 06/21/2020     06/21/2020    MCH 22 5 (L) 06/21/2020    MCHC 29 1 (L) 06/21/2020    RDW 19 1 (H) 06/21/2020    MPV 8 9 06/21/2020   , CMP:   Lab Results   Component Value Date    SODIUM 140 06/21/2020    K 4 3 06/21/2020     06/21/2020    CO2 25 06/21/2020    BUN 13 06/21/2020    CREATININE 1 51 (H) 06/21/2020    CALCIUM 7 5 (L) 06/21/2020    AST 10 06/21/2020    ALT 9 (L) 06/21/2020    ALKPHOS 51 06/21/2020    EGFR 42 06/21/2020     VTE Pharmacologic Prophylaxis: Sequential compression device (Venodyne)   VTE Mechanical Prophylaxis: sequential compression device

## 2020-06-21 NOTE — ASSESSMENT & PLAN NOTE
· Patient was evaluated by PCP secondary to dizziness and fatigue and has blood pressure done as an outpatient  His hemoglobin was found to be 6 2 and was sent to the emergency room  · On the day of admission hemoglobin in the emergency room was 7 1 and receiving 2 units of packed RBCs  · Iron panel-  · Patient gives history of dark stools-suspicious for upper GI bleed patient is on aspirin and Eliquis  · Hold antiplatelet and anticoagulation   · PPI b i d   · GI performed EGD 06/17/2020  Finding suspicious for possible bowel obstruction/gastroparesis  · Abdominal imaging shows R colon inflammation vs neoplasm which may contribute to anemia  · Status post colonoscopy 6/19/2020 -cecal mass  Circumferential   Biopsies performed, elevated CEA  ·  Discussed with Gastroenterology and surgery-planning for colectomy during this hospital stay  Patient is NPO  · Will obtain a CT scan of the chest to rule out any pulmonary Mets-came back unremarkable  · Patient is scheduled for OR today  · Patient with known history of coronary artery disease status post CABG in 1997, had a stress test 3/19-fixed defect on the stress test which has been stable  Recent echocardiogram beginning of this year-ejection fraction 65%  Patient appears to be well compensated  from cardiac standpoint  Given his age and comorbidities patient is definitely a high risk for any surgical procedure but given the circumstances patient is at acceptable risk for the procedure  · Patient received blood transfusion preoperatively yesterday  Hemoglobin today is 11 1  · Monitor hemoglobin and transfuse p r n

## 2020-06-21 NOTE — ASSESSMENT & PLAN NOTE
Abdominal imaging shows right-sided colon inflammation versus neoplasm  Surgical service ordered Cipro Flagyl  Status post EGD colonoscopy today-circumferential cecal mass-elevated CEA concerning for malignancy  Status post Exploratory laparotomy with right hemicolectomy    Postoperative day 1

## 2020-06-21 NOTE — ASSESSMENT & PLAN NOTE
· Patient with known history of coronary artery disease status post CABG in 1997 in [facility]  · Patient is on beta-blocker aspirin and statin  · Hold aspirin for now given possible GI bleed  Holding statin as patient is NPO

## 2020-06-21 NOTE — NURSING NOTE
Patient  Tolerating  OOB to chair encouraged incentive  Spirometer  10x  An hour  Needs reinforcement,denies passing  Gas

## 2020-06-21 NOTE — NURSING NOTE
Notified Dr Joseline Lee urine output 100 ml since 7am this am (from bo)   awaiting physician orders

## 2020-06-22 NOTE — PROGRESS NOTES
Progress Note - Tamara Arambula 80 y o  male MRN: [id number]    Unit/Bed#: -01 Encounter: [id number]      Assessment:  Colon mass s/p hemicolectomy  Anemia    Plan:  Post op surgical care  Continue PPI BID  F/u colon and OR path results  No signs of active GI bleeding    Subjective:   Patient resting comfortably in bed  Denies abd pain, no N/V  Had BM overnight  NG bilious    Objective:     Vitals: Blood pressure 132/60, pulse 89, temperature 98 1 °F (36 7 °C), resp  rate 18, height 5' 4 5" (1 638 m), weight 106 kg (234 lb 9 1 oz), SpO2 96 %  ,Body mass index is 39 64 kg/m²  Intake/Output Summary (Last 24 hours) at 6/22/2020 0959  Last data filed at 6/22/2020 0055  Gross per 24 hour   Intake 2858 33 ml   Output 350 ml   Net 2508 33 ml       Physical Exam: /60   Pulse 89   Temp 98 1 °F (36 7 °C)   Resp 18   Ht 5' 4 5" (1 638 m)   Wt 106 kg (234 lb 9 1 oz)   SpO2 96%   BMI 39 64 kg/m²     General Appearance:    Alert, cooperative, no distress, appears stated age   Head:    Normocephalic, without obvious abnormality, atraumatic   Eyes:    PERRL, conjunctiva/corneas clear, EOM's intact, fundi     benign, both eyes        Ears:    Normal TM's and external ear canals, both ears   Nose:   Nares normal, septum midline, mucosa normal, no drainage    or sinus tenderness    NG bilious   Throat:   Lips, mucosa, and tongue normal; teeth and gums normal   Neck:   Supple, symmetrical, trachea midline, no adenopathy;        thyroid:  No enlargement/tenderness/nodules; no carotid    bruit or JVD   Back:     Symmetric, no curvature, ROM normal, no CVA tenderness   Lungs:     Clear to auscultation bilaterally, respirations unlabored   Chest wall:    No tenderness or deformity   Heart:    Regular rate and rhythm, S1 and S2 normal, no murmur, rub   or gallop   Abdomen:     Soft, mild TTP all quads, no rebound, no guarding, bowel sounds active, no masses, no organomegaly   Genitalia:    Normal male without lesion, discharge or tenderness       Extremities:   Extremities normal, atraumatic, no cyanosis or edema   Pulses:   2+ and symmetric all extremities   Skin:   Skin color, texture, turgor normal, no rashes or lesions   Lymph nodes:   Cervical, supraclavicular, and axillary nodes normal   Neurologic:   CNII-XII intact  Normal strength, sensation and reflexes       throughout        Invasive Devices     Peripheral Intravenous Line            Peripheral IV 06/16/20 Left Antecubital 5 days    Peripheral IV 06/20/20 Right;Ventral (anterior) Wrist 1 day          Drain            NG/OG/Enteral Tube Nasogastric 18 Fr Right nares 4 days    Urethral Catheter Non-latex 16 Fr  1 day                Lab, Imaging and other studies: I have personally reviewed pertinent reports

## 2020-06-22 NOTE — ASSESSMENT & PLAN NOTE
Lab Results   Component Value Date    HGBA1C 8 2 (H) 06/18/2020       Recent Labs     06/22/20  0008 06/22/20  0712 06/22/20  1208 06/22/20  1656   POCGLU 182* 175* 219* 178*       Blood Sugar Average: Last 72 hrs:  (P) 212 25 type 2 diabetes mellitus on oral hypoglycemic agent  Hold metformin and glipizide in the hospital  Insulin sliding scale, will add Lantus at bedtime  Adjust as necessary while NPO-continue with the sliding scale

## 2020-06-22 NOTE — ASSESSMENT & PLAN NOTE
· Patient with known history of atrial fibrillation, status post HUMBERTO and cardioversion in January of this year in [facility] but unfortunately patient is in chronic atrial fibrillation per cardiology progress note, anticoagulated with Eliquis  · Slow ventricular response  · Heart rate is controlled  · Continue with metoprolol  · Eliquis on hold-will continue to hold the Eliquis,  · Heparin drip-ACS protocol

## 2020-06-22 NOTE — ASSESSMENT & PLAN NOTE
On EGD 6/17/2020, there was concern for possible bowel obstruction vs severe gastroparesis, given large amount of food and dark bilious fluid in stomach which could not be cleared  Placed on NG tube  Surgical consult appreciated  On PPI IV b i d     Abdominal imaging shows duodenal diverticulum which may be contributing to bile reflux in stomach  Given NPO status, will hold Lasix and give gentle fluid    Continue with the NG tube  Status post OR started-Exploratory laparotomy with right hemicolectomy, postoperative day 2  Surgery on board  NG tube in

## 2020-06-22 NOTE — ASSESSMENT & PLAN NOTE
· Patient with known history of coronary artery disease status post CABG in 1997 in [facility]  · Patient is on beta-blocker aspirin and statin  · Hold aspirin for now given possible GI bleed  Holding statin as patient is NPO

## 2020-06-22 NOTE — PROGRESS NOTES
Urine output continues to be decreased  25mL noted in bag at this time  Notified Willam TAYLOR  No orders at this time, due to continuous monitoring of fluid volume status secondary to heart failure  Patient continues with distended abdomen, tender to touch with abdominal edema  Patient Alert and oriented  Urine output increased by only 5mL by approximately 0000  New orders received from [address] to Bolus 1L of NS at 500mL/h  Patient also complains of congestion  New orders given for bezocaine, administered at 0134 x1 dose by nurse practitioner  Unire output continued to be minimal, furosemide 20mg IV ordered and administered at 0225  Urine output increased to 75mL  No further intervention at this time  Will continue to monitor

## 2020-06-22 NOTE — PROGRESS NOTES
Pt now NPO x approximately 6 days, IVF ordered  If unable to advance diet within 24 - 48 h, may need to consider nutrition support  Will provide recommendations if desired  Does not meet criteria for acute malnutrition at this time given wt increase per flowsheets

## 2020-06-22 NOTE — ASSESSMENT & PLAN NOTE
Wt Readings from Last 3 Encounters:   06/22/20 106 kg (234 lb 9 1 oz)   Patient with known history of chronic congestive heart failure and was admitted to [facility] in January  Patient was on 40 mg of Lasix b i d  As an outpatient and secondary to dizziness and orthostasis his Lasix dose was decreased to 40 mg daily during the recent hospital stay  Appears to be fluid overloaded on examination  Discussed with nephrology-since the patient is 3 L net positive since admission and signs of fluid overload continue with the IV Lasix  Urine output is improving  Hold IV fluids for now    Nephrology consultation  Ejection fraction 65% during the last echo 1/20  Monitor volume status

## 2020-06-22 NOTE — ASSESSMENT & PLAN NOTE
· Patient was evaluated by PCP secondary to dizziness and fatigue and has blood pressure done as an outpatient  His hemoglobin was found to be 6 2 and was sent to the emergency room  · On the day of admission hemoglobin in the emergency room was 7 1 and receiving 2 units of packed RBCs  · Patient gives history of dark stools-suspicious for upper GI bleed patient is on aspirin and Eliquis  · PPI b i d   · GI performed EGD 06/17/2020  Finding suspicious for possible bowel obstruction/gastroparesis  · Abdominal imaging shows R colon inflammation vs neoplasm which may contribute to anemia  · Status post colonoscopy 6/19/2020 -cecal mass  Circumferential   Biopsies performed, elevated CEA  ·  Discussed with Gastroenterology and surgery-planning for colectomy during this hospital stay  Patient is NPO  · Status post right hemicolectomy postoperative day 2  · Monitor hemoglobin and transfuse p r n

## 2020-06-22 NOTE — ASSESSMENT & PLAN NOTE
· Baseline creatinine appears to be between 1 4 and 1 6  Acute kidney injury with worsening creatinine  · Creatinine today is 2 1  · Also noted to have decreased urine output     IV albumin and Lasix per surgery    Monitor urine output  · Monitor creatinine-up trending  · Patient noted to be fluid overload and net positive for more than 3 L since admission  · Lasix  · Nephrology consultation  · Monitor creatinine

## 2020-06-22 NOTE — PROGRESS NOTES
Progress Note - Singh Driscoll 1935, 80 y o  male MRN: [id number]    Unit/Bed#: -01 Encounter: [id number]    Primary Care Provider: Hang Grewal DO   Date and time admitted to hospital: 6/16/2020 11:14 AM        * Symptomatic anemia  Assessment & Plan  · Patient was evaluated by PCP secondary to dizziness and fatigue and has blood pressure done as an outpatient  His hemoglobin was found to be 6 2 and was sent to the emergency room  · On the day of admission hemoglobin in the emergency room was 7 1 and receiving 2 units of packed RBCs  · Patient gives history of dark stools-suspicious for upper GI bleed patient is on aspirin and Eliquis  · PPI b i d   · GI performed EGD 06/17/2020  Finding suspicious for possible bowel obstruction/gastroparesis  · Abdominal imaging shows R colon inflammation vs neoplasm which may contribute to anemia  · Status post colonoscopy 6/19/2020 -cecal mass  Circumferential   Biopsies performed, elevated CEA  ·  Discussed with Gastroenterology and surgery-planning for colectomy during this hospital stay  Patient is NPO  · Status post right hemicolectomy postoperative day 2  · Monitor hemoglobin and transfuse p r n  Duodenal diverticulum  Assessment & Plan  Abdominal imaging shows inflamed duodenal diverticulum which might contribute to bile reflux and stomach  Surgery started on Cipro and Flagyl considering duodenal diverticulitis as well as possible right-sided colitis    Partial bowel obstruction ([address] )  Assessment & Plan  On EGD 6/17/2020, there was concern for possible bowel obstruction vs severe gastroparesis, given large amount of food and dark bilious fluid in stomach which could not be cleared  Placed on NG tube  Surgical consult appreciated  On PPI IV b i d     Abdominal imaging shows duodenal diverticulum which may be contributing to bile reflux in stomach  Given NPO status, will hold Lasix and give gentle fluid    Continue with the NG tube  Status post OR started-Exploratory laparotomy with right hemicolectomy, postoperative day 2  Surgery on board  NG tube in    Atrial fibrillation [facility])  Assessment & Plan  · Patient with known history of atrial fibrillation, status post HUMBERTO and cardioversion in January of this year in [facility] but unfortunately patient is in chronic atrial fibrillation per cardiology progress note, anticoagulated with Eliquis  · Slow ventricular response  · Heart rate is controlled  · Continue with metoprolol  · Eliquis on hold-will continue to hold the Eliquis,  · Heparin drip-ACS protocol    Morbid obesity ([address] )  Assessment & Plan  · TLC as an outpatient    Hyperlipidemia  Assessment & Plan  · Holding statin for now as patient is NPO  Essential hypertension  Assessment & Plan  · Metoprolol converted to IV as patient is NPO      Diabetes mellitus type 2 in obese [facility])  Assessment & Plan  Lab Results   Component Value Date    HGBA1C 8 2 (H) 06/18/2020       Recent Labs     06/22/20  0008 06/22/20  0712 06/22/20  1208 06/22/20  1656   POCGLU 182* 175* 219* 178*       Blood Sugar Average: Last 72 hrs:  (P) 212 25 type 2 diabetes mellitus on oral hypoglycemic agent  Hold metformin and glipizide in the hospital  Insulin sliding scale, will add Lantus at bedtime  Adjust as necessary while NPO-continue with the sliding scale    Coronary artery disease  Assessment & Plan  · Patient with known history of coronary artery disease status post CABG in 1997 in [facility]  · Patient is on beta-blocker aspirin and statin  · Hold aspirin for now given possible GI bleed  Holding statin as patient is NPO  Chronic kidney disease, stage 3 (HCC)  Assessment & Plan  · Baseline creatinine appears to be between 1 4 and 1 6  Acute kidney injury with worsening creatinine  · Creatinine today is 2 1  · Also noted to have decreased urine output     IV albumin and Lasix per surgery    Monitor urine output  · Monitor creatinine-up trending  · Patient noted to be fluid overload and net positive for more than 3 L since admission  · Lasix  · Nephrology consultation  · Monitor creatinine    CHF (congestive heart failure) ([facility] [facility] )  Assessment & Plan  Wt Readings from Last 3 Encounters:   06/22/20 106 kg (234 lb 9 1 oz)   Patient with known history of chronic congestive heart failure and was admitted to [facility] in January  Patient was on 40 mg of Lasix b i d  As an outpatient and secondary to dizziness and orthostasis his Lasix dose was decreased to 40 mg daily during the recent hospital stay  Appears to be fluid overloaded on examination  Discussed with nephrology-since the patient is 3 L net positive since admission and signs of fluid overload continue with the IV Lasix  Urine output is improving  Hold IV fluids for now  Nephrology consultation  Ejection fraction 65% during the last echo 1/20  Monitor volume status          VTE Pharmacologic Prophylaxis:   Pharmacologic: Heparin  Mechanical VTE Prophylaxis in Place: Yes    Patient Centered Rounds: I have performed bedside rounds with nursing staff today  Discussions with Specialists or Other Care Team Provider:  Surgery and Nephrology    Education and Discussions with Family / Patient:  Significant other updated in detail in the room    Time Spent for Care: 45 minutes  More than 50% of total time spent on counseling and coordination of care as described above  Current Length of Stay: 6 day(s)    Current Patient Status: Inpatient   Certification Statement:  Patient need continued inpatient stay secondary to status post exploratory laparotomy with hemicolectomy    Discharge Plan:     Code Status: Level 1 - Full Code      Subjective:   Patient seen and examined  Patient appears to be slightly more confused than baseline  Patient is very upset that he still has the NG tube in  Creatinine is up trending  Patient had issues with urine output yesterday received IV albumin and Lasix also fluid boluses  Appears to be fluid overloaded today  Will give IV Lasix and stop the IV fluids  Discussed with nephrology  Urine output appears to be picking up after the Lasix  Monitor creatinine diuresing  Patient appears to be more puffier today and definitely there is edema in the parasacral region    Objective:     Vitals:   Temp (24hrs), Av 7 °F (37 1 °C), Min:98 1 °F (36 7 °C), Max:99 1 °F (37 3 °C)    Temp:  [98 1 °F (36 7 °C)-99 1 °F (37 3 °C)] 99 °F (37 2 °C)  HR:  [77-95] 78  Resp:  [16-20] 16  BP: (114-145)/(57-63) 122/57  SpO2:  [91 %-97 %] 91 %  Body mass index is 39 64 kg/m²  Input and Output Summary (last 24 hours): Intake/Output Summary (Last 24 hours) at 2020 1751  Last data filed at 2020 1717  Gross per 24 hour   Intake 2388 33 ml   Output 1225 ml   Net 1163 33 ml       Physical Exam:     Physical Exam   Constitutional: He appears well-developed  No distress  HENT:   Head: Normocephalic and atraumatic  NG tube in   Eyes: Right eye exhibits no discharge  Left eye exhibits no discharge  Neck: No JVD present  Cardiovascular: Normal rate  Pulmonary/Chest:   Decreased breath sounds bilateral with bilateral crackles   Abdominal: Soft  Surgical site C/D/I staples intact   Musculoskeletal: Normal range of motion  He exhibits edema (Edema in the sacral region)  Neurological: He is alert  No cranial nerve deficit  Skin: Skin is warm         Additional Data:     Labs:    Results from last 7 days   Lab Units 20  0435   WBC Thousand/uL 5 24   HEMOGLOBIN g/dL 9 8*   HEMATOCRIT % 33 8*   PLATELETS Thousands/uL 177   NEUTROS PCT % 76*   LYMPHS PCT % 11*   MONOS PCT % 11   EOS PCT % 2     Results from last 7 days   Lab Units 20  1331 20  0435   POTASSIUM mmol/L 4 2 4 1   CHLORIDE mmol/L 110* 111*   CO2 mmol/L 21 20*   BUN mg/dL 22 20   CREATININE mg/dL 2 21* 2 06*   CALCIUM mg/dL 7 7* 7 4*   ALK PHOS U/L  --  48   ALT U/L  --  17   AST U/L  --  54*     Results from last 7 days   Lab Units 06/20/20  0613   INR  1 15       * I Have Reviewed All Lab Data Listed Above  * Additional Pertinent Lab Tests Reviewed: [address] Admission Reviewed    Imaging:    Imaging Reports Reviewed Today Include:   Imaging Personally Reviewed by Myself Includes:      Recent Cultures (last 7 days):           Last 24 Hours Medication List:     Current Facility-Administered Medications:  heparin (porcine) 5,000 Units Subcutaneous Q8H [facility], DO   insulin lispro 1-5 Units Subcutaneous Q6H [facility], DO   metoprolol 2 5 mg Intravenous Q6H Ranny Reil, DO   morphine injection 2 mg Intravenous Q3H PRN Loyd Fitzgerald MD   ondansetron 4 mg Intravenous Q8H PRN Ranny Reil, DO   pantoprazole 40 mg Intravenous Q12H [facility], DO   phenol 1 spray Mouth/Throat Q2H PRN Ranny Reil, DO   saliva substitute 5 spray Mouth/Throat 4x Daily PRN Norman Burch PA-C        Today, Patient Was Seen By: Loyd Fitzgerald MD    ** Please Note: Dictation voice to text software may have been used in the creation of this document   **

## 2020-06-22 NOTE — PROGRESS NOTES
Progress Note - General Surgery   Mariah Ortiz 80 y o  male MRN: [id number]  Unit/Bed#: -01 Encounter: [id number]    Assessment:  Postop day 2  Status post exploratory laparotomy with right hemicolectomy for ulcerated obstructing mass of cecum  The patient had 1 bowel movement overnight, bowel sounds remain hypoactive  Patient denies any abdominal pain at present  He has complaints of dry mouth  No nausea or vomiting  NG tube output to low wall suction with bilious drainage  Acute kidney injury, creatinine increased to 2 06  Patient received IV fluid bolus to increase urine output overnight  Urine output is increasing today  He still is edematous and is currently being given IV furosemide  Symptomatic anemia present prior to surgery, hemoglobin 9 8 this morning  Plan:  Maintain NG tube to low wall suction  I&Os  Will order Mouth Kote as needed, natural saliva  Continue the patient on PPI therapy  Maintain Bey catheter as a surgical requirement to measure I&Os  Analgesics and antiemetics as needed  Continue to monitor hemoglobin, transfusion protocol  DVT prophylaxis, ongoing  Subcu heparin and bilateral venous compression boots  May resume Eliquis once NG tube has been removed  Out of bed as tolerated, up in chair  IV furosemide 40 mg was just given at 12:30 p m , last dose ordered by hospitalist physician  May leave abdominal wound open to air, staples remain intact  Management of other medical comorbidities per the hospitalist providers  Final surgical pathology report pending  Repeat a m  Labs including CBC to check hemoglobin and BMP to evaluate electrolytes, kidney function  PT/OT  Incentive spirometry hourly while awake    Subjective/Objective   Chief Complaint:  Patient complains of dry mouth  Subjective:  80-year-old white male was admitted with symptomatic anemia  He was taken the operating room underwent exploratory laparotomy is found to have an ulcerating mass of the cecum  He is now postop day 2  NG tube remains attached to low wall suction  He also has an indwelling Bey catheter draining darker colored urine  Yesterday his urine output was low and his IV fluids were increased to 100 mL  He was given 1 L of fluid bolus NSS and urine output increased  The patient has edema and was given now 2 doses of IV Lasix ordered by the hospitalist provider  The patient was noted to have increased creatinine this morning now 2 06 up from 1 51 yesterday  He denies any abdominal pain  No nausea or vomiting  He has complaints of dry mouth      Scheduled Meds:  Current Facility-Administered Medications:  heparin (porcine) 5,000 Units Subcutaneous [facility] Merryl Ee, DO   insulin lispro 1-5 Units Subcutaneous Q6H [address] Merryl Ee, DO   metoprolol 2 5 mg Intravenous Q6H Merryl Ee, DO   morphine injection 2 mg Intravenous Q3H PRN Josef Ortega MD   ondansetron 4 mg Intravenous Q8H PRN Merryl Ee, DO   pantoprazole 40 mg Intravenous Q12H [address] Gissell Gian, DO   phenol 1 spray Mouth/Throat Q2H PRN Merryl Ee, DO   saliva substitute 5 spray Mouth/Throat 4x Daily PRN Geno Babb PA-C     Continuous Infusions:   PRN Meds: morphine injection    ondansetron    phenol    saliva substitute      Scheduled Meds:  Current Facility-Administered Medications:  fentaNYL 25 mcg Intravenous Q5 Min PRN Sukumar El CRNA    heparin (porcine) 5,000 Units Subcutaneous [facility] Merryl Ee, DO    HYDROmorphone 0 2 mg Intravenous Q10 Min PRN Sukumar El CRNA    insulin lispro 1-5 Units Subcutaneous Q6H [address] Merryl Ee, DO    metoprolol 2 5 mg Intravenous Q6H Merryl Ee, DO    morphine injection 2 mg Intravenous Q3H PRN Josef Ortega MD    ondansetron 4 mg Intravenous Q8H PRN Merryl Ee, DO    pantoprazole 40 mg Intravenous Q12H [address] Gissell Gian, DO    phenol 1 spray Mouth/Throat Q2H PRN Merryl Ee, DO    sodium chloride 100 mL/hr Intravenous Continuous Tyrese Ramirez MD Last Rate: 100 mL/hr (06/22/20 0411)     Continuous Infusions:  sodium chloride 100 mL/hr Last Rate: 100 mL/hr (06/22/20 0411)     PRN Meds:   fentaNYL    HYDROmorphone    morphine injection    ondansetron    phenol    Objective:     Blood pressure 132/60, pulse 89, temperature 98 1 °F (36 7 °C), resp  rate 18, height 5' 4 5" (1 638 m), weight 106 kg (234 lb 9 1 oz), SpO2 96 %  ,Body mass index is 39 64 kg/m²  Intake/Output Summary (Last 24 hours) at 6/22/2020 1041  Last data filed at 6/22/2020 0055  Gross per 24 hour   Intake 2608 33 ml   Output 350 ml   Net 2258 33 ml       I/O       06/20 0701 - 06/21 0700 06/21 0701 - 06/22 0700 06/22 0701 - 06/23 0700    P  O  0      I V  (mL/kg) 800 (7 8) 1108 3 (10 5)     IV Piggyback 450 1900     Total Intake(mL/kg) 1250 (12 1) 3008 3 (28 4)     Urine (mL/kg/hr) 475 (0 2) 250 (0 1)     Emesis/NG output 260 100     Other 2700      Stool 0      Blood 100      Total Output 3535 350     Net -2285 +2658  3            Unmeasured Urine Occurrence 1 x      Unmeasured Stool Occurrence 1 x            Invasive Devices     Peripheral Intravenous Line            Peripheral IV 06/20/20 Right;Ventral (anterior) Wrist 2 days          Drain            NG/OG/Enteral Tube Nasogastric 18 Fr Right nares 4 days    Urethral Catheter Non-latex 16 Fr  2 days                Physical Exam: /58   Pulse 77   Temp 99 1 °F (37 3 °C)   Resp 20   Ht 5' 4 5" (1 638 m)   Wt 106 kg (234 lb 9 1 oz)   SpO2 97%   BMI 39 64 kg/m²   General appearance: alert and oriented, in no acute distress  Head: Normocephalic, without obvious abnormality, atraumatic  Eyes: negative, conjunctivae/corneas clear  PERRL, EOM's intact  Fundi benign  Ears: normal TM's and external ear canals both ears and Hearing normal conversation  Nose: Nares normal  Septum midline  Mucosa normal  No drainage or sinus tenderness  , no discharge  Throat:  Oral mucosa is dry  Neck: no adenopathy, no carotid bruit, no JVD, supple, symmetrical, trachea midline and thyroid not enlarged, symmetric, no tenderness/mass/nodules  Back: negative, symmetric, no curvature  ROM normal  No CVA tenderness  Lungs: clear to auscultation bilaterally  Heart: regular rate and rhythm, S1, S2 normal, no murmur, click, rub or gallop  Abdomen:  Abdomen appears distended  Bowel sounds are rare and hypoactive throughout the abdomen in 4 quadrants  Midline surgical scar closed with staples  Wound is dry and intact without drainage or disruption  Tympanic to percussion  No masses  Minimal incision tenderness to touch  No guarding or rebound  Male genitalia: normal  Extremities:  Trace bilateral edema in both lower extremities  Moves all 4 extremities well  Skin:  Mild pallor, no rash, jaundice, or bruising  Neurologic: Alert and oriented X 3, normal strength and tone  Normal symmetric reflexes  Normal coordination and gait    Lab, Imaging and other studies:  I have personally reviewed pertinent lab results    , CBC:   Lab Results   Component Value Date    WBC 5 24 06/22/2020    HGB 9 8 (L) 06/22/2020    HCT 33 8 (L) 06/22/2020    MCV 78 (L) 06/22/2020     06/22/2020    MCH 22 5 (L) 06/22/2020    MCHC 29 0 (L) 06/22/2020    RDW 19 4 (H) 06/22/2020    MPV 9 0 06/22/2020    NRBC 0 06/22/2020   , CMP:   Lab Results   Component Value Date    SODIUM 141 06/22/2020    K 4 1 06/22/2020     (H) 06/22/2020    CO2 20 (L) 06/22/2020    BUN 20 06/22/2020    CREATININE 2 06 (H) 06/22/2020    CALCIUM 7 4 (L) 06/22/2020    AST 54 (H) 06/22/2020    ALT 17 06/22/2020    ALKPHOS 48 06/22/2020    EGFR 29 06/22/2020     VTE Pharmacologic Prophylaxis: Heparin  VTE Mechanical Prophylaxis: sequential compression device    Zahra Rodriguez PA-C

## 2020-06-23 PROBLEM — N17.9 ACUTE RENAL FAILURE (ARF) (HCC): Status: ACTIVE | Noted: 2020-01-01

## 2020-06-23 NOTE — PHYSICAL THERAPY NOTE
PHYSICAL THERAPY REEVALUATION NOTE  NAME:  Amy Richards  DATE: 06/23/20    Length Of Stay: 7  Performed at least 2 patient identifiers during session: Name and Birthday    TREATMENT:      06/23/20 1016   Note Type   Note type Re-eval   Pain Assessment   Pain Assessment Tool 0-10   Pain Score 7   Pain Location/Orientation Location: Buttocks   Home Living   Additional Comments Pt reports living in a 1 SH with 1 RADHA and being independent with mobility with use of RW  Prior Function   Comments Pt lives alone and reports being independent with ADLs, IADLs and driving and independent with RW PTA  Restrictions/Precautions   Other Precautions Chair Alarm; Bed Alarm; Fall Risk;Pain;Agitated  (NG tube)   General   Additional Pertinent History Pt is s/p exploratory laparotomy with R hemicolectomy on 6/20/20 seconday to ulcerated obstructing mass of cecum  Cognition   Attention Difficulty attending to directions   Following Commands Follows one step commands with increased time or repetition   RLE Assessment   RLE Assessment WFL  (3+/5 except hip flexion 3-/5)   LLE Assessment   LLE Assessment WFL  (3+/5 except hip flexion 3-/5)   Light Touch   RLE Light Touch Grossly intact   LLE Light Touch Grossly intact   Bed Mobility   Supine to Sit 2  Maximal assistance   Additional items Assist x 2; Increased time required;Verbal cues;LE management  (trunk management)   Sit to Supine 2  Maximal assistance   Additional items Assist x 2; Increased time required;Verbal cues;LE management  (trunk management)   Additional Comments HOB flat without bedrail  increased time and effort to complete with max verbal cues for sequence to facilitate bed mobility associated with increased pain from surgical procedure      Transfers   Sit to Stand   (steadying assistance)   Additional items Increased time required;Verbal cues   Stand to Sit   (steadying assistance)   Additional items Increased time required;Verbal cues   Stand pivot 4  Minimal assistance   Additional items Assist x 1; Increased time required;Verbal cues   Additional Comments use of RW  mod verbal cues for hand placement for safety with use of RW  pt insistent on pulling form RW to complete  increased time to achieve standing with steadying assistance  increased pain in abdomen upon standing  completed spt with RW with minAx1 with min verbal cues for turning completely prior ot sitting and Tammi for balance  decreased step length toward chair with manual management o fRW  Ambulation/Elevation   Gait pattern Wide SAMREEN; Short stride; Excessively slow   Gait Assistance 4  Minimal assist   Additional items Assist x 1;Verbal cues   Assistive Device Rolling walker   Distance 34'x1 with RW with miNAx1 with wide SAMREEN and decreased step length with cues to stay wihtin SAMREEN of RW and increase step length  furthe rmobility deferred due to increased agitation with therapy and pain  Balance   Static Sitting Fair +   Dynamic Sitting Fair   Static Standing Fair -   Dynamic Standing Poor +   Ambulatory Poor +   Activity Tolerance   Activity Tolerance Patient limited by pain;Treatment limited secondary to agitation   Medical Staff Made Aware OTJoseph   Nurse Made Aware RN   Assessment   Prognosis Good   Problem List Decreased strength;Decreased endurance; Impaired balance;Decreased mobility; Impaired judgement;Decreased safety awareness;Decreased cognition;Obesity; Decreased skin integrity;Pain   Barriers to Discharge Comments lives alone, requires assistance with mobility   Goals   Patient Goals "Go home "   STG Expiration Date 07/03/20   PT Treatment Day 1   Plan   Treatment/Interventions Functional transfer training;LE strengthening/ROM; Elevations; Therapeutic exercise; Endurance training;Patient/family training;Equipment eval/education;Gait training;Bed mobility; Compensatory technique education;Spoke to nursing;Spoke to case management;OT   PT Frequency Other (Comment)  (3-5x/week)   Recommendation   PT Discharge Recommendation Post-Acute Rehabilitation Services   PT - OK to Discharge   (when medically cleared to rehab)   Additional Comments [facility] 6 clicks 69/00     Pt seen this date for reevaluation as new orders were received post op  He is s/p exploratory laparotomy with R hemicolectomy on 6/20/2020  He was initially evaluated on 6/17/2020 and discharged form PT services as he was functioning at an independent level for mobility  He has since had a decline in functional status post-op  He has a PMHx of Afib, CAD, DM, HTN, CKD III, CHF, obesity and h/o MI  He had a fall at home prior to admission  He requires increased assistance to complete bed mobility, transfers and ambulation  He is presenting with impaired functional mobility associated with decreased strength, balance, endurance, increased pain and decreased insight to deficits and need for therapy services to facilitate return to home as he became increasingly agitated with mobility further limiting mobility trials  He will benefit from PT services 3-5x/week to maximize LOF and at this time would benefit from post acute rehab upon D/C  Goals: Pt will: Perform bed mobility tasks with miNAx1 to reposition in bed and prepare for transfers  Pt will perform transfers with modified I to increase Indep in home environment, decrease burden of care and decrease risk for falls  Pt will ambulate with RW for >/= 150' with  modified I  to increase Indep in home environment, decrease burden of care, decrease risk for falls, improve activity tolerance and improve gait quality  Pt will complete >/= 1 step with RW with Supervision to increase Indep in home environment, decrease burden of care, decrease risk for falls and improve activity tolerance       Venkata Nassar, PT,DPT

## 2020-06-23 NOTE — PROGRESS NOTES
Progress Note - General Surgery   Moustapha Narayan 80 y o  male MRN: [id number]  Unit/Bed#: -[place] Encounter: [id number]    Assessment:  POD3 s/p exploratory laparotomy with bowel resection  MARLENI 2 33 (2 21, 2 06), nephrology has been consulted  Underwent KUB yesterday, pending read    Plan:  NGT clamp trial this AM  OK to have ice chips and sips of clears  Will advance diet as tolerated  May resume Eliquis once NGT has been removed  Monitory I/Os closely  Medicate PRN pain/nausea  Follow qAM CBC and BMP  OOB ambulating with PT/OT  OOB to chair at least BID  Incentive spirometer qhour while awake  Await nephrology consult for MARLENI    Subjective/Objective     Subjective: States he is thirsty  Wants to drink fluids  States he is passing gas and moved his bowels this morning  NGT output continues to improve  Objective:  Blood pressure 122/61, pulse 78, temperature 98 °F (36 7 °C), resp  rate 18, height 5' 4 5" (1 638 m), weight 104 kg (229 lb 11 5 oz), SpO2 97 %  ,Body mass index is 38 82 kg/m²  Intake/Output Summary (Last 24 hours) at 6/23/2020 0916  Last data filed at 6/23/2020 0830  Gross per 24 hour   Intake 600 ml   Output 1875 ml   Net -1275 ml       Invasive Devices     Peripheral Intravenous Line            Peripheral IV 06/22/20 Distal;Right;Upper;Ventral (anterior) Arm less than 1 day          Drain            NG/OG/Enteral Tube Nasogastric 18 Fr Right nares 5 days    Urethral Catheter Non-latex 16 Fr  2 days                Physical Exam:   Gen: AxOx3  Heart: RRR  Lungs: CTA  Abd: soft, mildly distended, bowel sounds present and hypoactive, incision is CDI  Minimal incisional tenderness  No guarding or rebound  Ext: trace non pitting edema bilateral ankles/feet      Lab, Imaging and other studies:  I have personally reviewed pertinent lab results      CBC:   Lab Results   Component Value Date    WBC 5 03 06/23/2020    HGB 9 8 (L) 06/23/2020    HCT 34 0 (L) 06/23/2020    MCV 79 (L) 06/23/2020    PLT 187 06/23/2020    MCH 22 7 (L) 06/23/2020    MCHC 28 8 (L) 06/23/2020    RDW 20 0 (H) 06/23/2020    MPV 9 2 06/23/2020    NRBC 0 06/23/2020     CMP:   Lab Results   Component Value Date    SODIUM 143 06/23/2020    K 4 2 06/23/2020     (H) 06/23/2020    CO2 21 06/23/2020    BUN 28 (H) 06/23/2020    CREATININE 2 33 (H) 06/23/2020    CALCIUM 8 0 (L) 06/23/2020    EGFR 25 06/23/2020     VTE Mechanical Prophylaxis: sequential compression device       Huber Carr PA-C

## 2020-06-23 NOTE — ASSESSMENT & PLAN NOTE
· Patient with known history of atrial fibrillation, status post HUMBERTO and cardioversion in January of this year in [facility] but unfortunately patient is in chronic atrial fibrillation per cardiology progress note, anticoagulated with Eliquis  · Continue metoprolol and Eliquis

## 2020-06-23 NOTE — ASSESSMENT & PLAN NOTE
· Patient was evaluated by PCP secondary to dizziness and fatigue and has blood pressure done as an outpatient  His hemoglobin was found to be 6 2 and was sent to the emergency room  · On the day of admission hemoglobin in the emergency room was 7 1 and receiving 2 units of packed RBCs  · Patient gives history of dark stools-suspicious for upper GI bleed patient is on aspirin and Eliquis  · PPI b i d   · GI performed EGD 06/17/2020  Finding suspicious for possible bowel obstruction/gastroparesis  · Abdominal imaging shows R colon inflammation vs neoplasm which may contribute to anemia    · Status post right hemicolectomy postoperative day 3  · Hemoglobin stable

## 2020-06-23 NOTE — ASSESSMENT & PLAN NOTE
Most likely secondary to nonoliguric ATN   Diuretics cell and dried yesterday hyper renal function worsened  Nephrology team holding diuresis as well as IV fluid now  Continue to monitor renal function  Stable serum electrolytes

## 2020-06-23 NOTE — ASSESSMENT & PLAN NOTE
Wt Readings from Last 3 Encounters:   06/23/20 104 kg (229 lb 11 5 oz)     Patient with known history of chronic congestive heart failure and was admitted to [facility] in January  Patient was on 40 mg of Lasix b i d  As an outpatient and secondary to dizziness and orthostasis his Lasix dose was decreased to 40 mg daily during the recent hospital stay  Appears to be fluid overloaded on examination, Lasix on hold by Nephrology team due to a acute renal failure    Ejection fraction 65% during the last echo 1/20

## 2020-06-23 NOTE — CONSULTS
CONSULTATION-NEPHROLOGY   Shelly Aase 80 y o  male MRN: [id number]  Unit/Bed#: -01 Encounter: [id number]        Assessment and Plan:    1  Acute Kidney Injury  · Admitted with creatinine of 1 6 mg/dL -> 2 33 mg/dL today  · Urine chemistries indicate ATN  This is likely on the basis of hemodynamic perturbations post-operatively  He has also been significantly anemic requiring transfusion  He has been receiving IV diuretics, crystalloid and colloid throughout hospitalization  · He is non-oliguric  Urine output 1 5 liters yesterday via indwelling urinary catheter  · A renal ultrasound was ordered per primary team  · His last dose of lasix and fluids were yesterday  Would hold off on both for right now  Would hold off on albumin as this is a high sodium load  Repeat chest x-ray is improved from previous  · He is receiving protonix  Will check urine eosinophils  · Continue to monitor IO, daily standing weight, trend BMP, adjust meds to eGFR  · Avoid nephrotoxins, avoid wide variation in blood pressure  2  Stage 3 Chronic Kidney Disease  · Record review indicates baseline creatinine around 1 3-1 6 mg/dL  · 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, hypertensive nephrosclerosis  Await renal ultrasound  Quantify urine protein in steady state  UA bland except for blood which will need to be rechecked- could be on basis of traumatic bo insertion  3  Congestive Heart Failure  · Exacerbation in January which lead to hospitalization for IV diuresis  · He received about 100 mg IV lasix yesterday  Would hold off on diuretic and further fluids today and check renal function in AM  His chest x-ray is improved but he has some edema, possibly on the basis of low protein  He is NPO  Will need salt avoidance, daily weight  IO, etc    4  Ulcerated Obstructive Mass of Cecum  · EGD 6/17, colonoscopy 6/19, ex-lap with hemicolectomy 6/20  Per Gen/surg  NGT clamped    5  Anemia  · Of blood loss and iron deficiency  hgb goal > 10 g/dL  He is not acutely infected  Has an iron sat of 8%  Will treat with course of Venofer  6  Diabetes Mellitus 2       HPI:    Carlos Baptiste is a 80 y o  male with an active problem list significant for morbid obesity, hypertension, CAD s/p CABG, DM 2, CKD 3, CHF atrial fibrillation on eliquis who presented to [facility] ER 6/16 with progressive dyspnea and lightheadedness noted to have hemoglobin 6 2 g/dL on outpatient labs  He was given 2 units PRBCs in ER  Underwent EGD 6/17/20 and concern for possible inflammation versus neoplasm right colon  Underwent colonoscopy 6/19/20 and inflamed ulcerated mass found in cecum obstructing terminal ileum  Underwent ex-lap with right hemicolectomy on 6/20/20  For NGT clamp trial today  A renal consultation was requested today for acute kidney injury  Upon discussion with the patient he is confused  Believes he is here for open heart surgery  Is not really able to provide any HPI  Not sure why he came to the hospital  He denies any symptoms  Does endorse a history of diabetes  Never followed with a nephrologist  Is unsure if he has any relatives with kidney disease  The medical record, including Care Everywhere and media tabs were reviewed  Reason for Consult: Acute Kidney Injury     Review of Systems:  A complete 10-point review of systems was performed  Aside from what was mentioned in the HPI, it is otherwise negative        Historical Information   Past Medical History:   Diagnosis Date    A-fib [facility])     Cardiac disease     CH[facility]congestive [facility]) [facility] [address] )     Diabetes mellitus ([facility] [address] )     MI, old     Myocardial infarction [facility])      Past Surgical History:   Procedure Laterality Date    CARDIAC SURGERY      [place] PART REMOVAL COLON W ANASTOMOSIS N/A 6/20/2020    Procedure: exploratory laparatomy with right hemicolectomy;  Surgeon: Ernesto Moreno DO;  Location:  MAIN OR;  Service: General     Social History   Social History     Substance and Sexual Activity   Alcohol Use Not Currently     Social History     Substance and Sexual Activity   Drug Use Not Currently     Social History     Tobacco Use   Smoking Status Never Smoker   Smokeless Tobacco Never Used       Family History:   Family History   Problem Relation Age of Onset    Diabetes Mother        Medications:  Pertinent medications were reviewed    Current Facility-Administered Medications:  heparin (porcine) 5,000 Units Subcutaneous [facility] Gissell Springer,    insulin lispro 1-5 Units Subcutaneous Q6H [address] Gissell Springer, DO   metoprolol 2 5 mg Intravenous Q6H Shelba Emmy, DO   morphine injection 2 mg Intravenous Q3H PRN Radha Siddiqi MD   ondansetron 4 mg Intravenous Q8H PRN Shelba Emmy, DO   pantoprazole 40 mg Intravenous Q12H [address] Gissell Springer, DO   phenol 1 spray Mouth/Throat Q2H PRN Shelba Emmy, DO   saliva substitute 5 spray Mouth/Throat 4x Daily PRN Alanna Escobar PA-C         No Known Allergies      Vitals:   /80   Pulse 92   Temp 97 8 °F (36 6 °C)   Resp 16   Ht 5' 4 5" (1 638 m)   Wt 104 kg (229 lb 11 5 oz)   SpO2 94%   BMI 38 82 kg/m²   Body mass index is 38 82 kg/m²    SpO2: 94 %,   SpO2 Activity: At Rest,   O2 Device: Nasal cannula      Intake/Output Summary (Last 24 hours) at 6/23/2020 1423  Last data filed at 6/23/2020 0830  Gross per 24 hour   Intake 0 ml   Output 1400 ml   Net -1400 ml     Invasive Devices     Peripheral Intravenous Line            Peripheral IV 06/22/20 Distal;Right;Upper;Ventral (anterior) Arm 1 day          Drain            NG/OG/Enteral Tube Nasogastric 18 Fr Right nares 5 days    Urethral Catheter Non-latex 16 Fr  3 days                Physical Exam:  General: chronically ill appearing conscious, cooperative, in no acute distress  Eyes: conjunctivae pink, anicteric sclerae  ENT: lips and mucous membranes moist  Neck: supple, no JVD, no masses  Chest: diminished breath sounds bilateral, no crackles, ronchus or wheezings  CVS: S1 & S2, normal rate, regular rhythm  Abdomen: obese, soft, tender, non-distended, normoactive bowel sounds, + incision  Extremities: moderate + 2 edema of both legs  Skin: no rash  Neuro: awake, alert, disoriented to place      Diagnostic Data:  Lab: I have personally reviewed pertinent lab results  ,   CBC:  Results from last 7 days   Lab Units 06/23/20  0513   WBC Thousand/uL 5 03   HEMOGLOBIN g/dL 9 8*   HEMATOCRIT % 34 0*   PLATELETS Thousands/uL 187      CMP: Lab Results   Component Value Date    SODIUM 143 06/23/2020    K 4 2 06/23/2020     (H) 06/23/2020    CO2 21 06/23/2020    BUN 28 (H) 06/23/2020    CREATININE 2 33 (H) 06/23/2020    CALCIUM 8 0 (L) 06/23/2020    EGFR 25 06/23/2020   ,   PT/INR: No results found for: PT, INR,   Magnesium: No components found for: MAG,  Phosphorous: No results found for: PHOS    Microbiology:  @[facility],(urinecx:7)@        BRANDON Ramos    Portions of the record may have been created with voice recognition software  Occasional wrong word or "sound a like" substitutions may have occurred due to the inherent limitations of voice recognition software  Read the chart carefully and recognize, using context, where substitutions have occurred

## 2020-06-23 NOTE — OCCUPATIONAL THERAPY NOTE
Occupational Therapy Re-Evaluation     Patient Name: Feroz Gómez  Today's Date: 6/23/2020  Problem List  Principal Problem:    Symptomatic anemia  Active Problems:    CHF (congestive heart failure) (HCC)    Chronic kidney disease, stage 3 (HCC)    Coronary artery disease    Diabetes mellitus type 2 in obese ([facility] [address] )    Essential hypertension    Hyperlipidemia    Morbid obesity (HCC)    Atrial fibrillation (HCC)    Partial bowel obstruction (HCC)    Abnormal CT of the abdomen    Duodenal diverticulum    Colon  neoplasm    Past Medical History  Past Medical History:   Diagnosis Date    A-fib [facility])     Cardiac disease     CHF (congestive heart failure) ([facility] [address] )     [facility] )     MI, old     Myocardial infarction [facility])      Past Surgical History  Past Surgical History:   Procedure Laterality Date    CARDIAC SURGERY      [place] PART REMOVAL COLON W ANASTOMOSIS N/A 6/20/2020    Procedure: exploratory laparatomy with right hemicolectomy;  Surgeon: Cristiano Booth DO;  Location:  MAIN OR;  Service: General           06/23/20 1015   Note Type   Note type Re-eval   Pain Assessment   Pain Assessment Tool 0-10   Pain Score 7   Pain Location/Orientation Location: Buttocks   Home Living   Additional Comments Pt reports living in a 1 story home with 1 RADHA  Pt ambulates with RW  please refer to initial evaluation for full details regarding home set-up  (information was re-confirmed with Pt during re-eval)   Prior Function   Comments Pt lives alone and reports being completely independence with all ADLs, IADLs, functional mobility, and community moiblity + driving  Pt has minimal support from family/friends  Psychosocial   Patient Behaviors/Mood Irritable   ADL   UB Dressing Assistance 5  Supervision/Setup   LB Dressing Assistance 2  Maximal Assistance   LB Dressing Deficit Steadying; Requires assistive device for steadying;Verbal cueing;Supervision/safety; Increased time to complete; Don/doff R sock;Don/doff L sock; Thread RLE into pants; Thread LLE into pants;Pull up over hips   Additional Comments Pt with increased agitation this date  No effort to complete donning socks at this time  Pt becoming agitated with therapist and declining to complete LB dressing at this time  Pt deferring from complete toileting tasks  Bed Mobility   Supine to Sit 2  Maximal assistance   Additional items Assist x 2; Increased time required;Verbal cues;LE management   Sit to Supine 2  Maximal assistance   Additional items Assist x 2; Increased time required;Verbal cues;LE management   Additional Comments HOB flat, increased time, vc'ing for proper technique, trunk management  Pt resistive throughout task, making inappropriate comments to therapist  Pt not following commands with incresed agitated noted  Transfers   Sit to Stand   [facility] Assist)   Additional items Increased time required;Verbal cues   Stand to Sit   [facility] Assist)   Additional items Increased time required;Verbal cues   Stand pivot 4  Minimal assistance   Additional items Assist x 1; Increased time required;Verbal cues   Additional Comments Pt with increased difficulty durign STS from EOB>RW  Pt encouraged to push from EOB durign STS although Pt with increased agitation, would not listen to therapist commands, Pt insisting that he pull up from RW despite vc'ing form therapist to not ocmplete that way d/t safety concerns  Pt that completing SPT with use of RW @ Min A for balance, safety and RW management      Balance   Static Sitting Fair +   Dynamic Sitting Fair   Static Standing Fair -   Dynamic Standing Poor +   Activity Tolerance   Activity Tolerance Patient limited by pain;Treatment limited secondary to agitation   Medical Staff Made Aware Spoke with PT, Ricarda Amaro   Nurse Made Aware Spoke with RN   RUE Assessment   RUE Assessment WFL   LUE Assessment   LUE Assessment WFL   Hand Function   Gross Motor Coordination Functional   Fine Motor Coordination Functional   Sensation   Light Touch No apparent deficits   Cognition   Overall Cognitive Status Impaired   Arousal/Participation Alert; Uncooperative;Arousable   Attention Difficulty attending to directions   Following Commands Follows one step commands with increased time or repetition   Comments Pt with increased agitatio nand irritability this date  Pt with decreased command following, insisting that he can complete the task stephanie better way then how therapist suggest  Pt with slurred speech (at time uncomprehenable - possibly d/t NG tube)  Pt with significant change in mental statue, behaviors, and physical performance since last initial evaluation completed on 6/17/2020   Assessment   Limitation Decreased ADL status; Decreased UE strength;Decreased Safe judgement during ADL;Decreased cognition;Decreased endurance;Decreased self-care trans;Decreased high-level ADLs   Prognosis Good;Fair   Assessment Pt was seen for a re-evaluation this date  Pt undergoing an exploratory laparotomy with R hemicolectomy on 6/20/2020  Pt currently has an NG tube, bo cath and is restricted to ice chips and sips of clear liquids at this time  Pt is noted to have a significant change in mental status since surgery  Pt with increased agitation and irritability with inappropriate comments being made to therapy staff, slurred speech - at time incomprehendible, and decreased independence during ADLs and functional tasks  Pt scoring 35/100 on Barthel Index  Pt presents with decrease activity tolerance, decrease standing balance, decrease sitting balance, decrease performance during ADL tasks, decrease cognition, decrease safety awareness , increase impulsiveness, decrease UB MS, increased pain, decrease generalized strength, decrease activity engagement and decrease performance during functional transfers   Pt at this time does require continued acute OT services to address deficits as well as post acute rehab services upon d/c from [facility] d/t Pt requiring increased assistance and being completely independence PTA with no assistance available at home  Plan   Treatment Interventions ADL retraining;Functional transfer training;UE strengthening/ROM; Endurance training;Cognitive reorientation;Patient/family training;Equipment evaluation/education; Neuromuscular reeducation; Compensatory technique education;Continued evaluation; Energy conservation; Activityengagement   Goal Expiration Date 07/03/20   OT Frequency 3-5x/wk   Recommendation   Recommendation   (post acute rehab)   OT Discharge Recommendation Post-Acute Rehabilitation Services   Barthel Index   Feeding 0   Bathing 0   Grooming Score 5   Dressing Score 5   Bladder Score 0   Bowels Score 10   Toilet Use Score 5   Transfers (Bed/Chair) Score 10   Mobility (Level Surface) Score 0   Stairs Score 0   Barthel Index Score 35       Pt goals to be met by 6/23/2020      1  Pt will demonstrate ability to complete supine<>sit @ Mod I in order to increase safety and independence during ADL tasks  2  Pt will demonstrate ability to complete UB ADLs including washing/dressing @ Mod I in order to increase performance and participation during meaningful tasks  3  Pt will demonstrate ability to complete LB dressing @ Min A in order to increase safety and independence during meaningful tasks  4  Pt will demonstrate ability to aris/doff socks/shoes while sitting EOB @ Min A in order to increase safety and independence during meaningful tasks  5  Pt will demonstrate ability to complete toileting tasks including CM and pericare @ Mod I in order to increase safety and independence during meaningful tasks  6  Pt will demonstrate ability to complete EOB, chair, toilet/commode transfers @ Mod I in order to increase performance and participation during functional tasks  7  Pt will demonstrate ability to stand for 7-8 minutes while maintaining G balance with use of RW for UB support PRN    8  Pt will demonstrate ability to tolerate 30-35 minute OT session with no vc'ing for deep breathing or use of energy conservation techniques in order to increase activity tolerance during functional tasks  9  Pt will demonstrate Good carryover of use of energy conservation/compensatory strategies during ADLs and functional tasks in order to increase safety and reduce risk for falls  10  Pt will demonstrate Good attention and participation in continued evaluation of functional ambulation house hold distances in order to assist with safe d/c planning  11  Pt will attend to continued cognitive assessments 100% of the time in order to provide most appropriate d/c recommendations  12  Pt will follow 100% simple 2-step commands and be A&O x4 consistently with environmental cues to increase participation in functional activities  13  Pt will identify 3 areas of interest/hobbies and 1 intervention on how to incorporate into daily life in order to increase interaction with environment and peers as well as increase participation in meaningful tasks  14  Pt will demonstrate 100% carryover of BUE HEP in order to increase BUE MS and increase performance during functional tasks upon d/c home        Radha Livings, OTR/L

## 2020-06-23 NOTE — ASSESSMENT & PLAN NOTE
Lab Results   Component Value Date    HGBA1C 8 2 (H) 06/18/2020       Recent Labs     06/22/20  1656 06/23/20  0021 06/23/20  0512 06/23/20  1230   POCGLU 178* 179* 201* 304*       Blood Sugar Average: Last 72 hrs:  (P) 415 [id number] type 2 diabetes mellitus on oral hypoglycemic agent  Hold metformin and glipizide in the hospital  Insulin sliding scale, will add Lantus at bedtime  Adjust as necessary while NPO-continue with the sliding scale

## 2020-06-23 NOTE — PROGRESS NOTES
Progress Note - Carlos Baptiste 1935, 80 y o  male MRN: [id number]    Unit/Bed#: -01 Encounter: [id number]    Primary Care Provider: Jerome Rowland DO   Date and time admitted to hospital: 6/16/2020 11:14 AM    * Symptomatic anemia  Assessment & Plan  · Patient was evaluated by PCP secondary to dizziness and fatigue and has blood pressure done as an outpatient  His hemoglobin was found to be 6 2 and was sent to the emergency room  · On the day of admission hemoglobin in the emergency room was 7 1 and receiving 2 units of packed RBCs  · Patient gives history of dark stools-suspicious for upper GI bleed patient is on aspirin and Eliquis  · PPI b i d   · GI performed EGD 06/17/2020  Finding suspicious for possible bowel obstruction/gastroparesis  · Abdominal imaging shows R colon inflammation vs neoplasm which may contribute to anemia  · Status post right hemicolectomy postoperative day 3  · Hemoglobin stable    Acute renal failure (ARF) ([address] )  Assessment & Plan  Most likely secondary to nonoliguric ATN   Diuretics cell and dried yesterday hyper renal function worsened  Nephrology team holding diuresis as well as IV fluid now  Continue to monitor renal function  Stable serum electrolytes  Colon  neoplasm  Assessment & Plan  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  Status post resection, postop day 3, passing feces and flattens freely  Diet advanced said, NG tube removed today by surgery team    Atrial fibrillation [facility])  Assessment & Plan  · Patient with known history of atrial fibrillation, status post HUMBERTO and cardioversion in January of this year in [facility] but unfortunately patient is in chronic atrial fibrillation per cardiology progress note, anticoagulated with Eliquis  Continue metoprolol and hold Eliquis until cleared by surgery    Hyperlipidemia  Assessment & Plan  · Holding statin for now as patient is NPO      Essential hypertension  Assessment & Plan  · Metoprolol converted to IV as patient is NPO    Diabetes mellitus type 2 in obese [facility])  Assessment & Plan  Lab Results   Component Value Date    HGBA1C 8 2 (H) 06/18/2020     Recent Labs     06/22/20  1656 06/23/20  0021 06/23/20  0512 06/23/20  1230   POCGLU 178* 179* 201* 304*     Blood Sugar Average: Last 72 hrs:  (P) 895 [id number] type 2 diabetes mellitus on oral hypoglycemic agent  Hold metformin and glipizide in the hospital  Insulin sliding scale, will add Lantus at bedtime  Adjust as necessary while NPO-continue with the sliding scale    Coronary artery disease  Assessment & Plan  · Patient with known history of coronary artery disease status post CABG in 1997 in [facility]  · Patient is on beta-blocker aspirin and statin  · Hold aspirin for now given possible GI bleed  Holding statin as patient is NPO  Chronic kidney disease, stage 3 ([facility])  Assessment & Plan  · Baseline creatinine appears to be between 1 4 and 1 6  CHF (congestive heart failure) ([facility])  Assessment & Plan  Wt Readings from Last 3 Encounters:   06/23/20 104 kg (229 lb 11 5 oz)     Patient with known history of chronic congestive heart failure and was admitted to  in January  Patient was on 40 mg of Lasix b i d  As an outpatient and secondary to dizziness and orthostasis his Lasix dose was decreased to 40 mg daily during the recent hospital stay  Appears to be fluid overloaded on examination, Lasix on hold by Nephrology team due to a acute renal failure    Ejection fraction 65% during the last echo 1/20    VTE Pharmacologic Prophylaxis:   Pharmacologic: Heparin    Patient Centered Rounds: I have performed bedside rounds with nursing staff today    Discussions with Specialists or Other Care Team Provider:     Education and Discussions with Family / Patient:     Current Length of Stay: 7 day(s)    Current Patient Status: Inpatient   Certification Statement: The patient will continue to require additional inpatient hospital stay due to Acute renal failure    Discharge Plan:  Probably in 2-3 days    Code Status: Level 1 - Full Code      Subjective:   Complaining of dry mouth, cough  Denies abdominal pain  Admits for passing feces and flatus    Objective:     Vitals:   Temp (24hrs), Av 1 °F (36 7 °C), Min:97 2 °F (36 2 °C), Max:98 9 °F (37 2 °C)    Temp:  [97 2 °F (36 2 °C)-98 9 °F (37 2 °C)] 97 2 °F (36 2 °C)  HR:  [75-92] 90  Resp:  [16-20] 18  BP: (121-129)/(58-81) 121/81  SpO2:  [94 %-98 %] 97 %  Body mass index is 38 82 kg/m²  Input and Output Summary (last 24 hours): Intake/Output Summary (Last 24 hours) at 2020 1738  Last data filed at 2020 0830  Gross per 24 hour   Intake 0 ml   Output 1000 ml   Net -1000 ml       Physical Exam:     General appearance: alert, appears stated age and cooperative  Head: Normocephalic, without obvious abnormality, atraumatic  Lungs: clear to auscultation bilaterally  Heart: regular rate and rhythm  Abdomen: soft, non-tender, positive bowel sounds   Back: negative  Extremities: extremities atraumatic, no cyanosis or edema  Neurologic: Alert and oriented X 3, normal strength and tone  Normal symmetric reflexes   Normal coordination and gait    Additional Data:     Labs:    Results from last 7 days   Lab Units 20  0513   WBC Thousand/uL 5 03   HEMOGLOBIN g/dL 9 8*   HEMATOCRIT % 34 0*   PLATELETS Thousands/uL 187   NEUTROS PCT % 75   LYMPHS PCT % 13*   MONOS PCT % 10   EOS PCT % 1     Results from last 7 days   Lab Units 20  0513  20  0435   SODIUM mmol/L 143   < > 141   POTASSIUM mmol/L 4 2   < > 4 1   CHLORIDE mmol/L 110*   < > 111*   CO2 mmol/L 21   < > 20*   BUN mg/dL 28*   < > 20   CREATININE mg/dL 2 33*   < > 2 06*   ANION GAP mmol/L 12   < > 10   CALCIUM mg/dL 8 0*   < > 7 4*   ALBUMIN g/dL  --   --  2 1*   TOTAL BILIRUBIN mg/dL  --   --  0 58   ALK PHOS U/L  --   --  48   ALT U/L  --   --  17   AST U/L  --   --  54*   GLUCOSE RANDOM mg/dL 190* < > 170*    < > = values in this interval not displayed  Results from last 7 days   Lab Units 06/20/20  0613   INR  1 15     Results from last 7 days   Lab Units 06/23/20  1230 06/23/20  0512 06/23/20  0021 06/22/20  1656 06/22/20  1208 06/22/20  0712 06/22/20  0008 06/21/20  1732 06/21/20  1151 06/21/20  0456 06/21/20  0000 06/20/20  1751   POC GLUCOSE mg/dl 304* 201* 179* 178* 219* 175* 182* 219* 256* 194* 215* 269*     Results from last 7 days   Lab Units 06/18/20  0458   HEMOGLOBIN A1C % 8 2*               * I Have Reviewed All Lab Data Listed Above  * Additional Pertinent Lab Tests Reviewed: Petey 66 Admission Reviewed    Imaging:    Imaging Reports Reviewed Today Include: images reviewed    Recent Cultures (last 7 days):           Last 24 Hours Medication List:     Current Facility-Administered Medications:  heparin (porcine) 5,000 Units Subcutaneous Q8H [facility], DO    insulin lispro 1-5 Units Subcutaneous Q6H [facility], DO    iron sucrose 200 mg Intravenous Daily BRANDON Becker Last Rate: 200 mg (06/23/20 1621)   metoprolol 2 5 mg Intravenous Q6H Anuja Sites, DO    morphine injection 2 mg Intravenous Q3H PRN Robson Kyle MD    ondansetron 4 mg Intravenous Q8H PRN Anuja Sites, DO    pantoprazole 40 mg Intravenous Q12H [facility], DO    phenol 1 spray Mouth/Throat Q2H PRN Anuja Sites, DO    saliva substitute 5 spray Mouth/Throat 4x Daily PRN Suzette Avila PA-C         Today, Patient Was Seen By: Stacie Gao MD    ** Please Note: Dictation voice to text software may have been used in the creation of this document   **

## 2020-06-23 NOTE — ASSESSMENT & PLAN NOTE
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

## 2020-06-23 NOTE — PLAN OF CARE
Problem: PHYSICAL THERAPY ADULT  Goal: Performs mobility at highest level of function for planned discharge setting  See evaluation for individualized goals  Description  Treatment/Interventions: Functional transfer training, LE strengthening/ROM, Elevations, Therapeutic exercise, Endurance training, Patient/family training, Equipment eval/education, Gait training, Bed mobility, Compensatory technique education, Spoke to nursing, Spoke to case management, OT  Equipment Recommended: (pt has RW)       See flowsheet documentation for full assessment, interventions and recommendations  Note:   Prognosis: Good  Problem List: Decreased strength, Decreased endurance, Impaired balance, Decreased mobility, Impaired judgement, Decreased safety awareness, Decreased cognition, Obesity, Decreased skin integrity, Pain  Assessment: Pt seen this date for reevaluation as new orders were received post op  He is s/p exploratory laparotomy with R hemicolectomy on 6/20/2020  He was initially evaluated on 6/17/2020 and discharged form PT services as he was functioning at an independent level for mobility  He has since had a decline in functional status post-op  He has a PMHx of Afib, CAD, DM, HTN, CKD III, CHF, obesity and h/o MI  He had a fall at home prior to admission  He requires increased assistance to complete bed mobility, transfers and ambulation  He is presenting with impaired functional mobility associated with decreased strength, balance, endurance, increased pain and decreased insight to deficits and need for therapy services to facilitate return to home as he became increasingly agitated with mobility further limiting mobility trials  He will benefit from PT services 3-5x/week to maximize LOF and at this time would benefit from post acute rehab upon D/C        Barriers to Discharge Comments: lives alone, requires assistance with mobility  PT Discharge Recommendation: Post-Acute Rehabilitation Services     PT - OK to Discharge: (when medically cleared to rehab)    See flowsheet documentation for full assessment

## 2020-06-23 NOTE — CASE MANAGEMENT
Pt is not ready for dc:  NGT remains  Therapy recommend STR  CM followed up with patient he is politely declining  Discussed HHC options and assistance: He stated he has friends to assist and does not want HHC  During my conversation with the patient his POA/Morenita came in and we discussed Skilled Nursing facility recommendations  Wendi Mcleod will talk with patient to see if she can get him to change his mind  Will continue to follow

## 2020-06-23 NOTE — PLAN OF CARE
Problem: OCCUPATIONAL THERAPY ADULT  Goal: Performs self-care activities at highest level of function for planned discharge setting  See evaluation for individualized goals  Description  Treatment Interventions: ADL retraining, Functional transfer training, UE strengthening/ROM, Endurance training, Cognitive reorientation, Patient/family training, Equipment evaluation/education, Neuromuscular reeducation, Compensatory technique education, Continued evaluation, Energy conservation, Activityengagement          See flowsheet documentation for full assessment, interventions and recommendations  Note:   Limitation: Decreased ADL status, Decreased UE strength, Decreased Safe judgement during ADL, Decreased cognition, Decreased endurance, Decreased self-care trans, Decreased high-level ADLs  Prognosis: Good, Fair  Assessment: Pt was seen for a re-evaluation this date  Pt undergoing an exploratory laparotomy with R hemicolectomy on 6/20/2020  Pt currently has an NG tube, bo cath and is restricted to ice chips and sips of clear liquids at this time  Pt is noted to have a significant change in mental status since surgery  Pt with increased agitation and irritability with inappropriate comments being made to therapy staff, slurred speech - at time incomprehendible, and decreased independence during ADLs and functional tasks  Pt scoring 35/100 on Barthel Index  Pt presents with decrease activity tolerance, decrease standing balance, decrease sitting balance, decrease performance during ADL tasks, decrease cognition, decrease safety awareness , increase impulsiveness, decrease UB MS, increased pain, decrease generalized strength, decrease activity engagement and decrease performance during functional transfers   Pt at this time does require continued acute OT services to address deficits as well as post acute rehab services upon d/c from [address] [place] [address] d/t Pt requiring increased assistance and being completely independence PTA with no assistance available at home    Recommendation: (post acute rehab)  OT Discharge Recommendation: Post-Acute Rehabilitation Services

## 2020-06-23 NOTE — ASSESSMENT & PLAN NOTE
· Patient with known history of coronary artery disease status post CABG in 1997 in [facility]  · Patient is on beta-blocker aspirin and statin  · Hold aspirin for now given possible GI bleed  Holding statin as patient is NPO

## 2020-06-24 NOTE — ASSESSMENT & PLAN NOTE
· Patient was evaluated by PCP secondary to dizziness and fatigue and has blood pressure done as an outpatient  His hemoglobin was found to be 6 2 and was sent to the emergency room  · On the day of admission hemoglobin in the emergency room was 7 1 and receiving 2 units of packed RBCs  · Patient gives history of dark stools-suspicious for upper GI bleed patient is on aspirin and Eliquis  · PPI b i d   · GI performed EGD 06/17/2020  Finding suspicious for possible bowel obstruction/gastroparesis  · Abdominal imaging shows R colon inflammation vs neoplasm which may contribute to anemia    · Status post right hemicolectomy postoperative day 4  · Hemoglobin stable

## 2020-06-24 NOTE — NURSING NOTE
Notified Anusha campbelley fell out , 25 ml of urine output since 11pm,     Patient aggressive using inappropriate language , threatening to hit staff, refusing to be repositioned, advised patient of pressure injuries stage II to b/l buttocks  refusing to be repositioned

## 2020-06-24 NOTE — NURSING NOTE
Patient became nauseated after he had the abdominal ultrasound done  Zofran was given  Will monitor

## 2020-06-24 NOTE — CASE MANAGEMENT
Call received today from patient's son to discuss care  He is aware patient is reluctant for STR which is recommended  Son is going to talk to him today  CM met with patient and he remembered who I was and is aware he is weak and needs strengthening  Again shared that he needs short term rehab, he said alright and immediately said the one in [place]  When CM asked if it was [facility] he said yes  Pt aware that I was going to make a referral to [facility]  His response is wait til I talk to my son  CM will make a referral to [facility] at this time and will follow up with patient tomorrow  Information placed on Emeterio Cm  DC plan STR  CM spoke to son and he is going to review the STR list with his father this evening  A post acute care recommendation was made by your care team for [facility]  Discussed [facility] with patient  List of facilities given to patient via in person  patient aware the list is custom filtered for them by zip code location and that [facility] post acute providers are designated  Will follow up tomorrow

## 2020-06-24 NOTE — NURSING NOTE
Patient tolerated ice chips, requested sips of lemon soda  Sips and one ice chip at a time encouraged  Reported 8/10 abdominal pain and nausea after consuming approximately 60ml of lemon soda  Evonne Garcia PA-C notified  All ice chips and soda removed from patient's room and NPO status maintained  Upon repositioning patient back into the recliner and rearranging room, a gatorade was found in the bedside cabinet unopened  Education provided and gatorade placed in closet  Patient reported the gatorade was provided by family and agreed to comply to current diet order, despite multiple complaints,  and verbalized understanding of education  Ambulation around the room was encouraged, patient was offered repositioning into recliner, chair and couch in room with staff assistance to place alarm pad, which he refused  Patient refused alarm and incontinent pad placement on chairs and verbalized understanding of safety precautions and call bell usage for staff assistance    Refused reapplication of SCD, stated "not until I find a comfortable spot "

## 2020-06-24 NOTE — NURSING NOTE
Patient restless and agitated sitting on edge of bed, reported intent to sign out AMA  "if you don't take this tube out and get me a drink "  Patient agreeable to speak to Surgery team, but stated "my ride is coming at 10 o'clock and I'm leaving with or without this tube "  Education and encouragement provided, Sheryle Logan, PA-C notified by ayaan and en route to speak with patient

## 2020-06-24 NOTE — CONSULTS
Consult Note - Wound   Allen Lat 80 y o  male MRN: [id number]  Unit/Bed#: -01 Encounter: [id number]      History and Present Illness:    Patient is an 80year old male admitted for symptomatic anemia  Patient is lethargic today and did not become fully alert during assessment but was able to help reposition self  Patient is typically alert and oriented and forgetful at times, is continent of bowel and bladder although staff reports patient independently removed bo catheter last night  Wound care is consulted for hospital acquired pressure injuries of the bilateral buttocks and perirectal area  Offloading measures in place at time of finding, charting indicates patient was able to turn self and staff reports patient would intermittently refuse offloading interventions  Patient is obese with uncontrolled diabetes and had a right hemicolectomy done 4 days ago, has had issues with very low hemoglobin and decreased skin perfusion  Assessment Findings:     1  Bilateral buttocks: deep tissue injury HA  Linear matching areas of purple intact skin with friction/shearing intact serous-filled blisters superimposed over area, most prominently on the right buttock  Periwound is slightly firm and indurated, blanchable pink  2  Perirectum: deep tissue injury HA  Dark purple tissue intact with no drainage circumferential  Periwound is not indurated, is blanchable pink and intact  Small linear offshoot of DTI present towards the lateral right buttock  3  Sacrum: blanchable pink, dry and intact  4  Bilateral heels: cool, dry blanchable pink  Plan:     Cleanse buttocks and rectum with soap and water, pat dry  Apply Hydraguard silicone cream (blue top) twice daily  Skin care plans:  1-Hydraguard to bilateral sacrum, buttock and heels BID and PRN  2-Elevate heels to offload pressure  3-Ehob cushion in chair when out of bed    4-Moisturize skin daily with skin nourishing cream   5-Turn/reposition q2h or when medically stable for pressure re-distribution on skin  Wound care will continue to follow  Wound 06/20/20 Incision Abdomen Left (Active)   Wound Description Clean;Dry; Intact 6/23/2020  9:52 PM   Anitha-wound Assessment Clean;Dry; Intact 6/23/2020  9:52 PM   Closure Open to air 6/23/2020  9:52 PM   Drainage Amount None 6/23/2020  9:02 AM   Treatments Site care 6/23/2020  9:52 PM   Dressing Open to air 6/23/2020  9:52 PM   Dressing Status Removed 6/22/2020 10:30 AM       Wound 06/23/20 Pressure Injury Buttocks (Active)   Wound Image    6/24/2020  7:41 AM   Wound Description Light purple;Fragile 6/24/2020  7:41 AM   Staging Deep Tissue Injury 6/24/2020  7:41 AM   Anitha-wound Assessment Induration;Fragile;Pink 6/24/2020  7:41 AM   Wound Length (cm) 2 cm 6/24/2020  7:41 AM   Wound Width (cm) 2 cm 6/24/2020  7:41 AM   Wound Depth (cm) 0 6/24/2020  7:41 AM   Calculated Wound Area (cm^2) 4 cm^2 6/24/2020  7:41 AM   Calculated Wound Volume (cm^3) 0 cm^3 6/24/2020  7:41 AM   Drainage Amount None 6/24/2020  7:41 AM   Dressing Moisture barrier 6/24/2020  7:41 AM   Patient Tolerance Tolerated poorly 6/23/2020  9:58 PM       Wound 06/24/20 Pressure Injury Rectum (Active)   Wound Image   6/24/2020  7:48 AM   Wound Description Light purple;Fragile 6/24/2020  7:48 AM   Staging Deep Tissue Injury 6/24/2020  7:48 AM   Anitha-wound Assessment Fragile;Pink 6/24/2020  7:48 AM   Wound Length (cm) 4 cm 6/24/2020  7:48 AM   Wound Width (cm) 4 3 cm 6/24/2020  7:48 AM   Wound Depth (cm) 0 6/24/2020  7:48 AM   Calculated Wound Area (cm^2) 17 2 cm^2 6/24/2020  7:48 AM   Calculated Wound Volume (cm^3) 0 cm^3 6/24/2020  7:48 AM   Drainage Amount None 6/24/2020  7:48 AM   Treatments Cleansed 6/24/2020  7:48 AM   Dressing Moisture barrier 6/24/2020  7:48 AM

## 2020-06-24 NOTE — ASSESSMENT & PLAN NOTE
Lab Results   Component Value Date    HGBA1C 8 2 (H) 06/18/2020       Recent Labs     06/23/20  1807 06/23/20  2356 06/24/20  0507 06/24/20  1205   POCGLU 296* 261* 203* 226*       Blood Sugar Average: Last 72 hrs:  (P) 076 [id number] type 2 diabetes mellitus on oral hypoglycemic agent  Hold metformin and glipizide in the hospital  Insulin sliding scale, will add Lantus at bedtime  Adjust as necessary while NPO-continue with the sliding scale

## 2020-06-24 NOTE — PROGRESS NOTES
NEPHROLOGY PROGRESS NOTE   Gwyn Cook 80 y o  male MRN: [id number]  Unit/Bed#: -01 Encounter: [id number]    Assessment/Plan:    [de-identified] yo male with CKD 3, CHF, hypertension, CAD s/p CABG, a-fib,  who was admitted 6/16 with profound anemia found to have ulcerated obstructing mass in cecum underwent ex-lap with hemicolectomy on 6/20/20  Has been receiving fluids + albumin + lasix since admission  Renal consultation for MARLENI  Indwelling urinary catheter dislodged last evening and patient refusing re-insertion  NGT removed and diet advanced today  1  Acute Kidney Injury  · Admitted with creatinine of 1 6 mg/dL -> 2 46 mg/dL today  · Urine chemistries indicate ATN  This is likely on the basis of hemodynamic perturbations post-operatively  He has also been significantly anemic requiring transfusion  He has been receiving IV diuretics, crystalloid and colloid throughout hospitalization which were all held yesterday  Suspect the patient is still volume overloaded  He now has a diet  He also no longer has indwelling catheter and will not allow this to be replaced  · He has had minimal UOP documented over the last 24 hours  · Renal US was done and normal except for ascites  · Urine eos pending  · Will obtain bladder scans  · Will start IV diuretics  · Continue to monitor IO, daily weight, trend BMP, adjust meds to eGFR  · Avoid nephrotoxins, avoid wide variation in blood pressure   · Has been receiving Morphine for pain control with decreased eGFR- consider fentanyl in setting of MARLENI and decreased eGFR  This may assist with mentation  2  Stage 3 Chronic Kidney Disease  · Record review indicates baseline creatinine around 1 3-1 6 mg/dL  · 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, hypertensive nephrosclerosis  Quantify urine protein in steady state  UA bland except for blood which will need to be rechecked- could be on basis of traumatic bo insertion  · Will need outpatient follow-up  3   Congestive Heart Failure  · With exacerbation in January which required hospitalization  He takes oral lasix as outpatient  He has been intermittently receiving lasix doses but also receiving fluids and sneaking oral liquids like Gatorade which are high in sodium  I repeated chest x-ray yesterday which was improved from previous  He still appears overtly volume overloaded with oliguria  Will start gentle IV diuresis with IV lasix BID and monitor renal function  4  Hyperchloremic Hypernatremia  · Sodium corrects to high side of normal for glucose  He now has a clear liquid diet and has water at the bedside  Access to free water should be sufficient to correct his hypernatremia  5  Ulcerated Obstructive Mass of Cecum  · EGD 6/17, colonoscopy 6/19, ex-lap with hemicolectomy 6/20  Per Gen/surg  NGT removed and now has clear liquid diet  Oncology consultation has been requested  6  Anemia  ? Of blood loss and iron deficiency  hgb goal > 10 g/dL  He is not acutely infected  Has an iron sat of 8%  Will treat with course of Venofer  7  Diabetes Mellitus 2       ROS  Patient seen and examined sitting in chair  Still confused  States he feels a bit bloated  A complete 10 point review of systems have been performed and are otherwise negative         Historical Information   Past Medical History:   Diagnosis Date    A-fib [facility])     Cardiac disease     [facility]est[facility] [address] [facility]     Diabetes mellitus [facility] [address] [facility]     MI, old     Myocardial infarction [facility])      Past Surgical History:   Procedure Laterality Date    CARDIAC SURGERY      OR PART REMOVAL COLON W ANASTOMOSIS N/A 6/20/2020    Procedure: exploratory laparatomy with right hemicolectomy;  Surgeon: Jaden Course, DO;  Location:  MAIN OR;  Service: General     Social History   Social History     Substance and Sexual Activity   Alcohol Use Not Currently     Social History     Substance and Sexual Activity   Drug Use Not Currently     Social History     Tobacco Use Smoking Status Never Smoker   Smokeless Tobacco Never Used       Family History:   Family History   Problem Relation Age of Onset    Diabetes Mother        Medications:  Pertinent medications were reviewed    Current Facility-Administered Medications:  apixaban 2 5 mg Oral BID Omega Sample, PA-C    insulin lispro 1-5 Units Subcutaneous Q6H [address] Bernett Course, DO    iron sucrose 200 mg Intravenous Daily BRANDON Livingston Last Rate: 200 mg (06/24/20 0919)   metoprolol 2 5 mg Intravenous Q6H Bernett Course, DO    morphine injection 2 mg Intravenous Q3H PRN Annie Walton MD    ondansetron 4 mg Intravenous Q8H PRN Bernett Course, DO    pantoprazole 40 mg Intravenous Q12H [address] Gissell Springer, DO    phenol 1 spray Mouth/Throat Q2H PRN Damiánett Course, DO    saliva substitute 5 spray Mouth/Throat 4x Daily PRN Ja Jade PA-C          No Known Allergies      Vitals:   /64   Pulse 97   Temp 97 6 °F (36 4 °C)   Resp 17   Ht 5' 4 5" (1 638 m)   Wt 104 kg (229 lb 11 5 oz)   SpO2 95%   BMI 38 82 kg/m²   Body mass index is 38 82 kg/m²    SpO2: 95 %,   SpO2 Activity: At Rest,   O2 Device: None (Room air)      Intake/Output Summary (Last 24 hours) at 6/24/2020 1547  Last data filed at 6/24/2020 1342  Gross per 24 hour   Intake 440 ml   Output 555 ml   Net -115 ml     Invasive Devices     Peripheral Intravenous Line            Peripheral IV 06/24/20 Distal;Left;Upper;Ventral (anterior) Arm less than 1 day                Physical Exam  General: chronically ill appearing male conscious, cooperative, in no acute distress  Eyes: conjunctivae pink, anicteric sclerae  ENT: lips and mucous membranes moist  Neck: supple, no JVD, no masses  Chest: diminished breath sounds bilateral, no crackles, ronchus or wheezings  CVS: S1 & S2, normal rate, regular rhythm  Abdomen: large, soft, non-tender, non-distended, normoactive bowel sounds  Extremities: moderate + 2 edema of both legs  Skin: no rash, abdominal incision  Neuro: awake, alert, disoriented      Diagnostic Data:  Lab: I have personally reviewed pertinent lab results  ,   CBC:  Results from last 7 days   Lab Units 06/24/20  1007   WBC Thousand/uL 4 03*   HEMOGLOBIN g/dL 10 8*   HEMATOCRIT % 37 2   PLATELETS Thousands/uL 165      CMP: Lab Results   Component Value Date    SODIUM 143 06/24/2020    K 4 0 06/24/2020     (H) 06/24/2020    CO2 22 06/24/2020    BUN 39 (H) 06/24/2020    CREATININE 2 46 (H) 06/24/2020    CALCIUM 8 6 06/24/2020    AST 25 06/24/2020    ALT 20 06/24/2020    ALKPHOS 50 06/24/2020    EGFR 23 06/24/2020   ,   PT/INR: No results found for: PT, INR,   Magnesium: No components found for: MAG,  Phosphorous:   Lab Results   Component Value Date    PHOS 2 7 06/24/2020       Microbiology:  @LABRCNTIP,(urinecx:7)@        BRANDON Rawls    Portions of the record may have been created with voice recognition software  Occasional wrong word or "sound a like" substitutions may have occurred due to the inherent limitations of voice recognition software  Read the chart carefully and recognize, using context, where substitutions have occurred

## 2020-06-24 NOTE — ASSESSMENT & PLAN NOTE
Wt Readings from Last 3 Encounters:   06/24/20 104 kg (229 lb 11 5 oz)     Patient with known history of chronic congestive heart failure and was admitted to [facility] in January  Patient was on 40 mg of Lasix b i d  As an outpatient and secondary to dizziness and orthostasis his Lasix dose was decreased to 40 mg daily during the recent hospital stay  Appears to be fluid overloaded on examination, Lasix on hold by Nephrology team due to a acute renal failure    Ejection fraction 65% during the last echo 1/20

## 2020-06-24 NOTE — PLAN OF CARE
Problem: Potential for Falls  Goal: Patient will remain free of falls  Description  INTERVENTIONS:  - Assess patient frequently for physical needs  -  Identify cognitive and physical deficits and behaviors that affect risk of falls    -  [place] fall precautions as indicated by assessment   - Educate patient/family on patient safety including physical limitations  - Instruct patient to call for assistance with activity based on assessment  - Modify environment to reduce risk of injury  - Consider OT/PT consult to assist with strengthening/mobility  Outcome: Progressing     Problem: CARDIOVASCULAR - ADULT  Goal: Maintains optimal cardiac output and hemodynamic stability  Description  INTERVENTIONS:  - Monitor I/O, vital signs and rhythm  - Monitor for S/S and trends of decreased cardiac output  - Administer and titrate ordered vasoactive medications to optimize hemodynamic stability  - Assess quality of pulses, skin color and temperature  - Assess for signs of decreased coronary artery perfusion  - Instruct patient to report change in severity of symptoms  Outcome: Progressing  Goal: Absence of cardiac dysrhythmias or at baseline rhythm  Description  INTERVENTIONS:  - Continuous cardiac monitoring, vital signs, obtain 12 lead EKG if ordered  - Administer antiarrhythmic and heart rate control medications as ordered  - Monitor electrolytes and administer replacement therapy as ordered  Outcome: Progressing     Problem: METABOLIC, FLUID AND ELECTROLYTES - ADULT  Goal: Glucose maintained within target range  Description  INTERVENTIONS:  - Monitor Blood Glucose as ordered  - Assess for signs and symptoms of hyperglycemia and hypoglycemia  - Administer ordered medications to maintain glucose within target range  - Assess nutritional intake and initiate nutrition service referral as needed  Outcome: Progressing     Problem: SKIN/TISSUE INTEGRITY - ADULT  Goal: Skin integrity remains intact  Description  INTERVENTIONS  - Identify patients at risk for skin breakdown  - Assess and monitor skin integrity  - Assess and monitor nutrition and hydration status  - Monitor labs (i e  albumin)  - Assess for incontinence   - Turn and reposition patient  - Assist with mobility/ambulation  - Relieve pressure over bony prominences  - Avoid friction and shearing  - Provide appropriate hygiene as needed including keeping skin clean and dry  - Evaluate need for skin moisturizer/barrier cream  - Collaborate with interdisciplinary team (i e  Nutrition, Rehabilitation, etc )   - Patient/family teaching  Outcome: Not Progressing     Problem: Nutrition/Hydration-ADULT  Goal: Nutrient/Hydration intake appropriate for improving, restoring or maintaining nutritional needs  Description  Monitor and assess patient's nutrition/hydration status for malnutrition  Collaborate with interdisciplinary team and initiate plan and interventions as ordered  Monitor patient's weight and dietary intake as ordered or per policy  Utilize nutrition screening tool and intervene as necessary  Determine patient's food preferences and provide high-protein, high-caloric foods as appropriate       INTERVENTIONS:  - Monitor oral intake, urinary output, labs, and treatment plans  - Assess nutrition and hydration status and recommend course of action  - Evaluate amount of meals eaten  - Assist patient with eating if necessary   - Allow adequate time for meals  - Recommend/ encourage appropriate diets, oral nutritional supplements, and vitamin/mineral supplements  - Order, calculate, and assess calorie counts as needed  - Recommend, monitor, and adjust tube feedings and TPN/PPN based on assessed needs  - Assess need for intravenous fluids  - Provide specific nutrition/hydration education as appropriate  - Include patient/family/caregiver in decisions related to nutrition  Outcome: Not Progressing     Problem: Prexisting or High Potential for Compromised Skin Integrity  Goal: Skin integrity is maintained or improved  Description  INTERVENTIONS:  - Identify patients at risk for skin breakdown  - Assess and monitor skin integrity  - Assess and monitor nutrition and hydration status  - Monitor labs   - Assess for incontinence   - Turn and reposition patient  - Assist with mobility/ambulation  - Relieve pressure over bony prominences  - Avoid friction and shearing  - Provide appropriate hygiene as needed including keeping skin clean and dry  - Evaluate need for skin moisturizer/barrier cream  - Collaborate with interdisciplinary team   - Patient/family teaching  - Consider wound care consult   Outcome: Not Progressing     Problem: HEMATOLOGIC - ADULT  Goal: Maintains hematologic stability  Description  INTERVENTIONS  - Assess for signs and symptoms of bleeding or hemorrhage  - Monitor labs  - Administer supportive blood products/factors as ordered and appropriate  Outcome: Progressing

## 2020-06-24 NOTE — ASSESSMENT & PLAN NOTE
· Patient with known history of atrial fibrillation, status post HUMBERTO and cardioversion in January of this year in [facility] but unfortunately patient is in chronic atrial fibrillation per cardiology progress note, anticoagulated with Eliquis  · Continue metoprolol and Eliquis

## 2020-06-24 NOTE — PROGRESS NOTES
Pt advanced to clear liquid diet today  Agreeable to ensure clear, will order daily  Pt with hx of DM  Recommend advancement to CCD 2 as medically appropriate

## 2020-06-24 NOTE — PROGRESS NOTES
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)  Anemia    Plan:  Reviewed prelim path results with patient and he expressed understanding  Consult oncology for further recommendations  F/u final colonoscopy and OR path results  Post op surgical care  Continue PPI BID  No signs of active GI bleeding    Subjective:   Patient resting comfortably in bed  NG is out  Denies abd pain, no N/V  Hungry to try clears  BM brown    Objective:     Vitals: Blood pressure 101/60, pulse 81, temperature 98 8 °F (37 1 °C), temperature source Oral, resp  rate 20, height 5' 4 5" (1 638 m), weight 104 kg (229 lb 11 5 oz), SpO2 95 %  ,Body mass index is 38 82 kg/m²  Intake/Output Summary (Last 24 hours) at 6/24/2020 1056  Last data filed at 6/24/2020 1000  Gross per 24 hour   Intake 440 ml   Output 455 ml   Net -15 ml       Physical Exam: /60 (BP Location: Right arm)   Pulse 81   Temp 98 8 °F (37 1 °C) (Oral)   Resp 20   Ht 5' 4 5" (1 638 m)   Wt 104 kg (229 lb 11 5 oz)   SpO2 95%   BMI 38 82 kg/m²     Physical Exam  Gen:  AAOx3, NAD  Abd:  Soft, mild TTP all quads, no rebound, no guarding, +BS  CVS:  RRR  Pulm:  CTA b/l    Invasive Devices     Peripheral Intravenous Line            Peripheral IV 06/24/20 Distal;Left;Upper;Ventral (anterior) Arm less than 1 day                Lab, Imaging and other studies: I have personally reviewed pertinent reports

## 2020-06-24 NOTE — PROGRESS NOTES
Progress Note - General Surgery   Giuseppe Patel 80 y o  male MRN: [id number]  Unit/Bed#: -01 Encounter: [id number]    Assessment:  POD4 s/p  exploratory laparotomy with right hemicolectomy  NGT removed yesterday  Now passing gas, denies N/V  Refusing to have bo reinserted  MARLENI on CKD - nephrology on board    Plan: Will advance diet as tolerated  Clears now ordered  May resume Eliquis at this time (ordered)  Monitory I/Os closely  Medicate PRN pain/nausea  Follow qAM CBC and BMP  OOB ambulating with PT/OT  OOB to chair at least BID  Incentive spirometer qhour while awake    Subjective/Objective     Subjective: NGT removed yesterday  He denies nausea/vomiting  He states that he is passing gas, denies bowel movement  Abdominal discomfort improved  States he does not want a bo catheter  Objective:     Blood pressure 95/60, pulse 80, temperature 98 5 °F (36 9 °C), resp  rate 16, height 5' 4 5" (1 638 m), weight 104 kg (229 lb 11 5 oz), SpO2 93 %  ,Body mass index is 38 82 kg/m²  Intake/Output Summary (Last 24 hours) at 6/24/2020 0834  Last data filed at 6/24/2020 6004  Gross per 24 hour   Intake 100 ml   Output 455 ml   Net -355 ml       Invasive Devices     Peripheral Intravenous Line            Peripheral IV 06/22/20 Distal;Right;Upper;Ventral (anterior) Arm 1 day                Physical Exam:   Gen: AxOx3  Heart: RRR  Lungs: CTA  Abd: soft, mildly distended, bowel sounds present and hypoactive, incision is CDI  Minimal incisional tenderness  No guarding or rebound  Ext:no edema noted    Lab, Imaging and other studies:  I have personally reviewed pertinent lab results    CMP:   Lab Results   Component Value Date    SODIUM 143 06/24/2020    K 4 0 06/24/2020     (H) 06/24/2020    CO2 22 06/24/2020    BUN 39 (H) 06/24/2020    CREATININE 2 46 (H) 06/24/2020    CALCIUM 8 6 06/24/2020    AST 25 06/24/2020    ALT 20 06/24/2020    ALKPHOS 50 06/24/2020    EGFR 23 06/24/2020     VTE Pharmacologic Prophylaxis: Eliquis  VTE Mechanical Prophylaxis: sequential compression device      Shruthi Wilcox PA-C

## 2020-06-24 NOTE — DISCHARGE INSTR - OTHER ORDERS
Skin care plans:  1-Hydraguard to bilateral sacrum, buttock and heels BID and PRN  2-Elevate heels to offload pressure  3-Ehob cushion in chair when out of bed  4-Moisturize skin daily with skin nourishing cream   5-Turn/reposition q2h or when medically stable for pressure re-distribution on skin     6- Anitha-rectal wound bed, cleanse with soap and water, pat dry, No Sting barrier film to periwound, stomadhesive powder to moist wound bed, apply Triad wound paste dime thickness over stoma powder to wound bed daily and PRN with incontinence care
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
## 2020-06-24 NOTE — PROGRESS NOTES
Progress Note - Amy Richards 1935, 80 y o  male MRN: [id number]    Unit/Bed#: -01 Encounter: [id number]    Primary Care Provider: Kimberlee Lux DO   Date and time admitted to hospital: 6/16/2020 11:14 AM    * Symptomatic anemia  Assessment & Plan  · Patient was evaluated by PCP secondary to dizziness and fatigue and has blood pressure done as an outpatient  His hemoglobin was found to be 6 2 and was sent to the emergency room  · On the day of admission hemoglobin in the emergency room was 7 1 and receiving 2 units of packed RBCs  · Patient gives history of dark stools-suspicious for upper GI bleed patient is on aspirin and Eliquis  · PPI b i d   · GI performed EGD 06/17/2020  Finding suspicious for possible bowel obstruction/gastroparesis  · Abdominal imaging shows R colon inflammation vs neoplasm which may contribute to anemia  · Status post right hemicolectomy postoperative day 4  · Hemoglobin stable    Acute renal failure (ARF) ([facility] [address] )  Assessment & Plan  Most likely secondary to nonoliguric ATN   Diuretics on hold as per nephrology  Continue to monitor renal function  Stable serum electrolytes      Colon  neoplasm  Assessment & Plan  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  Status post resection, postop day 3, passing feces and flattens freely  patient tolerating diet    Atrial fibrillation ([facility] [address] )  Assessment & Plan  · Patient with known history of atrial fibrillation, status post HUMBERTO and cardioversion in January of this year in [facility] but unfortunately patient is in chronic atrial fibrillation per cardiology progress note, anticoagulated with Eliquis  · Continue metoprolol and Eliquis    Essential hypertension  Assessment & Plan  · Metoprolol converted to IV as patient is NPO    Diabetes mellitus type 2 in [facility])  Assessment & Plan  Lab Results   Component Value Date    HGBA1C 8 2 (H) 06/18/2020       Recent Labs     06/23/20  1807 06/23/20  2356 06/24/20  0507 20  1205   POCGLU 296* 261* 203* 226*     Blood Sugar Average: Last 72 hrs:  (P) 432 [id number] type 2 diabetes mellitus on oral hypoglycemic agent  Hold metformin and glipizide in the hospital  Insulin sliding scale, will add Lantus at bedtime  Adjust as necessary while NPO-continue with the sliding scale    Coronary artery disease  Assessment & Plan  · Patient with known history of coronary artery disease status post CABG in  in [facility]  · Patient is on beta-blocker aspirin and statin  · Hold aspirin for now given possible GI bleed  Holding statin as patient is NPO  Chronic kidney disease, stage 3 ([facility])  Assessment & Plan  · Baseline creatinine appears to be between 1 4 and 1 6  CHF (congestive heart failure) ([facility])  Assessment & Plan  Wt Readings from Last 3 Encounters:   20 104 kg (229 lb 11 5 oz)     Patient with known history of chronic congestive heart failure and was admitted to [facility] in January  Patient was on 40 mg of Lasix b i d  As an outpatient and secondary to dizziness and orthostasis his Lasix dose was decreased to 40 mg daily during the recent hospital stay  Appears to be fluid overloaded on examination, Lasix on hold by Nephrology team due to a acute renal failure    Ejection fraction 65% during the last echo     VTE Pharmacologic Prophylaxis:   Pharmacologic: Heparin    Patient Centered Rounds: I have performed bedside rounds with nursing staff today    Discussions with Specialists or Other Care Team Provider:     Education and Discussions with Family / Patient:     Current Length of Stay: 8 day(s)    Current Patient Status: Inpatient   Certification Statement: The patient will continue to require additional inpatient hospital stay due to Abdominal pain    Discharge Plan:  Probably tomorrow    Code Status: Level 1 - Full Code      Subjective:   No complaints    Objective:     Vitals:   Temp (24hrs), Av 1 °F (36 7 °C), Min:97 6 °F (36 4 °C), Max:98 8 °F (37 1 °C)    Temp:  [97 6 °F (36 4 °C)-98 8 °F (37 1 °C)] 97 6 °F (36 4 °C)  HR:  [71-97] 97  Resp:  [16-20] 17  BP: ()/(55-72) 119/64  SpO2:  [92 %-96 %] 95 %  Body mass index is 38 82 kg/m²  Input and Output Summary (last 24 hours): Intake/Output Summary (Last 24 hours) at 6/24/2020 1628  Last data filed at 6/24/2020 1342  Gross per 24 hour   Intake 440 ml   Output 525 ml   Net -85 ml       Physical Exam:     General appearance: alert, appears stated age and cooperative  Head: Normocephalic, without obvious abnormality, atraumatic  Lungs: clear to auscultation bilaterally  Heart: regular rate and rhythm  Abdomen: soft, non-tender, positive bowel sounds   Back: negative  Extremities: extremities atraumatic, no cyanosis or edema  Neurologic: Grossly normal    Additional Data:     Labs:    Results from last 7 days   Lab Units 06/24/20  1007   WBC Thousand/uL 4 03*   HEMOGLOBIN g/dL 10 8*   HEMATOCRIT % 37 2   PLATELETS Thousands/uL 165   NEUTROS PCT % 70   LYMPHS PCT % 18   MONOS PCT % 9   EOS PCT % 2     Results from last 7 days   Lab Units 06/24/20  0508   SODIUM mmol/L 143   POTASSIUM mmol/L 4 0   CHLORIDE mmol/L 110*   CO2 mmol/L 22   BUN mg/dL 39*   CREATININE mg/dL 2 46*   ANION GAP mmol/L 11   CALCIUM mg/dL 8 6   ALBUMIN g/dL 2 3*   TOTAL BILIRUBIN mg/dL 0 65   ALK PHOS U/L 50   ALT U/L 20   AST U/L 25   GLUCOSE RANDOM mg/dL 202*     Results from last 7 days   Lab Units 06/20/20  0613   INR  1 15     Results from last 7 days   Lab Units 06/24/20  1205 06/24/20  0507 06/23/20  2356 06/23/20  1807 06/23/20  1230 06/23/20  0512 06/23/20  0021 06/22/20  1656 06/22/20  1208 06/22/20  0712 06/22/20  0008 06/21/20  1732   POC GLUCOSE mg/dl 226* 203* 261* 296* 304* 201* 179* 178* 219* 175* 182* 219*     Results from last 7 days   Lab Units 06/18/20  0458   HEMOGLOBIN A1C % 8 2*               * I Have Reviewed All Lab Data Listed Above  * Additional Pertinent Lab Tests Reviewed:  All Labs For Current Hospital Admission Reviewed    Imaging:    Imaging Reports Reviewed Today Include: images reviewed    Recent Cultures (last 7 days):           Last 24 Hours Medication List:     Current Facility-Administered Medications:  apixaban 2 5 mg Oral BID Paul Dykes PA-C    [START ON 6/25/2020] furosemide 40 mg Intravenous BID (diuretic) Nicola Hard, CRNP    furosemide 60 mg Intravenous Once Nicola Hard, CRNP    insulin lispro 1-5 Units Subcutaneous Q6H 175 Chris Lake, DO    iron sucrose 200 mg Intravenous Daily Nicola Hard, CRNP Last Rate: 200 mg (06/24/20 0919)   metoprolol 2 5 mg Intravenous Q6H Priscilla Danker, DO    morphine injection 2 mg Intravenous Q3H PRN Trupit Quintanilla MD    ondansetron 4 mg Intravenous Q8H PRN Priscilla Danker, DO    pantoprazole 40 mg Intravenous Q12H [address] Gissell Springer, DO    phenol 1 spray Mouth/Throat Q2H PRN Priscilla Danciarra, DO    saliva substitute 5 spray Mouth/Throat 4x Daily PRN Humaira Banuelos PA-C         Today, Patient Was Seen By: Kacy Lopez MD    ** Please Note: Dictation voice to text software may have been used in the creation of this document   **

## 2020-06-24 NOTE — ASSESSMENT & PLAN NOTE
· Patient with known history of coronary artery disease status post CABG in 1997 in [facility]  · Patient is on beta-blocker aspirin and statin  · Hold aspirin for now given possible GI bleed  Holding statin as patient is NPO

## 2020-06-24 NOTE — ASSESSMENT & PLAN NOTE
Most likely secondary to nonoliguric ATN   Diuretics on hold as per nephrology  Continue to monitor renal function  Stable serum electrolytes

## 2020-06-25 NOTE — PROGRESS NOTES
Progress Note - General Surgery   Joseph Pierre 80 y o  male MRN: [id number]  Unit/Bed#: [facility] Encounter: [id number]    Assessment:  POD4 s/p  exploratory laparotomy with right hemicolectomy  NGT removed 2 days ago  Now passing gas and moving bowels, denies N/V  Tolerating clear liquids  MARLENI on CKD - improving, Cr 2 19 this AM, nephrology on board    Plan: Will advance diet to surgical soft today  Monitory I/Os closely  Medicate PRN pain/nausea  Follow qAM CBC and BMP  OOB ambulating with PT/OT  OOB to chair at least BID  Incentive spirometer qhour while awake  Had a discussion with patient about treatment compliance    Subjective/Objective     Subjective: States his nausea is improved  Is tolerating clear liquids  Now passing gas and moving bowels  Has been "chugging liquids"  Dneies fevers/chills  Abdominal pain improved  Objective:    Blood pressure 123/59, pulse 77, temperature 98 °F (36 7 °C), resp  rate 20, height 5' 4 5" (1 638 m), weight 102 kg (225 lb 8 5 oz), SpO2 96 %  ,Body mass index is 38 11 kg/m²  Intake/Output Summary (Last 24 hours) at 6/25/2020 0948  Last data filed at 6/25/2020 4424  Gross per 24 hour   Intake 340 ml   Output 567 ml   Net -227 ml       Invasive Devices     Peripheral Intravenous Line            Peripheral IV 06/24/20 Distal;Right;Upper;Ventral (anterior) Arm less than 1 day                Physical Exam:   Gen: AxOx3  Heart: RRR  Lungs: CTA  Abd: soft, mildly distended, bowel sounds present and hypoactive, incision is CDI  Minimal incisional tenderness  No guarding or rebound  Lab, Imaging and other studies:  I have personally reviewed pertinent lab results      CBC:   Lab Results   Component Value Date    WBC 3 29 (L) 06/25/2020    HGB 10 2 (L) 06/25/2020    HCT 34 4 (L) 06/25/2020    MCV 77 (L) 06/25/2020     06/25/2020    MCH 22 8 (L) 06/25/2020    MCHC 29 7 (L) 06/25/2020    RDW 20 3 (H) 06/25/2020    MPV 9 3 06/25/2020    NRBC 0 06/25/2020     CMP: Lab Results   Component Value Date    SODIUM 142 06/25/2020    K 3 7 06/25/2020     (H) 06/25/2020    CO2 23 06/25/2020    BUN 43 (H) 06/25/2020    CREATININE 2 19 (H) 06/25/2020    CALCIUM 7 7 (L) 06/25/2020    EGFR 26 06/25/2020     VTE Pharmacologic Prophylaxis: Eliquis  VTE Mechanical Prophylaxis: sequential compression device       Kiah Chatterjee PA-C

## 2020-06-25 NOTE — CASE MANAGEMENT
CM spoke to patient about STR as patient is approaching medical stability  Pt adamantly declines short term rehab or [facility] at this time  His plan is to go home  Per patient he discussed this with his son last evening  CM called and left a message with his son Neda Spaulding to discuss care needs  Pt was made aware that this is an unsafe dc plan  Will continue to follow as see if PT/OT shawn see patient today vs tomorrow  Current dc plan is home  2:30 CM got a call from Josef Luis: [phone] who is the granddaughter, she is on site and would like to talk to me  Therapy was working with patient when I was visiting  Pt at this moment is agreeable for home care services: Pt will benefit with Nursing/PT/OT and HHA  [facility] or [address] [place] [address] which ever home care agency has a home health aide to assist  Per Jo Ann friends will be checking in with [facility]  We discussed needed equipment: 1  Shower chair-- Jo Ann is going to order on [employer] and 2  Bedside commode- Mr [facility] stated he has one in the basement and Jo Ann will be looking for this  Referral made to [address] [facility]- referral pending at this time  3:15: [facility] has accepted: face to face was done and AVS updated  CM did speak to Jo Ann and updated her on acceptance of home care agency  Post follow up appointment was made for 7/6- information on AVS   Will continue to follow  Met with patient about 3:30 to review the dc IMM, he verbalized understanding,however politely declined to sign the form  Copy of form was provided to him

## 2020-06-25 NOTE — PROGRESS NOTES
Progress Note - Noelle Reina 1935, 80 y o  male MRN: [id number]    Unit/Bed#: -01 Encounter: [id number]    Primary Care Provider: Mariann Copeland DO   Date and time admitted to hospital: 6/16/2020 11:14 AM    * Symptomatic anemia  Assessment & Plan  · Patient was evaluated by PCP secondary to dizziness and fatigue and has blood pressure done as an outpatient    His hemoglobin was found to be 6 2 and was sent to the emergency room  · On the day of admission hemoglobin in the emergency room was 7 1 and receiving 2 units of packed RBCs  · Patient gives history of dark stools-suspicious for upper GI bleed patient is on aspirin and Eliquis  · Continue PPI  · Hemoglobin stable    Acute renal failure (ARF) ([facility] [address] )  Assessment & Plan  Most likely secondary to nonoliguric ATN   Diuretic challenge started yesterday, renal function improving  Continue Lasix 40 mg twice daily    Colon  neoplasm  Assessment & Plan  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  Status post resection, postop day 4, passing feces and flattens freely  Diet advanced , patient tolerating    Atrial fibrillation ([facility] [address] )  Assessment & Plan  · Patient with known history of atrial fibrillation, status post HUMBERTO and cardioversion in January of this year in [facility] but unfortunately patient is in chronic atrial fibrillation per cardiology progress note, anticoagulated with Eliquis  · Continue metoprolol and Eliquis    Essential hypertension  Assessment & Plan  · Metoprolol converted to IV as patient is NPO    Diabetes mellitus type 2 in [facility])  Assessment & Plan  Lab Results   Component Value Date    HGBA1C 8 2 (H) 06/18/2020       Recent Labs     06/24/20  1205 06/24/20  1831 06/24/20  2332 06/25/20  0540   POCGLU 226* 267* 297* 298*       Blood Sugar Average: Last 72 hrs:  (P) 234 [id number] type 2 diabetes mellitus on oral hypoglycemic agent  Hold metformin and glipizide in the hospital  Insulin sliding scale, will add Lantus at bedtime  Adjust as necessary while NPO-continue with the sliding scale    Coronary artery disease  Assessment & Plan  · Patient with known history of coronary artery disease status post CABG in  in [facility]  · Patient is on beta-blocker aspirin and statin    Chronic kidney disease, stage 3 ([facility])  Assessment & Plan  · Baseline creatinine appears to be between 1 4 and 1 6  CHF (congestive heart failure) ([facility])  Assessment & Plan  Wt Readings from Last 3 Encounters:   20 102 kg (225 lb 8 5 oz)     Patient with known history of chronic congestive heart failure and was admitted to [facility] in January  Patient was on 40 mg of Lasix b i d  As an outpatient and secondary to dizziness and orthostasis his Lasix dose was decreased to 40 mg daily during the recent hospital stay  Appears to be fluid overloaded, continue IV Lasix for now and switch to 40 mg twice daily on discharge  Ejection fraction 65% during the last echo 1/20    VTE Pharmacologic Prophylaxis:   Pharmacologic: Heparin    Patient Centered Rounds: I have performed bedside rounds with nursing staff today    Discussions with Specialists or Other Care Team Provider:     Education and Discussions with Family / Patient:       Current Length of Stay: 9 day(s)    Current Patient Status: Inpatient   Certification Statement: The patient will continue to require additional inpatient hospital stay due to Colon mass    Discharge Plan:  Probably tomorrow    Code Status: Level 1 - Full Code      Subjective:   No complaints    Objective:     Vitals:   Temp (24hrs), Av 7 °F (36 5 °C), Min:97 4 °F (36 3 °C), Max:98 °F (36 7 °C)    Temp:  [97 4 °F (36 3 °C)-98 °F (36 7 °C)] 97 4 °F (36 3 °C)  HR:  [70-97] 70  Resp:  [16-20] 18  BP: (116-129)/(59-67) 129/67  SpO2:  [94 %-98 %] 98 %  Body mass index is 38 11 kg/m²  Input and Output Summary (last 24 hours):        Intake/Output Summary (Last 24 hours) at 2020 1120  Last data filed at 6/25/2020 1322  Gross per 24 hour   Intake 100 ml   Output 567 ml   Net -467 ml       Physical Exam:     General appearance: alert, appears stated age and cooperative  Head: Normocephalic, without obvious abnormality, atraumatic  Lungs: clear to auscultation bilaterally  Heart: regular rate and rhythm  Abdomen: soft, non-tender, positive bowel sounds   Back: negative  Extremities: extremities atraumatic, no cyanosis or edema  Neurologic: Grossly normal    Additional Data:     Labs:    Results from last 7 days   Lab Units 06/25/20  0541   WBC Thousand/uL 3 29*   HEMOGLOBIN g/dL 10 2*   HEMATOCRIT % 34 4*   PLATELETS Thousands/uL 184   NEUTROS PCT % 72   LYMPHS PCT % 15   MONOS PCT % 10   EOS PCT % 2     Results from last 7 days   Lab Units 06/25/20  0541 06/24/20  0508   SODIUM mmol/L 142 143   POTASSIUM mmol/L 3 7 4 0   CHLORIDE mmol/L 109* 110*   CO2 mmol/L 23 22   BUN mg/dL 43* 39*   CREATININE mg/dL 2 19* 2 46*   ANION GAP mmol/L 10 11   CALCIUM mg/dL 7 7* 8 6   ALBUMIN g/dL  --  2 3*   TOTAL BILIRUBIN mg/dL  --  0 65   ALK PHOS U/L  --  50   ALT U/L  --  20   AST U/L  --  25   GLUCOSE RANDOM mg/dL 265* 202*     Results from last 7 days   Lab Units 06/20/20  0613   INR  1 15     Results from last 7 days   Lab Units 06/25/20  0540 06/24/20  2332 06/24/20  1831 06/24/20  1205 06/24/20  0507 06/23/20  2356 06/23/20  1807 06/23/20  1230 06/23/20  0512 06/23/20  0021 06/22/20  1656 06/22/20  1208   POC GLUCOSE mg/dl 298* 297* 267* 226* 203* 261* 296* 304* 201* 179* 178* 219*                   * I Have Reviewed All Lab Data Listed Above  * Additional Pertinent Lab Tests Reviewed:  [facility] Admission Reviewed    Imaging:    Imaging Reports Reviewed Today Include: images reviewed    Recent Cultures (last 7 days):           Last 24 Hours Medication List:     Current Facility-Administered Medications:  acetaminophen 650 mg Oral Q6H PRN Cintia Sandoval MD    apixaban 2 5 mg Oral BID Freddy Uribe KRISTOFER    furosemide 40 mg Intravenous BID (diuretic) [facility] Mark Delmy, CRNP    insulin lispro 1-5 Units Subcutaneous Q6H [facility] Kristie Quinones, DO    iron sucrose 200 mg Intravenous Daily Tello Paterson, CRNP Last Rate: 200 mg (06/25/20 1026)   metoprolol 2 5 mg Intravenous Q6H Gissell Springer, DO    ondansetron 4 mg Intravenous Q8H PRN Kristie Quinones, DO    oxyCODONE 5 mg Oral Q6H PRN Virginia Cassidy MD    pantoprazole 40 mg Intravenous Q12H [facility] Gissell Gian, DO    phenol 1 spray Mouth/Throat Q2H PRN Kristieness Quinones, DO    saliva substitute 5 spray Mouth/Throat 4x Daily PRN Navid Quarles PA-C         Today, Patient Was Seen By: Virginia Cassidy MD    ** Please Note: Dictation voice to text software may have been used in the creation of this document   **

## 2020-06-25 NOTE — PROGRESS NOTES
Pt advised to take small sips of clears, uncooperative when given fluids guzzles them down and complains of hiccups and belches multiple times along with nausea

## 2020-06-25 NOTE — PROGRESS NOTES
Progress Note - Nephrology   Lizzeth Mckay 80 y o  male MRN: [id number]  Unit/Bed#: -01 Encounter: [id number]    A/P:  1  Acute kidney injury  Creatinine martha from 1 6 -> 2 46 -> 2 19 mg/dl today  Nonoliguric: 440/467 (+1693 since admission)  Urine studies suggest ATN post op due to fluid shifts and anemia  Has a normal kidney ultrasound   Medications reviewed  Check PVR  2  Volume overload  Has ascites and scrotal edema and trace pedal edema  Receiving IV furosemide 40mg bid  Trend I/O  Albumin low at 2 3  May respond better to Bumex  3  Stage 3 Chronic kidney disease  Baseline creatinine on records review 1 3-1 6 mg/dl  Has history of diabetes and hypertension  Outpatient follow up  4  Iron deficiency anemia  Hemoglobin 10 2 g today  Receiving Venofer infusions  5  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  On clear liquid diet            Follow up reason for today's visit: Acute kidney injury    Symptomatic anemia    Patient Active Problem List   Diagnosis    CHF (congestive heart failure) ([facility])    Chronic kidney disease, stage 3 ([place] [address] )    Coronary artery disease    Diabetes mellitus type 2 in obese ([place] [address] )    Essential hypertension    Hyperlipidemia    Morbid obesity ([place] [address] )    Symptomatic anemia    Atrial fibrillation (HCC)    Partial bowel obstruction (HCC)    Abnormal CT of the abdomen    Duodenal diverticulum    Colon  neoplasm    Acute renal failure (ARF) (HCC)         Subjective:   No chest pain  Or orthopnea  Has dyspnea on exertion  No abdominal pain- had nausea    Objective:     Vitals: Blood pressure 123/59, pulse 77, temperature 98 °F (36 7 °C), resp  rate 20, height 5' 4 5" (1 638 m), weight 102 kg (225 lb 8 5 oz), SpO2 96 %  ,Body mass index is 38 11 kg/m²      Weight (last 2 days)     Date/Time   Weight    06/25/20 0236   102 (225 53)    06/24/20 0504   104 (229 72)    06/23/20 0519   104 (229 72)                Intake/Output Summary (Last 24 hours) at 6/25/2020 0122  Last data filed at 6/25/2020 0828  Gross per 24 hour   Intake 440 ml   Output 567 ml   Net -127 ml     I/O last 3 completed shifts: In: 26 [P O :340; IV Piggyback:100]  Out: 892 [Urine:892]         Physical Exam: /59   Pulse 77   Temp 98 °F (36 7 °C)   Resp 20   Ht 5' 4 5" (1 638 m)   Wt 102 kg (225 lb 8 5 oz)   SpO2 96%   BMI 38 11 kg/m²     General Appearance:    Alert, cooperative, no distress, appears stated age   Head:    Normocephalic, without obvious abnormality, atraumatic   Eyes:    Conjunctiva/corneas clear   Ears:    Normal external ears   Nose:   Nares normal, septum midline, mucosa normal, no drainage    or sinus tenderness   Throat:   Lips, mucosa, and tongue normal; teeth and gums normal   Neck:   Supple, symmetrical, trachea midline, no adenopathy;        thyroid:  No enlargement/tenderness/nodules; no carotid    bruit or JVD   Back:     Symmetric, no curvature, ROM normal, no CVA tenderness   Lungs:     Clear to auscultation bilaterally, respirations unlabored   Chest wall:    No tenderness or deformity   Heart:    Regular rate and rhythm, S1 and S2 normal, no murmur, rub   or gallop   Abdomen:     Soft, non-tender, bowel sounds active   Extremities:   Extremities normal, atraumatic, no cyanosis or edema   Skin:   Skin color, texture, turgor normal, no rashes or lesions   Lymph nodes:   Cervical normal   Neurologic:   CNII-XII intact            Lab, Imaging and other studies: I have personally reviewed pertinent labs    CBC:   Lab Results   Component Value Date    WBC 3 29 (L) 06/25/2020    HGB 10 2 (L) 06/25/2020    HCT 34 4 (L) 06/25/2020    MCV 77 (L) 06/25/2020     06/25/2020    MCH 22 8 (L) 06/25/2020    MCHC 29 7 (L) 06/25/2020    RDW 20 3 (H) 06/25/2020    MPV 9 3 06/25/2020    NRBC 0 06/25/2020     CMP:   Lab Results   Component Value Date    K 3 7 06/25/2020     (H) 06/25/2020    CO2 23 06/25/2020    BUN 43 (H) 06/25/2020    CREATININE 2 19 (H) 06/25/2020 CALCIUM 7 7 (L) 06/25/2020    EGFR 26 06/25/2020         Results from last 7 days   Lab Units 06/25/20  0541 06/24/20  0508 06/23/20  0513  06/22/20  0435 06/21/20  0457   POTASSIUM mmol/L 3 7 4 0 4 2   < > 4 1 4 3   CHLORIDE mmol/L 109* 110* 110*   < > 111* 108   CO2 mmol/L 23 22 21   < > 20* 25   BUN mg/dL 43* 39* 28*   < > 20 13   CREATININE mg/dL 2 19* 2 46* 2 33*   < > 2 06* 1 51*   CALCIUM mg/dL 7 7* 8 6 8 0*   < > 7 4* 7 5*   ALK PHOS U/L  --  50  --   --  48 51   ALT U/L  --  20  --   --  17 9*   AST U/L  --  25  --   --  54* 10    < > = values in this interval not displayed  Phosphorus: No results found for: PHOS  Magnesium:   Lab Results   Component Value Date    MG 2 0 06/25/2020     Urinalysis: No results found for: Tedra Radha, SPECGRAV, PHUR, LEUKOCYTESUR, NITRITE, PROTEINUA, GLUCOSEU, KETONESU, BILIRUBINUR, BLOODU  Ionized Calcium: No results found for: CAION  Coagulation: No results found for: PT, INR, APTT  Troponin: No results found for: TROPONINI  ABG: No results found for: PHART, OMV3ZQG, PO2ART, GHH5TIY, V8XAROKD, BEART, SOURCE  Radiology review:     IMAGING  Procedure: Xr Chest Portable    Result Date: 6/23/2020  Narrative: CHEST INDICATION:   rule out pulmonary edema  COMPARISON:  6/18/2020, 8/23/2019 EXAM PERFORMED/VIEWS:  XR CHEST PORTABLE  AP semierect Images: 1 FINDINGS:  There are median sternotomy wires indicating prior cardiac surgery  Enteric feeding tube terminates in the stomach  Heart shadow is enlarged but unchanged from prior exam  Serafin Gongora and left lower lobe atelectasis  Right lung clear  No pleural effusions or pneumothorax  Osseous structures appear within normal limits for patient age  Impression: Lingula and left lower lobe atelectasis  Enteric feeding tube terminates in the stomach  Workstation performed: [id number]     Procedure: Us Kidney And Bladder    Result Date: 6/24/2020  Narrative: RENAL ULTRASOUND INDICATION:   Acute renal failure   COMPARISON: 6/17/2020 TECHNIQUE:   Ultrasound of the retroperitoneum was performed with a curvilinear transducer utilizing volumetric sweeps and still imaging techniques  FINDINGS: KIDNEYS: Symmetric and normal size  Right kidney:  11 6 x 4 9 cm  Left kidney:  10 1 x 5 5 cm  Right kidney Normal echogenicity and contour  No suspicious masses detected  No hydronephrosis  No shadowing calculi  No perinephric fluid collections  Left kidney Evaluation of the left kidney is limited by bowel gas  Normal echogenicity and contour  No suspicious masses detected  No hydronephrosis  No shadowing calculi  No perinephric fluid collections  URETERS: Nonvisualized  BLADDER: Normally distended  No focal thickening or mass lesions  A left ureteral jet only was visualized  There is ascites  Impression: Limited evaluation of the left kidney  1   No hydronephrosis  2   Ascites   Workstation performed: [id number]       Current Facility-Administered Medications   Medication Dose Route Frequency    acetaminophen (TYLENOL) tablet 650 mg  650 mg Oral Q6H PRN    apixaban (ELIQUIS) tablet 2 5 mg  2 5 mg Oral BID    furosemide (LASIX) injection 40 mg  40 mg Intravenous BID (diuretic)    insulin lispro (HumaLOG) 100 units/mL subcutaneous injection 1-5 Units  1-5 Units Subcutaneous Q6H [facility]    iron sucrose (VENOFER) 200 mg in sodium chloride 0 9 % 100 mL IVPB  200 mg Intravenous Daily    metoprolol (LOPRESSOR) injection 2 5 mg  2 5 mg Intravenous Q6H    ondansetron (ZOFRAN) injection 4 mg  4 mg Intravenous Q8H PRN    oxyCODONE (ROXICODONE) IR tablet 5 mg  5 mg Oral Q6H PRN    pantoprazole (PROTONIX) injection 40 mg  40 mg Intravenous Q12H [facility]    phenol (CHLORASEPTIC) 1 4 % mucosal liquid 1 spray  1 spray Mouth/Throat Q2H PRN    saliva substitute (MOUTH KOTE) mucosal solution 5 spray  5 spray Mouth/Throat 4x Daily PRN     Medications Discontinued During This Encounter   Medication Reason    Aspirin Buf,CaCarb-MgCarb-MgO, 81 MG TABS     simvastatin (ZOCOR) 80 mg tablet     pravastatin (PRAVACHOL) tablet 40 mg     metoprolol succinate (TOPROL-XL) 24 hr tablet 25 mg     metoprolol (LOPRESSOR) injection 2 5 mg     HYDROcodone-acetaminophen (NORCO) 5-325 mg per tablet 1 tablet     acetaminophen (TYLENOL) tablet 650 mg     loratadine (CLARITIN) tablet 10 mg     polyethylene glycol (GLYCOLAX) bowel prep 238 g     insulin lispro (HumaLOG) 100 units/mL subcutaneous injection 1-5 Units     insulin lispro (HumaLOG) 100 units/mL subcutaneous injection 1-5 Units     morphine injection 2 mg     bisacodyl (DULCOLAX) EC tablet 10 mg     morphine injection 2 mg     sodium chloride 0 9 % irrigation solution Patient Discharge    bupivacaine liposomal (EXPAREL) 1 3 % injection 20 mL     ciprofloxacin (CIPRO) IVPB (premix) 400 mg 200 mL     metroNIDAZOLE (FLAGYL) IVPB (premix) 500 mg 100 mL     morphine (PF) 4 mg/mL injection 4 mg     HYDROmorphone (DILAUDID) injection 0 2 mg     fentaNYL (SUBLIMAZE) injection 25 mcg     sodium chloride 0 9 % infusion     guaiFENesin (MUCINEX) 12 hr tablet 600 mg     heparin (porcine) subcutaneous injection 5,000 Units     morphine injection 2 mg        Joyce Plaza MD      This progress note was produced in part using a dictation device which may document imprecise wording from author's original intent

## 2020-06-25 NOTE — ASSESSMENT & PLAN NOTE
Most likely secondary to nonoliguric ATN   Diuretic challenge started yesterday, renal function improving  Continue Lasix 40 mg twice daily

## 2020-06-25 NOTE — ASSESSMENT & PLAN NOTE
Wt Readings from Last 3 Encounters:   06/25/20 102 kg (225 lb 8 5 oz)     Patient with known history of chronic congestive heart failure and was admitted to [facility] in January  Patient was on 40 mg of Lasix b i d  As an outpatient and secondary to dizziness and orthostasis his Lasix dose was decreased to 40 mg daily during the recent hospital stay  Appears to be fluid overloaded, continue IV Lasix for now and switch to 40 mg twice daily on discharge    Ejection fraction 65% during the last echo 1/20

## 2020-06-25 NOTE — ASSESSMENT & PLAN NOTE
· Patient with known history of coronary artery disease status post CABG in 1997 in [facility]  · Patient is on beta-blocker aspirin and statin

## 2020-06-25 NOTE — PLAN OF CARE
Problem: PHYSICAL THERAPY ADULT  Goal: Performs mobility at highest level of function for planned discharge setting  See evaluation for individualized goals  Description  Treatment/Interventions: Functional transfer training, LE strengthening/ROM, Elevations, Therapeutic exercise, Endurance training, Patient/family training, Equipment eval/education, Gait training, Bed mobility, Compensatory technique education, Spoke to nursing, Spoke to case management, OT  Equipment Recommended: (pt has RW)       See flowsheet documentation for full assessment, interventions and recommendations  Outcome: Progressing  Note:   Prognosis: Good  Problem List: Decreased strength, Decreased endurance, Impaired balance, Decreased mobility, Impaired judgement, Decreased safety awareness, Decreased cognition, Obesity, Decreased skin integrity, Pain  Assessment: Pt tolerated session well despite c/o increased pain and moderate MEZA  He continues to require increased assistance with bed mobility associated with technique and increased pain  He was able to complete transfers and ambulate with decreasing assistance this date  He is not receptive to recommendations, but is agreeable with max education and encouragement for home health therapy services  He is limited by decreased insight to deficits, decreased strength, balance, endurance and increased pain  He will continue to benefit from PT services to maximize LOF  Barriers to Discharge Comments: lives alone  decreased motivation to participate  PT Discharge Recommendation: Home with skilled therapy, Return to previous environment with social support(HHPT-pt agreeable)     PT - OK to Discharge: (when medically cleared to rehab)    See flowsheet documentation for full assessment

## 2020-06-25 NOTE — ASSESSMENT & PLAN NOTE
· Patient with known history of atrial fibrillation, status post HUMBERTO and cardioversion in January of this year in [facility] but unfortunately patient is in chronic atrial fibrillation per cardiology progress note, anticoagulated with Eliquis  · Continue metoprolol and Eliquis

## 2020-06-25 NOTE — OCCUPATIONAL THERAPY NOTE
OccupationalTherapy Progress Note     Patient Name: Maikol Khoury  Today's Date: 6/25/2020  Problem List  Principal Problem:    Symptomatic anemia  Active Problems:    CHF (congestive heart failure) (HCC)    Chronic kidney disease, stage 3 (HCC)    Coronary artery disease    Diabetes mellitus type 2 in obese [facility])    Essential hypertension    Hyperlipidemia    Morbid obesity (HCC)    Atrial fibrillation (HCC)    Partial bowel obstruction (HCC)    Abnormal CT of the abdomen    Duodenal diverticulum    Colon  neoplasm    Acute renal failure (ARF) ([facility] [address] )            06/25/20 1345   Restrictions/Precautions   Other Precautions Chair Alarm; Bed Alarm;Cognitive; Fall Risk   Pain Assessment   Pain Assessment Tool 0-10   Pain Score 7   Pain Location/Orientation Location: Abdomen   ADL   Grooming Assistance   (SBA)   Grooming Deficit Verbal cueing;Supervision/safety; Increased time to complete;Wash/dry hands   Grooming Comments Pt standing at sinkside while completing hand hygiene  Pt with bend over postural control during hand hygiene and requiring vc'ing for self-correct   LB Dressing Assistance 3  Moderate Assistance   LB Dressing Deficit Steadying; Requires assistive device for steadying;Verbal cueing;Supervision/safety; Increased time to complete; Don/doff R sock; Don/doff L sock; Thread RLE into pants; Thread LLE into pants;Pull up over hips   LB Dressing Comments Pt continues to have increased difficulty with LB dressing d/t incision in abdomin  pt is not open to utilizing AD at this time  Pt will require assistance at home for donning socks/pants around feet  Pt is able to complete CM around waist for toileting at this time   [address]    (SBA)   Toileting Deficit Verbal cueing;Supervison/safety; Increased time to complete;Grab bar use;Clothing management up;Clothing management down;Perineal hygiene   Toileting Comments Pt requiring vc'ing for safety and technique   Pt completing functional transfers from toilet with use of grab bar @ SBA with increased time  Bed Mobility   Supine to Sit 2  Maximal assistance  (Max for trunk management  Mod A for LE management)   Additional items Assist x 2; Increased time required;LE management   Additional Comments pt completing supine>sit EOB with HOB flat and use of bedrail PRN with Max A x's 1 for trunk management and Mod A x's 1 for LE management   Transfers   Sit to Stand   (Steadying Assist progressing to SBA)   Additional items Verbal cues   Stand to Sit 5  Supervision   Additional items Verbal cues   Stand pivot   (Steadying Assist progressing to SBA)   Additional items Verbal cues   Additional Comments Pt completing functional transfers with use of RW for UB support  Pt requiring vc'ing for safety and hand placement   Cognition   Overall Cognitive Status Impaired   Arousal/Participation Alert; Responsive; Cooperative   Attention Attends with cues to redirect   Orientation Level Oriented X4   Memory Within functional limits   Following Commands Follows all commands and directions without difficulty   Comments Pt with increased participation and performance this date compared to previous sessions  Pt with no agitation/agression noted this date towards therapist   Activity Tolerance   Activity Tolerance Patient limited by fatigue   Medical Staff Made Aware Spoke with RN, Mesha Smith  Spoke with PTRuss   Assessment   Assessment Pt seen for treatment session #1 this date  Pt alert and agreeable to participate at this time  Pt with increased performance and participation compared to previous therapy session  Pt continued to require extensive assist for bed mobility and is recommended to sleep in a recliner chair upon d/c home at this time until home health can work with Pt on improving bed mobility performance  Pt verbalizes understanding  Pt is agreeable to accepting [facility] services into the home upon d/c from [address] [place] [address] at this time   Pt continues to make progress towards goals, limited by decrease activity tolerance, decrease standing balance, decrease performance during ADL tasks, decrease cognition, decrease safety awareness , decrease UB MS, increased pain, decrease generalized strength, decrease activity engagement and decrease performance during functional transfers  Upon end of session, Pt seated OOB in recliner chair with call bell in reach, chair alarm intact and all needs met at this time  Plan   Treatment Interventions ADL retraining;Functional transfer training;UE strengthening/ROM; Endurance training;Cognitive reorientation;Patient/family training;Equipment evaluation/education; Neuromuscular reeducation; Compensatory technique education;Continued evaluation; Energy conservation; Activityengagement   Goal Expiration Date 07/03/20   OT Treatment Day 1   OT Frequency 3-5x/wk   Recommendation   Recommendation   ([address] [place] [id number]'[facility] with family support)   OT Discharge Recommendation Home with skilled therapy     Pt goals to be met by 6/23/2020        1  Pt will demonstrate ability to complete supine<>sit @ Mod I in order to increase safety and independence during ADL tasks  2  Pt will demonstrate ability to complete UB ADLs including washing/dressing @ Mod I in order to increase performance and participation during meaningful tasks  3  Pt will demonstrate ability to complete LB dressing @ Min A in order to increase safety and independence during meaningful tasks  4  Pt will demonstrate ability to aris/doff socks/shoes while sitting EOB @ Min A in order to increase safety and independence during meaningful tasks  5  Pt will demonstrate ability to complete toileting tasks including CM and pericare @ Mod I in order to increase safety and independence during meaningful tasks  6  Pt will demonstrate ability to complete EOB, chair, toilet/commode transfers @ Mod I in order to increase performance and participation during functional tasks    7  Pt will demonstrate ability to stand for 7-8 minutes while maintaining G balance with use of RW for UB support PRN  8  Pt will demonstrate ability to tolerate 30-35 minute OT session with no vc'ing for deep breathing or use of energy conservation techniques in order to increase activity tolerance during functional tasks  9  Pt will demonstrate Good carryover of use of energy conservation/compensatory strategies during ADLs and functional tasks in order to increase safety and reduce risk for falls  10  Pt will demonstrate Good attention and participation in continued evaluation of functional ambulation house hold distances in order to assist with safe d/c planning  11  Pt will attend to continued cognitive assessments 100% of the time in order to provide most appropriate d/c recommendations  12  Pt will follow 100% simple 2-step commands and be A&O x4 consistently with environmental cues to increase participation in functional activities  13  Pt will identify 3 areas of interest/hobbies and 1 intervention on how to incorporate into daily life in order to increase interaction with environment and peers as well as increase participation in meaningful tasks     14  Pt will demonstrate 100% carryover of BUE HEP in order to increase BUE MS and increase performance during functional tasks upon d/c home         Maycol Christie OTR/L

## 2020-06-25 NOTE — ASSESSMENT & PLAN NOTE
· Patient was evaluated by PCP secondary to dizziness and fatigue and has blood pressure done as an outpatient    His hemoglobin was found to be 6 2 and was sent to the emergency room  · On the day of admission hemoglobin in the emergency room was 7 1 and receiving 2 units of packed RBCs  · Patient gives history of dark stools-suspicious for upper GI bleed patient is on aspirin and Eliquis  · PPI b i d   · Hemoglobin stable

## 2020-06-25 NOTE — ASSESSMENT & PLAN NOTE
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  Status post resection, postop day 4, passing feces and flattens freely  Diet advanced , patient tolerating

## 2020-06-25 NOTE — ASSESSMENT & PLAN NOTE
Lab Results   Component Value Date    HGBA1C 8 2 (H) 06/18/2020       Recent Labs     06/24/20  1205 06/24/20  1831 06/24/20  2332 06/25/20  0540   POCGLU 226* 267* 297* 298*       Blood Sugar Average: Last 72 hrs:  (P) 234 [id number] type 2 diabetes mellitus on oral hypoglycemic agent  Hold metformin and glipizide in the hospital  Insulin sliding scale, will add Lantus at bedtime  Adjust as necessary while NPO-continue with the sliding scale

## 2020-06-25 NOTE — PLAN OF CARE
Problem: OCCUPATIONAL THERAPY ADULT  Goal: Performs self-care activities at highest level of function for planned discharge setting  See evaluation for individualized goals  Description  Treatment Interventions: ADL retraining, Functional transfer training, UE strengthening/ROM, Endurance training, Cognitive reorientation, Patient/family training, Equipment evaluation/education, Neuromuscular reeducation, Compensatory technique education, Continued evaluation, Energy conservation, Activityengagement          See flowsheet documentation for full assessment, interventions and recommendations  Outcome: Progressing  Note:   Limitation: Decreased ADL status, Decreased UE strength, Decreased Safe judgement during ADL, Decreased cognition, Decreased endurance, Decreased self-care trans, Decreased high-level ADLs  Prognosis: Good, Fair  Assessment: Pt seen for treatment session #1 this date  Pt alert and agreeable to participate at this time  Pt with increased performance and participation compared to previous therapy session  Pt continued to require extensive assist for bed mobility and is recommended to sleep in a recliner chair upon d/c home at this time until home health can work with Pt on improving bed mobility performance  Pt verbalizes understanding  Pt is agreeable to accepting [facility] services into the home upon d/c from [address] [place] [address] at this time  Pt continues to make progress towards goals, limited by decrease activity tolerance, decrease standing balance, decrease performance during ADL tasks, decrease cognition, decrease safety awareness , decrease UB MS, increased pain, decrease generalized strength, decrease activity engagement and decrease performance during functional transfers  Upon end of session, Pt seated OOB in recliner chair with call bell in reach, chair alarm intact and all needs met at this time    Recommendation: ([address] with family support)  OT Discharge Recommendation: Home with skilled therapy

## 2020-06-26 NOTE — ASSESSMENT & PLAN NOTE
· Patient was evaluated by PCP secondary to dizziness and fatigue and has blood pressure done as an outpatient    His hemoglobin was found to be 6 2 and was sent to the emergency room  · On the day of admission hemoglobin in the emergency room was 7 1 and receiving 2 units of packed RBCs  · HGB stable since

## 2020-06-26 NOTE — ASSESSMENT & PLAN NOTE
· Patient with known history of atrial fibrillation, status post HUMBERTO and cardioversion in January of this year in [facility] but unfortunately patient is in chronic atrial fibrillation per cardiology progress note, anticoagulated with Eliquis  · Continue metoprolol and Eliquis

## 2020-06-26 NOTE — PROGRESS NOTES
Progress Note - General Surgery   Noelle Reina 80 y o  male MRN: [id number]  Unit/Bed#: -01 Encounter: [id number]    Assessment:  26-year-old male postop day 6 exploratory laparotomy with right hemicolectomy for obstructing adenocarcinoma of the cecum  31/31 lymph nodes evaluated were positive for metastatic disease  Pathologic staging is [id number]    Plan:  The above diagnosis was discussed yesterday with the patient and his son  They expressed understanding  The patient will require oncology evaluation following discharge  He may continue diet as tolerated  Out of bed as tolerated  Pain control as needed  Acute kidney injury is persistent, nephrology is following  The patient is doing well from a postoperative standpoint    Subjective/Objective     Subjective:  Patient states his pain after moving is slightly improved today  He was incontinent of stool overnight  Overall, the patient's mental status seems improved    Objective:     Blood pressure 109/56, pulse 86, temperature (!) 97 °F (36 1 °C), resp  rate 20, height 5' 4 5" (1 638 m), weight 102 kg (225 lb 12 oz), SpO2 96 %  ,Body mass index is 38 15 kg/m²        Intake/Output Summary (Last 24 hours) at 6/26/2020 0749  Last data filed at 6/25/2020 1800  Gross per 24 hour   Intake 820 ml   Output 389 ml   Net 431 ml       Invasive Devices     Peripheral Intravenous Line            Peripheral IV 06/24/20 Distal;Right;Upper;Ventral (anterior) Arm 1 day                Physical Exam: General appearance: alert and oriented, in no acute distress  Head: Normocephalic, without obvious abnormality, atraumatic  Abdomen: Soft, appropriately tender, slightly distended, incision is healing well with staples intact  Extremities: extremities normal, warm and well-perfused; no cyanosis, clubbing, or edema  Skin: Skin color, texture, turgor normal  No rashes or lesions  Neurologic: Grossly normal    Lab, Imaging and other studies:  CBC:   Lab Results   Component Value Date    WBC 3 11 (L) 06/26/2020    HGB 10 5 (L) 06/26/2020    HCT 35 6 (L) 06/26/2020    MCV 77 (L) 06/26/2020     06/26/2020    MCH 22 6 (L) 06/26/2020    MCHC 29 5 (L) 06/26/2020    RDW 20 3 (H) 06/26/2020    MPV 9 2 06/26/2020    NRBC 0 06/26/2020   , CMP:   Lab Results   Component Value Date    SODIUM 141 06/26/2020    K 3 5 06/26/2020     06/26/2020    CO2 26 06/26/2020    BUN 42 (H) 06/26/2020    CREATININE 2 26 (H) 06/26/2020    CALCIUM 7 8 (L) 06/26/2020    EGFR 25 06/26/2020     VTE Pharmacologic Prophylaxis:  Patient is on Eliquis  VTE Mechanical Prophylaxis: sequential compression device

## 2020-06-26 NOTE — CASE MANAGEMENT
Discussed in huddle  Patient is not ready for dc  POD 6 ex lap with right hemicolectomy for obstructing adenocarcinoma of the cecum  - Venofer infusion  Lasix 60 mg IV BID  - On soft diet  DC plan [facility] for Nursing/PT and OT  [facility] appointment made for 7/6    Will continue to follow

## 2020-06-26 NOTE — ASSESSMENT & PLAN NOTE
Lab Results   Component Value Date    HGBA1C 8 2 (H) 06/18/2020       Recent Labs     06/25/20  1218 06/25/20  1634 06/25/20  2108 06/26/20  0749   POCGLU 271* 310* 276* 261*       Blood Sugar Average: Last 72 hrs:  (P) 919 [id number] type 2 diabetes mellitus on oral hypoglycemic agent  Hold metformin and glipizide in the hospital  Insulin sliding scale, will add Lantus at bedtime  Adjust as necessary while NPO-continue with the sliding scale

## 2020-06-26 NOTE — PLAN OF CARE
Problem: Potential for Falls  Goal: Patient will remain free of falls  Description  INTERVENTIONS:  - Assess patient frequently for physical needs  -  Identify cognitive and physical deficits and behaviors that affect risk of falls    -  [place] fall precautions as indicated by assessment   - Educate patient/family on patient safety including physical limitations  - Instruct patient to call for assistance with activity based on assessment  - Modify environment to reduce risk of injury  - Consider OT/PT consult to assist with strengthening/mobility  Outcome: Progressing     Problem: CARDIOVASCULAR - ADULT  Goal: Maintains optimal cardiac output and hemodynamic stability  Description  INTERVENTIONS:  - Monitor I/O, vital signs and rhythm  - Monitor for S/S and trends of decreased cardiac output  - Administer and titrate ordered vasoactive medications to optimize hemodynamic stability  - Assess quality of pulses, skin color and temperature  - Assess for signs of decreased coronary artery perfusion  - Instruct patient to report change in severity of symptoms  Outcome: Progressing  Goal: Absence of cardiac dysrhythmias or at baseline rhythm  Description  INTERVENTIONS:  - Continuous cardiac monitoring, vital signs, obtain 12 lead EKG if ordered  - Administer antiarrhythmic and heart rate control medications as ordered  - Monitor electrolytes and administer replacement therapy as ordered  Outcome: Progressing     Problem: METABOLIC, FLUID AND ELECTROLYTES - ADULT  Goal: Glucose maintained within target range  Description  INTERVENTIONS:  - Monitor Blood Glucose as ordered  - Assess for signs and symptoms of hyperglycemia and hypoglycemia  - Administer ordered medications to maintain glucose within target range  - Assess nutritional intake and initiate nutrition service referral as needed  Outcome: Progressing     Problem: SKIN/TISSUE INTEGRITY - ADULT  Goal: Skin integrity remains intact  Description  INTERVENTIONS  - Identify patients at risk for skin breakdown  - Assess and monitor skin integrity  - Assess and monitor nutrition and hydration status  - Monitor labs (i e  albumin)  - Assess for incontinence   - Turn and reposition patient  - Assist with mobility/ambulation  - Relieve pressure over bony prominences  - Avoid friction and shearing  - Provide appropriate hygiene as needed including keeping skin clean and dry  - Evaluate need for skin moisturizer/barrier cream  - Collaborate with interdisciplinary team (i e  Nutrition, Rehabilitation, etc )   - Patient/family teaching  Outcome: Progressing     Problem: HEMATOLOGIC - ADULT  Goal: Maintains hematologic stability  Description  INTERVENTIONS  - Assess for signs and symptoms of bleeding or hemorrhage  - Monitor labs  - Administer supportive blood products/factors as ordered and appropriate  Outcome: Progressing     Problem: MUSCULOSKELETAL - ADULT  Goal: Maintain or return mobility to safest level of function  Description  INTERVENTIONS:  - Assess patient's ability to carry out ADLs; assess patient's baseline for ADL function and identify physical deficits which impact ability to perform ADLs (bathing, care of mouth/teeth, toileting, grooming, dressing, etc )  - Assess/evaluate cause of self-care deficits   - Assess range of motion  - Assess patient's mobility  - Assess patient's need for assistive devices and provide as appropriate  - Encourage maximum independence but intervene and supervise when necessary  - Involve family in performance of ADLs  - Assess for home care needs following discharge   - Consider OT consult to assist with ADL evaluation and planning for discharge  - Provide patient education as appropriate  Outcome: Progressing     Problem: Prexisting or High Potential for Compromised Skin Integrity  Goal: Skin integrity is maintained or improved  Description  INTERVENTIONS:  - Identify patients at risk for skin breakdown  - Assess and monitor skin integrity  - Assess and monitor nutrition and hydration status  - Monitor labs   - Assess for incontinence   - Turn and reposition patient  - Assist with mobility/ambulation  - Relieve pressure over bony prominences  - Avoid friction and shearing  - Provide appropriate hygiene as needed including keeping skin clean and dry  - Evaluate need for skin moisturizer/barrier cream  - Collaborate with interdisciplinary team   - Patient/family teaching  - Consider wound care consult   Outcome: Progressing     Problem: Nutrition/Hydration-ADULT  Goal: Nutrient/Hydration intake appropriate for improving, restoring or maintaining nutritional needs  Description  Monitor and assess patient's nutrition/hydration status for malnutrition  Collaborate with interdisciplinary team and initiate plan and interventions as ordered  Monitor patient's weight and dietary intake as ordered or per policy  Utilize nutrition screening tool and intervene as necessary  Determine patient's food preferences and provide high-protein, high-caloric foods as appropriate       INTERVENTIONS:  - Monitor oral intake, urinary output, labs, and treatment plans  - Assess nutrition and hydration status and recommend course of action  - Evaluate amount of meals eaten  - Assist patient with eating if necessary   - Allow adequate time for meals  - Recommend/ encourage appropriate diets, oral nutritional supplements, and vitamin/mineral supplements  - Order, calculate, and assess calorie counts as needed  - Recommend, monitor, and adjust tube feedings and TPN/PPN based on assessed needs  - Assess need for intravenous fluids  - Provide specific nutrition/hydration education as appropriate  - Include patient/family/caregiver in decisions related to nutrition  Outcome: Progressing

## 2020-06-26 NOTE — ASSESSMENT & PLAN NOTE
Most likely secondary to nonoliguric ATN   Diuretic as per nephrology  Continue Lasix 40 mg twice daily

## 2020-06-26 NOTE — ASSESSMENT & PLAN NOTE
Wt Readings from Last 3 Encounters:   06/26/20 102 kg (225 lb 12 oz)     Patient with known history of chronic congestive heart failure and was admitted to [facility] in January  Patient was on 40 mg of Lasix b i d  As an outpatient and secondary to dizziness and orthostasis his Lasix dose was decreased to 40 mg daily during the recent hospital stay  Appears to be fluid overloaded, continue IV Lasix for now and switch to 40 mg twice daily on discharge    Ejection fraction 65% during the last echo 1/20

## 2020-06-26 NOTE — PROGRESS NOTES
NEPHROLOGY PROGRESS NOTE   Maria Petersen 80 y o  male MRN: [id number]  Unit/Bed#: -01 Encounter: [id number]    Assessment/Plan:    20-year-old male with CKD 3, CHF, hypertension, CAD status post CABG, atrial fibrillation who was admitted 06/16 with profound anemia found have ulcerated obstruction in cecum underwent ex-lap with hemicolectomy on 6/20/20  Renal consultation requested for acute kidney injury  He had previously been receiving IV fluids and colloid atop Gatorade use  Did start him on diuretic  Renal function slightly worse today possibly due to inadequate diuresis versus some hypotension noted overnight  Still considerably edematous    1  Acute kidney injury  · Admitted with creatinine 1 6 mg/dL -> 2 46 mg/dL (peak) -> 2 26 mg/dL today  Urine chemistries indicate ATN likely due to hemodynamic perturbations in the perioperative for renal, fluid shifts, profound anemia  · He was started on Lasix IV with some improvement now seems to have plateaued  He still has considerable edema and no weight loss noted on record  Low albumin and protein source could be contributing  · He is nonoliguric but not all urine is quantified  He did have urinary catheter but this became dislodged he refused to have another 1 replace  Will need to monitor bladder scans  · Will increase Lasix to 60 mg IV b i d    · Continue to monitor I&O, daily standing weight, trend BMPs, adjust meds EGFR  · Avoid nephrotoxins, avoid wide variation in blood pressure  2  Stage 3 chronic kidney disease with a baseline creatinine 1 3-1 6 mg/dL  3  Acute on chronic CHF  · Did have exacerbation January for which she required hospitalization IV diuresis  He still examines considerably volume overloaded and will increase Lasix today  He also has low protein stores and protein should be encouraged  · He will need daily standing weights with an empty bladder, salt avoidance I&O  4   Iron deficiency anemia  · providing him with Venofer course  He has been complaining of some diarrhea will need to monitor closely as Venofer can cause diarrhea  5  Ulcerated obstructive cecal mass  · EGD 6/17, colonoscopy 6/19, ex-lap with hemicolectomy 6/20  Diet is being advanced and nutrition consult is done  6  Hypokalemia  · Will start daily oral potassium replacement for goal kg greater than 4 0 millimoles per L  7  Hypomagnesemia  · Will give 1 g IV magnesium for a goal magnesium level greater than 2 0 mg/dL    ROS  Seen examined sitting in chair  Does say he has intermittent abdominal pain especially when moving  A complete 10 point review of systems have been performed and are otherwise negative         Historical Information   Past Medical History:   Diagnosis Date    A-fib [facility])     Cardiac disease     [facility]congest[facility] failure[facility] [address] [facility]     Diabetes mellitus ([facility] )     MI, old     Myocardial infarction [facility])      Past Surgical History:   Procedure Laterality Date    CARDIAC SURGERY      MN PART REMOVAL COLON W ANASTOMOSIS N/A 6/20/2020    Procedure: exploratory laparatomy with right hemicolectomy;  Surgeon: Angela Foreman DO;  Location:  MAIN OR;  Service: General     Social History   Social History     Substance and Sexual Activity   Alcohol Use Not Currently     Social History     Substance and Sexual Activity   Drug Use Not Currently     Social History     Tobacco Use   Smoking Status Never Smoker   Smokeless Tobacco Never Used       Family History:   Family History   Problem Relation Age of Onset    Diabetes Mother        Medications:  Pertinent medications were reviewed    Current Facility-Administered Medications:  acetaminophen 650 mg Oral Q6H PRN Promise Villatoro MD    apixaban 2 5 mg Oral BID Dominga Ramírez PA-C    aspirin 81 mg Oral Daily Promise Villatoro MD    atorvastatin 40 mg Oral Daily With Delaney Rice MD    furosemide 40 mg Intravenous BID (diuretic) BRANDON Dobbins    insulin glargine 10 Units Subcutaneous HS Chilo Cortez MD    insulin lispro 1-5 Units Subcutaneous TID With Meals Chilo Cortez MD    insulin lispro 5 Units Subcutaneous TID With Meals Chilo Cortez MD    iron sucrose 200 mg Intravenous Daily BRANDON Espino Last Rate: 200 mg (06/26/20 0930)   [START ON 6/27/2020] metoprolol succinate 25 mg Oral Daily Chilo Cortez MD    ondansetron 4 mg Intravenous Q8H PRN Sheralyn Ends, DO    oxyCODONE 5 mg Oral Q4H PRN Sheralyn Ends, DO    pantoprazole 40 mg Intravenous Q12H [address] Gissell Gian, DO    phenol 1 spray Mouth/Throat Q2H PRN Sheralyn Ends, DO    saliva substitute 5 spray Mouth/Throat 4x Daily PRN Elna Fothergill, PA-C          No Known Allergies      Vitals:   /90   Pulse 92   Temp (!) 96 3 °F (35 7 °C)   Resp 17   Ht 5' 4 5" (1 638 m)   Wt 102 kg (225 lb 12 oz)   SpO2 94%   BMI 38 15 kg/m²   Body mass index is 38 15 kg/m²  SpO2: 94 %,   SpO2 Activity: At Rest,   O2 Device: None (Room air)      Intake/Output Summary (Last 24 hours) at 6/26/2020 1058  Last data filed at 6/26/2020 0900  Gross per 24 hour   Intake 820 ml   Output 289 ml   Net 531 ml     Invasive Devices     Peripheral Intravenous Line            Peripheral IV 06/26/20 Left;Upper;Ventral (anterior) Arm less than 1 day                Physical Exam  General: conscious, cooperative, in no acute distress chronically ill-appearing  Eyes: conjunctivae pink, anicteric sclerae  ENT: lips and mucous membranes moist  Neck: supple, no JVD, no masses  Chest:  Diminished breath sounds bilateral, no crackles, ronchus or wheezings  CVS: S1 & S2, normal rate, regular rhythm  Abdomen:  Obese soft, non-tender, non-distended, normoactive bowel sounds  Extremities:  Moderate +2 edema of both legs, scrotal edema  Skin: no rash, abdominal incision clean dry intact  Neuro: awake, alert, oriented      Diagnostic Data:  Lab: I have personally reviewed pertinent lab results  ,   CBC:  Results from last 7 days   Lab Units 06/26/20  0518   WBC Thousand/uL 3 11*   HEMOGLOBIN g/dL 10 5*   HEMATOCRIT % 35 6*   PLATELETS Thousands/uL 171      CMP: Lab Results   Component Value Date    SODIUM 141 06/26/2020    K 3 5 06/26/2020     06/26/2020    CO2 26 06/26/2020    BUN 42 (H) 06/26/2020    CREATININE 2 26 (H) 06/26/2020    CALCIUM 7 8 (L) 06/26/2020    EGFR 25 06/26/2020   ,   PT/INR: No results found for: PT, INR,   Magnesium: No components found for: MAG,  Phosphorous: No results found for: PHOS    Microbiology:  @LABRCNTIP,(urinecx:7)@        BRANDON Samuel    Portions of the record may have been created with voice recognition software  Occasional wrong word or "sound a like" substitutions may have occurred due to the inherent limitations of voice recognition software  Read the chart carefully and recognize, using context, where substitutions have occurred

## 2020-06-26 NOTE — ASSESSMENT & PLAN NOTE
· Patient with known history of coronary artery disease status post CABG in 1997 in [facility]  · Patient is on beta-blocker aspirin and statin

## 2020-06-26 NOTE — PROGRESS NOTES
Pt advanced to surgical soft diet  Will adjust ensure clear to glucerna BID  BS levels significantly elevated, will add CCD 3 restriction

## 2020-06-26 NOTE — PROGRESS NOTES
Progress Note - Pretty Gupta 1935, 80 y o  male MRN: [id number]    Unit/Bed#: -[facility] Encounter: [id number]    Primary Care Provider: Rosi Bonilla DO   Date and time admitted to hospital: 6/16/2020 11:14 AM    * Symptomatic anemia  Assessment & Plan  · Patient was evaluated by PCP secondary to dizziness and fatigue and has blood pressure done as an outpatient    His hemoglobin was found to be 6 2 and was sent to the emergency room  · On the day of admission hemoglobin in the emergency room was 7 1 and receiving 2 units of packed RBCs  · HGB stable since    Acute renal failure (ARF) ([facility] [address] )  Assessment & Plan  Most likely secondary to nonoliguric ATN   Diuretic as per nephrology  Continue Lasix 40 mg twice daily    Colon  neoplasm  Assessment & Plan  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  Status post resection, postop day 4, passing feces and flattens freely  Diet advanced , patient tolerating    Atrial fibrillation ([facility] [address] )  Assessment & Plan  · Patient with known history of atrial fibrillation, status post HUMBERTO and cardioversion in January of this year in [facility] but unfortunately patient is in chronic atrial fibrillation per cardiology progress note, anticoagulated with Eliquis  · Continue metoprolol and Eliquis    Morbid obesity ([facility] [address] )  Assessment & Plan  · TLC as an outpatient    Essential hypertension  Assessment & Plan  · Continue metoprolol    Diabetes mellitus type 2 in obese [facility])  Assessment & Plan  Lab Results   Component Value Date    HGBA1C 8 2 (H) 06/18/2020       Recent Labs     06/25/20  1218 06/25/20  1634 06/25/20  2108 06/26/20  0749   POCGLU 271* 310* 276* 261*     Blood Sugar Average: Last 72 hrs:  (P) 260 [id number] type 2 diabetes mellitus on oral hypoglycemic agent  Hold metformin and glipizide in the hospital  Insulin sliding scale, will add Lantus at bedtime  Adjust as necessary while NPO-continue with the sliding scale    Coronary artery disease  Assessment & Plan  · Patient with known history of coronary artery disease status post CABG in  in [facility]  · Patient is on beta-blocker aspirin and statin    Chronic kidney disease, stage 3 ([address] )  Assessment & Plan  · Baseline creatinine appears to be between 1 4 and 1 6  CHF (congestive heart failure) ([facility])  Assessment & Plan  Wt Readings from Last 3 Encounters:   20 102 kg (225 lb 12 oz)     Patient with known history of chronic congestive heart failure and was admitted to [facility] in January  Patient was on 40 mg of Lasix b i d  As an outpatient and secondary to dizziness and orthostasis his Lasix dose was decreased to 40 mg daily during the recent hospital stay  Appears to be fluid overloaded, continue IV Lasix for now and switch to 40 mg twice daily on discharge  Ejection fraction 65% during the last echo     VTE Pharmacologic Prophylaxis:   Pharmacologic: Heparin    Patient Centered Rounds: I have performed bedside rounds with nursing staff today    Discussions with Specialists or Other Care Team Provider:     Education and Discussions with Family / Patient:     Current Length of Stay: 10 day(s)    Current Patient Status: Inpatient   Certification Statement: The patient will continue to require additional inpatient hospital stay due to post op, MARLENI    Discharge Plan: probably in 1-2 days      Code Status: Level 1 - Full Code      Subjective:   No complaints  Tolerating diet, normal BM, no abdominal pain  Objective:     Vitals:   Temp (24hrs), Av 2 °F (36 2 °C), Min:96 3 °F (35 7 °C), Max:97 9 °F (36 6 °C)    Temp:  [96 3 °F (35 7 °C)-97 9 °F (36 6 °C)] 96 3 °F (35 7 °C)  HR:  [70-92] 92  Resp:  [17-20] 17  BP: ()/(56-90) 149/90  SpO2:  [94 %-98 %] 94 %  Body mass index is 38 15 kg/m²  Input and Output Summary (last 24 hours):        Intake/Output Summary (Last 24 hours) at 2020 1010  Last data filed at 2020 0900  Gross per 24 hour   Intake 820 ml   Output 289 ml   Net 531 ml       Physical Exam:     General appearance: alert, appears stated age and cooperative  Head: Normocephalic, without obvious abnormality, atraumatic  Lungs: clear to auscultation bilaterally  Heart: regular rate and rhythm  Abdomen: soft, non-tender, positive bowel sounds   Back: negative  Extremities: extremities atraumatic, no cyanosis or edema  Neurologic: Grossly normal    Additional Data:     Labs:    Results from last 7 days   Lab Units 06/26/20  0518   WBC Thousand/uL 3 11*   HEMOGLOBIN g/dL 10 5*   HEMATOCRIT % 35 6*   PLATELETS Thousands/uL 171   NEUTROS PCT % 62   LYMPHS PCT % 19   MONOS PCT % 12   EOS PCT % 5     Results from last 7 days   Lab Units 06/26/20  0518  06/24/20  0508   SODIUM mmol/L 141   < > 143   POTASSIUM mmol/L 3 5   < > 4 0   CHLORIDE mmol/L 108   < > 110*   CO2 mmol/L 26   < > 22   BUN mg/dL 42*   < > 39*   CREATININE mg/dL 2 26*   < > 2 46*   ANION GAP mmol/L 7   < > 11   CALCIUM mg/dL 7 8*   < > 8 6   ALBUMIN g/dL  --   --  2 3*   TOTAL BILIRUBIN mg/dL  --   --  0 65   ALK PHOS U/L  --   --  50   ALT U/L  --   --  20   AST U/L  --   --  25   GLUCOSE RANDOM mg/dL 236*   < > 202*    < > = values in this interval not displayed  Results from last 7 days   Lab Units 06/20/20  0613   INR  1 15     Results from last 7 days   Lab Units 06/26/20  0749 06/25/20  2108 06/25/20  1634 06/25/20  1218 06/25/20  0540 06/24/20  2332 06/24/20  1831 06/24/20  1205 06/24/20  0507 06/23/20  2356 06/23/20  1807 06/23/20  1230   POC GLUCOSE mg/dl 261* 276* 310* 271* 298* 297* 267* 226* 203* 261* 296* 304*                   * I Have Reviewed All Lab Data Listed Above  * Additional Pertinent Lab Tests Reviewed:  Petey [facility] Admission Reviewed    Imaging:    Imaging Reports Reviewed Today Include: images reviewed    Recent Cultures (last 7 days):           Last 24 Hours Medication List:     Current Facility-Administered Medications:  acetaminophen 650 mg Oral Q6H PRN Redway Vinson Claudia Sanchez MD    apixaban 2 5 mg Oral BID Charles Paz PA-C    aspirin 81 mg Oral Daily Allyn Del Toro MD    atorvastatin 40 mg Oral Daily With Lupe Cartwright MD    furosemide 40 mg Intravenous BID (diuretic) Michael Glaze CRNP    insulin glargine 10 Units Subcutaneous HS Allyn Del Toro MD    insulin lispro 1-5 Units Subcutaneous TID With Meals Allyn Del Toro MD    insulin lispro 5 Units Subcutaneous TID With Meals Allyn Del Toro MD    iron sucrose 200 mg Intravenous Daily Michael BRANDON Soto Last Rate: 200 mg (06/26/20 0930)   [START ON 6/27/2020] metoprolol succinate 25 mg Oral Daily Allyn Del Toro MD    ondansetron 4 mg Intravenous Q8H PRN Helane Corpus, DO    oxyCODONE 5 mg Oral Q4H PRN Helane Corpus, DO    pantoprazole 40 mg Intravenous Q12H [facility] Gissell Gian, DO    phenol 1 spray Mouth/Throat Q2H PRN Helane Corpus, DO    saliva substitute 5 spray Mouth/Throat 4x Daily PRN Jeff Bailey PA-C         Today, Patient Was Seen By: Allyn Del Toro MD    ** Please Note: Dictation voice to text software may have been used in the creation of this document   **

## 2020-06-27 NOTE — ASSESSMENT & PLAN NOTE
Most likely secondary to nonoliguric ATN   Diuretic as per nephrology, renal function is slightly improving    Continue Lasix 40 mg twice daily

## 2020-06-27 NOTE — ASSESSMENT & PLAN NOTE
Lab Results   Component Value Date    HGBA1C 8 2 (H) 06/18/2020       Recent Labs     06/26/20  1615 06/26/20  2114 06/27/20  0740 06/27/20  1101   POCGLU 290* 210* 255* 239*       Blood Sugar Average: Last 72 hrs:  (P) 263 5 type 2 diabetes mellitus on oral hypoglycemic agent  Hold metformin and glipizide in the hospital  Insulin sliding scale, will add Lantus at bedtime  Adjust as necessary while NPO-continue with the sliding scale

## 2020-06-27 NOTE — ASSESSMENT & PLAN NOTE
Wt Readings from Last 3 Encounters:   06/27/20 102 kg (225 lb 12 oz)     Patient with known history of chronic congestive heart failure and was admitted to [facility] in January  Patient was on 40 mg of Lasix b i d  As an outpatient and secondary to dizziness and orthostasis his Lasix dose was decreased to 40 mg daily during the recent hospital stay  Appears to be fluid overloaded, continue IV Lasix for now and switch to 40 mg twice daily on discharge    Ejection fraction 65% during the last echo 1/20

## 2020-06-27 NOTE — ASSESSMENT & PLAN NOTE
· Patient with known history of atrial fibrillation, status post HUMBERTO and cardioversion in January of this year in [facility] but unfortunately patient is in chronic atrial fibrillation per cardiology progress note, anticoagulated with Eliquis  · Continue metoprolol and Eliquis

## 2020-06-27 NOTE — PROGRESS NOTES
Progress Note - Nephrology   Singh Driscoll 80 y o  male MRN: [id number]  Unit/Bed#: -01 Encounter: [id number]    A/P:  1  Acute kidney injury atop kidney disease   Creatinine continues to slowly improve with aggressive diuresis  Continue to avoid nephrotoxins and at this time Ace inhibitors anti due to some receptor blockers  Bladder scan has been negative for significant postvoid residual   2  Chronic kidney disease stage 3 with baseline creatinine between 1 3-1 6 mg/dL  3  Hypertensive nephrosclerosis    Blood pressure is well controlled, continue current medications  4  Acute on chronic congestive heart failure   Continue aggressive diuresis, creatinine continues to improve with diuresis  5  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 June 20th  6  Multiple electrolyte abnormalities   Continue monitor all electrolytes in the setting of aggressive diuresis and replete as indicated  Follow up reason for today's visit:  Acute kidney injury/chronic kidney disease/hypertensive nephrosclerosis    Symptomatic anemia    Patient Active Problem List   Diagnosis    CHF (congestive heart failure) (HCC)    Chronic kidney disease, stage 3 ([facility] )    Coronary artery disease    Diabetes mellitus type 2 in obese ([facility] )    Essential hypertension    Hyperlipidemia    Morbid obesity ([facility] )    Symptomatic anemia    Atrial fibrillation (HCC)    Partial bowel obstruction (HCC)    Abnormal CT of the abdomen    Duodenal diverticulum    Colon  neoplasm    Acute renal failure (ARF) (HCC)         Subjective:   No Acute events overnight    Objective:     Vitals: Blood pressure 116/57, pulse 88, temperature 98 2 °F (36 8 °C), resp  rate 18, height 5' 4 5" (1 638 m), weight 102 kg (225 lb 12 oz), SpO2 96 %  ,Body mass index is 38 15 kg/m²      Weight (last 2 days)     Date/Time   Weight    06/27/20 0600   102 (225 75)    06/27/20 0500   102 (225 75)    06/26/20 0523   102 (225 75) 06/25/20 0236   102 (225 53)                Intake/Output Summary (Last 24 hours) at 6/27/2020 0922  Last data filed at 6/27/2020 0850  Gross per 24 hour   Intake 580 ml   Output 300 ml   Net 280 ml     I/O last 3 completed shifts: In: 580 [P O :480; IV Piggyback:100]  Out: 300 [Urine:300]         Physical Exam: /57   Pulse 88   Temp 98 2 °F (36 8 °C)   Resp 18   Ht 5' 4 5" (1 638 m)   Wt 102 kg (225 lb 12 oz)   SpO2 96%   BMI 38 15 kg/m²     General Appearance:    Alert, cooperative, no distress, appears stated age   Head:    Normocephalic, without obvious abnormality, atraumatic   Eyes:    Conjunctiva/corneas clear   Ears:    Normal external ears   Nose:   Nares normal, septum midline, mucosa normal, no drainage    or sinus tenderness   Throat:   Lips, mucosa, and tongue normal; teeth and gums normal   Neck:   Supple   Back:     Symmetric, no curvature, ROM normal, no CVA tenderness   Lungs:     Reduced bilaterally, no crackles appreciated this morning   Chest wall:    No tenderness or deformity   Heart:    Regular rate and rhythm, S1 and S2 normal, no murmur, rub   or gallop   Abdomen:     Soft, non-tender, bowel sounds active   Extremities:   Extremities normal, atraumatic, no cyanosis, 2 bilateral lower extremity edema +3 presacral edema   Skin:   Skin color, texture, turgor normal, no rashes or lesions   Lymph nodes:   Cervical normal   Neurologic:   CNII-XII intact            Lab, Imaging and other studies: I have personally reviewed pertinent labs    CBC:   Lab Results   Component Value Date    WBC 3 14 (L) 06/27/2020    HGB 9 9 (L) 06/27/2020    HCT 32 9 (L) 06/27/2020    MCV 74 (L) 06/27/2020     06/27/2020    MCH 22 4 (L) 06/27/2020    MCHC 30 1 (L) 06/27/2020    RDW 20 6 (H) 06/27/2020    MPV 9 1 06/27/2020    NRBC 0 06/27/2020     CMP:   Lab Results   Component Value Date    K 3 9 06/27/2020     06/27/2020    CO2 26 06/27/2020    BUN 42 (H) 06/27/2020    CREATININE 2 19 (H) 06/27/2020    CALCIUM 7 7 (L) 06/27/2020    AST 13 06/27/2020    ALT 14 06/27/2020    ALKPHOS 55 06/27/2020    EGFR 26 06/27/2020         Results from last 7 days   Lab Units 06/27/20  0519 06/26/20  0518 06/25/20  0541 06/24/20  0508  06/22/20  0435   POTASSIUM mmol/L 3 9 3 5 3 7 4 0   < > 4 1   CHLORIDE mmol/L 107 108 109* 110*   < > 111*   CO2 mmol/L 26 26 23 22   < > 20*   BUN mg/dL 42* 42* 43* 39*   < > 20   CREATININE mg/dL 2 19* 2 26* 2 19* 2 46*   < > 2 06*   CALCIUM mg/dL 7 7* 7 8* 7 7* 8 6   < > 7 4*   ALK PHOS U/L 55  --   --  50  --  48   ALT U/L 14  --   --  20  --  17   AST U/L 13  --   --  25  --  54*    < > = values in this interval not displayed  Phosphorus:   Lab Results   Component Value Date    PHOS 2 9 06/27/2020     Magnesium:   Lab Results   Component Value Date    MG 1 9 06/27/2020     Urinalysis: No results found for: Wendi Comber, SPECGRAV, PHUR, LEUKOCYTESUR, NITRITE, PROTEINUA, GLUCOSEU, KETONESU, BILIRUBINUR, BLOODU  Ionized Calcium: No results found for: CAION  Coagulation: No results found for: PT, INR, APTT  Troponin: No results found for: TROPONINI  ABG: No results found for: PHART, BCK1GGF, PO2ART, DYN8FPV, W8ELURNV, BEART, SOURCE  Radiology review:     IMAGING  Procedure: Us Kidney And Bladder    Result Date: 6/24/2020  Narrative: RENAL ULTRASOUND INDICATION:   Acute renal failure  COMPARISON: 6/17/2020 TECHNIQUE:   Ultrasound of the retroperitoneum was performed with a curvilinear transducer utilizing volumetric sweeps and still imaging techniques  FINDINGS: KIDNEYS: Symmetric and normal size  Right kidney:  11 6 x 4 9 cm  Left kidney:  10 1 x 5 5 cm  Right kidney Normal echogenicity and contour  No suspicious masses detected  No hydronephrosis  No shadowing calculi  No perinephric fluid collections  Left kidney Evaluation of the left kidney is limited by bowel gas  Normal echogenicity and contour  No suspicious masses detected  No hydronephrosis   No shadowing calculi  No perinephric fluid collections  URETERS: Nonvisualized  BLADDER: Normally distended  No focal thickening or mass lesions  A left ureteral jet only was visualized  There is ascites  Impression: Limited evaluation of the left kidney  1   No hydronephrosis  2   Ascites   Workstation performed: [id number]       Current Facility-Administered Medications   Medication Dose Route Frequency    acetaminophen (TYLENOL) tablet 650 mg  650 mg Oral Q6H PRN    apixaban (ELIQUIS) tablet 2 5 mg  2 5 mg Oral BID    aspirin (ECOTRIN LOW STRENGTH) EC tablet 81 mg  81 mg Oral Daily    atorvastatin (LIPITOR) tablet 40 mg  40 mg Oral Daily With Dinner    furosemide (LASIX) injection 60 mg  60 mg Intravenous BID (diuretic)    insulin glargine (LANTUS) subcutaneous injection 12 Units 0 12 mL  12 Units Subcutaneous HS    insulin lispro (HumaLOG) 100 units/mL subcutaneous injection 1-5 Units  1-5 Units Subcutaneous TID With Meals    insulin lispro (HumaLOG) 100 units/mL subcutaneous injection 7 Units  7 Units Subcutaneous TID With Meals    iron sucrose (VENOFER) 200 mg in sodium chloride 0 9 % 100 mL IVPB  200 mg Intravenous Daily    metoprolol succinate (TOPROL-XL) 24 hr tablet 25 mg  25 mg Oral Daily    ondansetron (ZOFRAN) injection 4 mg  4 mg Intravenous Q8H PRN    oxyCODONE (ROXICODONE) IR tablet 5 mg  5 mg Oral Q4H PRN    pantoprazole (PROTONIX) injection 40 mg  40 mg Intravenous Q12H ABEL    phenol (CHLORASEPTIC) 1 4 % mucosal liquid 1 spray  1 spray Mouth/Throat Q2H PRN    potassium chloride (K-DUR,KLOR-CON) CR tablet 40 mEq  40 mEq Oral Daily    saliva substitute (MOUTH KOTE) mucosal solution 5 spray  5 spray Mouth/Throat 4x Daily PRN     Medications Discontinued During This Encounter   Medication Reason    Aspirin Buf,CaCarb-MgCarb-MgO, 81 MG TABS     simvastatin (ZOCOR) 80 mg tablet     pravastatin (PRAVACHOL) tablet 40 mg     metoprolol succinate (TOPROL-XL) 24 hr tablet 25 mg     metoprolol (LOPRESSOR) injection 2 5 mg     HYDROcodone-acetaminophen (NORCO) 5-325 mg per tablet 1 tablet     acetaminophen (TYLENOL) tablet 650 mg     loratadine (CLARITIN) tablet 10 mg     polyethylene glycol (GLYCOLAX) bowel prep 238 g     insulin lispro (HumaLOG) 100 units/mL subcutaneous injection 1-5 Units     insulin lispro (HumaLOG) 100 units/mL subcutaneous injection 1-5 Units     morphine injection 2 mg     bisacodyl (DULCOLAX) EC tablet 10 mg     morphine injection 2 mg     sodium chloride 0 9 % irrigation solution Patient Discharge    bupivacaine liposomal (EXPAREL) 1 3 % injection 20 mL     ciprofloxacin (CIPRO) IVPB (premix) 400 mg 200 mL     metroNIDAZOLE (FLAGYL) IVPB (premix) 500 mg 100 mL     morphine (PF) 4 mg/mL injection 4 mg     HYDROmorphone (DILAUDID) injection 0 2 mg     fentaNYL (SUBLIMAZE) injection 25 mcg     sodium chloride 0 9 % infusion     guaiFENesin (MUCINEX) 12 hr tablet 600 mg     heparin (porcine) subcutaneous injection 5,000 Units     morphine injection 2 mg     oxyCODONE (ROXICODONE) IR tablet 5 mg     insulin lispro (HumaLOG) 100 units/mL subcutaneous injection 1-5 Units     insulin lispro (HumaLOG) 100 units/mL subcutaneous injection 1-5 Units     insulin glargine (LANTUS) subcutaneous injection 5 Units 0 05 mL     metoprolol (LOPRESSOR) injection 2 5 mg     furosemide (LASIX) injection 40 mg     insulin lispro (HumaLOG) 100 units/mL subcutaneous injection 5 Units     insulin glargine (LANTUS) subcutaneous injection 10 Units 0 1 mL        Ila Jensen, DO      This progress note was produced in part using a dictation device which may document imprecise wording from author's original intent

## 2020-06-27 NOTE — PROGRESS NOTES
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  31/31 lymph nodes evaluated were positive for metastatic disease  Pathologic staging is [id number]    Plan:  The above diagnosis was discussed yesterday with the patient and his son  They expressed understanding  The patient will require oncology evaluation following discharge  He may continue diet as tolerated  Out of bed as tolerated  Pain control as needed  Encourage p o  Acute kidney injury is persistent, nephrology is following  The patient is doing well from a postoperative standpoint  The patient is only tolerating very small amounts of diet  Would like to see his appetite improved    Subjective/Objective     Subjective:  Patient states he developed some pain overnight  He is able to eat small amounts of his diet  Positive bowel movements, positive flatus he denies any nausea or vomiting    Objective:     Blood pressure 116/57, pulse 88, temperature 98 2 °F (36 8 °C), resp  rate 18, height 5' 4 5" (1 638 m), weight 102 kg (225 lb 12 oz), SpO2 96 %  ,Body mass index is 38 15 kg/m²        Intake/Output Summary (Last 24 hours) at 6/27/2020 0902  Last data filed at 6/27/2020 0850  Gross per 24 hour   Intake 580 ml   Output 300 ml   Net 280 ml       Invasive Devices     Peripheral Intravenous Line            Peripheral IV 06/26/20 Left;Upper;Ventral (anterior) Arm less than 1 day                Physical Exam: General appearance: alert and oriented, in no acute distress  Head: Normocephalic, without obvious abnormality, atraumatic  Abdomen: Soft, appropriately tender, slightly distended, incision is healing well with staples intact  Extremities: extremities normal, warm and well-perfused; no cyanosis, clubbing, or edema  Skin: Skin color, texture, turgor normal  No rashes or lesions  Neurologic: Grossly normal    Lab, Imaging and other studies:  CBC:   Lab Results   Component Value Date    WBC 3 14 (L) 06/27/2020    HGB 9 9 (L) 06/27/2020    HCT 32 9 (L) 06/27/2020    MCV 74 (L) 06/27/2020     06/27/2020    MCH 22 4 (L) 06/27/2020    MCHC 30 1 (L) 06/27/2020    RDW 20 6 (H) 06/27/2020    MPV 9 1 06/27/2020    NRBC 0 06/27/2020   , CMP:   Lab Results   Component Value Date    SODIUM 140 06/27/2020    K 3 9 06/27/2020     06/27/2020    CO2 26 06/27/2020    BUN 42 (H) 06/27/2020    CREATININE 2 19 (H) 06/27/2020    CALCIUM 7 7 (L) 06/27/2020    AST 13 06/27/2020    ALT 14 06/27/2020    ALKPHOS 55 06/27/2020    EGFR 26 06/27/2020     VTE Pharmacologic Prophylaxis:  Patient is on Eliquis  VTE Mechanical Prophylaxis: sequential compression device

## 2020-06-27 NOTE — PROGRESS NOTES
Progress Note - Marcelyn Aase 1935, 80 y o  male MRN: [id number]    Unit/Bed#: -[facility] Encounter: [id number]    Primary Care Provider: Tahmina Oneal DO   Date and time admitted to hospital: 6/16/2020 11:14 AM     * Symptomatic anemia  Assessment & Plan  · Patient was evaluated by PCP secondary to dizziness and fatigue and has blood pressure done as an outpatient  His hemoglobin was found to be 6 2 and was sent to the emergency room  · On the day of admission hemoglobin in the emergency room was 7 1 and received 2 units of packed RBCs  · HGB stable since    Acute renal failure (ARF) ([facility] [address] )  Assessment & Plan  Most likely secondary to nonoliguric ATN   Diuretic as per nephrology, renal function is slightly improving  Continue Lasix 40 mg twice daily    Colon  neoplasm  Assessment & Plan  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  Status post resection, postop day 4, normal bowel movement  Patient on surgical soft diet, advance diet as per surgery recommendation  Atrial fibrillation [facility])  Assessment & Plan  · Patient with known history of atrial fibrillation, status post HUMBERTO and cardioversion in January of this year in [facility] but unfortunately patient is in chronic atrial fibrillation per cardiology progress note, anticoagulated with Eliquis  · Continue metoprolol and Eliquis    Morbid obesity ([facility] [address] )  Assessment & Plan  · TLC as an outpatient    Hyperlipidemia  Assessment & Plan  · Holding statin for now as patient is NPO      Essential hypertension  Assessment & Plan  · Continue metoprolol    Diabetes mellitus type 2 in obese [facility])  Assessment & Plan  Lab Results   Component Value Date    HGBA1C 8 2 (H) 06/18/2020       Recent Labs     06/26/20  1615 06/26/20  2114 06/27/20  0740 06/27/20  1101   POCGLU 290* 210* 255* 239*     Blood Sugar Average: Last 72 hrs:  (P) 263 5 type 2 diabetes mellitus on oral hypoglycemic agent  Hold metformin and glipizide in the hospital  Insulin sliding scale, will add Lantus at bedtime  Adjust as necessary while NPO-continue with the sliding scale    Coronary artery disease  Assessment & Plan  · Patient with known history of coronary artery disease status post CABG in  in [facility]  · Patient is on beta-blocker aspirin and statin    Chronic kidney disease, stage 3 ([facility])  Assessment & Plan  · Baseline creatinine appears to be between 1 4 and 1 6  CHF (congestive heart failure) ([facility])  Assessment & Plan  Wt Readings from Last 3 Encounters:   20 102 kg (225 lb 12 oz)     Patient with known history of chronic congestive heart failure and was admitted to [facility] in January  Patient was on 40 mg of Lasix b i d  As an outpatient and secondary to dizziness and orthostasis his Lasix dose was decreased to 40 mg daily during the recent hospital stay  Appears to be fluid overloaded, continue IV Lasix for now and switch to 40 mg twice daily on discharge  Ejection fraction 65% during the last echo 1/20    VTE Pharmacologic Prophylaxis:   Pharmacologic: Apixaban (Eliquis)    Patient Centered Rounds: I have performed bedside rounds with nursing staff today    Discussions with Specialists or Other Care Team Provider:     Education and Discussions with Family / Patient:     Current Length of Stay: 11 day(s)    Current Patient Status: Inpatient   Certification Statement: The patient will continue to require additional inpatient hospital stay due to Acute renal failure    Discharge Plan:  Probably in 2-3 days    Code Status: Level 1 - Full Code      Subjective:   Overnight patient had mild abdominal pain requiring extra dose of oxycodone  Patient tolerating diet but not eating as much  Says he has normal bowel movement and passes flatus freely  Abdomen not distended or tender      Objective:     Vitals:   Temp (24hrs), Av 3 °F (36 8 °C), Min:98 °F (36 7 °C), Max:98 8 °F (37 1 °C)    Temp:  [98 °F (36 7 °C)-98 8 °F (37 1 °C)] 98 2 °F (36 8 °C)  HR: [78-89] 86  Resp:  [17-20] 17  BP: (110-116)/(57-62) 111/62  SpO2:  [94 %-96 %] 94 %  Body mass index is 38 15 kg/m²  Input and Output Summary (last 24 hours): Intake/Output Summary (Last 24 hours) at 6/27/2020 1159  Last data filed at 6/27/2020 0850  Gross per 24 hour   Intake 480 ml   Output 200 ml   Net 280 ml       Physical Exam:     General appearance: alert, appears stated age and cooperative  Head: Normocephalic, without obvious abnormality, atraumatic  Lungs: clear to auscultation bilaterally  Heart: regular rate and rhythm  Abdomen: soft, non-tender, positive bowel sounds   Back: negative  Extremities: extremities atraumatic, no cyanosis or edema  Neurologic: Grossly normal    Additional Data:     Labs:    Results from last 7 days   Lab Units 06/27/20  0519   WBC Thousand/uL 3 14*   HEMOGLOBIN g/dL 9 9*   HEMATOCRIT % 32 9*   PLATELETS Thousands/uL 174   NEUTROS PCT % 64   LYMPHS PCT % 21   MONOS PCT % 10   EOS PCT % 4     Results from last 7 days   Lab Units 06/27/20  0519   SODIUM mmol/L 140   POTASSIUM mmol/L 3 9   CHLORIDE mmol/L 107   CO2 mmol/L 26   BUN mg/dL 42*   CREATININE mg/dL 2 19*   ANION GAP mmol/L 7   CALCIUM mg/dL 7 7*   ALBUMIN g/dL 2 0*   TOTAL BILIRUBIN mg/dL 0 51   ALK PHOS U/L 55   ALT U/L 14   AST U/L 13   GLUCOSE RANDOM mg/dL 257*         Results from last 7 days   Lab Units 06/27/20  1101 06/27/20  0740 06/26/20  2114 06/26/20  1615 06/26/20  1138 06/26/20  0749 06/25/20  2108 06/25/20  1634 06/25/20  1218 06/25/20  0540 06/24/20  2332 06/24/20  1831   POC GLUCOSE mg/dl 239* 255* 210* 290* 286* 261* 276* 310* 271* 298* 297* 267*                   * I Have Reviewed All Lab Data Listed Above  * Additional Pertinent Lab Tests Reviewed:  [facility] Admission Reviewed    Imaging:    Imaging Reports Reviewed Today Include: images reviewed    Recent Cultures (last 7 days):           Last 24 Hours Medication List:     Current Facility-Administered Medications:  acetaminophen 650 mg Oral Q6H PRN Chantal Gordon MD   apixaban 2 5 mg Oral BID Mendel Nichole PA-C   aspirin 81 mg Oral Daily Chantal Gordon MD   atorvastatin 40 mg Oral Daily With Tereza Andersen MD   furosemide 60 mg Intravenous BID (diuretic) BRANDON Carlos   insulin glargine 12 Units Subcutaneous HS Chantal Gordon MD   insulin lispro 1-5 Units Subcutaneous TID With Meals Chantal Gordon MD   insulin lispro 7 Units Subcutaneous TID With Meals Chantal Gordon MD   metoprolol succinate 25 mg Oral Daily Chantal Gordon MD   ondansetron 4 mg Intravenous Q8H PRN Casey Dura, DO   oxyCODONE 5 mg Oral Q4H PRN Casey Dura, DO   pantoprazole 40 mg Intravenous Q12H [address] Gissell Rhame, DO   phenol 1 spray Mouth/Throat Q2H PRN Casey Dura, DO   potassium chloride 40 mEq Oral Daily BRANDON Carlos   saliva substitute 5 spray Mouth/Throat 4x Daily PRN Daniela Chatman PA-C        Today, Patient Was Seen By: Chantal Gordon MD    ** Please Note: Dictation voice to text software may have been used in the creation of this document   **

## 2020-06-27 NOTE — PLAN OF CARE
Problem: Potential for Falls  Goal: Patient will remain free of falls  Description  INTERVENTIONS:  - Assess patient frequently for physical needs  -  Identify cognitive and physical deficits and behaviors that affect risk of falls    -  [place] fall precautions as indicated by assessment   - Educate patient/family on patient safety including physical limitations  - Instruct patient to call for assistance with activity based on assessment  - Modify environment to reduce risk of injury  - Consider OT/PT consult to assist with strengthening/mobility  Outcome: Progressing     Problem: CARDIOVASCULAR - ADULT  Goal: Maintains optimal cardiac output and hemodynamic stability  Description  INTERVENTIONS:  - Monitor I/O, vital signs and rhythm  - Monitor for S/S and trends of decreased cardiac output  - Administer and titrate ordered vasoactive medications to optimize hemodynamic stability  - Assess quality of pulses, skin color and temperature  - Assess for signs of decreased coronary artery perfusion  - Instruct patient to report change in severity of symptoms  Outcome: Progressing  Goal: Absence of cardiac dysrhythmias or at baseline rhythm  Description  INTERVENTIONS:  - Continuous cardiac monitoring, vital signs, obtain 12 lead EKG if ordered  - Administer antiarrhythmic and heart rate control medications as ordered  - Monitor electrolytes and administer replacement therapy as ordered  Outcome: Progressing     Problem: METABOLIC, FLUID AND ELECTROLYTES - ADULT  Goal: Glucose maintained within target range  Description  INTERVENTIONS:  - Monitor Blood Glucose as ordered  - Assess for signs and symptoms of hyperglycemia and hypoglycemia  - Administer ordered medications to maintain glucose within target range  - Assess nutritional intake and initiate nutrition service referral as needed  Outcome: Not Progressing     Problem: SKIN/TISSUE INTEGRITY - ADULT  Goal: Skin integrity remains intact  Description  INTERVENTIONS  - Identify patients at risk for skin breakdown  - Assess and monitor skin integrity  - Assess and monitor nutrition and hydration status  - Monitor labs (i e  albumin)  - Assess for incontinence   - Turn and reposition patient  - Assist with mobility/ambulation  - Relieve pressure over bony prominences  - Avoid friction and shearing  - Provide appropriate hygiene as needed including keeping skin clean and dry  - Evaluate need for skin moisturizer/barrier cream  - Collaborate with interdisciplinary team (i e  Nutrition, Rehabilitation, etc )   - Patient/family teaching  Outcome: Not Progressing, patient refusing repositioning

## 2020-06-27 NOTE — NURSING NOTE
Patient refusing oob to chair ,ambulation, repositioning, incentive spirometer explained contradictions refusing

## 2020-06-27 NOTE — ASSESSMENT & PLAN NOTE
· Patient with known history of coronary artery disease status post CABG in 1997 in [facility]  · Patient is on beta-blocker aspirin and statin

## 2020-06-27 NOTE — PLAN OF CARE
Problem: Potential for Falls  Goal: Patient will remain free of falls  Description  INTERVENTIONS:  - Assess patient frequently for physical needs  -  Identify cognitive and physical deficits and behaviors that affect risk of falls    -  [place] fall precautions as indicated by assessment   - Educate patient/family on patient safety including physical limitations  - Instruct patient to call for assistance with activity based on assessment  - Modify environment to reduce risk of injury  - Consider OT/PT consult to assist with strengthening/mobility  Outcome: Progressing     Problem: CARDIOVASCULAR - ADULT  Goal: Maintains optimal cardiac output and hemodynamic stability  Description  INTERVENTIONS:  - Monitor I/O, vital signs and rhythm  - Monitor for S/S and trends of decreased cardiac output  - Administer and titrate ordered vasoactive medications to optimize hemodynamic stability  - Assess quality of pulses, skin color and temperature  - Assess for signs of decreased coronary artery perfusion  - Instruct patient to report change in severity of symptoms  Outcome: Progressing  Goal: Absence of cardiac dysrhythmias or at baseline rhythm  Description  INTERVENTIONS:  - Continuous cardiac monitoring, vital signs, obtain 12 lead EKG if ordered  - Administer antiarrhythmic and heart rate control medications as ordered  - Monitor electrolytes and administer replacement therapy as ordered  Outcome: Progressing     Problem: METABOLIC, FLUID AND ELECTROLYTES - ADULT  Goal: Glucose maintained within target range  Description  INTERVENTIONS:  - Monitor Blood Glucose as ordered  - Assess for signs and symptoms of hyperglycemia and hypoglycemia  - Administer ordered medications to maintain glucose within target range  - Assess nutritional intake and initiate nutrition service referral as needed  Outcome: Progressing     Problem: SKIN/TISSUE INTEGRITY - ADULT  Goal: Skin integrity remains intact  Description  INTERVENTIONS  - Identify patients at risk for skin breakdown  - Assess and monitor skin integrity  - Assess and monitor nutrition and hydration status  - Monitor labs (i e  albumin)  - Assess for incontinence   - Turn and reposition patient  - Assist with mobility/ambulation  - Relieve pressure over bony prominences  - Avoid friction and shearing  - Provide appropriate hygiene as needed including keeping skin clean and dry  - Evaluate need for skin moisturizer/barrier cream  - Collaborate with interdisciplinary team (i e  Nutrition, Rehabilitation, etc )   - Patient/family teaching  Outcome: Progressing     Problem: HEMATOLOGIC - ADULT  Goal: Maintains hematologic stability  Description  INTERVENTIONS  - Assess for signs and symptoms of bleeding or hemorrhage  - Monitor labs  - Administer supportive blood products/factors as ordered and appropriate  Outcome: Progressing     Problem: MUSCULOSKELETAL - ADULT  Goal: Maintain or return mobility to safest level of function  Description  INTERVENTIONS:  - Assess patient's ability to carry out ADLs; assess patient's baseline for ADL function and identify physical deficits which impact ability to perform ADLs (bathing, care of mouth/teeth, toileting, grooming, dressing, etc )  - Assess/evaluate cause of self-care deficits   - Assess range of motion  - Assess patient's mobility  - Assess patient's need for assistive devices and provide as appropriate  - Encourage maximum independence but intervene and supervise when necessary  - Involve family in performance of ADLs  - Assess for home care needs following discharge   - Consider OT consult to assist with ADL evaluation and planning for discharge  - Provide patient education as appropriate  Outcome: Progressing     Problem: Prexisting or High Potential for Compromised Skin Integrity  Goal: Skin integrity is maintained or improved  Description  INTERVENTIONS:  - Identify patients at risk for skin breakdown  - Assess and monitor skin integrity  - Assess and monitor nutrition and hydration status  - Monitor labs   - Assess for incontinence   - Turn and reposition patient  - Assist with mobility/ambulation  - Relieve pressure over bony prominences  - Avoid friction and shearing  - Provide appropriate hygiene as needed including keeping skin clean and dry  - Evaluate need for skin moisturizer/barrier cream  - Collaborate with interdisciplinary team   - Patient/family teaching  - Consider wound care consult   Outcome: Progressing     Problem: Nutrition/Hydration-ADULT  Goal: Nutrient/Hydration intake appropriate for improving, restoring or maintaining nutritional needs  Description  Monitor and assess patient's nutrition/hydration status for malnutrition  Collaborate with interdisciplinary team and initiate plan and interventions as ordered  Monitor patient's weight and dietary intake as ordered or per policy  Utilize nutrition screening tool and intervene as necessary  Determine patient's food preferences and provide high-protein, high-caloric foods as appropriate       INTERVENTIONS:  - Monitor oral intake, urinary output, labs, and treatment plans  - Assess nutrition and hydration status and recommend course of action  - Evaluate amount of meals eaten  - Assist patient with eating if necessary   - Allow adequate time for meals  - Recommend/ encourage appropriate diets, oral nutritional supplements, and vitamin/mineral supplements  - Order, calculate, and assess calorie counts as needed  - Recommend, monitor, and adjust tube feedings and TPN/PPN based on assessed needs  - Assess need for intravenous fluids  - Provide specific nutrition/hydration education as appropriate  - Include patient/family/caregiver in decisions related to nutrition  Outcome: Progressing

## 2020-06-27 NOTE — ASSESSMENT & PLAN NOTE
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  Status post resection, postop day 4, normal bowel movement  Patient on surgical soft diet, advance diet as per surgery recommendation

## 2020-06-28 NOTE — PROGRESS NOTES
Progress Note - Nephrology   Carlyle Paget 80 y o  male MRN: [id number]  Unit/Bed#: -01 Encounter: [id number]    A/P:  1  Acute kidney injury atop kidney disease               creatinine stable, continue to diurese as tolerated  The patient's renal function can tolerate this in the setting of abdominal pain and decreased p o  Intake, we may need to back off on diuresis  Continue avoid nephrotoxins  2  Chronic kidney disease stage 3 with baseline creatinine between 1 3-1 6 mg/dL  3  Hypertensive nephrosclerosis                continue monitor blood pressures, they have been appropriate  4  Acute on chronic congestive heart failure               patient did have appropriate weight loss, no further adjustments needed from the aggressiveness standpoint, as mentioned above clinically, the patient may have significant reduction in oral intake which may alter the frequency may need for diuretics  5  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 June 20th  6  Multiple electrolyte abnormalities               continue monitor and adjust electrolytes as indicated  7  Abdominal pain   Status post obstruction series by surgery, continue to monitor clinically, care according to our surgical colleagues        Follow up reason for today's visit:  Acute kidney injury/chronic kidney disease/hypertensive nephrosclerosis/congestive heart failure    Symptomatic anemia    Patient Active Problem List   Diagnosis    CHF (congestive heart failure) (HCC)    Chronic kidney disease, stage 3 ([address] )    Coronary artery disease    Diabetes mellitus type 2 in obese ([address] )    Essential hypertension    Hyperlipidemia    Morbid obesity ([address] )    Symptomatic anemia    Atrial fibrillation (HCC)    Partial bowel obstruction (HCC)    Abnormal CT of the abdomen    Duodenal diverticulum    Colon  neoplasm    Acute renal failure (ARF) (HCC)         Subjective:   Patient complaining of abdominal pain which is new since this morning  He just had obstruction series performed earlier for etiology  Objective:     Vitals: Blood pressure 128/61, pulse 81, temperature 98 1 °F (36 7 °C), resp  rate 20, height 5' 4 5" (1 638 m), weight 98 9 kg (218 lb 0 6 oz), SpO2 95 %  ,Body mass index is 36 85 kg/m²  Weight (last 2 days)     Date/Time   Weight    06/28/20 0834   98 9 (218 04)    06/28/20 0441   102 (225 53)    06/27/20 0600   102 (225 75)    06/27/20 0500   102 (225 75)    06/26/20 0523   102 (225 75)                Intake/Output Summary (Last 24 hours) at 6/28/2020 0848  Last data filed at 6/28/2020 0532  Gross per 24 hour   Intake 410 ml   Output 1120 ml   Net -710 ml     I/O last 3 completed shifts:   In: 410 [P O :300; IV Piggyback:110]  Out: 1120 [Urine:1120]         Physical Exam: /61   Pulse 81   Temp 98 1 °F (36 7 °C)   Resp 20   Ht 5' 4 5" (1 638 m)   Wt 98 9 kg (218 lb 0 6 oz)   SpO2 95%   BMI 36 85 kg/m²     General Appearance:    Alert, cooperative, no distress, appears stated age   Head:    Normocephalic, without obvious abnormality, atraumatic   Eyes:    Conjunctiva/corneas clear   Ears:    Normal external ears   Nose:   Nares normal, septum midline, mucosa normal, no drainage    or sinus tenderness   Throat:   Lips, mucosa, and tongue normal; teeth and gums normal   Neck:   Supple   Back:     Symmetric, no curvature, ROM normal, no CVA tenderness   Lungs:     Reduced bilaterally with soft crackles   Chest wall:    No tenderness or deformity   Heart:    Regular rate and rhythm, S1 and S2 normal, no murmur, rub   or gallop   Abdomen:     Soft,, bowel sounds active, mild epigastric tenderness with significant tympany to percussion in that area   Extremities:   Extremities normal, atraumatic, no cyanosis, +2 bilateral lower extremity edema   Skin:   Skin color, texture, turgor normal, no rashes or lesions   Lymph nodes:   Cervical normal   Neurologic:   CNII-XII intact            Lab, Imaging and other studies: I have personally reviewed pertinent labs  CBC:   Lab Results   Component Value Date    WBC 4 38 06/28/2020    HGB 11 0 (L) 06/28/2020    HCT 36 7 06/28/2020    MCV 76 (L) 06/28/2020     06/28/2020    MCH 22 7 (L) 06/28/2020    MCHC 30 0 (L) 06/28/2020    RDW 21 1 (H) 06/28/2020    MPV 9 4 06/28/2020    NRBC 0 06/28/2020     CMP:   Lab Results   Component Value Date    K 3 9 06/28/2020     06/28/2020    CO2 24 06/28/2020    BUN 36 (H) 06/28/2020    CREATININE 2 14 (H) 06/28/2020    CALCIUM 7 8 (L) 06/28/2020    EGFR 27 06/28/2020         Results from last 7 days   Lab Units 06/28/20  0442 06/27/20  0519 06/26/20  0518  06/24/20  0508  06/22/20  0435   POTASSIUM mmol/L 3 9 3 9 3 5   < > 4 0   < > 4 1   CHLORIDE mmol/L 108 107 108   < > 110*   < > 111*   CO2 mmol/L 24 26 26   < > 22   < > 20*   BUN mg/dL 36* 42* 42*   < > 39*   < > 20   CREATININE mg/dL 2 14* 2 19* 2 26*   < > 2 46*   < > 2 06*   CALCIUM mg/dL 7 8* 7 7* 7 8*   < > 8 6   < > 7 4*   ALK PHOS U/L  --  55  --   --  50  --  48   ALT U/L  --  14  --   --  20  --  17   AST U/L  --  13  --   --  25  --  54*    < > = values in this interval not displayed  Phosphorus: No results found for: PHOS  Magnesium: No results found for: MG  Urinalysis: No results found for: Dennis Climes, SPECGRAV, PHUR, LEUKOCYTESUR, NITRITE, PROTEINUA, GLUCOSEU, KETONESU, BILIRUBINUR, BLOODU  Ionized Calcium: No results found for: CAION  Coagulation: No results found for: PT, INR, APTT  Troponin: No results found for: TROPONINI  ABG: No results found for: PHART, JMC2XPO, PO2ART, ITN7XYB, J3FXDNYL, BEART, SOURCE  Radiology review:     IMAGING  No results found      Current Facility-Administered Medications   Medication Dose Route Frequency    acetaminophen (TYLENOL) tablet 650 mg  650 mg Oral Q6H PRN    apixaban (ELIQUIS) tablet 2 5 mg  2 5 mg Oral BID    aspirin (ECOTRIN LOW STRENGTH) EC tablet 81 mg  81 mg Oral Daily    atorvastatin (LIPITOR) tablet 40 mg  40 mg Oral Daily With Dinner    furosemide (LASIX) injection 60 mg  60 mg Intravenous BID (diuretic)    insulin glargine (LANTUS) subcutaneous injection 12 Units 0 12 mL  12 Units Subcutaneous HS    insulin lispro (HumaLOG) 100 units/mL subcutaneous injection 1-5 Units  1-5 Units Subcutaneous TID With Meals    insulin lispro (HumaLOG) 100 units/mL subcutaneous injection 7 Units  7 Units Subcutaneous TID With Meals    metoprolol succinate (TOPROL-XL) 24 hr tablet 25 mg  25 mg Oral Daily    ondansetron (ZOFRAN) injection 4 mg  4 mg Intravenous Q8H PRN    ondansetron (ZOFRAN) injection 4 mg  4 mg Intravenous Once    oxyCODONE (ROXICODONE) IR tablet 5 mg  5 mg Oral Q4H PRN    pantoprazole (PROTONIX) injection 40 mg  40 mg Intravenous Q12H [facility]    phenol (CHLORASEPTIC) 1 4 % mucosal liquid 1 spray  1 spray Mouth/Throat Q2H PRN    potassium chloride (K-DUR,KLOR-CON) CR tablet 40 mEq  40 mEq Oral Daily    saliva substitute (MOUTH KOTE) mucosal solution 5 spray  5 spray Mouth/Throat 4x Daily PRN     Medications Discontinued During This Encounter   Medication Reason    Aspirin Buf,CaCarb-MgCarb-MgO, 81 MG TABS     simvastatin (ZOCOR) 80 mg tablet     pravastatin (PRAVACHOL) tablet 40 mg     metoprolol succinate (TOPROL-XL) 24 hr tablet 25 mg     metoprolol (LOPRESSOR) injection 2 5 mg     HYDROcodone-acetaminophen (NORCO) 5-325 mg per tablet 1 tablet     acetaminophen (TYLENOL) tablet 650 mg     loratadine (CLARITIN) tablet 10 mg     polyethylene glycol (GLYCOLAX) bowel prep 238 g     insulin lispro (HumaLOG) 100 units/mL subcutaneous injection 1-5 Units     insulin lispro (HumaLOG) 100 units/mL subcutaneous injection 1-5 Units     morphine injection 2 mg     bisacodyl (DULCOLAX) EC tablet 10 mg     morphine injection 2 mg     sodium chloride 0 9 % irrigation solution Patient Discharge    bupivacaine liposomal (EXPAREL) 1 3 % injection 20 mL     ciprofloxacin (CIPRO) IVPB (premix) 400 mg 200 mL     metroNIDAZOLE (FLAGYL) IVPB (premix) 500 mg 100 mL     morphine (PF) 4 mg/mL injection 4 mg     HYDROmorphone (DILAUDID) injection 0 2 mg     fentaNYL (SUBLIMAZE) injection 25 mcg     sodium chloride 0 9 % infusion     guaiFENesin (MUCINEX) 12 hr tablet 600 mg     heparin (porcine) subcutaneous injection 5,000 Units     morphine injection 2 mg     oxyCODONE (ROXICODONE) IR tablet 5 mg     insulin lispro (HumaLOG) 100 units/mL subcutaneous injection 1-5 Units     insulin lispro (HumaLOG) 100 units/mL subcutaneous injection 1-5 Units     insulin glargine (LANTUS) subcutaneous injection 5 Units 0 05 mL     metoprolol (LOPRESSOR) injection 2 5 mg     furosemide (LASIX) injection 40 mg     insulin lispro (HumaLOG) 100 units/mL subcutaneous injection 5 Units     insulin glargine (LANTUS) subcutaneous injection 10 Units 0 1 mL     ondansetron (ZOFRAN) injection 4 mg     metolazone (ZAROXOLYN) tablet 5 mg        Trish Phillip, DO      This progress note was produced in part using a dictation device which may document imprecise wording from author's original intent

## 2020-06-28 NOTE — ASSESSMENT & PLAN NOTE
· Patient with known history of coronary artery disease status post CABG in 1997 in [facility]  · Patient is on beta-blocker aspirin and statin

## 2020-06-28 NOTE — NURSING NOTE
Patient ambulated to approximately elevator and back assist x 2 with the walker, needs consistent reinforcement to ambulate (initially refused, explained contradictions) agreed   Encouraged to ambulate at least one more time before bedtime tonight

## 2020-06-28 NOTE — PROGRESS NOTES
Progress Note - General Surgery   Purnima Pierce 80 y o  male MRN: [id number]  Unit/Bed#: -01 Encounter: [id number]    Assessment:  70-year-old male postop day 8 exploratory laparotomy with right hemicolectomy for obstructing adenocarcinoma of the cecum  31/31 lymph nodes evaluated were positive for metastatic disease  Pathologic staging is [id number]    Plan:  The above diagnosis was discussed with the patient and his son  They expressed understanding  The patient will require oncology evaluation following discharge  Patient is having some nausea this morning  Will change his diet to clear liquids  Out of bed as tolerated  Pain control as needed  Nursing has been encouraging the patient to get out of bed and ambulate however the patient has been resistant  I reinforced the importance of ambulation with the patient and his family  Acute kidney injury is stable, nephrology is following      Subjective/Objective     Subjective:  Patient developed pain and nausea overnight  Currently would only like clear liquids  Afebrile  Objective:     Blood pressure 128/61, pulse 81, temperature 98 1 °F (36 7 °C), resp  rate 20, height 5' 4 5" (1 638 m), weight 98 9 kg (218 lb 0 6 oz), SpO2 95 %  ,Body mass index is 36 85 kg/m²        Intake/Output Summary (Last 24 hours) at 6/28/2020 0844  Last data filed at 6/28/2020 0532  Gross per 24 hour   Intake 410 ml   Output 1120 ml   Net -710 ml       Invasive Devices     Peripheral Intravenous Line            Peripheral IV 06/26/20 Left;Upper;Ventral (anterior) Arm 1 day                Physical Exam: General appearance: alert and oriented, in no acute distress  Head: Normocephalic, without obvious abnormality, atraumatic  Abdomen: Soft, appropriately tender, slightly distended, incision is healing well with staples intact  Extremities: extremities normal, warm and well-perfused; no cyanosis, clubbing, or edema  Skin: Skin color, texture, turgor normal  No rashes or lesions  Neurologic: Grossly normal    Lab, Imaging and other studies:  CBC:   Lab Results   Component Value Date    WBC 4 38 06/28/2020    HGB 11 0 (L) 06/28/2020    HCT 36 7 06/28/2020    MCV 76 (L) 06/28/2020     06/28/2020    MCH 22 7 (L) 06/28/2020    MCHC 30 0 (L) 06/28/2020    RDW 21 1 (H) 06/28/2020    MPV 9 4 06/28/2020    NRBC 0 06/28/2020   , CMP:   Lab Results   Component Value Date    SODIUM 142 06/28/2020    K 3 9 06/28/2020     06/28/2020    CO2 24 06/28/2020    BUN 36 (H) 06/28/2020    CREATININE 2 14 (H) 06/28/2020    CALCIUM 7 8 (L) 06/28/2020    EGFR 27 06/28/2020     VTE Pharmacologic Prophylaxis:  Patient is on Eliquis  VTE Mechanical Prophylaxis: sequential compression device

## 2020-06-28 NOTE — NURSING NOTE
Patient ambulated to hallway door , bathroom, and then back to bed  Needs continued encouragement  Tolerated oob to chair for approximately 3 hours in am, needs encouragement using incentive spirometer 10x an hour  C/o nausea this am at shift change, diet changed to clears

## 2020-06-28 NOTE — ASSESSMENT & PLAN NOTE
Wt Readings from Last 3 Encounters:   06/28/20 98 9 kg (218 lb 0 6 oz)     Patient with known history of chronic congestive heart failure and was admitted to [facility] in January  Patient was on 40 mg of Lasix b i d  As an outpatient and secondary to dizziness and orthostasis his Lasix dose was decreased to 40 mg daily during the recent hospital stay  Appears to be fluid overloaded, continue IV Lasix for now and switch to 40 mg twice daily on discharge    Ejection fraction 65% during the last echo 1/20

## 2020-06-28 NOTE — ASSESSMENT & PLAN NOTE
· Patient with known history of atrial fibrillation, status post HUMBERTO and cardioversion in January of this year in [facility] but unfortunately patient is in chronic atrial fibrillation per cardiology progress note, anticoagulated with Eliquis  · Continue metoprolol and Eliquis

## 2020-06-28 NOTE — ASSESSMENT & PLAN NOTE
Most likely secondary to nonoliguric ATN   Diuretic as per nephrology, renal function is slightly improving    Continue Lasix 60 mg twice daily  Creatinine is stable, probably a new baseline  Volume status:  Near euvolemic  Will discuss with nephrology for further management

## 2020-06-28 NOTE — PLAN OF CARE
Problem: Potential for Falls  Goal: Patient will remain free of falls  Description  INTERVENTIONS:  - Assess patient frequently for physical needs  -  Identify cognitive and physical deficits and behaviors that affect risk of falls    -  [place] fall precautions as indicated by assessment   - Educate patient/family on patient safety including physical limitations  - Instruct patient to call for assistance with activity based on assessment  - Modify environment to reduce risk of injury  - Consider OT/PT consult to assist with strengthening/mobility  Outcome: Not Progressing     Problem: CARDIOVASCULAR - ADULT  Goal: Maintains optimal cardiac output and hemodynamic stability  Description  INTERVENTIONS:  - Monitor I/O, vital signs and rhythm  - Monitor for S/S and trends of decreased cardiac output  - Administer and titrate ordered vasoactive medications to optimize hemodynamic stability  - Assess quality of pulses, skin color and temperature  - Assess for signs of decreased coronary artery perfusion  - Instruct patient to report change in severity of symptoms  Outcome: Not Progressing  Goal: Absence of cardiac dysrhythmias or at baseline rhythm  Description  INTERVENTIONS:  - Continuous cardiac monitoring, vital signs, obtain 12 lead EKG if ordered  - Administer antiarrhythmic and heart rate control medications as ordered  - Monitor electrolytes and administer replacement therapy as ordered  Outcome: Not Progressing     Problem: METABOLIC, FLUID AND ELECTROLYTES - ADULT  Goal: Glucose maintained within target range  Description  INTERVENTIONS:  - Monitor Blood Glucose as ordered  - Assess for signs and symptoms of hyperglycemia and hypoglycemia  - Administer ordered medications to maintain glucose within target range  - Assess nutritional intake and initiate nutrition service referral as needed  Outcome: Not Progressing     Problem: SKIN/TISSUE INTEGRITY - ADULT  Goal: Skin integrity remains intact  Description  INTERVENTIONS  - Identify patients at risk for skin breakdown  - Assess and monitor skin integrity  - Assess and monitor nutrition and hydration status  - Monitor labs (i e  albumin)  - Assess for incontinence   - Turn and reposition patient  - Assist with mobility/ambulation  - Relieve pressure over bony prominences  - Avoid friction and shearing  - Provide appropriate hygiene as needed including keeping skin clean and dry  - Evaluate need for skin moisturizer/barrier cream  - Collaborate with interdisciplinary team (i e  Nutrition, Rehabilitation, etc )   - Patient/family teaching  Outcome: Not Progressing     Problem: HEMATOLOGIC - ADULT  Goal: Maintains hematologic stability  Description  INTERVENTIONS  - Assess for signs and symptoms of bleeding or hemorrhage  - Monitor labs  - Administer supportive blood products/factors as ordered and appropriate  Outcome: Not Progressing     Problem: MUSCULOSKELETAL - ADULT  Goal: Maintain or return mobility to safest level of function  Description  INTERVENTIONS:  - Assess patient's ability to carry out ADLs; assess patient's baseline for ADL function and identify physical deficits which impact ability to perform ADLs (bathing, care of mouth/teeth, toileting, grooming, dressing, etc )  - Assess/evaluate cause of self-care deficits   - Assess range of motion  - Assess patient's mobility  - Assess patient's need for assistive devices and provide as appropriate  - Encourage maximum independence but intervene and supervise when necessary  - Involve family in performance of ADLs  - Assess for home care needs following discharge   - Consider OT consult to assist with ADL evaluation and planning for discharge  - Provide patient education as appropriate  Outcome: Not Progressing     Problem: Prexisting or High Potential for Compromised Skin Integrity  Goal: Skin integrity is maintained or improved  Description  INTERVENTIONS:  - Identify patients at risk for skin breakdown  - Assess and monitor skin integrity  - Assess and monitor nutrition and hydration status  - Monitor labs   - Assess for incontinence   - Turn and reposition patient  - Assist with mobility/ambulation  - Relieve pressure over bony prominences  - Avoid friction and shearing  - Provide appropriate hygiene as needed including keeping skin clean and dry  - Evaluate need for skin moisturizer/barrier cream  - Collaborate with interdisciplinary team   - Patient/family teaching  - Consider wound care consult   Outcome: Not Progressing     Problem: Nutrition/Hydration-ADULT  Goal: Nutrient/Hydration intake appropriate for improving, restoring or maintaining nutritional needs  Description  Monitor and assess patient's nutrition/hydration status for malnutrition  Collaborate with interdisciplinary team and initiate plan and interventions as ordered  Monitor patient's weight and dietary intake as ordered or per policy  Utilize nutrition screening tool and intervene as necessary  Determine patient's food preferences and provide high-protein, high-caloric foods as appropriate       INTERVENTIONS:  - Monitor oral intake, urinary output, labs, and treatment plans  - Assess nutrition and hydration status and recommend course of action  - Evaluate amount of meals eaten  - Assist patient with eating if necessary   - Allow adequate time for meals  - Recommend/ encourage appropriate diets, oral nutritional supplements, and vitamin/mineral supplements  - Order, calculate, and assess calorie counts as needed  Offer small frequentmeals  - Assess need for intravenous fluids  - Provide specific nutrition/hydration education as appropriate  - Include patient/family/caregiver in decisions related to nutrition   Outcome: Not Progressing

## 2020-06-28 NOTE — ASSESSMENT & PLAN NOTE
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

## 2020-06-28 NOTE — ASSESSMENT & PLAN NOTE
Lab Results   Component Value Date    HGBA1C 8 2 (H) 06/18/2020       Recent Labs     06/27/20  1559 06/27/20  2121 06/28/20  0735 06/28/20  1114   POCGLU 218* 131 201* 341*       Blood Sugar Average: Last 72 hrs:  (P) 516 [id number] type 2 diabetes mellitus on oral hypoglycemic agent  Hold metformin and glipizide in the hospital  Insulin sliding scale, will add Lantus at bedtime  Adjust as necessary while NPO-continue with the sliding scale

## 2020-06-28 NOTE — ASSESSMENT & PLAN NOTE
· Patient was evaluated by PCP secondary to dizziness and fatigue and has blood pressure done as an outpatient    His hemoglobin was found to be 6 2 and was sent to the emergency room  · On the day of admission hemoglobin in the emergency room was 7 1 and received 2 units of packed RBCs  · HGB stable since

## 2020-06-28 NOTE — PLAN OF CARE
Problem: Potential for Falls  Goal: Patient will remain free of falls  Description  INTERVENTIONS:  - Assess patient frequently for physical needs  -  Identify cognitive and physical deficits and behaviors that affect risk of falls    -  [place] fall precautions as indicated by assessment   - Educate patient/family on patient safety including physical limitations  - Instruct patient to call for assistance with activity based on assessment  - Modify environment to reduce risk of injury  - Consider OT/PT consult to assist with strengthening/mobility  Outcome: Progressing     Problem: CARDIOVASCULAR - ADULT  Goal: Maintains optimal cardiac output and hemodynamic stability  Description  INTERVENTIONS:  - Monitor I/O, vital signs and rhythm  - Monitor for S/S and trends of decreased cardiac output  - Administer and titrate ordered vasoactive medications to optimize hemodynamic stability  - Assess quality of pulses, skin color and temperature  - Assess for signs of decreased coronary artery perfusion  - Instruct patient to report change in severity of symptoms  Outcome: Progressing  Goal: Absence of cardiac dysrhythmias or at baseline rhythm  Description  INTERVENTIONS:  - Continuous cardiac monitoring, vital signs, obtain 12 lead EKG if ordered  - Administer antiarrhythmic and heart rate control medications as ordered  - Monitor electrolytes and administer replacement therapy as ordered  Outcome: Progressing     Problem: SKIN/TISSUE INTEGRITY - ADULT  Goal: Skin integrity remains intact  Description  INTERVENTIONS  - Identify patients at risk for skin breakdown  - Assess and monitor skin integrity  - Assess and monitor nutrition and hydration status  - Monitor labs (i e  albumin)  - Assess for incontinence   - Turn and reposition patient  - Assist with mobility/ambulation  - Relieve pressure over bony prominences  - Avoid friction and shearing  - Provide appropriate hygiene as needed including keeping skin clean and dry  - Evaluate need for skin moisturizer/barrier cream  - Collaborate with interdisciplinary team (i e  Nutrition, Rehabilitation, etc )   - Patient/family teaching  Outcome: Progressing     Problem: HEMATOLOGIC - ADULT  Goal: Maintains hematologic stability  Description  INTERVENTIONS  - Assess for signs and symptoms of bleeding or hemorrhage  - Monitor labs  - Administer supportive blood products/factors as ordered and appropriate  Outcome: Progressing     Problem: MUSCULOSKELETAL - ADULT  Goal: Maintain or return mobility to safest level of function  Description  INTERVENTIONS:  - Assess patient's ability to carry out ADLs; assess patient's baseline for ADL function and identify physical deficits which impact ability to perform ADLs (bathing, care of mouth/teeth, toileting, grooming, dressing, etc )  - Assess/evaluate cause of self-care deficits   - Assess range of motion  - Assess patient's mobility/assist x 2 with roller walker  - Assess patient's need for assistive devices and provide as appropriate  - Encourage maximum independence but intervene and supervise when necessary  - Involve family in performance of ADLs  - Assess for home care needs following discharge   - Consider OT consult to assist with ADL evaluation and planning for discharge  - Provide patient education as appropriate   Outcome: Not Progressing     Problem: Nutrition/Hydration-ADULT  Goal: Nutrient/Hydration intake appropriate for improving, restoring or maintaining nutritional needs  Description  Monitor and assess patient's nutrition/hydration status for malnutrition  Collaborate with interdisciplinary team and initiate plan and interventions as ordered  Monitor patient's weight and dietary intake as ordered or per policy  Utilize nutrition screening tool and intervene as necessary  Determine patient's food preferences and provide high-protein, high-caloric foods as appropriate       INTERVENTIONS:  - Monitor oral intake, urinary output, labs, and treatment plans  - Assess nutrition and hydration status and recommend course of action  - Evaluate amount of meals eaten  - Assist patient with eating if necessary   - Allow adequate time for meals  - Recommend/ encourage appropriate diets, oral nutritional supplements, and vitamin/mineral supplements  - Order, calculate, and assess calorie counts as needed  Offer small frequentmeals  - Assess need for intravenous fluids  - Provide specific nutrition/hydration education as appropriate  - Include patient/family/caregiver in decisions related to nutrition   Outcome: Progressing     Problem: Prexisting or High Potential for Compromised Skin Integrity  Goal: Skin integrity is maintained or improved  Description  INTERVENTIONS:  - Identify patients at risk for skin breakdown  - Assess and monitor skin integrity  - Assess and monitor nutrition and hydration status  - Monitor labs   - Assess for incontinence   - Turn and reposition patient  - Assist with mobility/ambulation  - Relieve pressure over bony prominences  - Avoid friction and shearing  - Provide appropriate hygiene as needed including keeping skin clean and dry  - Evaluate need for skin moisturizer/barrier cream  - Collaborate with interdisciplinary team   - Patient/family teaching  - Consider wound care consult   Outcome: Progressing

## 2020-06-28 NOTE — PROGRESS NOTES
Progress Note - Alvealanis Emile 1935, 80 y o  male MRN: [id number]    Unit/Bed#: -01 Encounter: [id number]    Primary Care Provider: Jerome Rowland DO   Date and time admitted to hospital: 6/16/2020 11:14 AM    * Symptomatic anemia  Assessment & Plan  · Patient was evaluated by PCP secondary to dizziness and fatigue and has blood pressure done as an outpatient  His hemoglobin was found to be 6 2 and was sent to the emergency room  · On the day of admission hemoglobin in the emergency room was 7 1 and received 2 units of packed RBCs  · HGB stable since    Acute renal failure (ARF) ([facility] [address] )  Assessment & Plan  Most likely secondary to nonoliguric ATN   Diuretic as per nephrology, renal function is slightly improving  Continue Lasix 60 mg twice daily  Creatinine is stable, probably a new baseline  Volume status:  Near euvolemic  Will discuss with nephrology for further management    Colon  neoplasm  Assessment & Plan  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  Status post resection, postop day 4, normal bowel movement  Patient on surgical soft diet, advance diet as per surgery recommendation  Discussed with the patient to arrange oncology follow-up as an outpatient:  Patient requested not to arrange any oncology follow-up  Will discuss again with patient on discharge      Atrial fibrillation [facility])  Assessment & Plan  · Patient with known history of atrial fibrillation, status post HUMBERTO and cardioversion in January of this year in [facility] but unfortunately patient is in chronic atrial fibrillation per cardiology progress note, anticoagulated with Eliquis  · Continue metoprolol and Eliquis    Essential hypertension  Assessment & Plan  · Continue metoprolol    Diabetes mellitus type 2 in obese [facility])  Assessment & Plan  Lab Results   Component Value Date    HGBA1C 8 2 (H) 06/18/2020       Recent Labs     06/27/20  1559 06/27/20  2121 06/28/20  0735 06/28/20  1114   POCGLU 218* 131 201* 341* Blood Sugar Average: Last 72 hrs:  (P) 518 [id number] type 2 diabetes mellitus on oral hypoglycemic agent  Hold metformin and glipizide in the hospital  Insulin sliding scale, will add Lantus at bedtime  Adjust as necessary while NPO-continue with the sliding scale    Coronary artery disease  Assessment & Plan  · Patient with known history of coronary artery disease status post CABG in 1997 in [facility]  · Patient is on beta-blocker aspirin and statin    Chronic kidney disease, stage 3 ([facility])  Assessment & Plan  · Baseline creatinine appears to be between 1 4 and 1 6  CHF (congestive heart failure) ([facility])  Assessment & Plan  Wt Readings from Last 3 Encounters:   06/28/20 98 9 kg (218 lb 0 6 oz)     Patient with known history of chronic congestive heart failure and was admitted to [facility] in January  Patient was on 40 mg of Lasix b i d  As an outpatient and secondary to dizziness and orthostasis his Lasix dose was decreased to 40 mg daily during the recent hospital stay  Appears to be fluid overloaded, continue IV Lasix for now and switch to 40 mg twice daily on discharge  Ejection fraction 65% during the last echo 1/20    VTE Pharmacologic Prophylaxis:   Pharmacologic: Heparin    Patient Centered Rounds: I have performed bedside rounds with nursing staff today    Discussions with Specialists or Other Care Team Provider:     Education and Discussions with Family / Patient:     Current Length of Stay: 12 day(s)    Current Patient Status: Inpatient   Certification Statement: The patient will continue to require additional inpatient hospital stay due to Acute renal failure, colon mass    Discharge Plan:  Probably in 2-3 days    Code Status: Level 1 - Full Code      Subjective:   Patient was complaining abdominal pain last night  Obstruction series done today and showed no significant obstruction except that he has a distention in the stomach    Patient advised to go out of bed and ambulate, use incentive spirometer as often  However patient is resistant to go out of bed and try to ambulate  I discussed with patient and patient's family regarding importance of early ambulation after surgery and both showed understanding  Discussed with the patient regarding oncology follow-up as an outpatient patient adamantly asking not to get him any oncology follow-up on discharge  Will discuss with patient again  Objective:     Vitals:   Temp (24hrs), Av 3 °F (36 8 °C), Min:97 9 °F (36 6 °C), Max:99 1 °F (37 3 °C)    Temp:  [97 9 °F (36 6 °C)-99 1 °F (37 3 °C)] 97 9 °F (36 6 °C)  HR:  [] 101  Resp:  [18-22] 20  BP: (111-131)/(54-67) 131/67  SpO2:  [95 %] 95 %  Body mass index is 36 85 kg/m²  Input and Output Summary (last 24 hours): Intake/Output Summary (Last 24 hours) at 2020 1141  Last data filed at 2020 0532  Gross per 24 hour   Intake 60 ml   Output 1120 ml   Net -1060 ml       Physical Exam:     General appearance: alert, appears stated age and cooperative  Head: Normocephalic, without obvious abnormality, atraumatic  Lungs: clear to auscultation bilaterally  Heart: regular rate and rhythm  Abdomen: soft, non-tender, positive bowel sounds   Back: negative  Extremities: extremities atraumatic, no cyanosis or edema  Neurologic: Alert and oriented X 3, normal strength and tone  Normal symmetric reflexes   Normal coordination and gait    Additional Data:     Labs:    Results from last 7 days   Lab Units 20  0442   WBC Thousand/uL 4 38   HEMOGLOBIN g/dL 11 0*   HEMATOCRIT % 36 7   PLATELETS Thousands/uL 202   NEUTROS PCT % 74   LYMPHS PCT % 15   MONOS PCT % 8   EOS PCT % 2     Results from last 7 days   Lab Units 20  0442 20  0519   SODIUM mmol/L 142 140   POTASSIUM mmol/L 3 9 3 9   CHLORIDE mmol/L 108 107   CO2 mmol/L 24 26   BUN mg/dL 36* 42*   CREATININE mg/dL 2 14* 2 19*   ANION GAP mmol/L 10 7   CALCIUM mg/dL 7 8* 7 7*   ALBUMIN g/dL  --  2 0* TOTAL BILIRUBIN mg/dL  --  0 51   ALK PHOS U/L  --  55   ALT U/L  --  14   AST U/L  --  13   GLUCOSE RANDOM mg/dL 165* 257*         Results from last 7 days   Lab Units 06/28/20  1114 06/28/20  0735 06/27/20  2121 06/27/20  1559 06/27/20  1101 06/27/20  0740 06/26/20  2114 06/26/20  1615 06/26/20  1138 06/26/20  0749 06/25/20  2108 06/25/20  1634   POC GLUCOSE mg/dl 341* 201* 131 218* 239* 255* 210* 290* 286* 261* 276* 310*                   * I Have Reviewed All Lab Data Listed Above  * Additional Pertinent Lab Tests Reviewed: Luis Miguelmartha Harpal Admission Reviewed    Imaging:    Imaging Reports Reviewed Today Include: images reviewed    Recent Cultures (last 7 days):           Last 24 Hours Medication List:     Current Facility-Administered Medications:  acetaminophen 650 mg Oral Q6H PRN Dakota Joyce MD   apixaban 2 5 mg Oral BID Dail Nyhan, PA-C   aspirin 81 mg Oral Daily Dakota Joyce MD   atorvastatin 40 mg Oral Daily With Collette Life, MD   furosemide 60 mg Intravenous BID (diuretic) Porsha Sites, CRZACH   insulin glargine 10 Units Subcutaneous HS Dakota Joyce MD   insulin lispro 1-5 Units Subcutaneous TID With Meals Dakota Joyce MD   insulin lispro 5 Units Subcutaneous TID With Meals Dakota Joyce MD   metoprolol succinate 25 mg Oral Daily Dakota Joyce MD   ondansetron 4 mg Intravenous Q8H PRN Anusha S Rachel, CRNP   ondansetron 4 mg Intravenous Once Anusha S Rachel, CRNP   oxyCODONE 5 mg Oral Q4H PRN Elena Shay, DO   pantoprazole 40 mg Intravenous Q12H [address] Gissell [place], DO   phenol 1 spray Mouth/Throat Q2H PRN Elena Jaspal, DO   potassium chloride 40 mEq Oral Daily Porsha Sites, CRNP   saliva substitute 5 spray Mouth/Throat 4x Daily PRN Joel Burns PA-C        Today, Patient Was Seen By: Dakota Joyce MD    ** Please Note: Dictation voice to text software may have been used in the creation of this document   **

## 2020-06-29 PROBLEM — C18.0 ADENOCARCINOMA OF CECUM (HCC): Status: ACTIVE | Noted: 2020-01-01

## 2020-06-29 PROBLEM — I50.33 ACUTE ON CHRONIC DIASTOLIC CONGESTIVE HEART FAILURE (HCC): Status: ACTIVE | Noted: 2020-01-01

## 2020-06-29 NOTE — PROGRESS NOTES
Progress Note - Delmy Alexander 1935, 80 y o  male MRN: [id number]    Unit/Bed#: -01 Encounter: [id number]    Primary Care Provider: Adeel Shine,    Date and time admitted to hospital: 6/16/2020 11:14 AM        * Partial bowel obstruction ([address] )  Assessment & Plan  · On EGD 6/17/2020, there was concern for possible bowel obstruction vs severe gastroparesis, given large amount of food and dark bilious fluid in stomach which could not be cleared  · Surgery following   · Placed on NG tube which has been discontinued   · On PPI IV b i d  Matheus Goody · Status post OR started-Exploratory laparotomy with right hemicolectomy, postoperative day 2  · Increased to surgical soft today     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[facility])  Assessment & Plan  · POD#9 s/p exploratory laparotomy with right hemicolectomy for 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  · Patient did not want oncology follow up  Please readdress this on discharge       Symptomatic anemia  Assessment & Plan  · Patient was evaluated by PCP secondary to dizziness and fatigue and has blood pressure done as an outpatient  His hemoglobin was found to be 6 2 and was sent to the emergency room  · On the day of admission hemoglobin in the emergency room was 7 1 and received 2 units of packed RBCs  · HGB stable since 11 0 last two days       Acute on chronic diastolic congestive heart failure (HCC)  Assessment & Plan  Wt Readings from Last 3 Encounters:   06/29/20 101 kg (223 lb 5 2 oz)     · Patient with known history of chronic congestive heart failure and was admitted to [facility] in January  · Patient was on 40 mg of Lasix b i d  As an outpatient and secondary to dizziness and orthostasis his Lasix dose was decreased to 40 mg daily during the recent hospital stay  · Continue IV Lasix for now and switch to 40 mg twice daily on discharge    · Currently on hold due to MARLENI   · Ejection fraction 65% during the last echo 1/20    Acute on chronic diastolic CHF, due to IVF and holding Lasix d/t MARLENI, a/e/b Cardiology [facility] OV note dated 5/27/20 stating "chronic diastolic heart failure," fluid overloaded, CHF, treated with IV Lasix, daily wts , and I&O  Acute renal failure (ARF) (HCC)  Assessment & Plan  · Most likely secondary to nonoliguric ATN   · Diuretic as per nephrology  · Currently on hold due to worsening creatinine  · 2 14 yesterday and 2 31 today       Chronic kidney disease, stage 3 (HCC)  Assessment & Plan  · Baseline creatinine appears to be between 1 4 and 1 6    Questionable new higher baseline     Coronary artery disease  Assessment & Plan  · Patient with known history of coronary artery disease status post CABG in 1997 in [facility]  · Patient is on beta-blocker aspirin and statin      Essential hypertension  Assessment & Plan  · Continue metoprolol      Diabetes mellitus type 2 in obese [facility])  Assessment & Plan  Lab Results   Component Value Date    HGBA1C 8 2 (H) 06/18/2020       Recent Labs     06/28/20  1114 06/28/20  1600 06/28/20  2130 06/29/20  0718   POCGLU 341* 270* 246* 179*       Blood Sugar Average: Last 72 hrs:  (P) 260 [id number] type 2 diabetes mellitus on oral hypoglycemic agent  · Hold metformin and glipizide in the hospital  · Insulin sliding scale,  · Increased Lantus today to 15 units     Atrial fibrillation (HCC)  Assessment & Plan  · Patient with known history of atrial fibrillation, status post HUMBERTO and cardioversion in January of this year in [facility] but unfortunately patient is in chronic atrial fibrillation per cardiology progress note, anticoagulated with Eliquis  · Continue metoprolol and Eliquis    Morbid obesity ([facility] [address] )  Assessment & Plan  · TLC as an outpatient    Hyperlipidemia  Assessment & Plan  · Continue atorvastatin      VTE Pharmacologic Prophylaxis:   Pharmacologic: Apixaban (Eliquis)  Mechanical VTE Prophylaxis in Place: Yes    Patient Centered Rounds: I have performed bedside rounds with nursing staff today  Discussions with Specialists or Other Care Team Provider:  None    Education and Discussions with Family / Patient:  Patient    Time Spent for Care: 20 minutes  More than 50% of total time spent on counseling and coordination of care as described above  Current Length of Stay: 13 day(s)    Current Patient Status: Inpatient   Certification Statement: The patient will continue to require additional inpatient hospital stay due to Further evaluation monitoring of partial small-bowel obstruction as well as congestive heart failure and acute kidney injury    Discharge Plan:  Pending improvement    Code Status: Level 1 - Full Code      Subjective:   Patient states his mid abdomen pain  States is not as bad as before however rates it as an 8/10  States it is tender when he touches it  Objective:     Vitals:   Temp (24hrs), Av 1 °F (36 7 °C), Min:97 8 °F (36 6 °C), Max:98 3 °F (36 8 °C)    Temp:  [97 8 °F (36 6 °C)-98 3 °F (36 8 °C)] 98 1 °F (36 7 °C)  HR:  [] 68  Resp:  [12-20] 17  BP: (110-131)/(58-67) 112/58  SpO2:  [95 %-99 %] 97 %  Body mass index is 37 74 kg/m²  Input and Output Summary (last 24 hours): Intake/Output Summary (Last 24 hours) at 2020 1050  Last data filed at 2020 1039  Gross per 24 hour   Intake 660 ml   Output 331 ml   Net 329 ml       Physical Exam:     Physical Exam   Constitutional: He appears well-developed and well-nourished  HENT:   Head: Normocephalic  Neck: Normal range of motion  Neck supple  Cardiovascular: Normal rate  An irregularly irregular rhythm present  No murmur heard  Pulmonary/Chest: Effort normal and breath sounds normal  No respiratory distress  Abdominal: Soft  Bowel sounds are normal  There is tenderness  Musculoskeletal: He exhibits no edema  Skin: Skin is warm and dry  Psychiatric: He has a normal mood and affect   His behavior is normal  Judgment and thought content normal          Additional Data:     Labs:    Results from last 7 days   Lab Units 06/29/20  0947 06/28/20  0442   WBC Thousand/uL 5 31 4 38   HEMOGLOBIN g/dL 11 0* 11 0*   HEMATOCRIT % 37 8 36 7   PLATELETS Thousands/uL 212 202   NEUTROS PCT %  --  74   LYMPHS PCT %  --  15   MONOS PCT %  --  8   EOS PCT %  --  2     Results from last 7 days   Lab Units 06/29/20  0947   SODIUM mmol/L 137   POTASSIUM mmol/L 4 7   CHLORIDE mmol/L 105   CO2 mmol/L 25   BUN mg/dL 36*   CREATININE mg/dL 2 31*   ANION GAP mmol/L 7   CALCIUM mg/dL 7 8*   ALBUMIN g/dL 2 2*   TOTAL BILIRUBIN mg/dL 0 70   ALK PHOS U/L 65   ALT U/L 16   AST U/L 30   GLUCOSE RANDOM mg/dL 218*         Results from last 7 days   Lab Units 06/29/20  0718 06/28/20  2130 06/28/20  1600 06/28/20  1114 06/28/20  0735 06/27/20  2121 06/27/20  1559 06/27/20  1101 06/27/20  0740 06/26/20  2114 06/26/20  1615 06/26/20  1138   POC GLUCOSE mg/dl 179* 246* 270* 341* 201* 131 218* 239* 255* 210* 290* 286*                   * I Have Reviewed All Lab Data Listed Above  * Additional Pertinent Lab Tests Reviewed:  [address] Admission Reviewed    Imaging:    Imaging Reports Reviewed Today Include: none  Imaging Personally Reviewed by Myself Includes:  none    Recent Cultures (last 7 days):           Last 24 Hours Medication List:     Current Facility-Administered Medications:  acetaminophen 650 mg Oral Q6H PRN Precious Weeks MD   apixaban 2 5 mg Oral BID Ulises Felix PA-C   aspirin 81 mg Oral Daily Precious Weeks MD   atorvastatin 40 mg Oral Daily With Cassidy Sosa MD   furosemide 60 mg Intravenous BID (diuretic) Marlou Severance, CRNP   insulin glargine 15 Units Subcutaneous HS BRANDON Petty   insulin lispro 1-5 Units Subcutaneous TID With Meals Precious Weeks MD   insulin lispro 5 Units Subcutaneous TID With Meals Precious Weeks MD   metoprolol succinate 25 mg Oral Daily Precious Weeks MD   ondansetron 4 mg Intravenous Q8H PRN Anusha S Rachel, CRNP   ondansetron 4 mg Intravenous Once Anusha S Rachel, CRNP   oxyCODONE 5 mg Oral Q4H PRN Gregg Sims, DO   pantoprazole 40 mg Intravenous Q12H [address] Gissell Springer, DO   phenol 1 spray Mouth/Throat Q2H PRN Gregg Sims, DO   potassium chloride 40 mEq Oral Daily BRANDON Samuel   saliva substitute 5 spray Mouth/Throat 4x Daily PRN Kurtis Almaraz PA-C        Today, Patient Was Seen By: BRANDON Pratt    ** Please Note: Dictation voice to text software may have been used in the creation of this document   **

## 2020-06-29 NOTE — NURSING NOTE
Pt rang call bell stating that he had a BM, encouraged to get oob for dinner  Refused, stating he had too much pain  Medicated with oxycodone for same  [facility]con care provided

## 2020-06-29 NOTE — PHYSICAL THERAPY NOTE
Physical Therapy Cancellation Note    Chart review performed  At this time, PT treatment session cancelled as patient is resting soundly in bed  RN reporting pt was up earlier in recliner this date 3-3 5 hours  nsg will attempt to transfer pt OOB later this date for dinner hours  PT will continue to follow and provide PT interventions as appropriate      Aris Miranda, PT

## 2020-06-29 NOTE — ASSESSMENT & PLAN NOTE
· Most likely secondary to nonoliguric ATN   · Diuretic as per nephrology  · Currently on hold due to worsening creatinine  · 2 14 yesterday and 2 31 today

## 2020-06-29 NOTE — PROGRESS NOTES
Progress Note - Nephrology   Jovana Mukesh 80 y o  male MRN: [id number]  Unit/Bed#: -01 Encounter: [id number]    A/P:  1  Acute kidney injury atop kidney disease                blood work from this morning remains pending, continue to monitor and avoid nephrotoxins at time  2  Chronic kidney disease stage 3 with baseline creatinine between 1 3-1 6 mg/dL  3  Hypertensive nephrosclerosis                 well controlled, continue current medications  4  Acute on chronic congestive heart failure              Follow-up standing scale weights, follow-up blood work  Patient will need to be ambulated to assess clinical symptoms as well  5  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 June 20th  6  Multiple electrolyte abnormalities              Follow-up blood work from this morning  7  Abdominal pain               improved, continue monitor  Follow up reason for today's visit:  Acute kidney injury/chronic kidney disease/electrolyte issues    Symptomatic anemia    Patient Active Problem List   Diagnosis    CHF (congestive heart failure) (HCC)    Chronic kidney disease, stage 3 ([facility] [address] )    Coronary artery disease    Diabetes mellitus type 2 in obese ([facility] )    Essential hypertension    Hyperlipidemia    Morbid obesity ([facility] )    Symptomatic anemia    Atrial fibrillation (HCC)    Partial bowel obstruction (HCC)    Abnormal CT of the abdomen    Duodenal diverticulum    Colon  neoplasm    Acute renal failure (ARF) (HCC)         Subjective:   No acute events overnight    Objective:     Vitals: Blood pressure 112/58, pulse 68, temperature 98 1 °F (36 7 °C), resp  rate 17, height 5' 4 5" (1 638 m), weight 101 kg (223 lb 5 2 oz), SpO2 97 %  ,Body mass index is 37 74 kg/m²      Weight (last 2 days)     Date/Time   Weight    06/29/20 0527   101 (223 33)    06/28/20 0834   98 9 (218 04)    06/28/20 0441   102 (225 53)    06/27/20 0600   102 (225 75)    06/27/20 0500   102 (225 75) Intake/Output Summary (Last 24 hours) at 6/29/2020 0730  Last data filed at 6/29/2020 0527  Gross per 24 hour   Intake 400 ml   Output 131 ml   Net 269 ml     I/O last 3 completed shifts: In: 26 [P O :460]  Out: 532 [Urine:532]         Physical Exam: /58   Pulse 68   Temp 98 1 °F (36 7 °C)   Resp 17   Ht 5' 4 5" (1 638 m)   Wt 101 kg (223 lb 5 2 oz)   SpO2 97%   BMI 37 74 kg/m²     General Appearance:    Alert, cooperative, no distress, appears stated age   Head:    Normocephalic, without obvious abnormality, atraumatic   Eyes:    Conjunctiva/corneas clear   Ears:    Normal external ears   Nose:   Nares normal, septum midline, mucosa normal, no drainage    or sinus tenderness   Throat:   Lips, mucosa, and tongue normal; teeth and gums normal   Neck:   Supple   Back:     Symmetric, no curvature, ROM normal, no CVA tenderness   Lungs:     Clear to auscultation bilaterally, respirations unlabored   Chest wall:    No tenderness or deformity   Heart:    Regular rate and rhythm, S1 and S2 normal, no murmur, rub   or gallop   Abdomen:     Soft, non-tender, bowel sounds active   Extremities:   Extremities normal, atraumatic, no cyanosis, +1 bilateral lower extremity edema   Skin:   Skin color, texture, turgor normal, no rashes or lesions   Lymph nodes:   Cervical normal   Neurologic:   CNII-XII intact            Lab, Imaging and other studies: I have personally reviewed pertinent labs  CBC: No results found for: WBC, HGB, HCT, MCV, PLT, ADJUSTEDWBC, MCH, MCHC, RDW, MPV, NRBC  CMP: No results found for: NA, K, CL, CO2, ANIONGAP, BUN, CREATININE, GLUCOSE, CALCIUM, AST, ALT, ALKPHOS, PROT, BILITOT, EGFR        Results from last 7 days   Lab Units 06/28/20  0442 06/27/20  0519 06/26/20  0518  06/24/20  0508   POTASSIUM mmol/L 3 9 3 9 3 5   < > 4 0   CHLORIDE mmol/L 108 107 108   < > 110*   CO2 mmol/L 24 26 26   < > 22   BUN mg/dL 36* 42* 42*   < > 39*   CREATININE mg/dL 2 14* 2 19* 2 26*   < > 2 46*   CALCIUM mg/dL 7 8* 7 7* 7 8*   < > 8 6   ALK PHOS U/L  --  55  --   --  50   ALT U/L  --  14  --   --  20   AST U/L  --  13  --   --  25    < > = values in this interval not displayed  Phosphorus: No results found for: PHOS  Magnesium: No results found for: MG  Urinalysis: No results found for: Trilla Quest, SPECGRAV, PHUR, LEUKOCYTESUR, NITRITE, PROTEINUA, GLUCOSEU, KETONESU, BILIRUBINUR, BLOODU  Ionized Calcium: No results found for: CAION  Coagulation: No results found for: PT, INR, APTT  Troponin: No results found for: TROPONINI  ABG: No results found for: PHART, UYT3JWW, PO2ART, OVS8HVZ, I4QTEZPI, BEART, SOURCE  Radiology review:     IMAGING  Procedure: Xr Abdomen Obstruction Series    Result Date: 6/28/2020  Narrative: OBSTRUCTION SERIES INDICATION:   abd pain/distention  COMPARISON:  None EXAM PERFORMED/VIEWS:  XR ABDOMEN OBSTRUCTION SERIES FINDINGS: Air filled, dilated small bowel loops with air fluid levels findings suggesting small bowel obstruction vs  adynamic ileus  Evidence of postoperative free intraperitoneal air at the left upper quadrant    No pathologic calcifications or soft tissue masses evident  Status post postoperative fusion at the L4-S1 levels    Examination of the chest reveals a normal cardiomediastinal silhouette  There is mild bibasilar atelectasis  Status post cardiac surgery Surgical clips along the midline anterior abdominal wall  Impression: Findings suggesting small bowel obstruction vs  adynamic ileus   Workstation performed: [id number]       Current Facility-Administered Medications   Medication Dose Route Frequency    acetaminophen (TYLENOL) tablet 650 mg  650 mg Oral Q6H PRN    apixaban (ELIQUIS) tablet 2 5 mg  2 5 mg Oral BID    aspirin (ECOTRIN LOW STRENGTH) EC tablet 81 mg  81 mg Oral Daily    atorvastatin (LIPITOR) tablet 40 mg  40 mg Oral Daily With Dinner    furosemide (LASIX) injection 60 mg  60 mg Intravenous BID (diuretic)    insulin glargine (LANTUS) subcutaneous injection 10 Units 0 1 mL  10 Units Subcutaneous HS    insulin lispro (HumaLOG) 100 units/mL subcutaneous injection 1-5 Units  1-5 Units Subcutaneous TID With Meals    insulin lispro (HumaLOG) 100 units/mL subcutaneous injection 5 Units  5 Units Subcutaneous TID With Meals    metoprolol succinate (TOPROL-XL) 24 hr tablet 25 mg  25 mg Oral Daily    ondansetron (ZOFRAN) injection 4 mg  4 mg Intravenous Q8H PRN    ondansetron (ZOFRAN) injection 4 mg  4 mg Intravenous Once    oxyCODONE (ROXICODONE) IR tablet 5 mg  5 mg Oral Q4H PRN    pantoprazole (PROTONIX) injection 40 mg  40 mg Intravenous Q12H [address]    phenol (CHLORASEPTIC) 1 4 % mucosal liquid 1 spray  1 spray Mouth/Throat Q2H PRN    potassium chloride (K-DUR,KLOR-CON) CR tablet 40 mEq  40 mEq Oral Daily    saliva substitute (MOUTH KOTE) mucosal solution 5 spray  5 spray Mouth/Throat 4x Daily PRN     Medications Discontinued During This Encounter   Medication Reason    Aspirin Buf,CaCarb-MgCarb-MgO, 81 MG TABS     simvastatin (ZOCOR) 80 mg tablet     pravastatin (PRAVACHOL) tablet 40 mg     metoprolol succinate (TOPROL-XL) 24 hr tablet 25 mg     metoprolol (LOPRESSOR) injection 2 5 mg     HYDROcodone-acetaminophen (NORCO) 5-325 mg per tablet 1 tablet     acetaminophen (TYLENOL) tablet 650 mg     loratadine (CLARITIN) tablet 10 mg     polyethylene glycol (GLYCOLAX) bowel prep 238 g     insulin lispro (HumaLOG) 100 units/mL subcutaneous injection 1-5 Units     insulin lispro (HumaLOG) 100 units/mL subcutaneous injection 1-5 Units     morphine injection 2 mg     bisacodyl (DULCOLAX) EC tablet 10 mg     morphine injection 2 mg     sodium chloride 0 9 % irrigation solution Patient Discharge    bupivacaine liposomal (EXPAREL) 1 3 % injection 20 mL     ciprofloxacin (CIPRO) IVPB (premix) 400 mg 200 mL     metroNIDAZOLE (FLAGYL) IVPB (premix) 500 mg 100 mL     morphine (PF) 4 mg/mL injection 4 mg     HYDROmorphone (DILAUDID) injection 0 2 mg     fentaNYL (SUBLIMAZE) injection 25 mcg     sodium chloride 0 9 % infusion     guaiFENesin (MUCINEX) 12 hr tablet 600 mg     heparin (porcine) subcutaneous injection 5,000 Units     morphine injection 2 mg     oxyCODONE (ROXICODONE) IR tablet 5 mg     insulin lispro (HumaLOG) 100 units/mL subcutaneous injection 1-5 Units     insulin lispro (HumaLOG) 100 units/mL subcutaneous injection 1-5 Units     insulin glargine (LANTUS) subcutaneous injection 5 Units 0 05 mL     metoprolol (LOPRESSOR) injection 2 5 mg     furosemide (LASIX) injection 40 mg     insulin lispro (HumaLOG) 100 units/mL subcutaneous injection 5 Units     insulin glargine (LANTUS) subcutaneous injection 10 Units 0 1 mL     ondansetron (ZOFRAN) injection 4 mg     metolazone (ZAROXOLYN) tablet 5 mg     insulin lispro (HumaLOG) 100 units/mL subcutaneous injection 7 Units     insulin glargine (LANTUS) subcutaneous injection 12 Units 0 12 mL        Wendy Weaver, DO      This progress note was produced in part using a dictation device which may document imprecise wording from author's original intent

## 2020-06-29 NOTE — ASSESSMENT & PLAN NOTE
· Patient with known history of coronary artery disease status post CABG in 1997 in [facility]  · Patient is on beta-blocker aspirin and statin

## 2020-06-29 NOTE — NURSING NOTE
Pt incontinent of BM, encouraged to get oob to the chair  Assisted to chair with use of walker, moderate assist to get to dangling position  Able to stand with minimal difficulty, ambulated a few steps to the chair  Linens changed  Small amt of loose brown stool noted  Denies pain  Respirations unlabored  Will continue to monitor  Call bell in reach

## 2020-06-29 NOTE — SOCIAL WORK
Discussed pt during care co ordination rounds, pt is not medically stable for dc today  Pt is on surgical soft diet, monitoring labs  Anticipating dc in next 24-48 hrs    CM to follow

## 2020-06-29 NOTE — ASSESSMENT & PLAN NOTE
· Patient was evaluated by PCP secondary to dizziness and fatigue and has blood pressure done as an outpatient    His hemoglobin was found to be 6 2 and was sent to the emergency room  · On the day of admission hemoglobin in the emergency room was 7 1 and received 2 units of packed RBCs  · HGB stable since 11 0 last two days

## 2020-06-29 NOTE — ASSESSMENT & PLAN NOTE
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

## 2020-06-29 NOTE — ASSESSMENT & PLAN NOTE
Wt Readings from Last 3 Encounters:   06/29/20 101 kg (223 lb 5 2 oz)     · Patient with known history of chronic congestive heart failure and was admitted to [facility] in January  · Patient was on 40 mg of Lasix b i d  As an outpatient and secondary to dizziness and orthostasis his Lasix dose was decreased to 40 mg daily during the recent hospital stay  · Continue IV Lasix for now and switch to 40 mg twice daily on discharge  · Currently on hold due to MARLENI   · Ejection fraction 65% during the last echo 1/20    Acute on chronic diastolic CHF, due to IVF and holding Lasix d/t MARLENI, a/e/b Cardiology [facility] OV note dated 5/27/20 stating "chronic diastolic heart failure," fluid overloaded, CHF, treated with IV Lasix, daily wts , and I&O

## 2020-06-29 NOTE — ASSESSMENT & PLAN NOTE
Lab Results   Component Value Date    HGBA1C 8 2 (H) 06/18/2020       Recent Labs     06/28/20  1114 06/28/20  1600 06/28/20  2130 06/29/20  0718   POCGLU 341* 270* 246* 179*       Blood Sugar Average: Last 72 hrs:  (P) 368 [id number] type 2 diabetes mellitus on oral hypoglycemic agent  · Hold metformin and glipizide in the hospital  · Insulin sliding scale,  · Increased Lantus today to 15 units

## 2020-06-29 NOTE — ASSESSMENT & PLAN NOTE
· Patient with known history of atrial fibrillation, status post HUMBERTO and cardioversion in January of this year in [facility] but unfortunately patient is in chronic atrial fibrillation per cardiology progress note, anticoagulated with Eliquis  · Continue metoprolol and Eliquis

## 2020-06-29 NOTE — PLAN OF CARE
Problem: Potential for Falls  Goal: Patient will remain free of falls  Description  INTERVENTIONS:  - Assess patient frequently for physical needs  -  Identify cognitive and physical deficits and behaviors that affect risk of falls    -  [place] fall precautions as indicated by assessment   - Educate patient/family on patient safety including physical limitations  - Instruct patient to call for assistance with activity based on assessment  - Modify environment to reduce risk of injury  - Consider OT/PT consult to assist with strengthening/mobility  Outcome: Progressing     Problem: CARDIOVASCULAR - ADULT  Goal: Maintains optimal cardiac output and hemodynamic stability  Description  INTERVENTIONS:  - Monitor I/O, vital signs and rhythm  - Monitor for S/S and trends of decreased cardiac output  - Administer and titrate ordered vasoactive medications to optimize hemodynamic stability  - Assess quality of pulses, skin color and temperature  - Assess for signs of decreased coronary artery perfusion  - Instruct patient to report change in severity of symptoms  Outcome: Progressing  Goal: Absence of cardiac dysrhythmias or at baseline rhythm  Description  INTERVENTIONS:  - Continuous cardiac monitoring, vital signs, obtain 12 lead EKG if ordered  - Administer antiarrhythmic and heart rate control medications as ordered  - Monitor electrolytes and administer replacement therapy as ordered  Outcome: Progressing     Problem: METABOLIC, FLUID AND ELECTROLYTES - ADULT  Goal: Glucose maintained within target range  Description  INTERVENTIONS:  - Monitor Blood Glucose as ordered  - Assess for signs and symptoms of hyperglycemia and hypoglycemia  - Administer ordered medications to maintain glucose within target range  - Assess nutritional intake and initiate nutrition service referral as needed  Outcome: Progressing     Problem: SKIN/TISSUE INTEGRITY - ADULT  Goal: Skin integrity remains intact  Description  INTERVENTIONS  - Identify patients at risk for skin breakdown  - Assess and monitor skin integrity  - Assess and monitor nutrition and hydration status  - Monitor labs (i e  albumin)  - Assess for incontinence   - Turn and reposition patient  - Assist with mobility/ambulation  - Relieve pressure over bony prominences  - Avoid friction and shearing  - Provide appropriate hygiene as needed including keeping skin clean and dry  - Evaluate need for skin moisturizer/barrier cream  - Collaborate with interdisciplinary team (i e  Nutrition, Rehabilitation, etc )   - Patient/family teaching  Outcome: Progressing     Problem: HEMATOLOGIC - ADULT  Goal: Maintains hematologic stability  Description  INTERVENTIONS  - Assess for signs and symptoms of bleeding or hemorrhage  - Monitor labs  - Administer supportive blood products/factors as ordered and appropriate  Outcome: Progressing     Problem: MUSCULOSKELETAL - ADULT  Goal: Maintain or return mobility to safest level of function  Description  INTERVENTIONS:  - Assess patient's ability to carry out ADLs; assess patient's baseline for ADL function and identify physical deficits which impact ability to perform ADLs (bathing, care of mouth/teeth, toileting, grooming, dressing, etc )  - Assess/evaluate cause of self-care deficits   - Assess range of motion  - Assess patient's mobility/assist x 2 with roller walker  - Assess patient's need for assistive devices and provide as appropriate  - Encourage maximum independence but intervene and supervise when necessary  - Involve family in performance of ADLs  - Assess for home care needs following discharge   - Consider OT consult to assist with ADL evaluation and planning for discharge  - Provide patient education as appropriate   Outcome: Progressing     Problem: Prexisting or High Potential for Compromised Skin Integrity  Goal: Skin integrity is maintained or improved  Description  INTERVENTIONS:  - Identify patients at risk for skin breakdown  - Assess and monitor skin integrity  - Assess and monitor nutrition and hydration status  - Monitor labs   - Assess for incontinence   - Turn and reposition patient  - Assist with mobility/ambulation  - Relieve pressure over bony prominences  - Avoid friction and shearing  - Provide appropriate hygiene as needed including keeping skin clean and dry  - Evaluate need for skin moisturizer/barrier cream  - Collaborate with interdisciplinary team   - Patient/family teaching  - Consider wound care consult   Outcome: Progressing     Problem: Nutrition/Hydration-ADULT  Goal: Nutrient/Hydration intake appropriate for improving, restoring or maintaining nutritional needs  Description  Monitor and assess patient's nutrition/hydration status for malnutrition  Collaborate with interdisciplinary team and initiate plan and interventions as ordered  Monitor patient's weight and dietary intake as ordered or per policy  Utilize nutrition screening tool and intervene as necessary  Determine patient's food preferences and provide high-protein, high-caloric foods as appropriate       INTERVENTIONS:  - Monitor oral intake, urinary output, labs, and treatment plans  - Assess nutrition and hydration status and recommend course of action  - Evaluate amount of meals eaten  - Assist patient with eating if necessary   - Allow adequate time for meals  - Recommend/ encourage appropriate diets, oral nutritional supplements, and vitamin/mineral supplements  - Order, calculate, and assess calorie counts as needed  Offer small frequentmeals  - Assess need for intravenous fluids  - Provide specific nutrition/hydration education as appropriate  - Include patient/family/caregiver in decisions related to nutrition   Outcome: Progressing

## 2020-06-29 NOTE — PROGRESS NOTES
Encouraged patient to ambulate one more time for the night even just to the door, pt refused but agreed to sit up on the side of the bed and did so for over an hour

## 2020-06-29 NOTE — ASSESSMENT & PLAN NOTE
· On EGD 6/17/2020, there was concern for possible bowel obstruction vs severe gastroparesis, given large amount of food and dark bilious fluid in stomach which could not be cleared  · Surgery following   · Placed on NG tube which has been discontinued   · On PPI IV b i d  Dirk Crawford   · Status post OR started-Exploratory laparotomy with right hemicolectomy, postoperative day 2  · Increased to surgical soft today

## 2020-06-29 NOTE — PROGRESS NOTES
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  Stag [id number]  - Pt reports pain manageable  - Pt reports no nausea today, tolerating clears  - XR abdomen (6/28): Air-filled dilated small bowel loops with air fluid levels, findings suggest SBO vs adynamic ileus    Plan:  - Advance to surgical soft diet  - Pain/nausea control PRN  - Encourage ambulating OOB  - Management of medical conditions as per hospitalist    Subjective/Objective   Subjective: No acute events overnight  Pt says pain is "okay" and well controlled with medications  No nausea since yesterday  Tolerating clears  Reports 3 loose BMs yesterday  Denies vomiting, fever, chills, CP, SOB  Objective:     Blood pressure 112/58, pulse 68, temperature 98 1 °F (36 7 °C), resp  rate 17, height 5' 4 5" (1 638 m), weight 101 kg (223 lb 5 2 oz), SpO2 97 %  ,Body mass index is 37 74 kg/m²  Intake/Output Summary (Last 24 hours) at 6/29/2020 0810  Last data filed at 6/29/2020 0747  Gross per 24 hour   Intake 400 ml   Output 231 ml   Net 169 ml       Invasive Devices     Peripheral Intravenous Line            Peripheral IV 06/26/20 Left;Upper;Ventral (anterior) Arm 2 days                Physical Exam: General appearance: alert and oriented, in no acute distress  Lungs: clear to auscultation bilaterally  Heart: regular rate and rhythm, S1, S2 normal, no murmur, click, rub or gallop  Abdomen: normal findings: bowel sounds normal, no masses palpable and surgical incision site well approximated with staples in place  No erythema, fluctuance, induration, bleeding, or drainage   and abnormal findings:  moderate tenderness in the RUQ    VTE Pharmacologic Prophylaxis: Eliquis  VTE Mechanical Prophylaxis: sequential compression device     Torie Padilla PA-C

## 2020-06-30 NOTE — PLAN OF CARE
Problem: Potential for Falls  Goal: Patient will remain free of falls  Description  INTERVENTIONS:  - Assess patient frequently for physical needs  -  Identify cognitive and physical deficits and behaviors that affect risk of falls    -  [place] fall precautions as indicated by assessment   - Educate patient/family on patient safety including physical limitations  - Instruct patient to call for assistance with activity based on assessment  - Modify environment to reduce risk of injury  - Consider OT/PT consult to assist with strengthening/mobility  Outcome: Progressing     Problem: CARDIOVASCULAR - ADULT  Goal: Maintains optimal cardiac output and hemodynamic stability  Description  INTERVENTIONS:  - Monitor I/O, vital signs and rhythm  - Monitor for S/S and trends of decreased cardiac output  - Administer and titrate ordered vasoactive medications to optimize hemodynamic stability  - Assess quality of pulses, skin color and temperature  - Assess for signs of decreased coronary artery perfusion  - Instruct patient to report change in severity of symptoms  Outcome: Progressing  Goal: Absence of cardiac dysrhythmias or at baseline rhythm  Description  INTERVENTIONS:  - Continuous cardiac monitoring, vital signs, obtain 12 lead EKG if ordered  - Administer antiarrhythmic and heart rate control medications as ordered  - Monitor electrolytes and administer replacement therapy as ordered  Outcome: Progressing     Problem: METABOLIC, FLUID AND ELECTROLYTES - ADULT  Goal: Glucose maintained within target range  Description  INTERVENTIONS:  - Monitor Blood Glucose as ordered  - Assess for signs and symptoms of hyperglycemia and hypoglycemia  - Administer ordered medications to maintain glucose within target range  - Assess nutritional intake and initiate nutrition service referral as needed  Outcome: Progressing     Problem: SKIN/TISSUE INTEGRITY - ADULT  Goal: Skin integrity remains intact  Description  INTERVENTIONS  - Identify patients at risk for skin breakdown  - Assess and monitor skin integrity  - Assess and monitor nutrition and hydration status  - Monitor labs (i e  albumin)  - Assess for incontinence   - Turn and reposition patient  - Assist with mobility/ambulation  - Relieve pressure over bony prominences  - Avoid friction and shearing  - Provide appropriate hygiene as needed including keeping skin clean and dry  - Evaluate need for skin moisturizer/barrier cream  - Collaborate with interdisciplinary team (i e  Nutrition, Rehabilitation, etc )   - Patient/family teaching  Outcome: Progressing     Problem: HEMATOLOGIC - ADULT  Goal: Maintains hematologic stability  Description  INTERVENTIONS  - Assess for signs and symptoms of bleeding or hemorrhage  - Monitor labs  - Administer supportive blood products/factors as ordered and appropriate  Outcome: Progressing     Problem: MUSCULOSKELETAL - ADULT  Goal: Maintain or return mobility to safest level of function  Description  INTERVENTIONS:  - Assess patient's ability to carry out ADLs; assess patient's baseline for ADL function and identify physical deficits which impact ability to perform ADLs (bathing, care of mouth/teeth, toileting, grooming, dressing, etc )  - Assess/evaluate cause of self-care deficits   - Assess range of motion  - Assess patient's mobility/assist x 2 with roller walker  - Assess patient's need for assistive devices and provide as appropriate  - Encourage maximum independence but intervene and supervise when necessary  - Involve family in performance of ADLs  - Assess for home care needs following discharge   - Consider OT consult to assist with ADL evaluation and planning for discharge  - Provide patient education as appropriate   Outcome: Progressing     Problem: Prexisting or High Potential for Compromised Skin Integrity  Goal: Skin integrity is maintained or improved  Description  INTERVENTIONS:  - Identify patients at risk for skin breakdown  - Assess and monitor skin integrity  - Assess and monitor nutrition and hydration status  - Monitor labs   - Assess for incontinence   - Turn and reposition patient  - Assist with mobility/ambulation  - Relieve pressure over bony prominences  - Avoid friction and shearing  - Provide appropriate hygiene as needed including keeping skin clean and dry  - Evaluate need for skin moisturizer/barrier cream  - Collaborate with interdisciplinary team   - Patient/family teaching  - Consider wound care consult   Outcome: Progressing     Problem: Nutrition/Hydration-ADULT  Goal: Nutrient/Hydration intake appropriate for improving, restoring or maintaining nutritional needs  Description  Monitor and assess patient's nutrition/hydration status for malnutrition  Collaborate with interdisciplinary team and initiate plan and interventions as ordered  Monitor patient's weight and dietary intake as ordered or per policy  Utilize nutrition screening tool and intervene as necessary  Determine patient's food preferences and provide high-protein, high-caloric foods as appropriate       INTERVENTIONS:  - Monitor oral intake, urinary output, labs, and treatment plans  - Assess nutrition and hydration status and recommend course of action  - Evaluate amount of meals eaten  - Assist patient with eating if necessary   - Allow adequate time for meals  - Recommend/ encourage appropriate diets, oral nutritional supplements, and vitamin/mineral supplements  - Order, calculate, and assess calorie counts as needed  Offer small frequentmeals  - Assess need for intravenous fluids  - Provide specific nutrition/hydration education as appropriate  - Include patient/family/caregiver in decisions related to nutrition   Outcome: Progressing

## 2020-06-30 NOTE — OCCUPATIONAL THERAPY NOTE
[address] Progress Note     Patient Name: Jovana Mayorga  Today's Date: 6/30/2020  Problem List  Principal Problem:    Partial bowel obstruction ([facility] )  Active Problems:    Acute on chronic diastolic congestive heart failure (HCC)    Chronic kidney disease, stage 3 (HCC)    Coronary artery disease    Diabetes mellitus type 2 in obese [facility])    Essential hypertension    Hyperlipidemia    Morbid obesity (HCC)    Symptomatic anemia    Atrial fibrillation (HCC)    Abnormal CT of the abdomen    Duodenal diverticulum    Colon  neoplasm    Acute renal failure (ARF) ([facility] )    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[facility] )            06/30/20 1023   Restrictions/Precautions   Other Precautions Chair Alarm; Bed Alarm; Fall Risk;Pain   Pain Assessment   Pain Assessment Tool FLACC   Pain Rating: FLACC (Rest) - Face 0   Pain Rating: FLACC (Rest) - Legs 0   Pain Rating: FLACC (Rest) - Activity 0   Pain Rating: FLACC (Rest) - Cry 0   Pain Rating: FLACC (Rest) - Consolability 0   Score: FLACC (Rest) 0   Pain Rating: FLACC (Activity) - Face 1   Pain Rating: FLACC (Activity) - Legs 0   Pain Rating: FLACC (Activity) - Activity 1   Pain Rating: FLACC (Activity) - Cry 1   Pain Rating: FLACC (Activity) - Consolability 0   Score: FLACC (Activity) 3   ADL   LB Bathing Assistance 3  Moderate Assistance   LB Bathing Deficit Steadying;Verbal cueing;Supervision/safety; Increased time to complete; Buttocks   LB Bathing Comments Pt incontinent of BM, when standing continuing to require Mod A for thorough cleaning of buttocks  Toileting Comments Therapist was in [address] when responded to Pt's call mora  Pt had an incontient episode of BM and urine, PCA requiring assistanec to roll and complete hygiene  Pt requiring Max A for side rolling with use of bedrails  Max A for supine>sit  Pt requiring Max encouragement to get OOB and sit in recliner for lunch   Pt thoroughly educated on importance of getting OOB daily as well as ringing the bell when needing to use the bathroom as to avoid soiling himself  Pt reports understanding although appears to continue to need review and reassurment  Functional Standing Tolerance   Time 3 minutes   Activity static standing at RW while completing LB washing  Comments use of RW for UB support   Bed Mobility   Rolling R 2  Maximal assistance   Additional items Assist x 1; Increased time required; Bedrails;Verbal cues;LE management   Rolling L 2  Maximal assistance   Additional items Assist x 1;Bedrails; Increased time required;Verbal cues;LE management   Supine to Sit 2  Maximal assistance   Additional items Assist x 1; Increased time required;Verbal cues;LE management  (trunk management)   Additional Comments HOB elevated  use of bedrails PRN   Transfers   Sit to Stand 4  Minimal assistance   Additional items Assist x 1; Increased time required;Verbal cues   Stand to Sit 5  Supervision   Additional items Increased time required;Verbal cues   Stand pivot   (Steadying Assist)   Additional items Increased time required;Verbal cues   Additional Comments Pt completing STS from EOB>RW @ Min A for posterior leaning and increased abdoiinal pain with movement  Pt then completing SPT and short distance ambulation with RW @ Steadying Assist   Functional Mobility   Functional Mobility   (Steadying Assist)   Additional Comments 20ft   Additional items Rolling walker   Cognition   Arousal/Participation Alert; Responsive; Cooperative   Attention Attends with cues to redirect   Orientation Level Oriented X4   Following Commands Follows one step commands without difficulty   Comments Pt requiring Max encouragement and motivation for OOB    Activity Tolerance   Activity Tolerance Patient tolerated treatment well   Medical Staff Made Aware Spoke with PCA, Heydi Carr  Spoke with PTTaylor   Assessment   Assessment Pt seen for treatment session #2 this date  Pt alert and agreeable to participate at this time   Pt requiring significant assistance for bed mobility and Max encouragement to fully participate this date and to sit OOB  Pt has good potential to make progress towards goals and be successful at home although d/t Pts decreased motivation, progress is limited  Pt thoroughly educated on importance of not soiling self in bed and to utilize call bell to alert nursing staff  Pt educated that if he wants to go home, he cannot be soiling self d/t difficulty/inability to thoroughly clean self if incontinent BM occurs  Pt verbalizes understanding  Upon end of session, Pt seated OOB in recliner chair, chair alarm intact, call bell in reach and all needs met at this time  Plan   Treatment Interventions ADL retraining;Functional transfer training;UE strengthening/ROM; Endurance training;Cognitive reorientation;Patient/family training;Equipment evaluation/education; Neuromuscular reeducation; Compensatory technique education; Energy conservation; Activityengagement;Continued evaluation   Goal Expiration Date 07/03/20   OT Treatment Day 2   OT Frequency 3-5x/wk   Recommendation   Recommendation   (HHOT vs  post acute rehab pending progress)   OT Discharge Recommendation   (HHOT vs  post acute rehab pending progress)     Pt goals to be met by 6/23/2020        1  Pt will demonstrate ability to complete supine<>sit @ Mod I in order to increase safety and independence during ADL tasks  2  Pt will demonstrate ability to complete UB ADLs including washing/dressing @ Mod I in order to increase performance and participation during meaningful tasks  3  Pt will demonstrate ability to complete LB dressing @ Min A in order to increase safety and independence during meaningful tasks  4  Pt will demonstrate ability to aris/doff socks/shoes while sitting EOB @ Min A in order to increase safety and independence during meaningful tasks     5  Pt will demonstrate ability to complete toileting tasks including CM and pericare @ Mod I in order to increase safety and independence during meaningful tasks   6  Pt will demonstrate ability to complete EOB, chair, toilet/commode transfers @ Mod I in order to increase performance and participation during functional tasks  7  Pt will demonstrate ability to stand for 7-8 minutes while maintaining G balance with use of RW for UB support PRN  8  Pt will demonstrate ability to tolerate 30-35 minute OT session with no vc'ing for deep breathing or use of energy conservation techniques in order to increase activity tolerance during functional tasks  9  Pt will demonstrate Good carryover of use of energy conservation/compensatory strategies during ADLs and functional tasks in order to increase safety and reduce risk for falls  10  Pt will demonstrate Good attention and participation in continued evaluation of functional ambulation house hold distances in order to assist with safe d/c planning  11  Pt will attend to continued cognitive assessments 100% of the time in order to provide most appropriate d/c recommendations  12  Pt will follow 100% simple 2-step commands and be A&O x4 consistently with environmental cues to increase participation in functional activities  13  Pt will identify 3 areas of interest/hobbies and 1 intervention on how to incorporate into daily life in order to increase interaction with environment and peers as well as increase participation in meaningful tasks     14  Pt will demonstrate 100% carryover of BUE HEP in order to increase BUE MS and increase performance during functional tasks upon d/c home         El Ortiz OTR/L

## 2020-06-30 NOTE — ASSESSMENT & PLAN NOTE
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

## 2020-06-30 NOTE — PLAN OF CARE
Problem: Potential for Falls  Goal: Patient will remain free of falls  Description  INTERVENTIONS:  - Assess patient frequently for physical needs  -  Identify cognitive and physical deficits and behaviors that affect risk of falls    -  [place] fall precautions as indicated by assessment   - Educate patient/family on patient safety including physical limitations  - Instruct patient to call for assistance with activity based on assessment  - Modify environment to reduce risk of injury  - Consider OT/PT consult to assist with strengthening/mobility  Outcome: Progressing     Problem: CARDIOVASCULAR - ADULT  Goal: Maintains optimal cardiac output and hemodynamic stability  Description  INTERVENTIONS:  - Monitor I/O, vital signs and rhythm  - Monitor for S/S and trends of decreased cardiac output  - Administer and titrate ordered vasoactive medications to optimize hemodynamic stability  - Assess quality of pulses, skin color and temperature  - Assess for signs of decreased coronary artery perfusion  - Instruct patient to report change in severity of symptoms  Outcome: Progressing  Goal: Absence of cardiac dysrhythmias or at baseline rhythm  Description  INTERVENTIONS:  - Continuous cardiac monitoring, vital signs, obtain 12 lead EKG if ordered  - Administer antiarrhythmic and heart rate control medications as ordered  - Monitor electrolytes and administer replacement therapy as ordered  Outcome: Progressing     Problem: METABOLIC, FLUID AND ELECTROLYTES - ADULT  Goal: Glucose maintained within target range  Description  INTERVENTIONS:  - Monitor Blood Glucose as ordered  - Assess for signs and symptoms of hyperglycemia and hypoglycemia  - Administer ordered medications to maintain glucose within target range  - Assess nutritional intake and initiate nutrition service referral as needed  Outcome: Progressing     Problem: SKIN/TISSUE INTEGRITY - ADULT  Goal: Skin integrity remains intact  Description  INTERVENTIONS  - Identify patients at risk for skin breakdown  - Assess and monitor skin integrity  - Assess and monitor nutrition and hydration status  - Monitor labs (i e  albumin)  - Assess for incontinence   - Turn and reposition patient  - Assist with mobility/ambulation  - Relieve pressure over bony prominences  - Avoid friction and shearing  - Provide appropriate hygiene as needed including keeping skin clean and dry  - Evaluate need for skin moisturizer/barrier cream  - Collaborate with interdisciplinary team (i e  Nutrition, Rehabilitation, etc )   - Patient/family teaching  Outcome: Progressing     Problem: HEMATOLOGIC - ADULT  Goal: Maintains hematologic stability  Description  INTERVENTIONS  - Assess for signs and symptoms of bleeding or hemorrhage  - Monitor labs  - Administer supportive blood products/factors as ordered and appropriate  Outcome: Progressing     Problem: MUSCULOSKELETAL - ADULT  Goal: Maintain or return mobility to safest level of function  Description  INTERVENTIONS:  - Assess patient's ability to carry out ADLs; assess patient's baseline for ADL function and identify physical deficits which impact ability to perform ADLs (bathing, care of mouth/teeth, toileting, grooming, dressing, etc )  - Assess/evaluate cause of self-care deficits   - Assess range of motion  - Assess patient's mobility/assist x 2 with roller walker  - Assess patient's need for assistive devices and provide as appropriate  - Encourage maximum independence but intervene and supervise when necessary  - Involve family in performance of ADLs  - Assess for home care needs following discharge   - Consider OT consult to assist with ADL evaluation and planning for discharge  - Provide patient education as appropriate   Outcome: Progressing     Problem: Prexisting or High Potential for Compromised Skin Integrity  Goal: Skin integrity is maintained or improved  Description  INTERVENTIONS:  - Identify patients at risk for skin breakdown  - Assess and monitor skin integrity  - Assess and monitor nutrition and hydration status  - Monitor labs   - Assess for incontinence   - Turn and reposition patient  - Assist with mobility/ambulation  - Relieve pressure over bony prominences  - Avoid friction and shearing  - Provide appropriate hygiene as needed including keeping skin clean and dry  - Evaluate need for skin moisturizer/barrier cream  - Collaborate with interdisciplinary team   - Patient/family teaching  - Consider wound care consult   Outcome: Progressing     Problem: Nutrition/Hydration-ADULT  Goal: Nutrient/Hydration intake appropriate for improving, restoring or maintaining nutritional needs  Description  Monitor and assess patient's nutrition/hydration status for malnutrition  Collaborate with interdisciplinary team and initiate plan and interventions as ordered  Monitor patient's weight and dietary intake as ordered or per policy  Utilize nutrition screening tool and intervene as necessary  Determine patient's food preferences and provide high-protein, high-caloric foods as appropriate       INTERVENTIONS:  - Monitor oral intake, urinary output, labs, and treatment plans  - Assess nutrition and hydration status and recommend course of action  - Evaluate amount of meals eaten  - Assist patient with eating if necessary   - Allow adequate time for meals  - Recommend/ encourage appropriate diets, oral nutritional supplements, and vitamin/mineral supplements  - Order, calculate, and assess calorie counts as needed  Offer small frequentmeals  - Assess need for intravenous fluids  - Provide specific nutrition/hydration education as appropriate  - Include patient/family/caregiver in decisions related to nutrition   Outcome: Progressing

## 2020-06-30 NOTE — PROGRESS NOTES
Progress Note - Singh Driscoll 1935, 80 y o  male MRN: [id number]    Unit/Bed#: -01 Encounter: [id number]    Primary Care Provider: Hang Grewal DO   Date and time admitted to hospital: 6/16/2020 11:14 AM    Symptomatic anemia  Assessment & Plan  · Patient was evaluated by PCP secondary to dizziness and fatigue and has blood pressure done as an outpatient  His hemoglobin was found to be 6 2 and was sent to the emergency room  · On the day of admission hemoglobin in the emergency room was 7 1 and received 2 units of packed RBCs  · HGB stable since 11 0 last two days   Acute blood loss anemia, due to colon neoplasm, a/e/b hbg 7 1, ED note stating has not been anemic before, symptomatic, treated with PRBCs, Protonix IVP, OR intervention, hemodynamic monitoring, and lab monitoring  Acute renal failure (ARF) (HCC)  Assessment & Plan  · Most likely secondary to nonoliguric ATN   · Diuretic as per nephrology  · Creatinine stable around 2 now  · Discuss with Nephrology if to switch Lasix to oral    Colon  neoplasm  Assessment & Plan  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  Status post resection, postop day 4, normal bowel movement  Patient on surgical soft diet, advance diet as per surgery recommendation  Discussed with the patient to arrange oncology follow-up as an outpatient:  Patient requested not to arrange any oncology follow-up  Will discuss again with patient on discharge  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[facility])  Assessment & Plan  ·  s/p exploratory laparotomy with right hemicolectomy for 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  · Patient did not want oncology follow up    Will try to talk to patient on discharge again    Atrial fibrillation [facility])  Assessment & Plan  · Patient with known history of atrial fibrillation, status post HUMBERTO and cardioversion in January of this year in [facility] but unfortunately patient is in chronic atrial fibrillation per cardiology progress note, anticoagulated with Eliquis  · Continue metoprolol and Eliquis    Morbid obesity ([address] )  Assessment & Plan  · TLC as an outpatient    Hyperlipidemia  Assessment & Plan  · Continue atorvastatin    Essential hypertension  Assessment & Plan  · Continue metoprolol    Diabetes mellitus type 2 in obese [facility])  Assessment & Plan  Lab Results   Component Value Date    HGBA1C 8 2 (H) 06/18/2020     Recent Labs     06/29/20  1100 06/29/20  1628 06/29/20  2108 06/30/20  0716   POCGLU 253* 219* 170* 221*     Blood Sugar Average: Last 72 hrs:  (P) 304 [id number] type 2 diabetes mellitus on oral hypoglycemic agent  · Hold metformin and glipizide in the hospital  · Insulin sliding scale,  · Increased Lantus today to 17 and Humalog to 7 units with meals    Chronic kidney disease, stage 3 (HCC)  Assessment & Plan  · Baseline creatinine appears to be between 1 4 and 1 6  Questionable new higher baseline     Acute on chronic diastolic congestive heart failure (HCC)  Assessment & Plan  Wt Readings from Last 3 Encounters:   06/30/20 104 kg (228 lb 9 9 oz)     · Patient with known history of chronic congestive heart failure and was admitted to [facility] in January  · Patient was on 40 mg of Lasix b i d  As an outpatient and secondary to dizziness and orthostasis his Lasix dose was decreased to 40 mg daily during the recent hospital stay  · Continue IV Lasix for now and switch to 40 mg twice daily on discharge  · Ejection fraction 65% during the last echo 1/20    * Partial bowel obstruction (HCC)  Assessment & Plan  · On EGD 6/17/2020, there was concern for possible bowel obstruction vs severe gastroparesis, given large amount of food and dark bilious fluid in stomach which could not be cleared  · Status post OR started-Exploratory laparotomy with right hemicolectomy  · Complaining of nausea and abdominal discomfort, continue surgical soft diet    Patient advised to ambulate  VTE Pharmacologic Prophylaxis:   Pharmacologic: Heparin    Patient Centered Rounds: I have performed bedside rounds with nursing staff today    Discussions with Specialists or Other Care Team Provider:     Education and Discussions with Family / Patient:   Current Length of Stay: 14 day(s)    Current Patient Status: Inpatient   Certification Statement: The patient will continue to require additional inpatient hospital stay due to Colon mass, acute renal failure    Discharge Plan:  Probably in 2-3 days    Code Status: Level 1 - Full Code      Subjective:   Patient complaining of nausea and abdominal discomfort after meal  No vomiting  Passes feces and flatus freely  Patient however not encourage to get out of bed and ambulate  He mentioned to me that he feels he is done and not encouraged to do anything  Objective:     Vitals:   Temp (24hrs), Av 1 °F (36 7 °C), Min:97 4 °F (36 3 °C), Max:99 °F (37 2 °C)    Temp:  [97 4 °F (36 3 °C)-99 °F (37 2 °C)] 97 9 °F (36 6 °C)  HR:  [64-81] 81  Resp:  [17-20] 18  BP: ()/(49-76) 130/76  SpO2:  [95 %-96 %] 96 %  Body mass index is 38 64 kg/m²  Input and Output Summary (last 24 hours): Intake/Output Summary (Last 24 hours) at 2020 1121  Last data filed at 2020 0800  Gross per 24 hour   Intake 540 ml   Output 100 ml   Net 440 ml       Physical Exam:     General appearance: alert, appears stated age and cooperative  Head: Normocephalic, without obvious abnormality, atraumatic  Lungs: clear to auscultation bilaterally  Heart: regular rate and rhythm  Abdomen: soft, non-tender, positive bowel sounds   Back: negative  Extremities: extremities atraumatic, no cyanosis or edema  Neurologic: Alert and oriented X 3, normal strength and tone  Normal symmetric reflexes   Normal coordination and gait    Additional Data:     Labs:    Results from last 7 days   Lab Units 20  0947 20  0442   WBC Thousand/uL 5 31 4 38   HEMOGLOBIN g/dL 11 0* 11 0*   HEMATOCRIT % 37 8 36 7   PLATELETS Thousands/uL 212 202   NEUTROS PCT %  --  74   LYMPHS PCT %  --  15   MONOS PCT %  --  8   EOS PCT %  --  2     Results from last 7 days   Lab Units 06/30/20  0930 06/29/20  0947   SODIUM mmol/L 138 137   POTASSIUM mmol/L 4 6 4 7   CHLORIDE mmol/L 106 105   CO2 mmol/L 22 25   BUN mg/dL 30* 36*   CREATININE mg/dL 2 01* 2 31*   ANION GAP mmol/L 10 7   CALCIUM mg/dL 7 4* 7 8*   ALBUMIN g/dL  --  2 2*   TOTAL BILIRUBIN mg/dL  --  0 70   ALK PHOS U/L  --  65   ALT U/L  --  16   AST U/L  --  30   GLUCOSE RANDOM mg/dL 204* 218*         Results from last 7 days   Lab Units 06/30/20  0716 06/29/20  2108 06/29/20  1628 06/29/20  1100 06/29/20  0718 06/28/20  2130 06/28/20  1600 06/28/20  1114 06/28/20  0735 06/27/20  2121 06/27/20  1559 06/27/20  1101   POC GLUCOSE mg/dl 221* 170* 219* 253* 179* 246* 270* 341* 201* 131 218* 239*                   * I Have Reviewed All Lab Data Listed Above  * Additional Pertinent Lab Tests Reviewed:  [address] Admission Reviewed    Imaging:    Imaging Reports Reviewed Today Include: images reviewed    Recent Cultures (last 7 days):           Last 24 Hours Medication List:     Current Facility-Administered Medications:  acetaminophen 650 mg Oral Q6H PRN Little Carrel, MD   apixaban 2 5 mg Oral BID Felipe Najera PA-C   aspirin 81 mg Oral Daily Little Carrel, MD   atorvastatin 40 mg Oral Daily With Thu Morales MD   furosemide 60 mg Intravenous BID (diuretic) BRANDON Cheung   insulin glargine 17 Units Subcutaneous HS Little Carrel, MD   insulin lispro 1-5 Units Subcutaneous TID With Meals Little Carrel, MD   insulin lispro 7 Units Subcutaneous TID With Meals Little Carrel, MD   metoprolol succinate 25 mg Oral Daily Little Carrel, MD   ondansetron 4 mg Intravenous Q8H PRN Anusha S Rachel, BRANDON   ondansetron 4 mg Intravenous Once Anusha S Rachel, BRANDON   oxyCODONE 5 mg Oral Q4H PRN Doni Stewart,    pantoprazole 40 mg Intravenous Q12H [address] Patricia Steele, DO   phenol 1 spray Mouth/Throat Q2H PRN Patricia Steele, DO   potassium chloride 40 mEq Oral Daily BRANDON Garcia   saliva substitute 5 spray Mouth/Throat 4x Daily PRN Chantelle Rajput PA-C        Today, Patient Was Seen By: Justina Trevino MD    ** Please Note: Dictation voice to text software may have been used in the creation of this document   **

## 2020-06-30 NOTE — ASSESSMENT & PLAN NOTE
Lab Results   Component Value Date    HGBA1C 8 2 (H) 06/18/2020       Recent Labs     06/29/20  1100 06/29/20  1628 06/29/20  2108 06/30/20  0716   POCGLU 253* 219* 170* 221*       Blood Sugar Average: Last 72 hrs:  (P) 810 [id number] type 2 diabetes mellitus on oral hypoglycemic agent  · Hold metformin and glipizide in the hospital  · Insulin sliding scale,  · Increased Lantus today to 17 and Humalog to 7 units with meals

## 2020-06-30 NOTE — PROGRESS NOTES
NEPHROLOGY PROGRESS NOTE   Feroz Gómez 80 y o  male MRN: [id number]  Unit/Bed#: -01 Encounter: [id number]    Assessment/Plan:    [de-identified] yo male with CKD 3, CHF, hypertension, CAD s/p CABG, a-fib admitted 6/16 for profound anemia  Underwent ex-lap with hemicolectomy 6/20/20 for obstructing adenocarcinoma of the cecum  Renal consultation was requested for MARLENI which improved with aggressive IV diuresis  Will decrease lasix to 40 mg IV BID today as Keshia Baca appears euvolemic and states he feels dry  1  Acute Kidney Injury  · Renal function continues to slowly improve  He is receiving aggressive diuresis with IV lasix 60 mg IV BID  Will decrease to 40 mg IV lasix BID as he exams euvolemic  He continues to be non-oliguric however some urines not quantified and feel weights are inaccurate  · Continue to avoid nephrotoxins and wide variation in blood pressure as best as possible  · Trend IO, daily standing weight, trend BMP, adjust meds to eGFR  2  Stage 3 Chronic Kidney Disease with baseline creatinine 1 3-1 6 mg/dL  3  Acute on Chronic Congestive Heart Failure  · Appears to be compensated today and euvolemic  Feel weights are inaccurate as modality changes from bed to standing scale  Continue to attempt standing daily weights, salt avoidance, IO    4  Hypertensive Nephrosclerosis  · Blood pressure is appropriate  5  Obstructing Adenocarcinoma of the Cecum  · S/p ex-lap with hemicolectomy 6/20/20  6  Abdominal Pain: resolved        ROS  Seen lying in bed  States he feels "dry " no other complaints  A complete 10 point review of systems have been performed and are otherwise negative         Historical Information   Past Medical History:   Diagnosis Date    A-fib [facility])     Cardiac disease     [facility] [address] [facility]     Diabetes mellitus [facility] [address] [facility]     MI, old     Myocardial infarction [address] [address] )      Past Surgical History:   Procedure Laterality Date    CARDIAC SURGERY      NV PART REMOVAL COLON W ANASTOMOSIS N/A 6/20/2020    Procedure: exploratory laparatomy with right hemicolectomy;  Surgeon: Cristiano Booth DO;  Location:  MAIN OR;  Service: General     Social History   Social History     Substance and Sexual Activity   Alcohol Use Not Currently     Social History     Substance and Sexual Activity   Drug Use Not Currently     Social History     Tobacco Use   Smoking Status Never Smoker   Smokeless Tobacco Never Used       Family History:   Family History   Problem Relation Age of Onset    Diabetes Mother        Medications:  Pertinent medications were reviewed    Current Facility-Administered Medications:  acetaminophen 650 mg Oral Q6H PRN Klever Dave MD   apixaban 2 5 mg Oral BID Geetha Walls PA-C   aspirin 81 mg Oral Daily Klever Dave MD   atorvastatin 40 mg Oral Daily With Sergio Quevedo MD   furosemide 40 mg Intravenous BID (diuretic) BRANDON Miramontes   insulin glargine 17 Units Subcutaneous HS Klever Dave MD   insulin lispro 1-5 Units Subcutaneous TID With Meals Klever Dave MD   insulin lispro 7 Units Subcutaneous TID With Meals Klever Dave MD   metoprolol succinate 25 mg Oral Daily Klever Dave MD   ondansetron 4 mg Intravenous Once Anusha S BRANDON Ramos   ondansetron 4 mg Intravenous Q6H PRN Klever Dave MD   oxyCODONE 5 mg Oral Q4H PRN Cristiano Booth DO   pantoprazole 40 mg Intravenous Q12H [facility] Gissell Springer DO   phenol 1 spray Mouth/Throat Q2H PRN Cristiano Booth, DO   potassium chloride 40 mEq Oral Daily BRANDON Miramontes   saliva substitute 5 spray Mouth/Throat 4x Daily PRN Alanna Escobar PA-C         No Known Allergies      Vitals:   /76   Pulse 97   Temp 98 °F (36 7 °C)   Resp 18   Ht 5' 4 5" (1 638 m)   Wt 104 kg (228 lb 9 9 oz)   SpO2 95%   BMI 38 64 kg/m²   Body mass index is 38 64 kg/m²    SpO2: 95 %,   SpO2 Activity: At Rest,   O2 Device: None (Room air)      Intake/Output Summary (Last 24 hours) at 6/30/2020 5796  Last data filed at 6/30/2020 1352  Gross per 24 hour   Intake 540 ml   Output 210 ml   Net 330 ml     Invasive Devices     Peripheral Intravenous Line            Peripheral IV 06/30/20 Right Antecubital less than 1 day                Physical Exam  General: conscious, cooperative, in no acute distress  Eyes: conjunctivae pink, anicteric sclerae  ENT: lips and mucous membranes moist  Neck: supple, no JVD, no masses  Chest: clear breath sounds bilateral, no crackles, ronchus or wheezings  CVS: S1 & S2, normal rate, regular rhythm  Abdomen: soft, non-tender, non-distended, normoactive bowel sounds  Extremities: + 1 pitting  edema of both legs  Skin: no rash, abd incision c/d/i staples  Neuro: awake, alert, oriented      Diagnostic Data:  Lab: I have personally reviewed pertinent lab results  ,   CBC:  Results from last 7 days   Lab Units 06/29/20  0947   WBC Thousand/uL 5 31   HEMOGLOBIN g/dL 11 0*   HEMATOCRIT % 37 8   PLATELETS Thousands/uL 212      CMP: Lab Results   Component Value Date    SODIUM 138 06/30/2020    K 4 6 06/30/2020     06/30/2020    CO2 22 06/30/2020    BUN 30 (H) 06/30/2020    CREATININE 2 01 (H) 06/30/2020    CALCIUM 7 4 (L) 06/30/2020    EGFR 29 06/30/2020   ,   PT/INR: No results found for: PT, INR,   Magnesium: No components found for: MAG,  Phosphorous: No results found for: PHOS    Microbiology:  @LABRCNTIP,(urinecx:7)@        BRANDON Livingston    Portions of the record may have been created with voice recognition software  Occasional wrong word or "sound a like" substitutions may have occurred due to the inherent limitations of voice recognition software  Read the chart carefully and recognize, using context, where substitutions have occurred

## 2020-06-30 NOTE — ASSESSMENT & PLAN NOTE
· Most likely secondary to nonoliguric ATN   · Diuretic as per nephrology  · Creatinine stable around 2 now  · Discuss with Nephrology if to switch Lasix to oral

## 2020-06-30 NOTE — ASSESSMENT & PLAN NOTE
· Patient with known history of atrial fibrillation, status post HUMBERTO and cardioversion in January of this year in [facility] but unfortunately patient is in chronic atrial fibrillation per cardiology progress note, anticoagulated with Eliquis  · Continue metoprolol and Eliquis

## 2020-06-30 NOTE — ASSESSMENT & PLAN NOTE
Wt Readings from Last 3 Encounters:   06/30/20 104 kg (228 lb 9 9 oz)     · Patient with known history of chronic congestive heart failure and was admitted to [facility] in January  · Patient was on 40 mg of Lasix b i d  As an outpatient and secondary to dizziness and orthostasis his Lasix dose was decreased to 40 mg daily during the recent hospital stay  · Continue IV Lasix for now and switch to 40 mg twice daily on discharge     · Ejection fraction 65% during the last echo 1/20

## 2020-06-30 NOTE — ASSESSMENT & PLAN NOTE
· On EGD 6/17/2020, there was concern for possible bowel obstruction vs severe gastroparesis, given large amount of food and dark bilious fluid in stomach which could not be cleared  · Status post OR started-Exploratory laparotomy with right hemicolectomy  · Complaining of nausea and abdominal discomfort, continue surgical soft diet  Patient advised to ambulate

## 2020-06-30 NOTE — PLAN OF CARE
Problem: OCCUPATIONAL THERAPY ADULT  Goal: Performs self-care activities at highest level of function for planned discharge setting  See evaluation for individualized goals  Description  Treatment Interventions: ADL retraining, Functional transfer training, UE strengthening/ROM, Endurance training, Cognitive reorientation, Patient/family training, Equipment evaluation/education, Neuromuscular reeducation, Compensatory technique education, Continued evaluation, Energy conservation, Activityengagement          See flowsheet documentation for full assessment, interventions and recommendations  Outcome: Progressing  Note:   Limitation: Decreased ADL status, Decreased UE strength, Decreased Safe judgement during ADL, Decreased cognition, Decreased endurance, Decreased self-care trans, Decreased high-level ADLs  Prognosis: Good, Fair  Assessment: Pt seen for treatment session #2 this date  Pt alert and agreeable to participate at this time  Pt requiring significant assistance for bed mobility and Max encouragement to fully participate this date and to sit OOB  Pt has good potential to make progress towards goals and be successful at home although d/t Pts decreased motivation, progress is limited  Pt thoroughly educated on importance of not soiling self in bed and to utilize call bell to alert nursing staff  Pt educated that if he wants to go home, he cannot be soiling self d/t difficulty/inability to thoroughly clean self if incontinent BM occurs  Pt verbalizes understanding  Upon end of session, Pt seated OOB in recliner chair, chair alarm intact, call bell in reach and all needs met at this time     Recommendation: (HHOT vs  post acute rehab pending progress)  OT Discharge Recommendation: (HHOT vs  post acute rehab pending progress)

## 2020-06-30 NOTE — PHYSICAL THERAPY NOTE
PHYSICAL THERAPY CANCELLATION NOTE          Patient Name: Susie Cardenas  ABJVD'G Date: 6/30/2020     Chart reviewed  Attempted to see patient this PM for PT services, however patient declined PT services at this time reporting he has been out of bed multiple times this date and would prefer to rest at this time  Declined participation despite max encouragement and education from therapist and family/friend visiting  Pt agreeable to get OOB with nursing this PM  Will continue to follow and see patient as medically appropriate at a later time       Zoe Estrada, PT,DPT

## 2020-06-30 NOTE — PROGRESS NOTES
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  Stag [id number]  - Nausea  - Passing gas and moving bowels  - XR abdomen (6/28): Air-filled dilated small bowel loops with air fluid levels, findings suggest SBO vs adynamic ileus  - CKD     Plan:  - Maintain surgical soft diet for now, if nausea persists, make clear liquid  - If nausea persists or new complaints of abdominal pain/constipation arise consider repeat CT abd/pelvis  - check CBC, ordered  - Pain/nausea control PRN  - Encourage ambulating OOB  - Management of medical conditions as per hospitalist    Subjective/Objective     Subjective: States he became nauseous overnight  Zofran helps slightly  Denies vomiting  Continues passing gas and moving bowels  Denies fevers/chills  Denies urinary issues  Objective:     Blood pressure 130/76, pulse 81, temperature 97 9 °F (36 6 °C), temperature source Oral, resp  rate 18, height 5' 4 5" (1 638 m), weight 104 kg (228 lb 9 9 oz), SpO2 96 %  ,Body mass index is 38 64 kg/m²  Intake/Output Summary (Last 24 hours) at 6/30/2020 1014  Last data filed at 6/30/2020 0800  Gross per 24 hour   Intake 540 ml   Output 200 ml   Net 340 ml       Invasive Devices     Peripheral Intravenous Line            Peripheral IV 06/30/20 Right Antecubital less than 1 day                Physical Exam:   Gen: AxOx3  Heart: RRR  Lungs: CTA  Abdomen: bowel sounds present, incision is clean dry and intact, no erythema or drainage,  no masses palpable  Non distended, minimal diffuse incisional discomfort  Lab, Imaging and other studies:  I have personally reviewed pertinent lab results    , CBC: No results found for: WBC, HGB, HCT, MCV, PLT, ADJUSTEDWBC, MCH, MCHC, RDW, MPV, NRBC   CMP:   Lab Results Component Value Date    SODIUM 138 06/30/2020    K 4 6 06/30/2020     06/30/2020    CO2 22 06/30/2020    BUN 30 (H) 06/30/2020    CREATININE 2 01 (H) 06/30/2020    CALCIUM 7 4 (L) 06/30/2020    EGFR 29 06/30/2020     VTE Pharmacologic Prophylaxis: Eliquis  VTE Mechanical Prophylaxis: sequential compression device       Shruthi Wilcox PA-C

## 2020-07-01 NOTE — PLAN OF CARE
Problem: Potential for Falls  Goal: Patient will remain free of falls  Description  INTERVENTIONS:  - Assess patient frequently for physical needs  -  Identify cognitive and physical deficits and behaviors that affect risk of falls    -  [place] fall precautions as indicated by assessment   - Educate patient/family on patient safety including physical limitations  - Instruct patient to call for assistance with activity based on assessment  - Modify environment to reduce risk of injury  - Consider OT/PT consult to assist with strengthening/mobility  Outcome: Progressing     Problem: CARDIOVASCULAR - ADULT  Goal: Maintains optimal cardiac output and hemodynamic stability  Description  INTERVENTIONS:  - Monitor I/O, vital signs and rhythm  - Monitor for S/S and trends of decreased cardiac output  - Administer and titrate ordered vasoactive medications to optimize hemodynamic stability  - Assess quality of pulses, skin color and temperature  - Assess for signs of decreased coronary artery perfusion  - Instruct patient to report change in severity of symptoms  Outcome: Progressing  Goal: Absence of cardiac dysrhythmias or at baseline rhythm  Description  INTERVENTIONS:  - Continuous cardiac monitoring, vital signs, obtain 12 lead EKG if ordered  - Administer antiarrhythmic and heart rate control medications as ordered  - Monitor electrolytes and administer replacement therapy as ordered  Outcome: Progressing     Problem: METABOLIC, FLUID AND ELECTROLYTES - ADULT  Goal: Glucose maintained within target range  Description  INTERVENTIONS:  - Monitor Blood Glucose as ordered  - Assess for signs and symptoms of hyperglycemia and hypoglycemia  - Administer ordered medications to maintain glucose within target range  - Assess nutritional intake and initiate nutrition service referral as needed  Outcome: Progressing     Problem: SKIN/TISSUE INTEGRITY - ADULT  Goal: Skin integrity remains intact  Description  INTERVENTIONS  - Identify patients at risk for skin breakdown  - Assess and monitor skin integrity  - Assess and monitor nutrition and hydration status  - Monitor labs (i e  albumin)  - Assess for incontinence   - Turn and reposition patient  - Assist with mobility/ambulation  - Relieve pressure over bony prominences  - Avoid friction and shearing  - Provide appropriate hygiene as needed including keeping skin clean and dry  - Evaluate need for skin moisturizer/barrier cream  - Collaborate with interdisciplinary team (i e  Nutrition, Rehabilitation, etc )   - Patient/family teaching  Outcome: Progressing     Problem: HEMATOLOGIC - ADULT  Goal: Maintains hematologic stability  Description  INTERVENTIONS  - Assess for signs and symptoms of bleeding or hemorrhage  - Monitor labs  - Administer supportive blood products/factors as ordered and appropriate  Outcome: Progressing     Problem: MUSCULOSKELETAL - ADULT  Goal: Maintain or return mobility to safest level of function  Description  INTERVENTIONS:  - Assess patient's ability to carry out ADLs; assess patient's baseline for ADL function and identify physical deficits which impact ability to perform ADLs (bathing, care of mouth/teeth, toileting, grooming, dressing, etc )  - Assess/evaluate cause of self-care deficits   - Assess range of motion  - Assess patient's mobility/assist x 2 with roller walker  - Assess patient's need for assistive devices and provide as appropriate  - Encourage maximum independence but intervene and supervise when necessary  - Involve family in performance of ADLs  - Assess for home care needs following discharge   - Consider OT consult to assist with ADL evaluation and planning for discharge  - Provide patient education as appropriate   Outcome: Progressing     Problem: Prexisting or High Potential for Compromised Skin Integrity  Goal: Skin integrity is maintained or improved  Description  INTERVENTIONS:  - Identify patients at risk for skin breakdown  - Assess and monitor skin integrity  - Assess and monitor nutrition and hydration status  - Monitor labs   - Assess for incontinence   - Turn and reposition patient  - Assist with mobility/ambulation  - Relieve pressure over bony prominences  - Avoid friction and shearing  - Provide appropriate hygiene as needed including keeping skin clean and dry  - Evaluate need for skin moisturizer/barrier cream  - Collaborate with interdisciplinary team   - Patient/family teaching  - Consider wound care consult   Outcome: Progressing     Problem: Nutrition/Hydration-ADULT  Goal: Nutrient/Hydration intake appropriate for improving, restoring or maintaining nutritional needs  Description  Monitor and assess patient's nutrition/hydration status for malnutrition  Collaborate with interdisciplinary team and initiate plan and interventions as ordered  Monitor patient's weight and dietary intake as ordered or per policy  Utilize nutrition screening tool and intervene as necessary  Determine patient's food preferences and provide high-protein, high-caloric foods as appropriate       INTERVENTIONS:  - Monitor oral intake, urinary output, labs, and treatment plans  - Assess nutrition and hydration status and recommend course of action  - Evaluate amount of meals eaten  - Assist patient with eating if necessary   - Allow adequate time for meals  - Recommend/ encourage appropriate diets, oral nutritional supplements, and vitamin/mineral supplements  - Order, calculate, and assess calorie counts as needed  Offer small frequentmeals  - Assess need for intravenous fluids  - Provide specific nutrition/hydration education as appropriate  - Include patient/family/caregiver in decisions related to nutrition   Outcome: Progressing

## 2020-07-01 NOTE — ASSESSMENT & PLAN NOTE
Wt Readings from Last 3 Encounters:   07/01/20 104 kg (229 lb 11 5 oz)     · Patient with known history of chronic congestive heart failure and was admitted to [facility] in January  · Transitioned to oral Lasix today  · Continue IV Lasix for now and switch to 40 mg twice daily on discharge     · Ejection fraction 65% during the last echo 1/20

## 2020-07-01 NOTE — ASSESSMENT & PLAN NOTE
Lab Results   Component Value Date    HGBA1C 8 2 (H) 06/18/2020       Recent Labs     06/30/20  1634 06/30/20  2106 07/01/20  0704 07/01/20  1056   POCGLU 178* 139 235* 157*       Blood Sugar Average: Last 72 hrs:  (P) 490 [id number] type 2 diabetes mellitus on oral hypoglycemic agent  · Hold metformin and glipizide in the hospital  · Insulin sliding scale,  · Increased Lantus today to 17 and Humalog to 7 units with meals

## 2020-07-01 NOTE — ASSESSMENT & PLAN NOTE
· Most likely secondary to nonoliguric ATN   · Diuretic as per nephrology  · Creatinine stable around 2 now  · Discuss with Nephrology if to switch Lasix to oral today

## 2020-07-01 NOTE — PHYSICAL THERAPY NOTE
PHYSICAL THERAPY TREATMENT NOTE  NAME:  Alejandrina Sepulveda  DATE: 07/01/20    Length Of Stay: 15  Performed at least 2 patient identifiers during session: Name and Birthday    TREATMENT:      07/01/20 1446   Pain Assessment   Pain Assessment Tool 0-10   Pain Score No Pain   Restrictions/Precautions   Other Precautions Chair Alarm; Bed Alarm; Fall Risk   General   Chart Reviewed Yes   Response to Previous Treatment Patient reporting fatigue but able to participate  Cognition   Following Commands Follows one step commands without difficulty   Subjective   Subjective "I am shakey and nervous " reassurance provided  Bed Mobility   Additional Comments Pt sitting OOB in recliner chair at start of session and end of session with all needs within reach  Transfers   Sit to Stand 4  Minimal assistance   Additional items Assist x 1; Increased time required;Verbal cues   Stand to Sit   (steadying assistance)   Additional items Increased time required;Verbal cues   Stand pivot   (min-->steadying assistance)   Additional items Increased time required;Verbal cues   Additional Comments min verbal cues for hand placement for safety with RW  pt insists on pulling from RW depsite verbal cues for hand placement  requires minAx1 to achieve standing and steadying assistance for stand to sit wiht min cues for contorlled descent  he requires minmal to steadying assistance for spt with min cues ot turn completely prior to sitting  Ambulation/Elevation   Gait pattern Decreased foot clearance; Short stride; Excessively slow; Improper Weight shift   Gait Assistance   (minimal to steadying assistance)   Additional items Assist x 1;Verbal cues   Assistive Device Rolling walker   Distance 24'x2 with RW with slow sarah, decreased step length with minAx1 progressing to steayding assistance with min cues for increased step length and reassurance throughout  pt declined further ambulation      Balance   Static Sitting Good   Dynamic Sitting Fair Static Standing   (fair - to poor +)   Dynamic Standing Fair -   Ambulatory   (fair - to poor +)   Endurance Deficit   Endurance Deficit Description limited by quck onset faitgue   Activity Tolerance   Activity Tolerance Patient limited by fatigue   Medical Staff Made Aware Donovan HYATT   Nurse Made Aware RNNain   Exercises   Hip Flexion Sitting;10 reps;AROM; Bilateral   Knee AROM Long Arc Quad Sitting;10 reps;AROM; Bilateral   Ankle Pumps Sitting;10 reps;AROM; Bilateral   Balance training  static standing with B UE support on RW 2' with minimal to steadying assistance with min cues for anterior wt shift   Assessment   Prognosis Good   Problem List Decreased strength;Decreased endurance; Impaired balance;Decreased mobility; Impaired judgement;Decreased safety awareness;Decreased skin integrity;Obesity;Pain   Barriers to Discharge Decreased caregiver support   Barriers to Discharge Comments lives alone, requires assistance for mobility   Goals   PT Treatment Day 3   Plan   Treatment/Interventions Functional transfer training;LE strengthening/ROM; Elevations; Therapeutic exercise; Endurance training;Patient/family training;Equipment eval/education;Gait training;Bed mobility; Compensatory technique education;Spoke to nursing;Spoke to case management   Progress Slow progress, multiple refusals   PT Frequency Other (Comment)  (3-5x/week)   Recommendation   PT Discharge Recommendation Home with skilled therapy  (HHPT versus post acute rehab pending progress)   Equipment Recommended   (walker-pt has)       Pt tolerated session fairly  He requires max encouragement to participate in PT services and max encouragement to increase mobility  He requires minimal assistance to achieve standing this date and minimal to steadying assistance for ambulation  He ambulated decreased distances compared to previous session limited by self and decreased endurance   He made progress within the session, but is not making progress toward goals as he is limited by self with multiple refusals to participate, decreased balance, endurance and strength  He has decreased understanding of deficits and need for participation OOB mobility multiple times per day as well as participation in therapy services to facilitate return to PLOF and safe D/C to home  Pt's Megan ROJAS present during session, encouraging patient  Max education provided for participation in PT services and benefits of mobility with therapy and nursing  Pt declined further mobility this date  He did state "I have to go home first and try before I go somewhere " Case management made aware  Continue PT services to maximize LOF       Pamella Tran, PT,DPT

## 2020-07-01 NOTE — PLAN OF CARE
Problem: Potential for Falls  Goal: Patient will remain free of falls  Description  INTERVENTIONS:  - Assess patient frequently for physical needs  -  Identify cognitive and physical deficits and behaviors that affect risk of falls    -  [place] fall precautions as indicated by assessment   - Educate patient/family on patient safety including physical limitations  - Instruct patient to call for assistance with activity based on assessment  - Modify environment to reduce risk of injury  - Consider OT/PT consult to assist with strengthening/mobility  Outcome: Progressing     Problem: CARDIOVASCULAR - ADULT  Goal: Maintains optimal cardiac output and hemodynamic stability  Description  INTERVENTIONS:  - Monitor I/O, vital signs and rhythm  - Monitor for S/S and trends of decreased cardiac output  - Administer and titrate ordered vasoactive medications to optimize hemodynamic stability  - Assess quality of pulses, skin color and temperature  - Assess for signs of decreased coronary artery perfusion  - Instruct patient to report change in severity of symptoms  Outcome: Progressing  Goal: Absence of cardiac dysrhythmias or at baseline rhythm  Description  INTERVENTIONS:  - Continuous cardiac monitoring, vital signs, obtain 12 lead EKG if ordered  - Administer antiarrhythmic and heart rate control medications as ordered  - Monitor electrolytes and administer replacement therapy as ordered  Outcome: Progressing     Problem: METABOLIC, FLUID AND ELECTROLYTES - ADULT  Goal: Glucose maintained within target range  Description  INTERVENTIONS:  - Monitor Blood Glucose as ordered  - Assess for signs and symptoms of hyperglycemia and hypoglycemia  - Administer ordered medications to maintain glucose within target range  - Assess nutritional intake and initiate nutrition service referral as needed  Outcome: Progressing     Problem: SKIN/TISSUE INTEGRITY - ADULT  Goal: Skin integrity remains intact  Description  INTERVENTIONS  - Identify patients at risk for skin breakdown  - Assess and monitor skin integrity  - Assess and monitor nutrition and hydration status  - Monitor labs (i e  albumin)  - Assess for incontinence   - Turn and reposition patient  - Assist with mobility/ambulation  - Relieve pressure over bony prominences  - Avoid friction and shearing  - Provide appropriate hygiene as needed including keeping skin clean and dry  - Evaluate need for skin moisturizer/barrier cream  - Collaborate with interdisciplinary team (i e  Nutrition, Rehabilitation, etc )   - Patient/family teaching  Outcome: Progressing     Problem: HEMATOLOGIC - ADULT  Goal: Maintains hematologic stability  Description  INTERVENTIONS  - Assess for signs and symptoms of bleeding or hemorrhage  - Monitor labs  - Administer supportive blood products/factors as ordered and appropriate  Outcome: Progressing     Problem: MUSCULOSKELETAL - ADULT  Goal: Maintain or return mobility to safest level of function  Description  INTERVENTIONS:  - Assess patient's ability to carry out ADLs; assess patient's baseline for ADL function and identify physical deficits which impact ability to perform ADLs (bathing, care of mouth/teeth, toileting, grooming, dressing, etc )  - Assess/evaluate cause of self-care deficits   - Assess range of motion  - Assess patient's mobility/assist x 2 with roller walker  - Assess patient's need for assistive devices and provide as appropriate  - Encourage maximum independence but intervene and supervise when necessary  - Involve family in performance of ADLs  - Assess for home care needs following discharge   - Consider OT consult to assist with ADL evaluation and planning for discharge  - Provide patient education as appropriate   Outcome: Progressing     Problem: Prexisting or High Potential for Compromised Skin Integrity  Goal: Skin integrity is maintained or improved  Description  INTERVENTIONS:  - Identify patients at risk for skin breakdown  - Assess and monitor skin integrity  - Assess and monitor nutrition and hydration status  - Monitor labs   - Assess for incontinence   - Turn and reposition patient  - Assist with mobility/ambulation  - Relieve pressure over bony prominences  - Avoid friction and shearing  - Provide appropriate hygiene as needed including keeping skin clean and dry  - Evaluate need for skin moisturizer/barrier cream  - Collaborate with interdisciplinary team   - Patient/family teaching  - Consider wound care consult   Outcome: Progressing     Problem: Nutrition/Hydration-ADULT  Goal: Nutrient/Hydration intake appropriate for improving, restoring or maintaining nutritional needs  Description  Monitor and assess patient's nutrition/hydration status for malnutrition  Collaborate with interdisciplinary team and initiate plan and interventions as ordered  Monitor patient's weight and dietary intake as ordered or per policy  Utilize nutrition screening tool and intervene as necessary  Determine patient's food preferences and provide high-protein, high-caloric foods as appropriate       INTERVENTIONS:  - Monitor oral intake, urinary output, labs, and treatment plans  - Assess nutrition and hydration status and recommend course of action  - Evaluate amount of meals eaten  - Assist patient with eating if necessary   - Allow adequate time for meals  - Recommend/ encourage appropriate diets, oral nutritional supplements, and vitamin/mineral supplements  - Order, calculate, and assess calorie counts as needed  Offer small frequentmeals  - Assess need for intravenous fluids  - Provide specific nutrition/hydration education as appropriate  - Include patient/family/caregiver in decisions related to nutrition   Outcome: Progressing     Problem: Nutrition/Hydration-ADULT  Goal: Nutrient/Hydration intake appropriate for improving, restoring or maintaining nutritional needs  Description  Monitor and assess patient's nutrition/hydration status for malnutrition  Collaborate with interdisciplinary team and initiate plan and interventions as ordered  Monitor patient's weight and dietary intake as ordered or per policy  Utilize nutrition screening tool and intervene as necessary  Determine patient's food preferences and provide high-protein, high-caloric foods as appropriate       INTERVENTIONS:  - Monitor oral intake, urinary output, labs, and treatment plans  - Assess nutrition and hydration status and recommend course of action  - Evaluate amount of meals eaten  - Assist patient with eating if necessary   - Allow adequate time for meals  - Recommend/ encourage appropriate diets, oral nutritional supplements, and vitamin/mineral supplements  - Order, calculate, and assess calorie counts as needed  Offer small frequentmeals  - Assess need for intravenous fluids  - Provide specific nutrition/hydration education as appropriate  - Include patient/family/caregiver in decisions related to nutrition   Outcome: Progressing

## 2020-07-01 NOTE — CASE MANAGEMENT
Chart reviewed aware of medical management  Therapy came to me and shared that patient is agreeable only to go home with services and if failed he will consider a Skilled facility  ( this discussion was done with POA Tovey Maxin present)    CM went back to patient and discussed this: Em Manuel his POA was already gone  Pt does not want to go to a skilled facility only home with home care services  Pt provided me permission to call his [address]  CM discussed this Opal Maxin and she too does not want him at a nursing home, would only consider [facility], but she is going to discuss this with patient's son  CM educated that patient needs to participate in 2-3 hours of therapy at an Acute Rehab and patient is not capable of doing this, she agreed  Per Tovey Maxin the plan remains home with Home Care Services and she will be assisting Darren Yanez upon dc along with other family members  Will continue to follow  Opal Maxin is going to bring a copy of her POA paperwork in to be scanned into the chart  DC plan is [facility] and spoke to Jeanie Hidalog: Follow up appointment was made for follow up on July 9th at 2 PM  Jeanie Hidalgo requested a confirmation call back stating patient family is okay with the appointment  CM contacted [facility] and appointment is fine as she will take him there  Will fax clinical information to Jeanie Hidalgo and inform her that family is fine with the appointment

## 2020-07-01 NOTE — PROGRESS NOTES
Pt reporting nausea, no reported abdominal pain or constipation at this time, +BM per flowsheets  Pt reports meals are too large for him to tolerate, pt typically eats 2 meals per day at home and feels 3 meals is too much  Not drinking glucerna supplements  Will add small portions per pt preference  This may require adjustment in insulin dosing per MD  Will d/c glucerna

## 2020-07-01 NOTE — PLAN OF CARE
Problem: Potential for Falls  Goal: Patient will remain free of falls  Description  INTERVENTIONS:  - Assess patient frequently for physical needs  -  Identify cognitive and physical deficits and behaviors that affect risk of falls    -  [place] fall precautions as indicated by assessment   - Educate patient/family on patient safety including physical limitations  - Instruct patient to call for assistance with activity based on assessment  - Modify environment to reduce risk of injury  - Consider OT/PT consult to assist with strengthening/mobility  Outcome: Progressing     Problem: CARDIOVASCULAR - ADULT  Goal: Maintains optimal cardiac output and hemodynamic stability  Description  INTERVENTIONS:  - Monitor I/O, vital signs and rhythm  - Monitor for S/S and trends of decreased cardiac output  - Administer and titrate ordered vasoactive medications to optimize hemodynamic stability  - Assess quality of pulses, skin color and temperature  - Assess for signs of decreased coronary artery perfusion  - Instruct patient to report change in severity of symptoms  Outcome: Progressing  Goal: Absence of cardiac dysrhythmias or at baseline rhythm  Description  INTERVENTIONS:  - Continuous cardiac monitoring, vital signs, obtain 12 lead EKG if ordered  - Administer antiarrhythmic and heart rate control medications as ordered  - Monitor electrolytes and administer replacement therapy as ordered  Outcome: Progressing     Problem: METABOLIC, FLUID AND ELECTROLYTES - ADULT  Goal: Glucose maintained within target range  Description  INTERVENTIONS:  - Monitor Blood Glucose as ordered  - Assess for signs and symptoms of hyperglycemia and hypoglycemia  - Administer ordered medications to maintain glucose within target range  - Assess nutritional intake and initiate nutrition service referral as needed  Outcome: Progressing     Problem: SKIN/TISSUE INTEGRITY - ADULT  Goal: Skin integrity remains intact  Description  INTERVENTIONS  - Identify patients at risk for skin breakdown  - Assess and monitor skin integrity  - Assess and monitor nutrition and hydration status  - Monitor labs (i e  albumin)  - Assess for incontinence   - Turn and reposition patient  - Assist with mobility/ambulation  - Relieve pressure over bony prominences  - Avoid friction and shearing  - Provide appropriate hygiene as needed including keeping skin clean and dry  - Evaluate need for skin moisturizer/barrier cream  - Collaborate with interdisciplinary team (i e  Nutrition, Rehabilitation, etc )   - Patient/family teaching  Outcome: Progressing     Problem: HEMATOLOGIC - ADULT  Goal: Maintains hematologic stability  Description  INTERVENTIONS  - Assess for signs and symptoms of bleeding or hemorrhage  - Monitor labs  - Administer supportive blood products/factors as ordered and appropriate  Outcome: Progressing     Problem: MUSCULOSKELETAL - ADULT  Goal: Maintain or return mobility to safest level of function  Description  INTERVENTIONS:  - Assess patient's ability to carry out ADLs; assess patient's baseline for ADL function and identify physical deficits which impact ability to perform ADLs (bathing, care of mouth/teeth, toileting, grooming, dressing, etc )  - Assess/evaluate cause of self-care deficits   - Assess range of motion  - Assess patient's mobility/assist x 2 with roller walker  - Assess patient's need for assistive devices and provide as appropriate  - Encourage maximum independence but intervene and supervise when necessary  - Involve family in performance of ADLs  - Assess for home care needs following discharge   - Consider OT consult to assist with ADL evaluation and planning for discharge  - Provide patient education as appropriate   Outcome: Progressing     Problem: Prexisting or High Potential for Compromised Skin Integrity  Goal: Skin integrity is maintained or improved  Description  INTERVENTIONS:  - Identify patients at risk for skin breakdown  - Assess and monitor skin integrity  - Assess and monitor nutrition and hydration status  - Monitor labs   - Assess for incontinence   - Turn and reposition patient  - Assist with mobility/ambulation  - Relieve pressure over bony prominences  - Avoid friction and shearing  - Provide appropriate hygiene as needed including keeping skin clean and dry  - Evaluate need for skin moisturizer/barrier cream  - Collaborate with interdisciplinary team   - Patient/family teaching  - Consider wound care consult   Outcome: Progressing     Problem: Nutrition/Hydration-ADULT  Goal: Nutrient/Hydration intake appropriate for improving, restoring or maintaining nutritional needs  Description  Monitor and assess patient's nutrition/hydration status for malnutrition  Collaborate with interdisciplinary team and initiate plan and interventions as ordered  Monitor patient's weight and dietary intake as ordered or per policy  Utilize nutrition screening tool and intervene as necessary  Determine patient's food preferences and provide high-protein, high-caloric foods as appropriate       INTERVENTIONS:  - Monitor oral intake, urinary output, labs, and treatment plans  - Assess nutrition and hydration status and recommend course of action  - Evaluate amount of meals eaten  - Assist patient with eating if necessary   - Allow adequate time for meals  - Recommend/ encourage appropriate diets, oral nutritional supplements, and vitamin/mineral supplements  - Order, calculate, and assess calorie counts as needed  Offer small frequentmeals  - Assess need for intravenous fluids  - Provide specific nutrition/hydration education as appropriate  - Include patient/family/caregiver in decisions related to nutrition   Outcome: Progressing

## 2020-07-01 NOTE — ASSESSMENT & PLAN NOTE
· Patient with known history of coronary artery disease status post CABG in 1997 in [facility]  · Patient is on beta-blocker aspirin and statin

## 2020-07-01 NOTE — WOUND OSTOMY CARE
Progress Note - Wound   Ulysses Nita 80 y o  male MRN: [id number]  Unit/Bed#: -01 Encounter: [id number]      Assessment:   Wound care follow up on 80year old male  Patient was alert and OOB to the chair  EHOB waffle cushion in place for pressure redistribution  Wife at bedside  PT working with patient assisted with wound assessment  Patient is an assist of 1 to get up to the walker  Small amount of soft stool noted with assessment  1  Bilateral buttocks-sacrum dry, pink and blanchable, fibrotic area to the periwound,  no open areas     2  Amisha-rectal stage 3 pressure injury, yellow slough in wound bed    Plan:   1-Hydraguard to bilateral sacrum, buttock and heels BID and PRN  2-Elevate heels to offload pressure  3-Ehob cushion in chair when out of bed  4-Moisturize skin daily with skin nourishing cream   5-Turn/reposition q2h   6-Maxorb rope to amisha-rectal area, tuck into area, change daily or with soilage or dislodgement    Wound 06/20/20 Incision Abdomen Left (Active)   Wound Description Clean;Dry; Intact 7/1/2020  7:30 AM   Amisha-wound Assessment Clean;Dry; Intact 7/1/2020  7:30 AM   Closure Staples 7/1/2020  7:30 AM   Drainage Amount None 7/1/2020  7:30 AM   Treatments Site care 6/29/2020  7:46 AM   Dressing Open to air 7/1/2020  7:30 AM   Dressing Status Clean;Dry; Intact 6/29/2020  8:00 PM       Wound 06/23/20 Pressure Injury Buttocks (Active)   Wound Image   7/1/2020  1:57 PM   Wound Description Fragile;Pink 7/1/2020 12:11 AM   Staging Stage II 6/30/2020  8:06 AM   Amisha-wound Assessment Clean;Dry; Intact 7/1/2020 12:11 AM   Wound Length (cm) 2 cm 6/24/2020  7:41 AM   Wound Width (cm) 2 cm 6/24/2020  7:41 AM   Wound Depth (cm) 0 6/24/2020  7:41 AM   Calculated Wound Area (cm^2) 4 cm^2 6/24/2020  7:41 AM   Calculated Wound Volume (cm^3) 0 cm^3 6/24/2020  7:41 AM   Drainage Amount None 7/1/2020 12:11 AM   Treatments Site care 6/30/2020  8:06 AM   Dressing Moisture barrier 7/1/2020 12:11 AM Patient Tolerance Tolerated well 6/28/2020  9:09 AM   Dressing Status Clean;Dry; Intact 6/28/2020  9:09 AM       Wound 06/24/20 Pressure Injury Rectum (Active)   Wound Image   7/1/2020  1:48 PM   Wound Description Light purple 6/28/2020  9:09 AM   Staging Deep Tissue Injury 6/26/2020  7:50 AM   Anitha-wound Assessment Fragile;Pink 6/28/2020  9:09 AM   Wound Length (cm) 4 cm 6/24/2020  7:48 AM   Wound Width (cm) 4 3 cm 6/24/2020  7:48 AM   Wound Depth (cm) 0 6/24/2020  7:48 AM   Calculated Wound Area (cm^2) 17 2 cm^2 6/24/2020  7:48 AM   Calculated Wound Volume (cm^3) 0 cm^3 6/24/2020  7:48 AM   Drainage Amount None 6/28/2020  9:09 AM   Treatments Cleansed;Site care;Elevated 6/25/2020  9:00 AM   Dressing Moisture barrier 6/28/2020  9:09 AM   Patient Tolerance Tolerated poorly 6/27/2020  9:03 AM   Dressing Status Clean;Dry; Intact 6/28/2020  9:09 AM

## 2020-07-01 NOTE — PLAN OF CARE
Problem: PHYSICAL THERAPY ADULT  Goal: Performs mobility at highest level of function for planned discharge setting  See evaluation for individualized goals  Description  Treatment/Interventions: Functional transfer training, LE strengthening/ROM, Elevations, Therapeutic exercise, Endurance training, Patient/family training, Equipment eval/education, Gait training, Bed mobility, Compensatory technique education, Spoke to nursing, Spoke to case management, OT  Equipment Recommended: (pt has RW)       See flowsheet documentation for full assessment, interventions and recommendations  Outcome: Not Progressing  Note:   Prognosis: Good  Problem List: Decreased strength, Decreased endurance, Impaired balance, Decreased mobility, Impaired judgement, Decreased safety awareness, Decreased skin integrity, Obesity, Pain  Assessment: Pt tolerated session fairly  He requires max encouragement to participate in PT services and max encouragement to increase mobility  He requires minimal assistance to achieve standing this date and minimal to steadying assistance for ambulation  He ambulated decreased distances compared to previous session limited by self and decreased endurance  He made progress within the session, but is not making progress toward goals as he is limited by self with multiple refusals to participate, decreased balance, endurance and strength  He has decreased understanding of deficits and need for participation OOB mobility multiple times per day as well as participation in therapy services to facilitate return to PLOF and safe D/C to home  Pt's POA, Willard Maxin present during session, encouraging patient  Max education provided for participation in PT services and benefits of mobility with therapy and nursing  Pt declined further mobility this date  He did state "I have to go home first and try before I go somewhere " Case management made aware  Continue PT services to maximize LOF     Barriers to Discharge: Decreased caregiver support  Barriers to Discharge Comments: lives alone, requires assistance for mobility  PT Discharge Recommendation: Home with skilled therapy(HHPT versus post acute rehab pending progress)     PT - OK to Discharge: (when medically cleared to rehab)    See flowsheet documentation for full assessment

## 2020-07-01 NOTE — ASSESSMENT & PLAN NOTE
· Patient with known history of atrial fibrillation, status post HUMBERTO and cardioversion in January of this year in [facility] but unfortunately patient is in chronic atrial fibrillation per cardiology progress note, anticoagulated with Eliquis  · Continue metoprolol and Eliquis

## 2020-07-01 NOTE — PROGRESS NOTES
Progress Note - Nephrology   Sabina Bear 80 y o  male MRN: [id number]  Unit/Bed#: -01 Encounter: [id number]    A/P:  1  Acute kidney injury on top kidney disease               Creatinine essentially unchanged at about 2 1 mg/dL, continue avoid nephrotoxins  Patient's furosemide was reduced to 40 mg IV twice a day yesterday  Will further transition to 80 mg p o  Once daily  Continue avoid nephrotoxins  2  Chronic kidney disease stage 3 with baseline creatinine between 1 3-1 6 mg/dL  3  Hypertensive nephrosclerosis                  controlled, continue monitor  4  Acute on chronic congestive heart failure              Clinically, the patient has been stable  Although he may be slightly decompensated this time, is improved significantly  That being said, the patient has not been ambulating regularly and is difficult to assess current status  Will transition patient to 80 mg of oral Lasix once daily for now  Continue monitor closely  Will be difficult to have a patient's true euvolemic volume status achieved with no edema due to decreased oncotic pressures due to reduced albumin  5  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 June 20th  6  Multiple electrolyte abnormalities              Continue monitor blood work, replete deficiencies as indicated    7  Abdominal pain      Follow up reason for today's visit:  Acute kidney injury/chronic kidney disease/hypertensive nephrosclerosis    Partial bowel obstruction [facility])    Patient Active Problem List   Diagnosis    Acute on chronic diastolic congestive heart failure (HCC)    Chronic kidney disease, stage 3 ([facility] [address] )    Coronary artery disease    Diabetes mellitus type 2 in obese ([facility] [address] )    Essential hypertension    Hyperlipidemia    Morbid obesity (HCC)    Symptomatic anemia    Atrial fibrillation (HCC)    Partial bowel obstruction (HCC)    Abnormal CT of the abdomen    Duodenal diverticulum    Colon  neoplasm    Acute renal failure (ARF) (HCC)    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HCC)         Subjective:   No acute events overnight    Objective:     Vitals: Blood pressure 112/60, pulse 75, temperature 98 3 °F (36 8 °C), resp  rate 16, height 5' 4 5" (1 638 m), weight 104 kg (229 lb 11 5 oz), SpO2 95 %  ,Body mass index is 38 82 kg/m²  Weight (last 2 days)     Date/Time   Weight    07/01/20 0538   104 (229 72)    06/30/20 0600   104 (228 62)    06/29/20 0527   101 (223 33)                Intake/Output Summary (Last 24 hours) at 7/1/2020 0811  Last data filed at 7/1/2020 0806  Gross per 24 hour   Intake 450 ml   Output 960 ml   Net -510 ml     I/O last 3 completed shifts: In: 80 [P O :870]  Out: 985 [Urine:985]         Physical Exam: /60 (BP Location: Right arm)   Pulse 75   Temp 98 3 °F (36 8 °C)   Resp 16   Ht 5' 4 5" (1 638 m)   Wt 104 kg (229 lb 11 5 oz)   SpO2 95%   BMI 38 82 kg/m²     General Appearance:    Alert, cooperative, no distress, appears stated age   Head:    Normocephalic, without obvious abnormality, atraumatic   Eyes:    Conjunctiva/corneas clear   Ears:    Normal external ears   Nose:   Nares normal, septum midline, mucosa normal, no drainage    or sinus tenderness   Throat:   Lips, mucosa, and tongue normal; teeth and gums normal   Neck:   Supple   Back:     Symmetric, no curvature, ROM normal, no CVA tenderness   Lungs:     Clear to auscultation bilaterally, respirations unlabored   Chest wall:    No tenderness or deformity   Heart:    Regular rate and rhythm, S1 and S2 normal, no murmur, rub   or gallop   Abdomen:     Soft, non-tender, bowel sounds active   Extremities:   Extremities normal, atraumatic, no cyanosis, +2 bilateral lower extremity edema   Skin:   Skin color, texture, turgor normal, no rashes or lesions   Lymph nodes:   Cervical normal   Neurologic:   CNII-XII intact            Lab, Imaging and other studies: I have personally reviewed pertinent labs    CBC:   Lab Results Component Value Date    WBC 4 75 07/01/2020    HGB 11 0 (L) 07/01/2020    HCT 37 0 07/01/2020    MCV 77 (L) 07/01/2020     07/01/2020    MCH 22 9 (L) 07/01/2020    MCHC 29 7 (L) 07/01/2020    RDW 23 4 (H) 07/01/2020    MPV 9 4 07/01/2020    NRBC 0 07/01/2020     CMP:   Lab Results   Component Value Date    K 4 3 07/01/2020     07/01/2020    CO2 26 07/01/2020    BUN 29 (H) 07/01/2020    CREATININE 2 18 (H) 07/01/2020    CALCIUM 7 4 (L) 07/01/2020    EGFR 27 07/01/2020         Results from last 7 days   Lab Units 07/01/20  0546 06/30/20  0930 06/29/20  0947  06/27/20  0519   POTASSIUM mmol/L 4 3 4 6 4 7   < > 3 9   CHLORIDE mmol/L 105 106 105   < > 107   CO2 mmol/L 26 22 25   < > 26   BUN mg/dL 29* 30* 36*   < > 42*   CREATININE mg/dL 2 18* 2 01* 2 31*   < > 2 19*   CALCIUM mg/dL 7 4* 7 4* 7 8*   < > 7 7*   ALK PHOS U/L  --   --  65  --  55   ALT U/L  --   --  16  --  14   AST U/L  --   --  30  --  13    < > = values in this interval not displayed  Phosphorus:   Lab Results   Component Value Date    PHOS 3 6 07/01/2020     Magnesium:   Lab Results   Component Value Date    MG 1 7 07/01/2020     Urinalysis: No results found for: [place], SPECGRAV, PHUR, LEUKOCYTESUR, NITRITE, PROTEINUA, GLUCOSEU, KETONESU, BILIRUBINUR, BLOODU  Ionized Calcium: No results found for: CAION  Coagulation: No results found for: PT, INR, APTT  Troponin: No results found for: TROPONINI  ABG: No results found for: PHART, UHO1TNK, PO2ART, HNO6RTK, I9FVRPUH, BEART, SOURCE  Radiology review:     IMAGING  Procedure: Xr Abdomen Obstruction Series    Result Date: 6/28/2020  Narrative: OBSTRUCTION SERIES INDICATION:   abd pain/distention  COMPARISON:  None EXAM PERFORMED/VIEWS:  XR ABDOMEN OBSTRUCTION SERIES FINDINGS: Air filled, dilated small bowel loops with air fluid levels findings suggesting small bowel obstruction vs  adynamic ileus  Evidence of postoperative free intraperitoneal air at the left upper quadrant  [place] No pathologic calcifications or soft tissue masses evident  Status post postoperative fusion at the L4-S1 levels    Examination of the chest reveals a normal cardiomediastinal silhouette  There is mild bibasilar atelectasis  Status post cardiac surgery Surgical clips along the midline anterior abdominal wall  Impression: Findings suggesting small bowel obstruction vs  adynamic ileus   Workstation performed: [id number]       Current Facility-Administered Medications   Medication Dose Route Frequency    acetaminophen (TYLENOL) tablet 650 mg  650 mg Oral Q6H PRN    apixaban (ELIQUIS) tablet 2 5 mg  2 5 mg Oral BID    aspirin (ECOTRIN LOW STRENGTH) EC tablet 81 mg  81 mg Oral Daily    atorvastatin (LIPITOR) tablet 40 mg  40 mg Oral Daily With Dinner    furosemide (LASIX) injection 40 mg  40 mg Intravenous BID (diuretic)    insulin glargine (LANTUS) subcutaneous injection 17 Units 0 17 mL  17 Units Subcutaneous HS    insulin lispro (HumaLOG) 100 units/mL subcutaneous injection 1-5 Units  1-5 Units Subcutaneous TID With Meals    insulin lispro (HumaLOG) 100 units/mL subcutaneous injection 7 Units  7 Units Subcutaneous TID With Meals    metoprolol succinate (TOPROL-XL) 24 hr tablet 25 mg  25 mg Oral Daily    ondansetron (ZOFRAN) injection 4 mg  4 mg Intravenous Once    ondansetron (ZOFRAN) injection 4 mg  4 mg Intravenous Q6H PRN    oxyCODONE (ROXICODONE) IR tablet 5 mg  5 mg Oral Q4H PRN    pantoprazole (PROTONIX) injection 40 mg  40 mg Intravenous Q12H ABEL    phenol (CHLORASEPTIC) 1 4 % mucosal liquid 1 spray  1 spray Mouth/Throat Q2H PRN    potassium chloride (K-DUR,KLOR-CON) CR tablet 40 mEq  40 mEq Oral Daily    saliva substitute (MOUTH KOTE) mucosal solution 5 spray  5 spray Mouth/Throat 4x Daily PRN     Medications Discontinued During This Encounter   Medication Reason    Aspirin Buf,CaCarb-MgCarb-MgO, 81 MG TABS     simvastatin (ZOCOR) 80 mg tablet     pravastatin (PRAVACHOL) tablet 40 mg     metoprolol succinate (TOPROL-XL) 24 hr tablet 25 mg     metoprolol (LOPRESSOR) injection 2 5 mg     HYDROcodone-acetaminophen (NORCO) 5-325 mg per tablet 1 tablet     acetaminophen (TYLENOL) tablet 650 mg     loratadine (CLARITIN) tablet 10 mg     polyethylene glycol (GLYCOLAX) bowel prep 238 g     insulin lispro (HumaLOG) 100 units/mL subcutaneous injection 1-5 Units     insulin lispro (HumaLOG) 100 units/mL subcutaneous injection 1-5 Units     morphine injection 2 mg     bisacodyl (DULCOLAX) EC tablet 10 mg     morphine injection 2 mg     sodium chloride 0 9 % irrigation solution Patient Discharge    bupivacaine liposomal (EXPAREL) 1 3 % injection 20 mL     ciprofloxacin (CIPRO) IVPB (premix) 400 mg 200 mL     metroNIDAZOLE (FLAGYL) IVPB (premix) 500 mg 100 mL     morphine (PF) 4 mg/mL injection 4 mg     HYDROmorphone (DILAUDID) injection 0 2 mg     fentaNYL (SUBLIMAZE) injection 25 mcg     sodium chloride 0 9 % infusion     guaiFENesin (MUCINEX) 12 hr tablet 600 mg     heparin (porcine) subcutaneous injection 5,000 Units     morphine injection 2 mg     oxyCODONE (ROXICODONE) IR tablet 5 mg     insulin lispro (HumaLOG) 100 units/mL subcutaneous injection 1-5 Units     insulin lispro (HumaLOG) 100 units/mL subcutaneous injection 1-5 Units     insulin glargine (LANTUS) subcutaneous injection 5 Units 0 05 mL     metoprolol (LOPRESSOR) injection 2 5 mg     furosemide (LASIX) injection 40 mg     insulin lispro (HumaLOG) 100 units/mL subcutaneous injection 5 Units     insulin glargine (LANTUS) subcutaneous injection 10 Units 0 1 mL     ondansetron (ZOFRAN) injection 4 mg     metolazone (ZAROXOLYN) tablet 5 mg     insulin lispro (HumaLOG) 100 units/mL subcutaneous injection 7 Units     insulin glargine (LANTUS) subcutaneous injection 12 Units 0 12 mL     insulin glargine (LANTUS) subcutaneous injection 10 Units 0 1 mL     insulin lispro (HumaLOG) 100 units/mL subcutaneous injection 5 Units     insulin glargine (LANTUS) subcutaneous injection 15 Units 0 15 mL     ondansetron (ZOFRAN) injection 4 mg     furosemide (LASIX) injection 60 mg     morphine (PF) 4 mg/mL injection 4 mg        Moent Bucco, DO      This progress note was produced in part using a dictation device which may document imprecise wording from author's original intent

## 2020-07-01 NOTE — NURSING NOTE
Dr Tamera Hill aware that family at bedside, and they would like the colon mass pathology report  Also he is aware that patient continues with abdominal pain and intermittent nausea  Abdomen has very faint hypoactive bowel sounds in all 4 quadrants  He belches a lot when he site up in bed

## 2020-07-01 NOTE — PLAN OF CARE
Problem: Nutrition/Hydration-ADULT  Goal: Nutrient/Hydration intake appropriate for improving, restoring or maintaining nutritional needs  Description  Monitor and assess patient's nutrition/hydration status for malnutrition  Collaborate with interdisciplinary team and initiate plan and interventions as ordered  Monitor patient's weight and dietary intake as ordered or per policy  Utilize nutrition screening tool and intervene as necessary  Determine patient's food preferences and provide high-protein, high-caloric foods as appropriate       INTERVENTIONS:  - Monitor oral intake, urinary output, labs, and treatment plans  - Assess nutrition and hydration status and recommend course of action  - Evaluate amount of meals eaten  - Assist patient with eating if necessary   - Allow adequate time for meals  - Recommend/ encourage appropriate diets, oral nutritional supplements, and vitamin/mineral supplements  - Order, calculate, and assess calorie counts as needed  Offer small frequentmeals  - Assess need for intravenous fluids  - Provide specific nutrition/hydration education as appropriate  - Include patient/family/caregiver in decisions related to nutrition   Outcome: Progressing

## 2020-07-01 NOTE — PROGRESS NOTES
Progress Note - General Surgery   Susie Cardenas 80 y o  male MRN: [id number]  Unit/Bed#: -01 Encounter: [id number]    Assessment:  S/p exp lap and right hemicolectomy,  POD#11,  Minimal nausea   Refusing PT    Plan:  I encouraged pt to get OOB  Also encouraged him to do spirometry  Pt is taking some solids and no emesis at this time    Subjective/Objective   Chief Complaint: abdominal pain    Subjective: pt states that he gets too much food  No vomiting but some nausea,  He denies any chest pain or shortness of breath  Objective:     Blood pressure 112/60, pulse 75, temperature 98 3 °F (36 8 °C), resp  rate 16, height 5' 4 5" (1 638 m), weight 104 kg (229 lb 11 5 oz), SpO2 95 %  ,Body mass index is 38 82 kg/m²  Intake/Output Summary (Last 24 hours) at 7/1/2020 0941  Last data filed at 7/1/2020 0806  Gross per 24 hour   Intake 450 ml   Output 960 ml   Net -510 ml       Invasive Devices     Peripheral Intravenous Line            Peripheral IV 06/30/20 Right Antecubital less than 1 day                Physical Exam: abd:   Soft and nontender , wound without any erythema or drainage  Lab, Imaging and other studies:I have personally reviewed pertinent lab results      VTE Pharmacologic Prophylaxis: Sequential compression device (Venodyne)   VTE Mechanical Prophylaxis: sequential compression device

## 2020-07-02 PROBLEM — K31.5 DUODENAL OBSTRUCTION: Status: ACTIVE | Noted: 2020-01-01

## 2020-07-02 PROBLEM — R60.1 ANASARCA: Status: ACTIVE | Noted: 2020-01-01

## 2020-07-02 NOTE — PROCEDURES
PICC Line Insertion  Date/Time: 7/2/2020 4:06 PM  Performed by: BRANDON Carnes  Authorized by: BRANDON Carnes     Patient location:  Bedside  Other Assisting Provider: Yes (comment) (Dr Manjeet Reynolds)    Consent:     Consent obtained:  Written    Consent given by:  Patient    Risks discussed:  Arterial puncture, incorrect placement, pneumothorax, infection and bleeding    Alternatives discussed:  Alternative treatment and delayed treatment  Universal protocol:     Procedure explained and questions answered to patient or proxy's satisfaction: yes      Relevant documents present and verified: yes      Site/side marked: yes      Immediately prior to procedure, a time out was called: yes      Patient identity confirmed:  Verbally with patient and hospital-assigned identification number  Pre-procedure details:     Hand hygiene: Hand hygiene performed prior to insertion      Sterile barrier technique: All elements of maximal sterile technique followed      Skin preparation:  2% chlorhexidine    Skin preparation agent: Skin preparation agent completely dried prior to procedure    Indications:     PICC line indications: total parenteral nutrition    Anesthesia (see MAR for exact dosages):      Anesthesia method:  Local infiltration    Local anesthetic:  Lidocaine 1% w/o epi  Procedure details:     Location:  Cephalic    Vessel type: vein      Laterality:  Right    Approach: percutaneous technique used      Patient position:  Reverse Trendelenburg    Procedural supplies:  Double lumen    Catheter size:  5 Fr    Landmarks identified: yes      Ultrasound guidance: yes      Sterile ultrasound techniques: Sterile gel and sterile probe covers were used      Number of attempts:  3    Successful placement: yes      Vessel of catheter tip end:  Chest Xray needed to confirm placement    Total catheter length (cm):  43    Catheter out on skin (cm):  0    Arm circumference:  35  Post-procedure details: Post-procedure:  Dressing applied and securement device placed    Assessment:  Blood return through all ports, free fluid flow and placement verified by x-ray    Post-procedure complications: none      Patient tolerance of procedure:   Tolerated well, no immediate complications

## 2020-07-02 NOTE — ASSESSMENT & PLAN NOTE
Lab Results   Component Value Date    HGBA1C 8 2 (H) 06/18/2020       Recent Labs     07/01/20 2054 07/02/20  0545 07/02/20  0725 07/02/20  1122   POCGLU 153* 138 170* 103       Blood Sugar Average: Last 72 hrs:  (P) 289 [id number] type 2 diabetes mellitus on oral hypoglycemic agent  · Hold metformin and glipizide in the hospital  · Insulin sliding scale,  · Patient and p o , hold basal bolus insulin for now

## 2020-07-02 NOTE — PHYSICAL THERAPY NOTE
PHYSICAL THERAPY CANCELLATION NOTE          Patient Name: John CUI Date: 7/2/2020     Chart reviewed  Patient now requiring NG tube with 700 ml of drainage collected  Patient had CT abdomen and pelvis this date resulting Gaseous distention, with transition just distal to the duodenal diverticulum, Moderate volume ascites with small bilateral pleural effusions,  Ill-defined hypodensity in the left hepatic lobe, may represent metastasis, Right mesenteric lymphadenopathy  Pt currently having PICC line placed at bedside  Will cancel PT services this date, continue to follow and see patient as medically appropriate at a later date/time        Pamella Tran, PT,DPT

## 2020-07-02 NOTE — PROCEDURES
PICC Line Insertion  Date/Time: 7/2/2020 2:30 PM  Performed by: BRANDON Murillo  Authorized by: BRANDON Murillo     Patient location:  Bedside  Other Assisting Provider: Yes (comment) (Dr Monty Mccray)    Consent:     Consent obtained:  Written    Consent given by:  Patient    Risks discussed:  Arterial puncture, incorrect placement, pneumothorax, infection and bleeding    Alternatives discussed:  No treatment  Universal protocol:     Procedure explained and questions answered to patient or proxy's satisfaction: yes      Relevant documents present and verified: yes      Site/side marked: yes      Immediately prior to procedure, a time out was called: yes      Patient identity confirmed:  Verbally with patient and hospital-assigned identification number  Pre-procedure details:     Hand hygiene: Hand hygiene performed prior to insertion      Sterile barrier technique: All elements of maximal sterile technique followed      Skin preparation:  2% chlorhexidine    Skin preparation agent: Skin preparation agent completely dried prior to procedure    Indications:     PICC line indications: total parenteral nutrition    Anesthesia (see MAR for exact dosages): Anesthesia method:  Local infiltration    Local anesthetic:  Lidocaine 1% w/o epi  Procedure details:     Location:  Cephalic    Vessel type: vein      Laterality:  Left    Site selection rationale:  Pt right handed    Approach: percutaneous technique used      Patient position:  Reverse Trendelenburg    Procedural supplies:  Double lumen    Landmarks identified: yes      Ultrasound guidance: yes      Sterile ultrasound techniques: Sterile gel and sterile probe covers were used      Number of attempts:  1    Successful placement: no      Arm circumference:  34  Post-procedure details:     Post-procedure:  Dressing applied  Comments:      Unable to advance catheter past 25 cm  Procedure was aborted   No bleeding at the site, dressing applied

## 2020-07-02 NOTE — ASSESSMENT & PLAN NOTE
· On EGD 6/17/2020, there was concern for possible bowel obstruction vs severe gastroparesis, given large amount of food and dark bilious fluid in stomach which could not be cleared  · Status post OR started-Exploratory laparotomy with right hemicolectomy  · Patient continue to have nausea and vomiting with abdominal discomfort  CT abdomen pelvis repeated today and showed 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  NG-tube placed with suction  NPO  Consider parenteral nutrition  Nutrition consult

## 2020-07-02 NOTE — ASSESSMENT & PLAN NOTE
Wt Readings from Last 3 Encounters:   07/02/20 104 kg (230 lb 2 6 oz)     · Patient with known history of chronic congestive heart failure and was admitted to [facility] in January  · Continue IV Lasix for now and switch to 40 mg twice once able to take oral feeding    · Ejection fraction 65% during the last echo 1/20

## 2020-07-02 NOTE — QUICK NOTE
Occult blood gastric / duodenum   Order: [id number]   Status:  Final result   Visible to patient:  No (Not Released)   Next appt:  None    Ref Range & Units 7/2/20 1117   Occult Blood, Gastric Negative PositiveAbnormal           Specimen Collected: 07/02/20 11:17 Last Resulted: 07/02/20 11:25           CT Scan abdomen pelvis without contrast done this morning with the following results:    1  Gaseous distention, with transition just distal to the duodenal diverticulum  Recommend aspiration precautions, as well as small bowel follow-through to evaluate for obstruction caused by the duodenal diverticulum  2   Foci of air are noted dependently within the gastric fundus, while these are favored to represent ingested food particles, this may also represent pneumatosis of the wall  Recommend clinical correlation, to include lactate analysis  3  Ill-defined hypodensity in the left hepatic lobe, may represent metastasis, attention on follow-up  4  Right mesenteric lymphadenopathy  5  Prominent retroperitoneal lymph nodes, attention on follow-up  6  Moderate volume ascites with small bilateral pleural effusions    Discussed via secure TT messaging with Yves Reed with regard to gastric contents heme-positive  Patient is on Eliquis and aspirin, Dr Nedra Torres will address anticoagulation need  Obtain CBC, hemoglobin stable past several days around 11  IV Protonix initiated by hospitalist physician  Serial abdominal exams, will continue to observe  Hopefully the patient will open and the next day or 2  No plan for urgent surgical intervention at this time      Sarah Beth Walter PA-C

## 2020-07-02 NOTE — ASSESSMENT & PLAN NOTE
Significant anasarca which is multifactorial including hypoproteinemia, CHF  Continue IV diuresis, consult nutrition for optimizing protein, parenteral nutrition considered

## 2020-07-02 NOTE — PROGRESS NOTES
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  Renal consultation was requested for MARLENI which improved with aggressive IV diuresis  IV lasix transitioned to oral yesterday  Weight same last 3 days but this is a bed scale  For repeat CT scan abd/pelvis today for persistent n/v    1  Acute Kidney Injury atop CKD 3  · Renal function remains stable- creatinine around 2 0-2 2 mg/dL but uptrending  Mr Roge Fox has nausea, vomiting this AM with decreased intake  He is for repeat imaging per surgery  Will discontinue 80 mg of lasix and transition to 40 mg BID for more gentle diuresis  · Continue to avoid nephrotoxins, wide variation in blood pressure  · Will need to continue daily weights, IO, trend BMP, adjust meds to eGFR  2  Stage 3 Chronic Kidney Disease with baseline creatinine 1 3-1 6 mg/dL  3  Hypertensive Nephrosclerosis  4  Acute on Chronic CHF  · Again, change to lasix 40 mg BID for more gentle diuresis  His intake is poor and he has nausea, vomiting  May need to hold diuretic temporarily if this is persistent  · He will continue to need salt avoidance, daily standing weights if able or amenable  · He does still have edema however he is not mobile and this is also associated with low protein stores  5  Obstructing Adenocarcinoma of the Cecum  6  Nausea, Vomiting- for repeat imaging today        ROS  Patient seen and examined lying in bed  He states he has nausea and some abdominal pain  A complete 10 point review of systems have been performed and are otherwise negative         Historical Information   Past Medical History:   Diagnosis Date    [facility]fib [facility])     Cardiac disease     [facility]congestive [facility] failure) [facility]     Diabetes mellitus ([facility] )     MI, old     Myocardial infarction [facility])      Past Surgical History:   Procedure Laterality Date    CARDIAC SURGERY      ME PART REMOVAL COLON W ANASTOMOSIS N/A 6/20/2020    Procedure: exploratory laparatomy with right hemicolectomy;  Surgeon: Doni Stewart DO;  Location:  MAIN OR;  Service: General     Social History   Social History     Substance and Sexual Activity   Alcohol Use Not Currently     Social History     Substance and Sexual Activity   Drug Use Not Currently     Social History     Tobacco Use   Smoking Status Never Smoker   Smokeless Tobacco Never Used       Family History:   Family History   Problem Relation Age of Onset    Diabetes Mother        Medications:  Pertinent medications were reviewed    Current Facility-Administered Medications:  acetaminophen 650 mg Oral Q6H PRN Little Carrel, MD   apixaban 2 5 mg Oral BID Felipe Najera PA-C   aspirin 81 mg Oral Daily Little Carrel, MD   atorvastatin 40 mg Oral Daily With Thu Morales MD   furosemide 80 mg Oral Daily Jake Hebert,    insulin glargine 17 Units Subcutaneous HS Little Carrel, MD   insulin lispro 1-5 Units Subcutaneous TID With Meals Little Carrel, MD   insulin lispro 7 Units Subcutaneous TID With Meals Little Carrel, MD   metoprolol succinate 25 mg Oral Daily Little Carrel, MD   ondansetron 4 mg Intravenous Once Anusha S RachelBRANDON   ondansetron 4 mg Intravenous Q6H PRN Little Carrel, MD   oxyCODONE 5 mg Oral Q4H PRN Doni Stewart DO   pantoprazole 40 mg Intravenous Q12H [facility] Gissell Springer,    phenol 1 spray Mouth/Throat Q2H PRN Doni Stewart DO   potassium chloride 40 mEq Oral Daily BRANDON Cheung   saliva substitute 5 spray Mouth/Throat 4x Daily PRN Eden Brush PA-C         No Known Allergies      Vitals:   /61   Pulse 82   Temp 98 5 °F (36 9 °C)   Resp 20   Ht 5' 4 5" (1 638 m)   Wt 104 kg (230 lb 2 6 oz)   SpO2 95%   BMI 38 90 kg/m²   Body mass index is 38 9 kg/m²  SpO2: 95 %,   SpO2 Activity: At Rest,   O2 Device: None (Room air)      Intake/Output Summary (Last 24 hours) at 7/2/2020 0753  Last data filed at 7/2/2020 0045  Gross per 24 hour   Intake 240 ml   Output 350 ml   Net -110 ml     Invasive Devices     Peripheral Intravenous Line            Peripheral IV 06/30/20 Right Antecubital 1 day                Physical Exam  General: conscious, cooperative, in no acute distress chronically ill appearing  Eyes: conjunctivae pink, anicteric sclerae  ENT: lips and mucous membranes moist  Neck: supple, no JVD, no masses  Chest: diminished breath sounds bilateral, no crackles, ronchus or wheezings  CVS: S1 & S2, normal rate, regular rhythm  Abdomen: soft, diffusely-tender, non-distended, hypoactive bowel sounds  Extremities: trace edema of both legs  Skin: no rash  Neuro: awake, alert, oriented      Diagnostic Data:  Lab: I have personally reviewed pertinent lab results  ,   CBC:  Results from last 7 days   Lab Units 07/01/20  0545   WBC Thousand/uL 4 75   HEMOGLOBIN g/dL 11 0*   HEMATOCRIT % 37 0   PLATELETS Thousands/uL 239      CMP: Lab Results   Component Value Date    SODIUM 138 07/02/2020    K 5 0 07/02/2020     07/02/2020    CO2 25 07/02/2020    BUN 30 (H) 07/02/2020    CREATININE 2 21 (H) 07/02/2020    CALCIUM 7 9 (L) 07/02/2020    EGFR 26 07/02/2020   ,   PT/INR: No results found for: PT, INR,   Magnesium: No components found for: MAG,  Phosphorous: No results found for: PHOS    Microbiology:  @LABRCNTIP,(urinecx:7)@        BRANDON Fay    Portions of the record may have been created with voice recognition software  Occasional wrong word or "sound a like" substitutions may have occurred due to the inherent limitations of voice recognition software  Read the chart carefully and recognize, using context, where substitutions have occurred

## 2020-07-02 NOTE — SOCIAL WORK
Discuss pt during care co ordination rounds, pt is not medically stable for dc at this time  Pt with duodenal obstruction, pt with NG tube, NPO, TPN    CM will continue to follow

## 2020-07-02 NOTE — PROGRESS NOTES
Progress Note - General Surgery   Malika Hair 80 y o  male MRN: [id number]  Unit/Bed#: -01 Encounter: [id number]    Assessment:  Status post right hemicolectomy with persistent abdominal distention and nausea vomiting  Plan: Will plan for CT scan abdomen and pelvis today to further evaluate the intra-abdominal area  We will proceed from there regarding further treatment plan and recommendation  Subjective/Objective   Chief Complaint:  Abdominal pain nausea vomiting    Subjective:  Patient seen is still complains of nausea vomiting and some abdominal pain  He states he is not feeling well today  Objective:     Blood pressure 109/61, pulse 82, temperature 98 5 °F (36 9 °C), resp  rate 20, height 5' 4 5" (1 638 m), weight 104 kg (230 lb 2 6 oz), SpO2 95 %  ,Body mass index is 38 9 kg/m²  Intake/Output Summary (Last 24 hours) at 7/2/2020 0751  Last data filed at 7/2/2020 0045  Gross per 24 hour   Intake 240 ml   Output 350 ml   Net -110 ml       Invasive Devices     Peripheral Intravenous Line            Peripheral IV 06/30/20 Right Antecubital 1 day                Physical Exam:  Patient is awake and alert in mild distress  Abdomen is softly distended and wound is intact with no erythema drainage or fluctuance      Lab, Imaging and other studies:  CBC: No results found for: WBC, HGB, HCT, MCV, PLT, ADJUSTEDWBC, MCH, MCHC, RDW, MPV, NRBC, CMP:   Lab Results   Component Value Date    SODIUM 138 07/02/2020    K 5 0 07/02/2020     07/02/2020    CO2 25 07/02/2020    BUN 30 (H) 07/02/2020    CREATININE 2 21 (H) 07/02/2020    CALCIUM 7 9 (L) 07/02/2020    EGFR 26 07/02/2020

## 2020-07-02 NOTE — ASSESSMENT & PLAN NOTE
· Patient with known history of coronary artery disease status post CABG in 1997 in [facility]  · Patient is on beta-blocker aspirin and statin Yes

## 2020-07-02 NOTE — ASSESSMENT & PLAN NOTE
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  Status post resection, postop day 4, normal bowel movement  Patient on surgical soft diet, advance diet as per surgery recommendation  Discussed with the patient to arrange oncology follow-up as an outpatient:  Patient agreeable to proceed is oncology as an outpatient  Full code

## 2020-07-02 NOTE — QUICK NOTE
Patient underwent CT scan of abdomen pelvis without contrast this am, final report pending but reveals significant gastric distention  Patient continues with nausea and frequent bouts of vomiting this morning  Patient is status post right hemicolectomy on June 20th for ulcerating cecal mass by Dr Elyse Collier  NG tube was inserted by RN at bedside, 700 mL of brownish colored drainage in suction canister  Abdomen is distended and tympanic to percussion  Incision clean and dry with staples intact, no wound disruption  Probable postoperative ileus, status post right hemicolectomy    Will notify hospitalist physician regarding changing all p o  Meds to IV, patient will likely need parental nutrition and nutrition consultation for initiation, probable PICC line  Will send gastric drainage for heme test     Patient due for next IV Zofran at 11:40 a m     Right now the patient is much more comfortable after NG tube insertion      Sunshine Hart PA-C

## 2020-07-02 NOTE — PLAN OF CARE
Problem: Potential for Falls  Goal: Patient will remain free of falls  Description  INTERVENTIONS:  - Assess patient frequently for physical needs  -  Identify cognitive and physical deficits and behaviors that affect risk of falls    -  [place] fall precautions as indicated by assessment   - Educate patient/family on patient safety including physical limitations  - Instruct patient to call for assistance with activity based on assessment  - Modify environment to reduce risk of injury  - Consider OT/PT consult to assist with strengthening/mobility  Outcome: Progressing     Problem: CARDIOVASCULAR - ADULT  Goal: Maintains optimal cardiac output and hemodynamic stability  Description  INTERVENTIONS:  - Monitor I/O, vital signs and rhythm  - Monitor for S/S and trends of decreased cardiac output  - Administer and titrate ordered vasoactive medications to optimize hemodynamic stability  - Assess quality of pulses, skin color and temperature  - Assess for signs of decreased coronary artery perfusion  - Instruct patient to report change in severity of symptoms  Outcome: Progressing  Goal: Absence of cardiac dysrhythmias or at baseline rhythm  Description  INTERVENTIONS:  - Continuous cardiac monitoring, vital signs, obtain 12 lead EKG if ordered  - Administer antiarrhythmic and heart rate control medications as ordered  - Monitor electrolytes and administer replacement therapy as ordered  Outcome: Progressing     Problem: METABOLIC, FLUID AND ELECTROLYTES - ADULT  Goal: Glucose maintained within target range  Description  INTERVENTIONS:  - Monitor Blood Glucose as ordered  - Assess for signs and symptoms of hyperglycemia and hypoglycemia  - Administer ordered medications to maintain glucose within target range  - Assess nutritional intake and initiate nutrition service referral as needed  Outcome: Progressing     Problem: SKIN/TISSUE INTEGRITY - ADULT  Goal: Skin integrity remains intact  Description  INTERVENTIONS  - Identify patients at risk for skin breakdown  - Assess and monitor skin integrity  - Assess and monitor nutrition and hydration status  - Monitor labs (i e  albumin)  - Assess for incontinence   - Turn and reposition patient  - Assist with mobility/ambulation  - Relieve pressure over bony prominences  - Avoid friction and shearing  - Provide appropriate hygiene as needed including keeping skin clean and dry  - Evaluate need for skin moisturizer/barrier cream  - Collaborate with interdisciplinary team (i e  Nutrition, Rehabilitation, etc )   - Patient/family teaching  Outcome: Progressing     Problem: HEMATOLOGIC - ADULT  Goal: Maintains hematologic stability  Description  INTERVENTIONS  - Assess for signs and symptoms of bleeding or hemorrhage  - Monitor labs  - Administer supportive blood products/factors as ordered and appropriate  Outcome: Progressing     Problem: MUSCULOSKELETAL - ADULT  Goal: Maintain or return mobility to safest level of function  Description  INTERVENTIONS:  - Assess patient's ability to carry out ADLs; assess patient's baseline for ADL function and identify physical deficits which impact ability to perform ADLs (bathing, care of mouth/teeth, toileting, grooming, dressing, etc )  - Assess/evaluate cause of self-care deficits   - Assess range of motion  - Assess patient's mobility/assist x 2 with roller walker  - Assess patient's need for assistive devices and provide as appropriate  - Encourage maximum independence but intervene and supervise when necessary  - Involve family in performance of ADLs  - Assess for home care needs following discharge   - Consider OT consult to assist with ADL evaluation and planning for discharge  - Provide patient education as appropriate   Outcome: Progressing     Problem: Prexisting or High Potential for Compromised Skin Integrity  Goal: Skin integrity is maintained or improved  Description  INTERVENTIONS:  - Identify patients at risk for skin breakdown  - Assess and monitor skin integrity  - Assess and monitor nutrition and hydration status  - Monitor labs   - Assess for incontinence   - Turn and reposition patient  - Assist with mobility/ambulation  - Relieve pressure over bony prominences  - Avoid friction and shearing  - Provide appropriate hygiene as needed including keeping skin clean and dry  - Evaluate need for skin moisturizer/barrier cream  - Collaborate with interdisciplinary team   - Patient/family teaching  - Consider wound care consult   Outcome: Progressing     Problem: Nutrition/Hydration-ADULT  Goal: Nutrient/Hydration intake appropriate for improving, restoring or maintaining nutritional needs  Description  Monitor and assess patient's nutrition/hydration status for malnutrition  Collaborate with interdisciplinary team and initiate plan and interventions as ordered  Monitor patient's weight and dietary intake as ordered or per policy  Utilize nutrition screening tool and intervene as necessary  Determine patient's food preferences and provide high-protein, high-caloric foods as appropriate       INTERVENTIONS:  - Monitor oral intake, urinary output, labs, and treatment plans  - Assess nutrition and hydration status and recommend course of action  - Evaluate amount of meals eaten  - Assist patient with eating if necessary   - Allow adequate time for meals  - Recommend/ encourage appropriate diets, oral nutritional supplements, and vitamin/mineral supplements  - Order, calculate, and assess calorie counts as needed  Offer small frequentmeals  - Assess need for intravenous fluids  - Provide specific nutrition/hydration education as appropriate  - Include patient/family/caregiver in decisions related to nutrition   Outcome: Progressing

## 2020-07-02 NOTE — ASSESSMENT & PLAN NOTE
· Most likely secondary to nonoliguric ATN   · Diuretic as per nephrology  · Creatinine stable around 2 now  · Continue Lasix 40 mg IV q 12  · Patient currently NPO

## 2020-07-02 NOTE — NURSING NOTE
Martin THOMPSON made aware of patient's abdominal distention, pain, belching when sitting on side of bed, and nausea  Bowel sounds are faint  He is passing flatus and has ocassional small liquid brown stool

## 2020-07-02 NOTE — OCCUPATIONAL THERAPY NOTE
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

## 2020-07-02 NOTE — PROGRESS NOTES
Progress Note - Sheridan Charter 1935, 80 y o  male MRN: [id number]    Unit/Bed#: -01 Encounter: [id number]    Primary Care Provider: Jacky Starr DO   Date and time admitted to hospital: 6/16/2020 11:14 AM    * Duodenal obstruction  Assessment & Plan  · On EGD 6/17/2020, there was concern for possible bowel obstruction VS severe gastroparesis, given large amount of food and dark bilious fluid in stomach which could not be cleared  · Status post OR started-Exploratory laparotomy with right hemicolectomy  · Patient continue to have nausea and vomiting with abdominal discomfort  CT abdomen pelvis repeated today and showed 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  NG-tube placed with suction  NPO  Consider parenteral nutrition  Nutrition consult     Surgery team following  Anasarca  Assessment & Plan  Significant anasarca which is multifactorial including hypoproteinemia, CHF  Continue IV diuresis, consult nutrition for optimizing protein, parenteral nutrition considered  Acute renal failure (ARF) (HCC)  Assessment & Plan  · Most likely secondary to nonoliguric ATN   · Diuretic as per nephrology  · Creatinine stable around 2 now  · Continue Lasix 40 mg IV q 12  · Patient currently NPO    Colon  neoplasm  Assessment & Plan  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  Status post resection, postop day 4, normal bowel movement  Patient on surgical soft diet, advance diet as per surgery recommendation  Discussed with the patient to arrange oncology follow-up as an outpatient:  Patient agreeable to proceed is oncology as an outpatient  Full code  Symptomatic anemia  Assessment & Plan  · Patient was evaluated by PCP secondary to dizziness and fatigue and has blood pressure done as an outpatient    His hemoglobin was found to be 6 2 and was sent to the emergency room  · On the day of admission hemoglobin in the emergency room was 7 1 and received 2 units of packed RBCs  · HGB stable since 11 0 last two days     Atrial fibrillation ([facility] [address] )  Assessment & Plan  · Patient no NPO  · Lopressor 2 5 mg IV q 6 hours  · Eliquis on hold, may consider heparin drip    Morbid obesity ([facility] [address] )  Assessment & Plan  · TLC as an outpatient    Essential hypertension  Assessment & Plan  · Continue metoprolol    Diabetes mellitus type 2 in obese [facility])  Assessment & Plan  Lab Results   Component Value Date    HGBA1C 8 2 (H) 06/18/2020     Recent Labs     07/01/20  2054 07/02/20  0545 07/02/20  0725 07/02/20  1122   POCGLU 153* 138 170* 103     Blood Sugar Average: Last 72 hrs:  (P) 500 [id number] type 2 diabetes mellitus on oral hypoglycemic agent  · Hold metformin and glipizide in the hospital  · Insulin sliding scale,  · Patient and p o , hold basal bolus insulin for now    Coronary artery disease  Assessment & Plan  · Patient with known history of coronary artery disease status post CABG in 1997 in [facility]  · Patient is on beta-blocker aspirin and statin    Chronic kidney disease, stage 3 (HCC)  Assessment & Plan  · Baseline creatinine appears to be between 1 4 and 1 6  Questionable new higher baseline     Acute on chronic diastolic congestive heart failure (HCC)  Assessment & Plan  Wt Readings from Last 3 Encounters:   07/02/20 104 kg (230 lb 2 6 oz)     · Patient with known history of chronic congestive heart failure and was admitted to [facility] in January  · Continue IV Lasix for now and switch to 40 mg twice once able to take oral feeding    · Ejection fraction 65% during the last echo 1/20    VTE Pharmacologic Prophylaxis:   Pharmacologic: Heparin    Patient Centered Rounds: I have performed bedside rounds with nursing staff today    Discussions with Specialists or Other Care Team Provider:     Education and Discussions with Family / Patient:     Current Length of Stay: 16 day(s)    Current Patient Status: Inpatient   Certification Statement: The patient will continue to require additional inpatient hospital stay due to Intestinal obstruction    Discharge Plan:  Acute problem improves    Code Status: Level 1 - Full Code      Subjective:   Abdominal pain associated with nausea and vomiting    Objective:     Vitals:   Temp (24hrs), Av 2 °F (36 8 °C), Min:97 3 °F (36 3 °C), Max:98 7 °F (37 1 °C)    Temp:  [97 3 °F (36 3 °C)-98 7 °F (37 1 °C)] 98 7 °F (37 1 °C)  HR:  [72-91] 81  Resp:  [16-20] 16  BP: (105-121)/(47-66) 116/65  SpO2:  [94 %-95 %] 94 %  Body mass index is 38 9 kg/m²  Input and Output Summary (last 24 hours): Intake/Output Summary (Last 24 hours) at 2020 1206  Last data filed at 2020 1029  Gross per 24 hour   Intake 120 ml   Output 875 ml   Net -755 ml       Physical Exam:     General appearance: alert, appears stated age and cooperative  Head: Normocephalic, without obvious abnormality, atraumatic  Lungs: clear to auscultation bilaterally  Heart: regular rate and rhythm  Abdomen: Mild tenderness over the epigastric region  Back: negative  Extremities: Grade 2 to sacral and abdominal edema  Neurologic: Grossly normal    Additional Data:     Labs:    Results from last 7 days   Lab Units 20  0545   WBC Thousand/uL 4 75   HEMOGLOBIN g/dL 11 0*   HEMATOCRIT % 37 0   PLATELETS Thousands/uL 239   NEUTROS PCT % 68   LYMPHS PCT % 18   MONOS PCT % 9   EOS PCT % 3     Results from last 7 days   Lab Units 20  0637  20  0947   SODIUM mmol/L 138   < > 137   POTASSIUM mmol/L 5 0   < > 4 7   CHLORIDE mmol/L 104   < > 105   CO2 mmol/L 25   < > 25   BUN mg/dL 30*   < > 36*   CREATININE mg/dL 2 21*   < > 2 31*   ANION GAP mmol/L 9   < > 7   CALCIUM mg/dL 7 9*   < > 7 8*   ALBUMIN g/dL  --   --  2 2*   TOTAL BILIRUBIN mg/dL  --   --  0 70   ALK PHOS U/L  --   --  65   ALT U/L  --   --  16   AST U/L  --   --  30   GLUCOSE RANDOM mg/dL 163*   < > 218*    < > = values in this interval not displayed           Results from last 7 days   Lab Units 20  1122 20  0725 07/02/20  0545 07/01/20  2054 07/01/20  1527 07/01/20  1056 07/01/20  0704 06/30/20  2106 06/30/20  1634 06/30/20  1143 06/30/20  0716 06/29/20  2108   POC GLUCOSE mg/dl 103 170* 138 153* 112 157* 235* 139 178* 202* 221* 170*                   * I Have Reviewed All Lab Data Listed Above  * Additional Pertinent Lab Tests Reviewed: Petey 66 Admission Reviewed    Imaging:    Imaging Reports Reviewed Today Include: images reviewed    Recent Cultures (last 7 days):           Last 24 Hours Medication List:     Current Facility-Administered Medications:  furosemide 40 mg Intravenous BID (diuretic) Ines Paget, MD   insulin glargine 10 Units Subcutaneous HS Ines Paget, MD   insulin lispro 1-5 Units Subcutaneous TID With Meals Ines Paget, MD   metoprolol 2 5 mg Intravenous Q6H Ines Paget, MD   ondansetron 4 mg Intravenous Once Anusha S Rachel, CRNP   ondansetron 4 mg Intravenous Q6H PRN Ines Paget, MD   pantoprazole 40 mg Intravenous Q12H [address] Gissell Springer, DO   phenol 1 spray Mouth/Throat Q2H PRN Renu Jay, DO   saliva substitute 5 spray Mouth/Throat 4x Daily PRN Davian Trujillo PA-C        Today, Patient Was Seen By: Ines Paget, MD    ** Please Note: Dictation voice to text software may have been used in the creation of this document   **

## 2020-07-03 NOTE — ASSESSMENT & PLAN NOTE
· Most likely secondary to nonoliguric ATN   · Diuretic as per nephrology  · Creatinine stable around 2 now  · May Continue Lasix 40 mg IV q 12 tomorrow  · Patient currently NPO

## 2020-07-03 NOTE — ASSESSMENT & PLAN NOTE
Significant anasarca which is multifactorial including hypoproteinemia, CHF  Diuresis as per Nephrology recommendation, currently diuresis on hold due to NG suction, may consider to restart tomorrow  Anasarca most likely also secondary to hypoproteinemia, TPN started

## 2020-07-03 NOTE — WOUND OSTOMY CARE
Progress Note - Wound   Susie Cardenas 80 y o  male MRN: [id number]  Unit/Bed#: -01 Encounter: [id number]      Assessment:   Wound care follow up on 80year old male  Patient was alert and in bed  EHOB waffle cushion in room for pressure redistribution  ANTONIO Levine and PCA Ronaldo at bedside assisted with wound turning patient  Small amount of soft stool noted with assessment, incontinence care provided at that time by PCA  Sacrum pink, intact, and blanchable    1  HA-Anitha-rectal stage 3 pressure injury, evolved from DTI, beefy red, tan slough in wound bed, no fluctuance noted       Plan:  Changed wound treatment for anitha-rectal wound   1  Anitha-rectal wound bed, cleanse with soap and water, pat dry, No Sting barrier film to periwound, stomadhesive powder to moist wound bed, apply Triad wound paste dime thickness over stoma powder to wound bed daily and PRN with incontinence care        Wound 06/24/20 Pressure Injury Rectum (Active)   Wound Image   7/3/2020  9:24 AM   Wound Description Beefy red;Slough 7/3/2020  7:30 AM   Staging Stage III 7/3/2020  9:33 AM   Anitha-wound Assessment Intact 7/3/2020  9:33 AM   Wound Length (cm) 1 5 cm 7/3/2020  9:33 AM   Wound Width (cm) 2 cm 7/3/2020  9:33 AM   Wound Depth (cm) 0 02 7/3/2020  9:33 AM   Calculated Wound Area (cm^2) 3 cm^2 7/3/2020  9:33 AM   Calculated Wound Volume (cm^3) 0 06 cm^3 7/3/2020  9:33 AM   Tunneling 0 cm 7/3/2020  9:33 AM   Undermining 0 7/3/2020  9:33 AM   Drainage Amount Scant 7/3/2020  9:33 AM   Drainage Description Serosanguineous 7/3/2020  9:33 AM   Treatments Cleansed 7/3/2020  9:33 AM   Dressing Moisture barrier 7/2/2020  8:13 PM   Patient Tolerance Tolerated well 7/3/2020  9:33 AM   Dressing Status Clean;Dry; Intact 7/2/2020  8:13 PM     Reviewed plan of care with primary ANTONIO Levine  Recommendations written as orders  Wound care to follow weekly  Questions or concerns Dolores GARCIA, RN

## 2020-07-03 NOTE — ASSESSMENT & PLAN NOTE
CT showing transition point beyond the duodenal diverticulum  Definitive management as per surgery team

## 2020-07-03 NOTE — PHYSICAL THERAPY NOTE
PHYSICAL THERAPY CANCELLATION NOTE          Patient Name: Jovana Mayorga  AAAAW'Q Date: 7/3/2020     Chart reviewed  Spoke with Dr Desiree Millsay to cancel PT services this date as patient is not medically appropriate for PT services this date  Will continue to follow and see patient as medically appropriate at a later time      Luma Lacey, PT,DPT

## 2020-07-03 NOTE — PROGRESS NOTES
Progress Note - Radha Rashid 1935, 80 y o  male MRN: [id number]    Unit/Bed#: -01 Encounter: [id number]    Primary Care Provider: Cherelle Wyman DO   Date and time admitted to hospital: 6/16/2020 11:14 AM    * Duodenal obstruction  Assessment & Plan  · On EGD 6/17/2020, there was concern for possible bowel obstruction vs severe gastroparesis, given large amount of food and dark bilious fluid in stomach which could not be cleared  · Status post OR started-Exploratory laparotomy with right hemicolectomy  · Patient continue to have nausea and vomiting with abdominal discomfort  CT abdomen pelvis repeated today and showed 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  NG-tube placed with suction  NPO  TPN started  Appreciate Nutrition consult  Follow surgery team recommendation  Anasarca  Assessment & Plan  Significant anasarca which is multifactorial including hypoproteinemia, CHF  Diuresis as per Nephrology recommendation, currently diuresis on hold due to NG suction, may consider to restart tomorrow  Anasarca most likely also secondary to hypoproteinemia, TPN started  Acute renal failure (ARF) (HCC)  Assessment & Plan  · Most likely secondary to nonoliguric ATN   · Diuretic as per nephrology  · Creatinine stable around 2 now  · May Continue Lasix 40 mg IV q 12 tomorrow  · Patient currently NPO    Colon  neoplasm  Assessment & Plan  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  Status post resection, postop day 4, normal bowel movement  Patient on surgical soft diet, advance diet as per surgery recommendation  Discussed with the patient to arrange oncology follow-up as an outpatient:  Patient agreeable to proceed is oncology as an outpatient  Full code  Symptomatic anemia  Assessment & Plan  · Patient was evaluated by PCP secondary to dizziness and fatigue and has blood pressure done as an outpatient    His hemoglobin was found to be 6 2 and was sent to the emergency room  · On the day of admission hemoglobin in the emergency room was 7 1 and received 2 units of packed RBCs  · HGB stable since 11 0 last two days     Duodenal diverticulum  Assessment & Plan  CT showing transition point beyond the duodenal diverticulum  Definitive management as per surgery team    Atrial fibrillation ([facility] [address] )  Assessment & Plan  · Patient no NPO  · Lopressor 2 5 mg IV q 6 hours  · Eliquis on hold, Lovenox 30 mg daily which was adjusted for GFR started    Essential hypertension  Assessment & Plan  · Continue metoprolol    Diabetes mellitus type 2 in obese [facility])  Assessment & Plan  Lab Results   Component Value Date    HGBA1C 8 2 (H) 06/18/2020     Recent Labs     07/03/20  0705 07/03/20  0838 07/03/20  1058 07/03/20  1526   POCGLU 322* 348* 289* 295*     Blood Sugar Average: Last 72 hrs:  (P) 198 type 2 diabetes mellitus on oral hypoglycemic agent  · Hold metformin and glipizide in the hospital  · Insulin sliding scale,  · Patient and p o , hold basal bolus insulin for now    Coronary artery disease  Assessment & Plan  · Patient with known history of coronary artery disease status post CABG in 1997 in [facility]  · Patient is on beta-blocker aspirin and statin    Chronic kidney disease, stage 3 (HCC)  Assessment & Plan  · Baseline creatinine appears to be between 1 4 and 1 6  Questionable new higher baseline     Acute on chronic diastolic congestive heart failure (HCC)  Assessment & Plan  Wt Readings from Last 3 Encounters:   07/03/20 101 kg (222 lb 14 2 oz)     · Patient with known history of chronic congestive heart failure and was admitted to [facility] in January  · Continue IV Lasix for now and switch to 40 mg twice once able to take oral feeding    · Ejection fraction 65% during the last echo 1/20    VTE Pharmacologic Prophylaxis:   Pharmacologic: Heparin Drip    Patient Centered Rounds: I have performed bedside rounds with nursing staff today    Discussions with Specialists or Other Care Team Provider:     Education and Discussions with Family / Patient:     Current Length of Stay: 17 day(s)    Current Patient Status: Inpatient   Certification Statement: The patient will continue to require additional inpatient hospital stay due to Acute renal failure, anasarca, duodenal obstruction    Discharge Plan: With surgical problem improves    Code Status: Level 1 - Full Code      Subjective:   Still complaining of abdominal pain, generalized weakness  Objective:     Vitals:   Temp (24hrs), Av 5 °F (36 9 °C), Min:97 1 °F (36 2 °C), Max:99 7 °F (37 6 °C)    Temp:  [97 1 °F (36 2 °C)-99 7 °F (37 6 °C)] 98 4 °F (36 9 °C)  HR:  [74-88] 74  Resp:  [16-20] 18  BP: (115-121)/(60-65) 115/62  SpO2:  [90 %-94 %] 90 %  Body mass index is 37 67 kg/m²  Input and Output Summary (last 24 hours):        Intake/Output Summary (Last 24 hours) at 7/3/2020 1557  Last data filed at 7/3/2020 1514  Gross per 24 hour   Intake 488 2 ml   Output 2925 ml   Net -2436 8 ml       Physical Exam:     General appearance: alert, appears stated age and cooperative  Head: Normocephalic, without obvious abnormality, atraumatic  Lungs: clear to auscultation bilaterally  Heart: regular rate and rhythm  Abdomen: soft, non-tender, positive bowel sounds   Back: negative  Extremities: extremities atraumatic, no cyanosis or edema  Neurologic: Grossly normal    Additional Data:     Labs:    Results from last 7 days   Lab Units 20  0523   WBC Thousand/uL 3 70*   HEMOGLOBIN g/dL 9 5*   HEMATOCRIT % 32 0*   PLATELETS Thousands/uL 222   NEUTROS PCT % 75   LYMPHS PCT % 15   MONOS PCT % 7   EOS PCT % 1     Results from last 7 days   Lab Units 20  0716   SODIUM mmol/L 139   POTASSIUM mmol/L 4 3   CHLORIDE mmol/L 104   CO2 mmol/L 29   BUN mg/dL 33*   CREATININE mg/dL 2 30*   ANION GAP mmol/L 6   CALCIUM mg/dL 7 7*   ALBUMIN g/dL 2 0*   TOTAL BILIRUBIN mg/dL 0 79   ALK PHOS U/L 61   ALT U/L 12   AST U/L 12   GLUCOSE RANDOM mg/dL 344* Results from last 7 days   Lab Units 07/03/20  0958   INR  1 22*     Results from last 7 days   Lab Units 07/03/20  1526 07/03/20  1058 07/03/20  0838 07/03/20  0705 07/02/20  2135 07/02/20  1516 07/02/20  1122 07/02/20  0725 07/02/20  0545 07/01/20  2054 07/01/20  1527 07/01/20  1056   POC GLUCOSE mg/dl 295* 289* 348* 322* 209* 95 103 170* 138 153* 112 157*                   * I Have Reviewed All Lab Data Listed Above  * Additional Pertinent Lab Tests Reviewed: Paragelaine 66 Admission Reviewed    Imaging:    Imaging Reports Reviewed Today Include: images reviewed    Recent Cultures (last 7 days):           Last 24 Hours Medication List:     Current Facility-Administered Medications:  Adult TPN (CUSTOM BASE/STANDARD ELECTROLYTE)  Intravenous Continuous Tita Vides MD    Adult TPN (STANDARD BASE/STANDARD ELECTROLYTE)  Intravenous Continuous Tita Vides MD Last Rate: 42 mL/hr at 07/02/20 1807   heparin (porcine) 3-20 Units/kg/hr (Order-Specific) Intravenous Titrated Tita Vides MD Last Rate: 11 1 Units/kg/hr (07/03/20 1047)   insulin glargine 10 Units Subcutaneous HS Tita Vides MD    insulin glargine 15 Units Subcutaneous QAM Tita Vides MD    insulin lispro 1-5 Units Subcutaneous TID With Meals Tita Vides MD    metoprolol 2 5 mg Intravenous Q6H Tita Vides MD    ondansetron 4 mg Intravenous Once Anusha S Rachel, CRNP    ondansetron 4 mg Intravenous Q6H PRN Tita Vides MD    pantoprazole 40 mg Intravenous Q12H [address] Gissell Springer, DO    phenol 1 spray Mouth/Throat Q2H PRN Rukhsana Reynolds, DO    saliva substitute 5 spray Mouth/Throat 4x Daily PRN Sun Jones PA-C         Today, Patient Was Seen By: Tita Vides MD    ** Please Note: Dictation voice to text software may have been used in the creation of this document   **

## 2020-07-03 NOTE — NURSING NOTE
NG tube has required frequent irrigation, due to thick drainage with particles clogging up the tube  When patient C/O pain, he usually needs the tube irrigated  After irrigated the pain goes away completely

## 2020-07-03 NOTE — ASSESSMENT & PLAN NOTE
· Patient with known history of coronary artery disease status post CABG in 1997 in [facility]  · Patient is on beta-blocker aspirin and statin

## 2020-07-03 NOTE — OCCUPATIONAL THERAPY NOTE
Occupational Therapy Hold      Patient Name: Malika Parra  XEJFL'T Date: 7/3/2020      Chart review completed  Pt's medical status continues to fluctuate at this time  High potassium levels and glucose readings  Spoke with Mark Ames [facility] to hold therapy services at this time  Will continue to follow and address pt as medically appropriate and as schedule allows       RAFIQ Pedroza/PRETTY

## 2020-07-03 NOTE — NURSING NOTE
Anusha TAYLOR aware of CMP results  The previous BMP reported was drawn from PICC also, but had very high potassium and glucose readings  Therefor the repeat was done

## 2020-07-03 NOTE — PROGRESS NOTES
Progress Note - General Surgery   Ulysses Forbes 80 y o  male MRN: [id number]  Unit/Bed#: -01 Encounter: [id number]    Assessment:  Status post right hemicolectomy for right colon carcinoma  Postoperative ileus with large duodenal diverticula now with NG tube in place and clinically feeling better  Renal insufficiency    Plan:  Continue NPO NG tube hyperalimentation and PICC line  This was discussed with the patient  Subjective/Objective   Chief Complaint:  Abdominal pain    Subjective: The patient states he does have some abdominal pain but it is improving  and dry mouth and is asking for something to drink  He denies any nausea  He denies any increase in pain  Objective:     Blood pressure 121/60, pulse 74, temperature 98 7 °F (37 1 °C), resp  rate 18, height 5' 4 5" (1 638 m), weight 101 kg (222 lb 14 2 oz), SpO2 93 %  ,Body mass index is 37 67 kg/m²  Intake/Output Summary (Last 24 hours) at 7/3/2020 0925  Last data filed at 7/3/2020 0756  Gross per 24 hour   Intake 88 2 ml   Output 2175 ml   Net -2086 8 ml       Invasive Devices     Peripherally Inserted Central Catheter Line            PICC Line 07/02/20 less than 1 day          Drain            NG/OG/Enteral Tube Nasogastric 18 Fr Right nares less than 1 day                Physical Exam:  The patient is awake and alert in no acute distress    Abdomen is softer and not distended and wound is intact with no erythema drainage or fluctuance      Lab, Imaging and other studies:  CBC:   Lab Results   Component Value Date    WBC 3 70 (L) 07/03/2020    HGB 9 5 (L) 07/03/2020    HCT 32 0 (L) 07/03/2020    MCV 77 (L) 07/03/2020     07/03/2020    MCH 22 9 (L) 07/03/2020    MCHC 29 7 (L) 07/03/2020    RDW 23 8 (H) 07/03/2020    MPV 9 5 07/03/2020    NRBC 0 07/03/2020   , CMP:   Lab Results   Component Value Date    SODIUM 139 07/03/2020    K 4 3 07/03/2020     07/03/2020    CO2 29 07/03/2020    BUN 33 (H) 07/03/2020    CREATININE 2 30 (H) 07/03/2020    CALCIUM 7 7 (L) 07/03/2020    AST 12 07/03/2020    ALT 12 07/03/2020    ALKPHOS 61 07/03/2020    EGFR 25 07/03/2020     VTE Pharmacologic Prophylaxis: Sequential compression device (Venodyne)   VTE Mechanical Prophylaxis: sequential compression device

## 2020-07-03 NOTE — PROGRESS NOTES
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 Renal function worse today- 2 4 mg/dL likely on the bases of NPO, large output from NGT and negative fluid balance  Will hold lasix today and re-evaluate tomorrow  Issac Swartz has significant edema which may be on the basis of severely low albumin/protein levels  2  Stage 3 Chronic Kidney Disease with baseline creatinine 1 3-1 6 mg/dL  3  Hypertensive Nephrosclerosis  4  Acute on Chronic CHF  ? Lasix on hold as above  ? Will still need daily weights and strict IO  5  Obstructing Adenocarcinoma of the Cecum  6  Duodenal Obstruction  · Found on repeat imaging done yesterday  NGT has been placed for gastric decompression  He is receiving TPN  Management per surgery  Will check daily bmp, phos, mag and replete as necessary for goal K > 4 0 mmol/L, Mg > 2 0 mg/dL and phos > 2 5 mg/dL   7  Hyperkalemia  · Likely pseudohyperkalemia as specimen seems to have been contaminated with TPN as evidenced by severely elevated phosphorus and glucose levels  A stat repeat is pending  ROS  Seen and examined lying in bed  States he has a very dry mouth  No other physical complaints at this time  A complete 10 point review of systems have been performed and are otherwise negative         Historical Information   Past Medical History:   Diagnosis Date  A-fib ([facility] )     Cardiac disease     CHF (congestive heart failure) (HCC)     Diabetes mellitus ([facility] )     MI, old     Myocardial infarction ([facility] )      Past Surgical History:   Procedure Laterality Date    CARDIAC SURGERY      GA PART REMOVAL COLON W ANASTOMOSIS N/A 6/20/2020    Procedure: exploratory laparatomy with right hemicolectomy;  Surgeon: Alejandro Latham DO;  Location:  MAIN OR;  Service: General     Social History   Social History     Substance and Sexual Activity   Alcohol Use Not Currently     Social History     Substance and Sexual Activity   Drug Use Not Currently     Social History     Tobacco Use   Smoking Status Never Smoker   Smokeless Tobacco Never Used       Family History:   Family History   Problem Relation Age of Onset    Diabetes Mother        Medications:  Pertinent medications were reviewed    Current Facility-Administered Medications:  Adult TPN (STANDARD BASE/STANDARD ELECTROLYTE)  Intravenous Continuous Janette Dickinson MD Last Rate: 42 mL/hr at 07/02/20 1807   calcium gluconate 1 g Intravenous Once Anusha S Rachel, CRNP    heparin (porcine) 5,000 Units Subcutaneous Q8H [address] Janette Dickinson MD    insulin glargine 10 Units Subcutaneous HS Janette Dickinson MD    insulin lispro 1-5 Units Subcutaneous TID With Meals Janette Dickinson MD    metoprolol 2 5 mg Intravenous Q6H Janette Dickinson MD    ondansetron 4 mg Intravenous Once Anusha S Rachel, CRNP    ondansetron 4 mg Intravenous Q6H PRN Janette Dickinson MD    pantoprazole 40 mg Intravenous Q12H [address] Gissell Springer DO    phenol 1 spray Mouth/Throat Q2H PRN Alejandro Latham DO    saliva substitute 5 spray Mouth/Throat 4x Daily PRN Gallito Krueger PA-C    saliva substitute 5 spray Mouth/Throat Once BRANDON Peralta          No Known Allergies      Vitals:   /60   Pulse 74   Temp 98 7 °F (37 1 °C)   Resp 18   Ht 5' 4 5" (1 638 m)   Wt 101 kg (222 lb 14 2 oz)   SpO2 93%   BMI 37 67 kg/m²   Body mass index is 37 67 kg/m²    SpO2: 93 %,   SpO2 Activity: At Rest,   O2 Device: None (Room air)      Intake/Output Summary (Last 24 hours) at 7/3/2020 8096  Last data filed at 7/3/2020 5051  Gross per 24 hour   Intake 88 2 ml   Output 1575 ml   Net -1486 8 ml     Invasive Devices     Peripherally Inserted Central Catheter Line            PICC Line 07/02/20 less than 1 day          Drain            NG/OG/Enteral Tube Nasogastric 18 Fr Right nares less than 1 day                Physical Exam  General: conscious, cooperative, in no acute distress chronically ill appearing   Eyes: conjunctivae pink, anicteric sclerae  ENT: lips and mucous membranes moist, NGT  Neck: supple, no JVD, no masses  Chest: diminished breath sounds bilateral, no crackles, ronchus or wheezings  CVS: S1 & S2, normal rate, regular rhythm  Abdomen: soft, non-tender, non-distended, normoactive bowel sounds, midline incision  Extremities: + 2 pitting edema of both legs  Skin: no rash  Neuro: awake, alert, oriented      Diagnostic Data:  Lab: I have personally reviewed pertinent lab results  ,   CBC:  Results from last 7 days   Lab Units 07/03/20  0523   WBC Thousand/uL 3 70*   HEMOGLOBIN g/dL 9 5*   HEMATOCRIT % 32 0*   PLATELETS Thousands/uL 222      CMP: Lab Results   Component Value Date    SODIUM 134 (L) 07/03/2020    K 6 9 (HH) 07/03/2020    CL 99 (L) 07/03/2020    CO2 25 07/03/2020    BUN 31 (H) 07/03/2020    CREATININE 2 38 (H) 07/03/2020    CALCIUM 7 4 (L) 07/03/2020    AST 19 07/03/2020    ALT 15 07/03/2020    ALKPHOS 54 07/03/2020    EGFR 24 07/03/2020   ,   PT/INR: No results found for: PT, INR,   Magnesium: No components found for: MAG,  Phosphorous:   Lab Results   Component Value Date    PHOS 7 5 (H) 07/03/2020       Microbiology:  @[email or web address],(urinecx:7)@        BRANDON Robbins    Portions of the record may have been created with voice recognition software   Occasional wrong word or "sound a like" substitutions may have occurred due to the inherent limitations of voice recognition software  Read the chart carefully and recognize, using context, where substitutions have occurred

## 2020-07-03 NOTE — ASSESSMENT & PLAN NOTE
Wt Readings from Last 3 Encounters:   07/03/20 101 kg (222 lb 14 2 oz)     · Patient with known history of chronic congestive heart failure and was admitted to [facility] in January  · Continue IV Lasix for now and switch to 40 mg twice once able to take oral feeding    · Ejection fraction 65% during the last echo 1/20

## 2020-07-03 NOTE — ASSESSMENT & PLAN NOTE
· Patient no NPO  · Lopressor 2 5 mg IV q 6 hours  · Eliquis on hold, heparin drip started for stroke prevention

## 2020-07-03 NOTE — CONSULTS
Per surgery note, pt with probable postoperative ileus, requiring TPN, standard TPN initiated, consulted for goal TPN recommendations  Nephrology d/c lasix use at this time noting negative fluid balance, attending MD desiring 1500 ml fluid restriction at this time  Increased PRO needs noted  ADDENDUM: Unable to order dextrose in 5% increments, adjusted recommendations  Recommend TPN advancement to 50% dextrose 500 ml, 20% lipids 300 ml, 15% amino acids 650 ml  This will provide a total of 250 g dextrose, 60 g lipid, 98 g amino acids (100% estimated PRO needs)  Total of 1842 kcal (lower end of estimated energy needs) and 1450 ml fluid  CHO load of 1 72 mg/kg/min and lipid load of 0 59 g/kg/day  Micronutrients per pharmacy  Thank you for the consult  Will continue to follow

## 2020-07-03 NOTE — ASSESSMENT & PLAN NOTE
· On EGD 6/17/2020, there was concern for possible bowel obstruction vs severe gastroparesis, given large amount of food and dark bilious fluid in stomach which could not be cleared  · Status post OR started-Exploratory laparotomy with right hemicolectomy  · Patient continue to have nausea and vomiting with abdominal discomfort  CT abdomen pelvis repeated today and showed 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  NG-tube placed with suction  NPO  TPN started  Appreciate Nutrition consult  Follow surgery team recommendation

## 2020-07-03 NOTE — PLAN OF CARE
Problem: Potential for Falls  Goal: Patient will remain free of falls  Description  INTERVENTIONS:  - Assess patient frequently for physical needs  -  Identify cognitive and physical deficits and behaviors that affect risk of falls    -  [place] fall precautions as indicated by assessment   - Educate patient/family on patient safety including physical limitations  - Instruct patient to call for assistance with activity based on assessment  - Modify environment to reduce risk of injury  - Consider OT/PT consult to assist with strengthening/mobility  Outcome: Progressing     Problem: CARDIOVASCULAR - ADULT  Goal: Maintains optimal cardiac output and hemodynamic stability  Description  INTERVENTIONS:  - Monitor I/O, vital signs and rhythm  - Monitor for S/S and trends of decreased cardiac output  - Administer and titrate ordered vasoactive medications to optimize hemodynamic stability  - Assess quality of pulses, skin color and temperature  - Assess for signs of decreased coronary artery perfusion  - Instruct patient to report change in severity of symptoms  Outcome: Progressing  Goal: Absence of cardiac dysrhythmias or at baseline rhythm  Description  INTERVENTIONS:  - Continuous cardiac monitoring, vital signs, obtain 12 lead EKG if ordered  - Administer antiarrhythmic and heart rate control medications as ordered  - Monitor electrolytes and administer replacement therapy as ordered  Outcome: Progressing     Problem: METABOLIC, FLUID AND ELECTROLYTES - ADULT  Goal: Glucose maintained within target range  Description  INTERVENTIONS:  - Monitor Blood Glucose as ordered  - Assess for signs and symptoms of hyperglycemia and hypoglycemia  - Administer ordered medications to maintain glucose within target range  - Assess nutritional intake and initiate nutrition service referral as needed  Outcome: Progressing     Problem: SKIN/TISSUE INTEGRITY - ADULT  Goal: Skin integrity remains intact  Description  INTERVENTIONS  - Identify patients at risk for skin breakdown  - Assess and monitor skin integrity  - Assess and monitor nutrition and hydration status  - Monitor labs (i e  albumin)  - Assess for incontinence   - Turn and reposition patient  - Assist with mobility/ambulation  - Relieve pressure over bony prominences  - Avoid friction and shearing  - Provide appropriate hygiene as needed including keeping skin clean and dry  - Evaluate need for skin moisturizer/barrier cream  - Collaborate with interdisciplinary team (i e  Nutrition, Rehabilitation, etc )   - Patient/family teaching  Outcome: Progressing     Problem: HEMATOLOGIC - ADULT  Goal: Maintains hematologic stability  Description  INTERVENTIONS  - Assess for signs and symptoms of bleeding or hemorrhage  - Monitor labs  - Administer supportive blood products/factors as ordered and appropriate  Outcome: Progressing     Problem: MUSCULOSKELETAL - ADULT  Goal: Maintain or return mobility to safest level of function  Description  INTERVENTIONS:  - Assess patient's ability to carry out ADLs; assess patient's baseline for ADL function and identify physical deficits which impact ability to perform ADLs (bathing, care of mouth/teeth, toileting, grooming, dressing, etc )  - Assess/evaluate cause of self-care deficits   - Assess range of motion  - Assess patient's mobility/assist x 2 with roller walker  - Assess patient's need for assistive devices and provide as appropriate  - Encourage maximum independence but intervene and supervise when necessary  - Involve family in performance of ADLs  - Assess for home care needs following discharge   - Consider OT consult to assist with ADL evaluation and planning for discharge  - Provide patient education as appropriate   Outcome: Progressing     Problem: Prexisting or High Potential for Compromised Skin Integrity  Goal: Skin integrity is maintained or improved  Description  INTERVENTIONS:  - Identify patients at risk for skin breakdown  - Assess and monitor skin integrity  - Assess and monitor nutrition and hydration status  - Monitor labs   - Assess for incontinence   - Turn and reposition patient  - Assist with mobility/ambulation  - Relieve pressure over bony prominences  - Avoid friction and shearing  - Provide appropriate hygiene as needed including keeping skin clean and dry  - Evaluate need for skin moisturizer/barrier cream  - Collaborate with interdisciplinary team   - Patient/family teaching  - Consider wound care consult   Outcome: Progressing     Problem: Nutrition/Hydration-ADULT  Goal: Nutrient/Hydration intake appropriate for improving, restoring or maintaining nutritional needs  Description  Monitor and assess patient's nutrition/hydration status for malnutrition  Collaborate with interdisciplinary team and initiate plan and interventions as ordered  Monitor patient's weight and dietary intake as ordered or per policy  Utilize nutrition screening tool and intervene as necessary  Determine patient's food preferences and provide high-protein, high-caloric foods as appropriate       INTERVENTIONS:  - Monitor oral intake, urinary output, labs, and treatment plans  - Assess nutrition and hydration status and recommend course of action  - Evaluate amount of meals eaten  - Assist patient with eating if necessary   - Allow adequate time for meals  - Recommend/ encourage appropriate diets, oral nutritional supplements, and vitamin/mineral supplements  - Order, calculate, and assess calorie counts as needed  Offer small frequentmeals  - Assess need for intravenous fluids  - Provide specific nutrition/hydration education as appropriate  - Include patient/family/caregiver in decisions related to nutrition   Outcome: Progressing

## 2020-07-03 NOTE — ASSESSMENT & PLAN NOTE
Lab Results   Component Value Date    HGBA1C 8 2 (H) 06/18/2020       Recent Labs     07/03/20  0705 07/03/20  0838 07/03/20  1058 07/03/20  1526   POCGLU 322* 348* 289* 295*       Blood Sugar Average: Last 72 hrs:  (P) 198 type 2 diabetes mellitus on oral hypoglycemic agent  · Hold metformin and glipizide in the hospital  · Insulin sliding scale,  · Patient and p o , hold basal bolus insulin for now

## 2020-07-03 NOTE — PLAN OF CARE
Problem: Nutrition/Hydration-ADULT  Goal: Nutrient/Hydration intake appropriate for improving, restoring or maintaining nutritional needs  Description  Monitor and assess patient's nutrition/hydration status for malnutrition  Collaborate with interdisciplinary team and initiate plan and interventions as ordered  Monitor patient's weight and dietary intake as ordered or per policy  Utilize nutrition screening tool and intervene as necessary  Determine patient's food preferences and provide high-protein, high-caloric foods as appropriate       INTERVENTIONS:  - Monitor oral intake, urinary output, labs, and treatment plans  - Assess nutrition and hydration status and recommend course of action  - Evaluate amount of meals eaten  - Assist patient with eating if necessary   - Allow adequate time for meals  - Recommend/ encourage appropriate diets, oral nutritional supplements, and vitamin/mineral supplements  - Order, calculate, and assess calorie counts as needed  Offer small frequentmeals  - Assess need for intravenous fluids  - Provide specific nutrition/hydration education as appropriate  - Include patient/family/caregiver in decisions related to nutrition   Outcome: Not Progressing

## 2020-07-04 PROBLEM — E11.8 DIABETES MELLITUS TYPE 2 WITH COMPLICATIONS (HCC): Status: ACTIVE | Noted: 2020-01-01

## 2020-07-04 NOTE — QUICK NOTE
Renal Quick Note:  Patient net negative 1 6 liters yesterday due to large volume of NGT output  No updated weight to review today  Creatinine 2 3 mg/dL- unchanged from yesterday  Severe albuminemia and hypoproteinemia noted  Sodium corrected for glucose 152 mmol/L  K 4 2 mmol/L, Phos 3 1 mg/dL, Mag 1 9 mg/dL, calcium corrected for albumin 9 18 mg/dL  Continue to hold lasix  Recheck BMP this afternoon as corrected sodium 152 mmol/L to ensure accuracy as he has been receiving TPN  He may need hypotonic fluids  -DAYSI Delgado

## 2020-07-04 NOTE — ASSESSMENT & PLAN NOTE
· Patient NPO  · Lopressor 2 5 mg IV q 6 hours  · Eliquis on hold, will place on heparin for DVT prophylaxis for now

## 2020-07-04 NOTE — ASSESSMENT & PLAN NOTE
· Baseline creatinine appears to be between 1 4 and 1 6  Questionable new higher baseline   Avoid hypotension and nephrotoxic agents  Gentle hydration for now

## 2020-07-04 NOTE — NURSING NOTE
Received critical value for blood glucose of 510 from CMP drawn via PICC line  Fingerstick bloodsugar obtained and read sugar > 500  Fracisco Larkin NP made aware via [employer] and then verbally

## 2020-07-04 NOTE — ASSESSMENT & PLAN NOTE
· Most likely secondary to nonoliguric ATN   Patient's baseline creatinine is 1 5-1 6  Currently creatinine is up to 2 3   · Diuretic as per nephrology  · Patient currently NPO  · Patient is currently on TPN however the dextrose is up to 50% and hence his blood sugars are uncontrolled  Will discontinue TPN bag now and place him on isolate at 50 mL/hour  Restart of standard TPN formula starting 9:00 p m  Tonight    Will discontinue isolate at that time

## 2020-07-04 NOTE — NURSING NOTE
PTT drawn via PICC line earlier was abnormal, and lab wanted a repeat drawn  Repeat PTT was sent and resulted high  Heparin drip was stopped  Dr Brent Charles aware that we are going to draw a peripheral PTT to confirm if it is accurate, since the previous was drawn from the PICC line

## 2020-07-04 NOTE — ASSESSMENT & PLAN NOTE
Significant anasarca which is multifactorial including hypoproteinemia, CHF  Diuresis as per Nephrology recommendation, Anasarca most likely also secondary to hypoproteinemia, TPN started

## 2020-07-04 NOTE — PROGRESS NOTES
Progress Note - General Surgery   Maiokl Dry 80 y o  male MRN: [id number]  Unit/Bed#: -01 Encounter: [id number]    Assessment:  Status post exploratory laparotomy with right hemicolectomy  Postoperative ileus  Renal insufficiency    Plan:  The NG tube drainage is still over 1000 per shift therefore we will continue with NG tube to low continuous suction and hyperalimentation  The patient is asking for something the drain site at this point in time we will continue to keep him NPO  Subjective/Objective   Chief Complaint:  Thirsty    Subjective: The patient is seen and denies abdominal pain  He states he did have a bowel movement but continues to be thirsty with the NG tube in place  He denies any chest pain shortness of breath or extremity pain  Objective:     Blood pressure 114/56, pulse 88, temperature 98 6 °F (37 °C), resp  rate 18, height 5' 4 5" (1 638 m), weight 101 kg (222 lb 14 2 oz), SpO2 92 %  ,Body mass index is 37 67 kg/m²        Intake/Output Summary (Last 24 hours) at 7/4/2020 0743  Last data filed at 7/4/2020 0501  Gross per 24 hour   Intake 2438 34 ml   Output 4100 ml   Net -1661 66 ml       Invasive Devices     Peripherally Inserted Central Catheter Line            PICC Line 07/02/20 1 day          Drain            NG/OG/Enteral Tube Nasogastric 18 Fr Right nares 1 day                Physical Exam:  Abdomen is soft nontender with skin staples in place wound intact with no erythema drainage or fluctuance    Lab, Imaging and other studies:  CBC: No results found for: WBC, HGB, HCT, MCV, PLT, ADJUSTEDWBC, MCH, MCHC, RDW, MPV, NRBC, CMP:   Lab Results   Component Value Date    SODIUM 142 07/04/2020    K 4 2 07/04/2020     07/04/2020    CO2 29 07/04/2020    BUN 38 (H) 07/04/2020    CREATININE 2 30 (H) 07/04/2020    CALCIUM 7 5 (L) 07/04/2020    AST 10 07/04/2020    ALT 12 07/04/2020    ALKPHOS 59 07/04/2020    EGFR 25 07/04/2020

## 2020-07-04 NOTE — PLAN OF CARE
Problem: Potential for Falls  Goal: Patient will remain free of falls  Description  INTERVENTIONS:  - Assess patient frequently for physical needs  -  Identify cognitive and physical deficits and behaviors that affect risk of falls    -  [place] fall precautions as indicated by assessment   - Educate patient/family on patient safety including physical limitations  - Instruct patient to call for assistance with activity based on assessment  - Modify environment to reduce risk of injury  - Consider OT/PT consult to assist with strengthening/mobility  Outcome: Progressing     Problem: CARDIOVASCULAR - ADULT  Goal: Maintains optimal cardiac output and hemodynamic stability  Description  INTERVENTIONS:  - Monitor I/O, vital signs and rhythm  - Monitor for S/S and trends of decreased cardiac output  - Administer and titrate ordered vasoactive medications to optimize hemodynamic stability  - Assess quality of pulses, skin color and temperature  - Assess for signs of decreased coronary artery perfusion  - Instruct patient to report change in severity of symptoms  Outcome: Progressing  Goal: Absence of cardiac dysrhythmias or at baseline rhythm  Description  INTERVENTIONS:  - Continuous cardiac monitoring, vital signs, obtain 12 lead EKG if ordered  - Administer antiarrhythmic and heart rate control medications as ordered  - Monitor electrolytes and administer replacement therapy as ordered  Outcome: Progressing     Problem: METABOLIC, FLUID AND ELECTROLYTES - ADULT  Goal: Glucose maintained within target range  Description  INTERVENTIONS:  - Monitor Blood Glucose as ordered  - Assess for signs and symptoms of hyperglycemia and hypoglycemia  - Administer ordered medications to maintain glucose within target range  - Assess nutritional intake and initiate nutrition service referral as needed  Outcome: Progressing     Problem: SKIN/TISSUE INTEGRITY - ADULT  Goal: Skin integrity remains intact  Description  INTERVENTIONS  - Identify patients at risk for skin breakdown  - Assess and monitor skin integrity  - Assess and monitor nutrition and hydration status  - Monitor labs (i e  albumin)  - Assess for incontinence   - Turn and reposition patient  - Assist with mobility/ambulation  - Relieve pressure over bony prominences  - Avoid friction and shearing  - Provide appropriate hygiene as needed including keeping skin clean and dry  - Evaluate need for skin moisturizer/barrier cream  - Collaborate with interdisciplinary team (i e  Nutrition, Rehabilitation, etc )   - Patient/family teaching  Outcome: Progressing     Problem: HEMATOLOGIC - ADULT  Goal: Maintains hematologic stability  Description  INTERVENTIONS  - Assess for signs and symptoms of bleeding or hemorrhage  - Monitor labs  - Administer supportive blood products/factors as ordered and appropriate  Outcome: Progressing     Problem: MUSCULOSKELETAL - ADULT  Goal: Maintain or return mobility to safest level of function  Description  INTERVENTIONS:  - Assess patient's ability to carry out ADLs; assess patient's baseline for ADL function and identify physical deficits which impact ability to perform ADLs (bathing, care of mouth/teeth, toileting, grooming, dressing, etc )  - Assess/evaluate cause of self-care deficits   - Assess range of motion  - Assess patient's mobility/assist x 2 with roller walker  - Assess patient's need for assistive devices and provide as appropriate  - Encourage maximum independence but intervene and supervise when necessary  - Involve family in performance of ADLs  - Assess for home care needs following discharge   - Consider OT consult to assist with ADL evaluation and planning for discharge  - Provide patient education as appropriate   Outcome: Progressing     Problem: Prexisting or High Potential for Compromised Skin Integrity  Goal: Skin integrity is maintained or improved  Description  INTERVENTIONS:  - Identify patients at risk for skin breakdown  - Assess and monitor skin integrity  - Assess and monitor nutrition and hydration status  - Monitor labs   - Assess for incontinence   - Turn and reposition patient  - Assist with mobility/ambulation  - Relieve pressure over bony prominences  - Avoid friction and shearing  - Provide appropriate hygiene as needed including keeping skin clean and dry  - Evaluate need for skin moisturizer/barrier cream  - Collaborate with interdisciplinary team   - Patient/family teaching  - Consider wound care consult   Outcome: Progressing     Problem: Nutrition/Hydration-ADULT  Goal: Nutrient/Hydration intake appropriate for improving, restoring or maintaining nutritional needs  Description  Monitor and assess patient's nutrition/hydration status for malnutrition  Collaborate with interdisciplinary team and initiate plan and interventions as ordered  Monitor patient's weight and dietary intake as ordered or per policy  Utilize nutrition screening tool and intervene as necessary  Determine patient's food preferences and provide high-protein, high-caloric foods as appropriate       INTERVENTIONS:  - Monitor oral intake, urinary output, labs, and treatment plans  - Assess nutrition and hydration status and recommend course of action  - Evaluate amount of meals eaten  - Assist patient with eating if necessary   - Allow adequate time for meals  - Recommend/ encourage appropriate diets, oral nutritional supplements, and vitamin/mineral supplements  - Order, calculate, and assess calorie counts as needed  Offer small frequentmeals  - Assess need for intravenous fluids  - Provide specific nutrition/hydration education as appropriate  - Include patient/family/caregiver in decisions related to nutrition   Outcome: Progressing

## 2020-07-04 NOTE — PLAN OF CARE
Problem: Potential for Falls  Goal: Patient will remain free of falls  Description  INTERVENTIONS:  - Assess patient frequently for physical needs  -  Identify cognitive and physical deficits and behaviors that affect risk of falls    -  [place] fall precautions as indicated by assessment   - Educate patient/family on patient safety including physical limitations  - Instruct patient to call for assistance with activity based on assessment  - Modify environment to reduce risk of injury  - Consider OT/PT consult to assist with strengthening/mobility  Outcome: Progressing     Problem: CARDIOVASCULAR - ADULT  Goal: Maintains optimal cardiac output and hemodynamic stability  Description  INTERVENTIONS:  - Monitor I/O, vital signs and rhythm  - Monitor for S/S and trends of decreased cardiac output  - Administer and titrate ordered vasoactive medications to optimize hemodynamic stability  - Assess quality of pulses, skin color and temperature  - Assess for signs of decreased coronary artery perfusion  - Instruct patient to report change in severity of symptoms  Outcome: Progressing  Goal: Absence of cardiac dysrhythmias or at baseline rhythm  Description  INTERVENTIONS:  - Continuous cardiac monitoring, vital signs, obtain 12 lead EKG if ordered  - Administer antiarrhythmic and heart rate control medications as ordered  - Monitor electrolytes and administer replacement therapy as ordered  Outcome: Progressing     Problem: METABOLIC, FLUID AND ELECTROLYTES - ADULT  Goal: Glucose maintained within target range  Description  INTERVENTIONS:  - Monitor Blood Glucose as ordered  - Assess for signs and symptoms of hyperglycemia and hypoglycemia  - Administer ordered medications to maintain glucose within target range  - Assess nutritional intake and initiate nutrition service referral as needed  Outcome: Progressing     Problem: SKIN/TISSUE INTEGRITY - ADULT  Goal: Skin integrity remains intact  Description  INTERVENTIONS  - Identify patients at risk for skin breakdown  - Assess and monitor skin integrity  - Assess and monitor nutrition and hydration status  - Monitor labs (i e  albumin)  - Assess for incontinence   - Turn and reposition patient  - Assist with mobility/ambulation  - Relieve pressure over bony prominences  - Avoid friction and shearing  - Provide appropriate hygiene as needed including keeping skin clean and dry  - Evaluate need for skin moisturizer/barrier cream  - Collaborate with interdisciplinary team (i e  Nutrition, Rehabilitation, etc )   - Patient/family teaching  Outcome: Progressing     Problem: HEMATOLOGIC - ADULT  Goal: Maintains hematologic stability  Description  INTERVENTIONS  - Assess for signs and symptoms of bleeding or hemorrhage  - Monitor labs  - Administer supportive blood products/factors as ordered and appropriate  Outcome: Progressing     Problem: MUSCULOSKELETAL - ADULT  Goal: Maintain or return mobility to safest level of function  Description  INTERVENTIONS:  - Assess patient's ability to carry out ADLs; assess patient's baseline for ADL function and identify physical deficits which impact ability to perform ADLs (bathing, care of mouth/teeth, toileting, grooming, dressing, etc )  - Assess/evaluate cause of self-care deficits   - Assess range of motion  - Assess patient's mobility/assist x 2 with roller walker  - Assess patient's need for assistive devices and provide as appropriate  - Encourage maximum independence but intervene and supervise when necessary  - Involve family in performance of ADLs  - Assess for home care needs following discharge   - Consider OT consult to assist with ADL evaluation and planning for discharge  - Provide patient education as appropriate   Outcome: Progressing     Problem: Prexisting or High Potential for Compromised Skin Integrity  Goal: Skin integrity is maintained or improved  Description  INTERVENTIONS:  - Identify patients at risk for skin breakdown  - Assess and monitor skin integrity  - Assess and monitor nutrition and hydration status  - Monitor labs   - Assess for incontinence   - Turn and reposition patient  - Assist with mobility/ambulation  - Relieve pressure over bony prominences  - Avoid friction and shearing  - Provide appropriate hygiene as needed including keeping skin clean and dry  - Evaluate need for skin moisturizer/barrier cream  - Collaborate with interdisciplinary team   - Patient/family teaching  - Consider wound care consult   Outcome: Progressing     Problem: Nutrition/Hydration-ADULT  Goal: Nutrient/Hydration intake appropriate for improving, restoring or maintaining nutritional needs  Description  Monitor and assess patient's nutrition/hydration status for malnutrition  Collaborate with interdisciplinary team and initiate plan and interventions as ordered  Monitor patient's weight and dietary intake as ordered or per policy  Utilize nutrition screening tool and intervene as necessary  Determine patient's food preferences and provide high-protein, high-caloric foods as appropriate       INTERVENTIONS:  - Monitor oral intake, urinary output, labs, and treatment plans  - Assess nutrition and hydration status and recommend course of action  - Evaluate amount of meals eaten  - Assist patient with eating if necessary   - Allow adequate time for meals  - Recommend/ encourage appropriate diets, oral nutritional supplements, and vitamin/mineral supplements  - Order, calculate, and assess calorie counts as needed  Offer small frequentmeals  - Assess need for intravenous fluids  - Provide specific nutrition/hydration education as appropriate  - Include patient/family/caregiver in decisions related to nutrition   Outcome: Progressing

## 2020-07-04 NOTE — ASSESSMENT & PLAN NOTE
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

## 2020-07-04 NOTE — PROGRESS NOTES
Progress Note - Malika Hair 1935, 80 y o  male MRN: [id number]    Unit/Bed#: -01 Encounter: [id number]    Primary Care Provider: Ramon Mc DO   Date and time admitted to hospital: 6/16/2020 11:14 AM        * Duodenal obstruction  Assessment & Plan  · On EGD 6/17/2020, there was concern for possible bowel obstruction vs severe gastroparesis, given large amount of food and dark bilious fluid in stomach which could not be cleared  · Status post OR started-Exploratory laparotomy with right hemicolectomy  · Patient continue to have nausea and vomiting with abdominal discomfort  CT abdomen pelvis repeated and showed 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  NG-tube placed with suction  NPO  TPN started  Appreciate Nutrition consult  Follow surgery team recommendation  Patient did have a bowel movement overnight  Continues to have high NG outputs of almost a 1000 cc per shift  Continue NG tube suction for now until outputs decreased    Diabetes mellitus type 2 with complications [facility])  Assessment & Plan  Lab Results   Component Value Date    HGBA1C 8 2 (H) 06/18/2020       Recent Labs     07/03/20  1526 07/03/20  2052 07/04/20  0605 07/04/20  1023   POCGLU 295* 317* >500* >500*       Blood Sugar Average: Last 72 hrs:  (P) 210 [id number] type 2 diabetes mellitus on oral hypoglycemic agent  · Hold metformin and glipizide in the hospital  · Insulin sliding scale,  · Patient blood sugar is over 500 today secondary to being on concentrated dextrose TPN  Discontinue the TPN bag  Will place on isolate for now  Restart standard TPN in the evening  Continue Lantus and insulin sliding scale as ordered  No evidence of DKA at this time however blood sugar is quite elevated    Will be doing Accu-Cheks q 2 hours until blood sugars decrease to acceptable range of under 300    Anasarca  Assessment & Plan  Significant anasarca which is multifactorial including hypoproteinemia, CHF  Diuresis as per Nephrology recommendation, Anasarca most likely also secondary to hypoproteinemia, TPN started  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[facility])  Assessment & Plan  ·  s/p exploratory laparotomy with right hemicolectomy for 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  · Patient did not want oncology follow up  Will need to discuss it again upon discharge        Acute renal failure (ARF) ([facility] [address] )  Assessment & Plan  · Most likely secondary to nonoliguric ATN   Patient's baseline creatinine is 1 5-1 6  Currently creatinine is up to 2 3   · Diuretic as per nephrology  · Patient currently NPO  · Patient is currently on TPN however the dextrose is up to 50% and hence his blood sugars are uncontrolled  Will discontinue TPN bag now and place him on isolate at 50 mL/hour  Restart of standard TPN formula starting 9:00 p m  Tonight  Will discontinue isolate at that time      Atrial fibrillation [facility])  Assessment & Plan  · Patient NPO  · Lopressor 2 5 mg IV q 6 hours  · Eliquis on hold, will place on heparin for DVT prophylaxis for now    Symptomatic anemia  Assessment & Plan  · Patient was evaluated by PCP secondary to dizziness and fatigue and has blood pressure done as an outpatient  His hemoglobin was found to be 6 2 and was sent to the emergency room  · On the day of admission hemoglobin in the emergency room was 7 1 and received 2 units of packed RBCs  · HGB stable since 11 0 last two days       Hyperlipidemia  Assessment & Plan  · Hold atorvastatin while NPO    Essential hypertension  Assessment & Plan  · Continue metoprolol iv while NPO      Coronary artery disease  Assessment & Plan  · Patient with known history of coronary artery disease status post CABG in 1997 in [facility]  · Patient is on beta-blocker aspirin and statin      Chronic kidney disease, stage 3 (HCC)  Assessment & Plan  · Baseline creatinine appears to be between 1 4 and 1 6  Questionable new higher baseline   Avoid hypotension and nephrotoxic agents  Gentle hydration for now  Acute on chronic diastolic congestive heart failure (HCC)  Assessment & Plan  Wt Readings from Last 3 Encounters:   20 101 kg (222 lb 14 2 oz)     · Patient with known history of chronic congestive heart failure and was admitted to [facility] in January  · Patient is currently mildly fluid overloaded at this time  Lasix currently on hold as per Nephrology due to worsening renal function  Continue closely monitor for now  · Ejection fraction 65% during the last echo                 VTE Pharmacologic Prophylaxis:   Pharmacologic: Heparin  Mechanical VTE Prophylaxis in Place: Yes    Patient Centered Rounds: I have performed bedside rounds with nursing staff today  Discussions with Specialists or Other Care Team Provider:  None    Education and Discussions with Family / Patient:  Discussed with patient at bedside and will update the family    Time Spent for Care: 45 minutes  More than 50% of total time spent on counseling and coordination of care as described above  Current Length of Stay: 18 day(s)    Current Patient Status: Inpatient   Certification Statement: The patient will continue to require additional inpatient hospital stay due to Duodenal obstruction and hyperglycemia    Discharge Plan:  Pending progress  Continue NG suction    Code Status: Level 1 - Full Code      Subjective:   Patient denies any chest pain or shortness of breath or abdominal pain at this time  He states he just feels uncomfortable  He did have a bowel movement last night  Currently has an NG tube in which is fortunately not bothering him      Objective:     Vitals:   Temp (24hrs), Av 4 °F (36 9 °C), Min:98 °F (36 7 °C), Max:98 6 °F (37 °C)    Temp:  [98 °F (36 7 °C)-98 6 °F (37 °C)] 98 6 °F (37 °C)  HR:  [74-95] 88  Resp:  [15-18] 18  BP: (114-121)/(56-68) 114/56  SpO2:  [87 %-92 %] 92 %  Body mass index is 37 67 kg/m²  Input and Output Summary (last 24 hours): Intake/Output Summary (Last 24 hours) at 7/4/2020 1104  Last data filed at 7/4/2020 1021  Gross per 24 hour   Intake 2088 34 ml   Output 4432 ml   Net -2343 66 ml       Physical Exam:     Physical Exam   Constitutional: He is oriented to person, place, and time  He appears well-developed and well-nourished  HENT:   Head: Normocephalic and atraumatic  Right Ear: External ear normal    Left Ear: External ear normal    Mouth/Throat: Oropharynx is clear and moist    NG tube present   Eyes: Pupils are equal, round, and reactive to light  Conjunctivae and EOM are normal    Neck: Normal range of motion  Neck supple  Cardiovascular: Normal rate, regular rhythm, normal heart sounds and intact distal pulses  Pulmonary/Chest: Effort normal    Moderate air entry bilaterally with faint bibasilar crackles noted   Abdominal: Soft  He exhibits no mass  There is no tenderness  There is no rebound and no guarding  Hypoactive bowel sounds   Genitourinary:   Genitourinary Comments: deferred   Musculoskeletal: Normal range of motion  Neurological: He is alert and oriented to person, place, and time  He has normal reflexes  Cranial nerves 2-12 are normal   Normal neurological exam   Skin: Skin is warm and dry  No rash noted  Psychiatric: He has a normal mood and affect  Nursing note and vitals reviewed          Additional Data:     Labs:    Results from last 7 days   Lab Units 07/03/20  0523   WBC Thousand/uL 3 70*   HEMOGLOBIN g/dL 9 5*   HEMATOCRIT % 32 0*   PLATELETS Thousands/uL 222   NEUTROS PCT % 75   LYMPHS PCT % 15   MONOS PCT % 7   EOS PCT % 1     Results from last 7 days   Lab Units 07/04/20  0510   SODIUM mmol/L 142   POTASSIUM mmol/L 4 2   CHLORIDE mmol/L 105   CO2 mmol/L 29   BUN mg/dL 38*   CREATININE mg/dL 2 30*   ANION GAP mmol/L 8   CALCIUM mg/dL 7 5*   ALBUMIN g/dL 1 9*   TOTAL BILIRUBIN mg/dL 0 53   ALK PHOS U/L 59   ALT U/L 12 AST U/L 10   GLUCOSE RANDOM mg/dL 510*     Results from last 7 days   Lab Units 07/03/20  0958   INR  1 22*     Results from last 7 days   Lab Units 07/04/20  1023 07/04/20  0605 07/03/20  2052 07/03/20  1526 07/03/20  1058 07/03/20  0838 07/03/20  0705 07/02/20  2135 07/02/20  1516 07/02/20  1122 07/02/20  0725 07/02/20  0545   POC GLUCOSE mg/dl >500* >500* 317* 295* 289* 348* 322* 209* 95 103 170* 138                   * I Have Reviewed All Lab Data Listed Above  * Additional Pertinent Lab Tests Reviewed: Petey Rowan Admission Reviewed    Imaging:    Imaging Reports Reviewed Today Include:  CT abdomen pelvis and chest x-ray  Imaging Personally Reviewed by Myself Includes:  Chest x-ray and CT abdomen pelvis    Recent Cultures (last 7 days):           Last 24 Hours Medication List:     Current Facility-Administered Medications:  Adult TPN (STANDARD BASE/STANDARD ELECTROLYTE)  Intravenous Continuous Donnie Bruno MD   heparin (porcine) 5,000 Units Subcutaneous Q8H [address] Donnie Bruno MD   insulin glargine 20 Units Subcutaneous QAM Martín Patino MD   insulin lispro 2-12 Units Subcutaneous Q6H Donnie Bruno MD   insulin lispro 5 Units Subcutaneous Once Donnie Bruno MD   metoprolol 2 5 mg Intravenous Q6H Martín Patino MD   multi-electrolyte 50 mL/hr Intravenous Continuous Donnie Bruno MD   ondansetron 4 mg Intravenous Once Anusha S Rachel, CRNP   ondansetron 4 mg Intravenous Q6H PRN Martín Patino MD   pantoprazole 40 mg Intravenous Q12H [address] Gissell Springer, DO   phenol 1 spray Mouth/Throat Q2H PRN Merryl Ee, DO   saliva substitute 5 spray Mouth/Throat 4x Daily PRN Geno Babb PA-C        Today, Patient Was Seen By: Donnie Bruno MD    ** Please Note: Dictation voice to text software may have been used in the creation of this document   **

## 2020-07-04 NOTE — CASE MANAGEMENT
Chart reviewed- medically not ready for dc  NGT/TPN  IV medications  CM will need to reschedule PCP appointment and Oncology Appointment on Monday  Will continue to follow  Current dc plan is [facility]

## 2020-07-04 NOTE — ASSESSMENT & PLAN NOTE
· Patient with known history of coronary artery disease status post CABG in 1997 in [facility]  · Patient is on beta-blocker aspirin and statin

## 2020-07-04 NOTE — ASSESSMENT & PLAN NOTE
Lab Results   Component Value Date    HGBA1C 8 2 (H) 06/18/2020       Recent Labs     07/03/20  1526 07/03/20 2052 07/04/20  0605 07/04/20  1023   POCGLU 295* 317* >500* >500*       Blood Sugar Average: Last 72 hrs:  (P) 210 [id number] type 2 diabetes mellitus on oral hypoglycemic agent  · Hold metformin and glipizide in the hospital  · Insulin sliding scale,  · Patient blood sugar is over 500 today secondary to being on concentrated dextrose TPN  Discontinue the TPN bag  Will place on isolate for now  Restart standard TPN in the evening  Continue Lantus and insulin sliding scale as ordered  No evidence of DKA at this time however blood sugar is quite elevated    Will be doing Accu-Cheks q 2 hours until blood sugars decrease to acceptable range of under 300

## 2020-07-04 NOTE — ASSESSMENT & PLAN NOTE
Wt Readings from Last 3 Encounters:   07/03/20 101 kg (222 lb 14 2 oz)     · Patient with known history of chronic congestive heart failure and was admitted to [facility] in January  · Patient is currently mildly fluid overloaded at this time  Lasix currently on hold as per Nephrology due to worsening renal function  Continue closely monitor for now    · Ejection fraction 65% during the last echo 1/20

## 2020-07-04 NOTE — NURSING NOTE
Dr Whitaker Pat aware of trouble retrieving NG tube content  No trouble flushing  Patient connected to low continuous wall suction  No new orders at this time

## 2020-07-04 NOTE — ASSESSMENT & PLAN NOTE
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

## 2020-07-05 PROBLEM — I48.0 PAROXYSMAL ATRIAL FIBRILLATION (HCC): Status: ACTIVE | Noted: 2020-01-01

## 2020-07-05 NOTE — NURSING NOTE
Red and purple lumens assessed for blood return and flush freely s/p Cathflo instillation    Dr Colten Merlos notified

## 2020-07-05 NOTE — PROGRESS NOTES
Progress Note - Alvealanis Emile 1935, 80 y o  male MRN: [id number]    Unit/Bed#: -01 Encounter: [id number]    Primary Care Provider: Jerome Rowland DO   Date and time admitted to hospital: 6/16/2020 11:14 AM        * Duodenal obstruction  Assessment & Plan  · On EGD 6/17/2020, there was concern for possible bowel obstruction vs severe gastroparesis, given large amount of food and dark bilious fluid in stomach which could not be cleared  · Status post OR started-Exploratory laparotomy with right hemicolectomy postop day 16  · CT abdomen pelvis repeated and showed 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  NG-tube placed with suction  NPO  TPN started  Appreciate Nutrition consult  Follow surgery team recommendation  Patient did have a bowel movement overnight  Continues to have high NG outputs of almost 500 cc per shift  Further as per surgery  Continue to adjust TPN  Diabetes mellitus type 2 with complications [facility])  Assessment & Plan  Lab Results   Component Value Date    HGBA1C 8 2 (H) 06/18/2020       Recent Labs     07/04/20 2027 07/04/20  2354 07/05/20  0535 07/05/20  0905   POCGLU 155* 238* 280* 304*       Blood Sugar Average: Last 72 hrs:  (P) 702 [id number] type 2 diabetes mellitus on oral hypoglycemic agent  · Hold metformin and glipizide in the hospital  · Insulin sliding scale,  Patient's blood sugar was very uncontrolled yesterday  TPN was changed to standard TPN today  Blood sugars still high between 250-304  Will repeat BMP as the blood sugar on that does not correlate with the Accu-Chek  Place on Lantus 25 units daily in the morning and insulin sliding scale algorithm 4 and Humalog 5 units every 6 hours  Try to keep blood sugar less than 200    Will adjust TPN further today    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[facility])  Assessment & Plan  ·  s/p exploratory laparotomy with right hemicolectomy for 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  · Patient did not want oncology follow up  Will need to discuss it again upon discharge        Acute renal failure (ARF) ([facility] [address] )  Assessment & Plan  · Most likely secondary to nonoliguric ATN   Patient's baseline creatinine is 1 5-1 6  creatinine was up to 2 3  Now is back down to 1 8  · Diuretic as per nephrology  · Patient currently NPO  · Patient is currently on standard TPN however his blood sugars are uncontrolled  Colon  neoplasm  Assessment & Plan  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  Now postop day 16 right hemicolectomy  Discussed with the patient to arrange oncology follow-up as an outpatient:  Patient agreeable to proceed with oncology as an outpatient  Full code  Duodenal diverticulum  Assessment & Plan  CT showing transition point beyond the duodenal diverticulum  Definitive management as per surgery team    Paroxysmal atrial fibrillation ([facility] )  Assessment & Plan  · Patient NPO  · Lopressor 2 5 mg IV q 6 hours  · Eliquis on hold, will place on heparin for DVT prophylaxis for now    Symptomatic anemia  Assessment & Plan  · Patient was evaluated by PCP secondary to dizziness and fatigue and has blood pressure done as an outpatient  His hemoglobin was found to be 6 2 and was sent to the emergency room  · On the day of admission hemoglobin in the emergency room was 7 1 and received 2 units of packed RBCs  · HGB stable 10-11 for now      Morbid obesity ([facility] [address] )  Assessment & Plan  · TLC as an outpatient    Hyperlipidemia  Assessment & Plan  · Hold atorvastatin while NPO    Essential hypertension  Assessment & Plan  · Continue metoprolol iv while NPO      Coronary artery disease  Assessment & Plan  · Patient with known history of coronary artery disease status post CABG in 1997 in [facility]  · Patient is on beta-blocker aspirin and statin      Chronic kidney disease, stage 3 ([address] )  Assessment & Plan  · Baseline creatinine appears to be between 1 4 and 1 6  Questionable new higher baseline   Avoid hypotension and nephrotoxic agents  Gentle hydration for now with tpn since npo    Acute on chronic diastolic congestive heart failure (HCC)  Assessment & Plan  Wt Readings from Last 3 Encounters:   20 101 kg (222 lb 14 2 oz)     · Patient with known history of chronic congestive heart failure and was admitted to [facility] in January  · Patient is currently mildly fluid overloaded at this time  Lasix currently on hold as per Nephrology due to worsening renal function  Continue closely monitor for now  · Ejection fraction 65% during the last echo               Acute encephalopathy:  Patient is a little confused today  Will correct electrolytes and blood sugars and reassess again  VTE Pharmacologic Prophylaxis:   Pharmacologic: Heparin  Mechanical VTE Prophylaxis in Place: Yes    Patient Centered Rounds: I have performed bedside rounds with nursing staff today  Discussions with Specialists or Other Care Team Provider:  Discussed with surgery    Education and Discussions with Family / Patient:  Discussed with patient at bedside    Time Spent for Care: 45 minutes  More than 50% of total time spent on counseling and coordination of care as described above  Current Length of Stay: 19 day(s)    Current Patient Status: Inpatient   Certification Statement: The patient will continue to require additional inpatient hospital stay due to  bowel obstruction    Discharge Plan:  Pending progress    Code Status: Level 1 - Full Code      Subjective:   Patient currently is a little confused  Denies any chest pain or shortness of breath or abdominal pain  Has NG tube in place    Reports had a bowel movement this morning    Objective:     Vitals:   Temp (24hrs), Av °F (36 7 °C), Min:97 3 °F (36 3 °C), Max:98 5 °F (36 9 °C)    Temp:  [97 3 °F (36 3 °C)-98 5 °F (36 9 °C)] 97 3 °F (36 3 °C)  HR:  [69-87] 85  Resp:  [17-20] 18  BP: (110-132)/(54-68) 117/63  SpO2: [92 %-95 %] 94 %  Body mass index is 37 67 kg/m²  Input and Output Summary (last 24 hours): Intake/Output Summary (Last 24 hours) at 7/5/2020 1020  Last data filed at 7/5/2020 0901  Gross per 24 hour   Intake 170 ml   Output 2012 ml   Net -1842 ml       Physical Exam:     Physical Exam   Constitutional: He appears well-developed and well-nourished  HENT:   Head: Normocephalic and atraumatic  Right Ear: External ear normal    Left Ear: External ear normal    Mouth/Throat: Oropharynx is clear and moist    Eyes: Pupils are equal, round, and reactive to light  Conjunctivae and EOM are normal    Neck: Normal range of motion  Neck supple  Cardiovascular: Normal rate, regular rhythm and normal heart sounds  Pulmonary/Chest: Effort normal    Moderate air entry bilaterally with mild decreased breath sounds bilateral bases   Abdominal: Soft  He exhibits distension  He exhibits no mass  There is no tenderness  There is no rebound and no guarding  Hypoactive bowel sounds   Genitourinary:   Genitourinary Comments: deferred   Musculoskeletal: Normal range of motion  Neurological: He is alert  He has normal reflexes  Cranial nerves 2-12 are normal   Oriented to person only   Skin: Skin is warm and dry  No rash noted  Psychiatric:   Irritable   Nursing note and vitals reviewed          Additional Data:     Labs:    Results from last 7 days   Lab Units 07/05/20  0539 07/03/20  0523   WBC Thousand/uL 4 23* 3 70*   HEMOGLOBIN g/dL 10 0* 9 5*   HEMATOCRIT % 34 0* 32 0*   PLATELETS Thousands/uL 215 222   NEUTROS PCT %  --  75   LYMPHS PCT %  --  15   MONOS PCT %  --  7   EOS PCT %  --  1     Results from last 7 days   Lab Units 07/05/20  0539  07/04/20  0510   SODIUM mmol/L 142   < > 142   POTASSIUM mmol/L 5 4*   < > 4 2   CHLORIDE mmol/L 106   < > 105   CO2 mmol/L 31   < > 29   BUN mg/dL 33*   < > 38*   CREATININE mg/dL 1 88*   < > 2 30*   ANION GAP mmol/L 5   < > 8   CALCIUM mg/dL 7 4*   < > 7 5*   ALBUMIN g/dL  --   --  1 9*   TOTAL BILIRUBIN mg/dL  --   --  0 53   ALK PHOS U/L  --   --  59   ALT U/L  --   --  12   AST U/L  --   --  10   GLUCOSE RANDOM mg/dL 669*   < > 510*    < > = values in this interval not displayed  Results from last 7 days   Lab Units 07/03/20  0958   INR  1 22*     Results from last 7 days   Lab Units 07/05/20  0905 07/05/20  0535 07/04/20  2354 07/04/20  2027 07/04/20  1758 07/04/20  1554 07/04/20  1424 07/04/20  1357 07/04/20  1203 07/04/20  1023 07/04/20  0605 07/03/20  2052   POC GLUCOSE mg/dl 304* 280* 238* 155* 327* 245* 383* 28* 384* >500* >500* 317*                   * I Have Reviewed All Lab Data Listed Above  * Additional Pertinent Lab Tests Reviewed:  Petey 66 Admission Reviewed    Imaging:    Imaging Reports Reviewed Today Include:  None  Imaging Personally Reviewed by Myself Includes:  None    Recent Cultures (last 7 days):           Last 24 Hours Medication List:     Current Facility-Administered Medications:  Adult TPN (STANDARD BASE/STANDARD ELECTROLYTE)  Intravenous Continuous Cj Tomas MD Last Rate: 42 mL/hr at 07/04/20 2102   heparin (porcine) 5,000 Units Subcutaneous [facility] Cj Tomas MD    insulin glargine 25 Units Subcutaneous QAM Cj Tomas MD    insulin lispro 2-12 Units Subcutaneous Q6H Cj Tomas MD    insulin lispro 5 Units Subcutaneous Q6H Cj Tomas MD    metoprolol 2 5 mg Intravenous Q6H Janette Dickinson MD    multi-electrolyte 75 mL/hr Intravenous Continuous Cj Tomas MD Last Rate: 75 mL/hr (07/04/20 1621)   ondansetron 4 mg Intravenous Once BRANDON Veloz    ondansetron 4 mg Intravenous Q6H PRN Janette Dickinson MD    pantoprazole 40 mg Intravenous Q12H [facility] Gissell Springer, DO    phenol 1 spray Mouth/Throat Q2H PRN Reford Shave, DO    saliva substitute 5 spray Mouth/Throat 4x Daily PRN Gallito Evans, PA-C         Today, Patient Was Seen By: Cj Tomas MD    ** Please Note: Dictation voice to text software may have been used in the creation of this document   **

## 2020-07-05 NOTE — ASSESSMENT & PLAN NOTE
· Patient was evaluated by PCP secondary to dizziness and fatigue and has blood pressure done as an outpatient    His hemoglobin was found to be 6 2 and was sent to the emergency room  · On the day of admission hemoglobin in the emergency room was 7 1 and received 2 units of packed RBCs  · HGB stable 10-11 for now

## 2020-07-05 NOTE — PROGRESS NOTES
Progress Note - General Surgery   Sheridan Larsen 80 y o  male MRN: [id number]  Unit/Bed#: -[facility] Encounter: [id number]    Assessment:  Status post right hemicolectomy  Duodenal diverticulum  Hyperkalemia :  Will adjust TPN as per primary service  Renal insufficiency    Plan:  Since the patient is having bowel movements and is having decreased NG output we will try to clamp the NG to today and check residuals and then proceed from there regarding further treatment plan  Subjective/Objective     Subjective: The patient is seen in states he is having bowel movements and denies any nausea  No new complaints  He denies any leg pain  Objective:    Blood pressure 117/63, pulse 85, temperature (!) 97 3 °F (36 3 °C), resp  rate 18, height 5' 4 5" (1 638 m), weight 101 kg (222 lb 14 2 oz), SpO2 94 %  ,Body mass index is 37 67 kg/m²  Intake/Output Summary (Last 24 hours) at 7/5/2020 5049  Last data filed at 7/5/2020 0600  Gross per 24 hour   Intake 140 ml   Output 2312 ml   Net -2172 ml       Invasive Devices     Peripherally Inserted Central Catheter Line            PICC Line 07/02/20 2 days          Drain            NG/OG/Enteral Tube Nasogastric 18 Fr Right nares 2 days                Physical Exam:  Patient is awake alert and oriented x3 in no acute distress  Abdomen soft and nontender with wound intact with no erythema drainage or fluctuance      Lab, Imaging and other studies:  CBC:   Lab Results   Component Value Date    WBC 4 23 (L) 07/05/2020    HGB 10 0 (L) 07/05/2020    HCT 34 0 (L) 07/05/2020    MCV 77 (L) 07/05/2020     07/05/2020    MCH 22 7 (L) 07/05/2020    MCHC 29 4 (L) 07/05/2020    RDW 23 8 (H) 07/05/2020    MPV 9 5 07/05/2020   , CMP:   Lab Results   Component Value Date    SODIUM 142 07/05/2020    K 5 4 (H) 07/05/2020     07/05/2020    CO2 31 07/05/2020    BUN 33 (H) 07/05/2020    CREATININE 1 88 (H) 07/05/2020    CALCIUM 7 4 (L) 07/05/2020    EGFR 32 07/05/2020     VTE Pharmacologic Prophylaxis: Sequential compression device (Venodyne)   VTE Mechanical Prophylaxis: sequential compression device

## 2020-07-05 NOTE — PHYSICIAN ADVISOR
Current patient class: Inpatient  The patient is currently on Hospital Day: 20      The patient was admitted to the hospital at 12:29 PM on 6/16/20 for the following diagnosis:  Dizziness [R42]  Symptomatic anemia [D64 9]  Gastrointestinal hemorrhage, unspecified gastrointestinal hemorrhage type [K92 2]     CMS OUTLIER STAY REVIEW    After review of the relevant documentation, labs, vital signs and test results, the patient is appropriate for CONTINUED INPATIENT ADMISSION  The patient continues to remain hospitalized receiving acute medical care  The patient has surpassed the expected duration of stay, however given the clinical condition, need for further acute care management, the patient is appropriate to remain in an inpatient status  The patient still being actively managed, and does have unresolved medical issues requiring further hospitalization  This review is conducted at 10 day intervals, to help satisfy the requirements for significant outlier stay review as per CMS  Given the current condition of this patient, the patient satisfies this review was determination for continued inpatient stay  Rationale is as follows: The patient is a 80 yrs old Male who presented to the ED at 6/16/2020 11:14 AM with a chief complaint of Weakness - Generalized (Pt was sent here by PCP due to low hgb, pt reports dizziness/weakness)     The patient's diuretics have been held as the patient is NPO with large volume nasogastric tube output  The patient is also hypernatremic with a sodium of 149 and the patient is the patient is to be started on IV hypotonic fluids as per nephrology  The patient has been started on TPN  The patient is appropriate for INPATIENT admission as the patient is receiving hospital level services       The patients vitals on arrival were ED Triage Vitals [06/16/20 1117]   Temperature Pulse Respirations Blood Pressure SpO2   98 4 °F (36 9 °C) 84 22 151/67 95 %      Temp Source Heart Rate Source Patient Position - Orthostatic VS BP Location FiO2 (%)   Oral Monitor Lying Right arm --      Pain Score       2           Past Medical History:   Diagnosis Date    [facility])     Cardiac disease     [facility]est[facility] [address] [facility]     [facility]     MI, old     [facility] infarction [facility] )      Past Surgical History:   Procedure Laterality Date    CARDIAC SURGERY      MS PART REMOVAL COLON W ANASTOMOSIS N/A 6/20/2020    Procedure: exploratory laparatomy with right hemicolectomy;  Surgeon: Vitor Gao DO;  Location:  MAIN OR;  Service: General           Consults have been placed to:   IP CONSULT TO GASTROENTEROLOGY  IP CONSULT TO ACUTE CARE SURGERY  IP CONSULT TO NEPHROLOGY  IP CONSULT TO WOUND CARE  IP CONSULT TO NUTRITION SERVICES    Vitals:    07/05/20 1256 07/05/20 1257 07/05/20 1303 07/05/20 1405   BP: 115/73 116/74 92/58 122/69   Pulse: 97 (!) 111 103 (!) 112   Resp:       Temp:       TempSrc:       SpO2:  91% (!) 86% 93%   Weight:       Height:           Most recent labs:    Recent Labs     07/03/20  0958 07/04/20  0510  07/05/20  0539  07/05/20  1251   WBC  --   --   --  4 23*  --   --    HGB  --   --   --  10 0*  --   --    HCT  --   --   --  34 0*  --   --    PLT  --   --   --  215  --   --    K  --  4 2   < > 5 4*   < > 4 7   CALCIUM  --  7 5*   < > 7 4*   < > 8 4   BUN  --  38*   < > 33*   < > 35*   CREATININE  --  2 30*   < > 1 88*   < > 1 98*   INR 1 22*  --   --   --   --   --    AST  --  10  --   --   --   --    ALT  --  12  --   --   --   --    ALKPHOS  --  59  --   --   --   --     < > = values in this interval not displayed         Scheduled Meds:  Current Facility-Administered Medications:  Adult TPN (CUSTOM BASE/CUSTOM ELECTROLYTE)  Intravenous Continuous Donnie Bruno MD    Adult TPN (STANDARD BASE/STANDARD ELECTROLYTE)  Intravenous Continuous Donnie Bruno MD Last Rate: Stopped (07/05/20 1100)   heparin (porcine) 5,000 Units Subcutaneous [address] MD Arturo    insulin glargine 30 Units Subcutaneous Q12H [address] Zane Snyder MD    insulin lispro 10 Units Subcutaneous Q6H Zane Snyder MD    insulin lispro 2-12 Units Subcutaneous Q6H Zane Snyder MD    metoprolol 2 5 mg Intravenous Q6H Stacie Gao MD    morphine injection 1 mg Intravenous Q4H PRN Zane Snyder MD    ondansetron 4 mg Intravenous Once Anusha S Rachel, CRNP    ondansetron 4 mg Intravenous Q6H PRN Stacie Gao MD    pantoprazole 40 mg Intravenous Q12H [address] Anuja Sites, DO    phenol 1 spray Mouth/Throat Q2H PRN Anuja Sites, DO    saliva substitute 5 spray Mouth/Throat 4x Daily PRN Suzette Avila PA-C    sodium chloride 60 mL/hr Intravenous Continuous Ramon Kingdom, CRNP      Continuous Infusions:  Adult TPN (CUSTOM BASE/CUSTOM ELECTROLYTE)     Adult TPN (STANDARD BASE/STANDARD ELECTROLYTE)  Last Rate: Stopped (07/05/20 1100)   sodium chloride 60 mL/hr      PRN Meds: morphine injection    ondansetron    phenol    saliva substitute    Surgical procedures (if appropriate):  Procedure(s):  exploratory laparatomy with right hemicolectomy

## 2020-07-05 NOTE — PROGRESS NOTES
And had erroneously lab result secondary to encourage lab draw from PICC line  Repeat labs show normal potassium however still has elevated blood sugars  Will place on Lantus 30 units twice daily and placed on 10 units of Humalog every 6 hours along with an insulin sliding scale    Monitor blood sugars closely and try to get blood sugars less than 200

## 2020-07-05 NOTE — PROGRESS NOTES
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  Renal consultation was requested for MARLENI which initially improved with aggressive IV diuresis  Developed N/V 7/2- duodenal diverticulum noted on repeat imaging and NGT placed now on TPN  Has been having large volume output from NGT which is slowly improving  1  Acute Kidney Injury atop CKD 3  · Renal function remains stable around 2 0 mg/dL  He was previously volume overloaded requiring lasix but lasix has been stopped as he is NPO with large volume NGT output and weight loss noted  · Unremarkable kidneys on ultrasound  · Remains nonoliguric with 1 2 liters urine output yesterday  · Continue to avoid nephrotoxins and wide variation in hemodynamics   2  Stage 3 Chronic Kidney Disease with baseline creatinine around 1 3-1 6 mg/dL  3  Hypernatremia  · Sodium corrected for hyperglycemia 149 mmol/L  He is NPO and unable to obtain free water  Will avoid hypotonic solution with dextrose as his blood sugars are uncontrolled  Will start on gentle 0 45% normal saline  4  Chronic CHF  · Lasix on hold  Examines dry  5  Obstructing Adenocarcinoma of the Cecum  5  Duodenal Diverticulum  · Gastric decompression via NGT  Receiving TPN  6  Uncontrolled Diabetes Mellitus 2  · Insulin regimen increased  7  Metabolic Alkalosis  · Due to gastric losses of acid  8  Hyperphosphatemia  · Feel this is inaccurate due to specimen being contaminated with TPN  Will add Phos level to new lab which was drawn around 1300  Monitor closely for hypophosphatemia     ROS  Seen and examined lying in bed  States he feels very dry  A complete 10 point review of systems have been performed and are otherwise negative  Historical Information   Past Medical History:   Diagnosis Date    [facility]fib [facility])     Cardiac disease     [facility] [address] [facility]     Diabetes mellitus [facility]     MI, old     Myocardial infarction ([facility] )      Past Surgical History:   Procedure Laterality Date    CARDIAC SURGERY      RI PART REMOVAL COLON W ANASTOMOSIS N/A 6/20/2020    Procedure: exploratory laparatomy with right hemicolectomy;  Surgeon: Ondina Duque DO;  Location:  MAIN OR;  Service: General     Social History   Social History     Substance and Sexual Activity   Alcohol Use Not Currently     Social History     Substance and Sexual Activity   Drug Use Not Currently     Social History     Tobacco Use   Smoking Status Never Smoker   Smokeless Tobacco Never Used       Family History:   Family History   Problem Relation Age of Onset    Diabetes Mother        Medications:  Pertinent medications were reviewed    Current Facility-Administered Medications:  Adult TPN (CUSTOM BASE/CUSTOM ELECTROLYTE)  Intravenous Continuous Stephanie Musa MD    Adult TPN (STANDARD BASE/STANDARD ELECTROLYTE)  Intravenous Continuous Stephanie Musa MD Last Rate: 42 mL/hr at 07/04/20 2102   heparin (porcine) 5,000 Units Subcutaneous [facility] Stephanie Musa MD    insulin glargine 30 Units Subcutaneous Q12H [address] Stephanie Musa MD    insulin lispro 10 Units Subcutaneous Q6H Stephanie Musa MD    insulin lispro 2-12 Units Subcutaneous Q6H Stephanie Musa MD    metoprolol 2 5 mg Intravenous Q6H Diya Luke MD    morphine injection 1 mg Intravenous Q4H PRN Stephanie Musa MD    multi-electrolyte 75 mL/hr Intravenous Continuous Stephanie Musa MD Last Rate: 75 mL/hr (07/04/20 1621)   ondansetron 4 mg Intravenous Once Anusah S BRANDON Ramos    ondansetron 4 mg Intravenous Q6H PRN Diya Luke MD    pantoprazole 40 mg Intravenous Q12H [address] Gissell Springer DO    phenol 1 spray Mouth/Throat Q2H PRN Ondina Duque DO    saliva substitute 5 spray Mouth/Throat 4x Daily PRN Elna Fothergill, PA-C          No Known Allergies      Vitals:   /69   Pulse (!) 112   Temp (!) 97 3 °F (36 3 °C)   Resp 18   Ht 5' 4 5" (1 638 m)   Wt 101 kg (222 lb 14 2 oz)   SpO2 93%   BMI 37 67 kg/m²   Body mass index is 37 67 kg/m²  SpO2: 93 %,   SpO2 Activity: At Rest,   O2 Device: None (Room air)      Intake/Output Summary (Last 24 hours) at 7/5/2020 1425  Last data filed at 7/5/2020 0901  Gross per 24 hour   Intake 50 ml   Output 1050 ml   Net -1000 ml     Invasive Devices     Peripherally Inserted Central Catheter Line            PICC Line 07/02/20 2 days          Drain            NG/OG/Enteral Tube Nasogastric 18 Fr Right nares 3 days                Physical Exam  General: conscious, cooperative, in no acute distress chronically ill appearing   Eyes: conjunctivae pink, anicteric sclerae  ENT: lips and mucous membranes moist, NGT  Neck: supple, no JVD, no masses  Chest: diminished breath sounds bilateral, no crackles, ronchus or wheezings  CVS: S1 & S2, tacyhcardic rate, regular rhythm  Abdomen: soft, non-tender, non-distended, normoactive bowel sounds  Extremities: no edema of both legs  Skin: no rash  Neuro: awake, alert, oriented      Diagnostic Data:  Lab: I have personally reviewed pertinent lab results  ,   CBC:  Results from last 7 days   Lab Units 07/05/20  0539   WBC Thousand/uL 4 23*   HEMOGLOBIN g/dL 10 0*   HEMATOCRIT % 34 0*   PLATELETS Thousands/uL 215      CMP: Lab Results   Component Value Date    SODIUM 144 07/05/2020    K 4 7 07/05/2020     07/05/2020    CO2 33 (H) 07/05/2020    BUN 35 (H) 07/05/2020    CREATININE 1 98 (H) 07/05/2020    CALCIUM 8 4 07/05/2020    EGFR 30 07/05/2020   ,   PT/INR: No results found for: PT, INR,   Magnesium: No components found for: MAG,  Phosphorous:   Lab Results   Component Value Date    PHOS 5 7 (H) 07/05/2020       Microbiology:  @LABRCNTIP,(urinecx:7)@        BRANDON Espino    Portions of the record may have been created with voice recognition software  Occasional wrong word or "sound a like" substitutions may have occurred due to the inherent limitations of voice recognition software  Read the chart carefully and recognize, using context, where substitutions have occurred

## 2020-07-05 NOTE — ASSESSMENT & PLAN NOTE
· On EGD 6/17/2020, there was concern for possible bowel obstruction vs severe gastroparesis, given large amount of food and dark bilious fluid in stomach which could not be cleared  · Status post OR started-Exploratory laparotomy with right hemicolectomy postop day 16  · CT abdomen pelvis repeated and showed 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  NG-tube placed with suction  NPO  TPN started  Appreciate Nutrition consult  Follow surgery team recommendation  Patient did have a bowel movement overnight  Continues to have high NG outputs of almost 500 cc per shift  Further as per surgery  Continue to adjust TPN

## 2020-07-05 NOTE — ASSESSMENT & PLAN NOTE
· Most likely secondary to nonoliguric ATN   Patient's baseline creatinine is 1 5-1 6  creatinine was up to 2 3  Now is back down to 1 8  · Diuretic as per nephrology  · Patient currently NPO  · Patient is currently on standard TPN however his blood sugars are uncontrolled

## 2020-07-05 NOTE — ASSESSMENT & PLAN NOTE
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  Now postop day 16 right hemicolectomy  Discussed with the patient to arrange oncology follow-up as an outpatient:  Patient agreeable to proceed with oncology as an outpatient  Full code

## 2020-07-05 NOTE — PLAN OF CARE
Problem: Potential for Falls  Goal: Patient will remain free of falls  Description  INTERVENTIONS:  - Assess patient frequently for physical needs  -  Identify cognitive and physical deficits and behaviors that affect risk of falls    -  [place] fall precautions as indicated by assessment   - Educate patient/family on patient safety including physical limitations  - Instruct patient to call for assistance with activity based on assessment  - Modify environment to reduce risk of injury  - Consider OT/PT consult to assist with strengthening/mobility  Outcome: Progressing     Problem: CARDIOVASCULAR - ADULT  Goal: Maintains optimal cardiac output and hemodynamic stability  Description  INTERVENTIONS:  - Monitor I/O, vital signs and rhythm  - Monitor for S/S and trends of decreased cardiac output  - Administer and titrate ordered vasoactive medications to optimize hemodynamic stability  - Assess quality of pulses, skin color and temperature  - Assess for signs of decreased coronary artery perfusion  - Instruct patient to report change in severity of symptoms  Outcome: Progressing  Goal: Absence of cardiac dysrhythmias or at baseline rhythm  Description  INTERVENTIONS:  - Continuous cardiac monitoring, vital signs, obtain 12 lead EKG if ordered  - Administer antiarrhythmic and heart rate control medications as ordered  - Monitor electrolytes and administer replacement therapy as ordered  Outcome: Progressing     Problem: METABOLIC, FLUID AND ELECTROLYTES - ADULT  Goal: Glucose maintained within target range  Description  INTERVENTIONS:  - Monitor Blood Glucose as ordered  - Assess for signs and symptoms of hyperglycemia and hypoglycemia  - Administer ordered medications to maintain glucose within target range  - Assess nutritional intake and initiate nutrition service referral as needed  Outcome: Progressing     Problem: SKIN/TISSUE INTEGRITY - ADULT  Goal: Skin integrity remains intact  Description  INTERVENTIONS  - Identify patients at risk for skin breakdown  - Assess and monitor skin integrity  - Assess and monitor nutrition and hydration status  - Monitor labs (i e  albumin)  - Assess for incontinence   - Turn and reposition patient  - Assist with mobility/ambulation  - Relieve pressure over bony prominences  - Avoid friction and shearing  - Provide appropriate hygiene as needed including keeping skin clean and dry  - Evaluate need for skin moisturizer/barrier cream  - Collaborate with interdisciplinary team (i e  Nutrition, Rehabilitation, etc )   - Patient/family teaching  Outcome: Progressing     Problem: HEMATOLOGIC - ADULT  Goal: Maintains hematologic stability  Description  INTERVENTIONS  - Assess for signs and symptoms of bleeding or hemorrhage  - Monitor labs  - Administer supportive blood products/factors as ordered and appropriate  Outcome: Progressing     Problem: MUSCULOSKELETAL - ADULT  Goal: Maintain or return mobility to safest level of function  Description  INTERVENTIONS:  - Assess patient's ability to carry out ADLs; assess patient's baseline for ADL function and identify physical deficits which impact ability to perform ADLs (bathing, care of mouth/teeth, toileting, grooming, dressing, etc )  - Assess/evaluate cause of self-care deficits   - Assess range of motion  - Assess patient's mobility/assist x 2 with roller walker  - Assess patient's need for assistive devices and provide as appropriate  - Encourage maximum independence but intervene and supervise when necessary  - Involve family in performance of ADLs  - Assess for home care needs following discharge   - Consider OT consult to assist with ADL evaluation and planning for discharge  - Provide patient education as appropriate   Outcome: Progressing     Problem: Prexisting or High Potential for Compromised Skin Integrity  Goal: Skin integrity is maintained or improved  Description  INTERVENTIONS:  - Identify patients at risk for skin breakdown  - Assess and monitor skin integrity  - Assess and monitor nutrition and hydration status  - Monitor labs   - Assess for incontinence   - Turn and reposition patient  - Assist with mobility/ambulation  - Relieve pressure over bony prominences  - Avoid friction and shearing  - Provide appropriate hygiene as needed including keeping skin clean and dry  - Evaluate need for skin moisturizer/barrier cream  - Collaborate with interdisciplinary team   - Patient/family teaching  - Consider wound care consult   Outcome: Progressing     Problem: Nutrition/Hydration-ADULT  Goal: Nutrient/Hydration intake appropriate for improving, restoring or maintaining nutritional needs  Description  Monitor and assess patient's nutrition/hydration status for malnutrition  Collaborate with interdisciplinary team and initiate plan and interventions as ordered  Monitor patient's weight and dietary intake as ordered or per policy  Utilize nutrition screening tool and intervene as necessary  Determine patient's food preferences and provide high-protein, high-caloric foods as appropriate       INTERVENTIONS:  - Monitor oral intake, urinary output, labs, and treatment plans  - Assess nutrition and hydration status and recommend course of action  - Evaluate amount of meals eaten  - Assist patient with eating if necessary   - Allow adequate time for meals  - Recommend/ encourage appropriate diets, oral nutritional supplements, and vitamin/mineral supplements  - Order, calculate, and assess calorie counts as needed  Offer small frequentmeals  - Assess need for intravenous fluids  - Provide specific nutrition/hydration education as appropriate  - Include patient/family/caregiver in decisions related to nutrition   Outcome: Progressing

## 2020-07-05 NOTE — ASSESSMENT & PLAN NOTE
· Patient with known history of coronary artery disease status post CABG in 1997 in [facility]  · Patient is on beta-blocker aspirin and statin

## 2020-07-05 NOTE — ASSESSMENT & PLAN NOTE
· Baseline creatinine appears to be between 1 4 and 1 6  Questionable new higher baseline   Avoid hypotension and nephrotoxic agents    Gentle hydration for now with tpn since npo

## 2020-07-05 NOTE — ASSESSMENT & PLAN NOTE
Lab Results   Component Value Date    HGBA1C 8 2 (H) 06/18/2020       Recent Labs     07/04/20 2027 07/04/20  2354 07/05/20  0535 07/05/20  0905   POCGLU 155* 238* 280* 304*       Blood Sugar Average: Last 72 hrs:  (P) 896 [id number] type 2 diabetes mellitus on oral hypoglycemic agent  · Hold metformin and glipizide in the hospital  · Insulin sliding scale,  Patient's blood sugar was very uncontrolled yesterday  TPN was changed to standard TPN today  Blood sugars still high between 250-304  Will repeat BMP as the blood sugar on that does not correlate with the Accu-Chek  Place on Lantus 25 units daily in the morning and insulin sliding scale algorithm 4 and Humalog 5 units every 6 hours  Try to keep blood sugar less than 200    Will adjust TPN further today

## 2020-07-05 NOTE — ASSESSMENT & PLAN NOTE
Wt Readings from Last 3 Encounters:   07/03/20 101 kg (222 lb 14 2 oz)     · Patient with known history of chronic congestive heart failure and was admitted to [facility] in January  · Patient is currently mildly fluid overloaded at this time  Lasix currently on hold as per Nephrology due to worsening renal function  Continue closely monitor for now    · Ejection fraction 65% during the last echo 1/20

## 2020-07-05 NOTE — PLAN OF CARE
Problem: Potential for Falls  Goal: Patient will remain free of falls  Description  INTERVENTIONS:  - Assess patient frequently for physical needs  -  Identify cognitive and physical deficits and behaviors that affect risk of falls    -  [place] fall precautions as indicated by assessment   - Educate patient/family on patient safety including physical limitations  - Instruct patient to call for assistance with activity based on assessment  - Modify environment to reduce risk of injury  - Consider OT/PT consult to assist with strengthening/mobility  Outcome: Progressing     Problem: CARDIOVASCULAR - ADULT  Goal: Maintains optimal cardiac output and hemodynamic stability  Description  INTERVENTIONS:  - Monitor I/O, vital signs and rhythm  - Monitor for S/S and trends of decreased cardiac output  - Administer and titrate ordered vasoactive medications to optimize hemodynamic stability  - Assess quality of pulses, skin color and temperature  - Assess for signs of decreased coronary artery perfusion  - Instruct patient to report change in severity of symptoms  Outcome: Progressing  Goal: Absence of cardiac dysrhythmias or at baseline rhythm  Description  INTERVENTIONS:  - Continuous cardiac monitoring, vital signs, obtain 12 lead EKG if ordered  - Administer antiarrhythmic and heart rate control medications as ordered  - Monitor electrolytes and administer replacement therapy as ordered  Outcome: Progressing     Problem: METABOLIC, FLUID AND ELECTROLYTES - ADULT  Goal: Glucose maintained within target range  Description  INTERVENTIONS:  - Monitor Blood Glucose as ordered  - Assess for signs and symptoms of hyperglycemia and hypoglycemia  - Administer ordered medications to maintain glucose within target range  - Assess nutritional intake and initiate nutrition service referral as needed  Outcome: Progressing     Problem: SKIN/TISSUE INTEGRITY - ADULT  Goal: Skin integrity remains intact  Description  INTERVENTIONS  - Identify patients at risk for skin breakdown  - Assess and monitor skin integrity  - Assess and monitor nutrition and hydration status  - Monitor labs (i e  albumin)  - Assess for incontinence   - Turn and reposition patient  - Assist with mobility/ambulation  - Relieve pressure over bony prominences  - Avoid friction and shearing  - Provide appropriate hygiene as needed including keeping skin clean and dry  - Evaluate need for skin moisturizer/barrier cream  - Collaborate with interdisciplinary team (i e  Nutrition, Rehabilitation, etc )   - Patient/family teaching  Outcome: Progressing     Problem: HEMATOLOGIC - ADULT  Goal: Maintains hematologic stability  Description  INTERVENTIONS  - Assess for signs and symptoms of bleeding or hemorrhage  - Monitor labs  - Administer supportive blood products/factors as ordered and appropriate  Outcome: Progressing     Problem: MUSCULOSKELETAL - ADULT  Goal: Maintain or return mobility to safest level of function  Description  INTERVENTIONS:  - Assess patient's ability to carry out ADLs; assess patient's baseline for ADL function and identify physical deficits which impact ability to perform ADLs (bathing, care of mouth/teeth, toileting, grooming, dressing, etc )  - Assess/evaluate cause of self-care deficits   - Assess range of motion  - Assess patient's mobility/assist x 2 with roller walker  - Assess patient's need for assistive devices and provide as appropriate  - Encourage maximum independence but intervene and supervise when necessary  - Involve family in performance of ADLs  - Assess for home care needs following discharge   - Consider OT consult to assist with ADL evaluation and planning for discharge  - Provide patient education as appropriate   Outcome: Progressing     Problem: Prexisting or High Potential for Compromised Skin Integrity  Goal: Skin integrity is maintained or improved  Description  INTERVENTIONS:  - Identify patients at risk for skin breakdown  - Assess and monitor skin integrity  - Assess and monitor nutrition and hydration status  - Monitor labs   - Assess for incontinence   - Turn and reposition patient  - Assist with mobility/ambulation  - Relieve pressure over bony prominences  - Avoid friction and shearing  - Provide appropriate hygiene as needed including keeping skin clean and dry  - Evaluate need for skin moisturizer/barrier cream  - Collaborate with interdisciplinary team   - Patient/family teaching  - Consider wound care consult   Outcome: Progressing     Problem: Nutrition/Hydration-ADULT  Goal: Nutrient/Hydration intake appropriate for improving, restoring or maintaining nutritional needs  Description  Monitor and assess patient's nutrition/hydration status for malnutrition  Collaborate with interdisciplinary team and initiate plan and interventions as ordered  Monitor patient's weight and dietary intake as ordered or per policy  Utilize nutrition screening tool and intervene as necessary  Determine patient's food preferences and provide high-protein, high-caloric foods as appropriate       INTERVENTIONS:  - Monitor oral intake, urinary output, labs, and treatment plans  - Assess nutrition and hydration status and recommend course of action  - Evaluate amount of meals eaten  - Assist patient with eating if necessary   - Allow adequate time for meals  - Recommend/ encourage appropriate diets, oral nutritional supplements, and vitamin/mineral supplements  - Order, calculate, and assess calorie counts as needed  Offer small frequentmeals  - Assess need for intravenous fluids  - Provide specific nutrition/hydration education as appropriate  - Include patient/family/caregiver in decisions related to nutrition   Outcome: Progressing

## 2020-07-05 NOTE — NURSING NOTE
Pharmacy and Dr Ramona Leyva notified of cathflo protocol and timing adjustment of TPN, fluids  Cathflo instillation into red lumen of PICC, fluids and TPN on hold  Purple lumen with TPN infusing assessed for blood return and flushed with NSS

## 2020-07-06 NOTE — PROGRESS NOTES
Progress Note - General Surgery   Noelle Reina 80 y o  male MRN: [id number]  Unit/Bed#: -01 Encounter: [id number]    Assessment:    Patient with increased abdominal distention, RN notes a lot of debris within the NG tube drainage requiring hand irrigation  NG tube drainage is improving since yesterday  Possible gastric outlet obstruction  Status post right hemicolectomy 6/20/2020 for obstructing ulcerated cecal mass  Known duodenal diverticulum  Hyperkalemia, resolved  Renal insufficiency, creatinine improved 1 76 today    Plan:  Discussed with Dr Koch  Recommend re-consultation with Gastroenterology at this time, duodenal diverticulum and continued increased abdominal distention with frequently blocked NG tube debris  Consideration for possible jejunostomy feeding tube  Leave NG tube to low wall suction, do not clamp q 4 hours at this time  I&Os  Adjustment of TPN by hospitalist physician  Patient remains on sliding scale of insulin for blood glucose control while NPO by mouth  Management of other medical co-morbidities per medicine team    Subjective/Objective   Chief Complaint:  Patient has complaints of abdominal pain throughout the abdomen with increased distention this morning  Subjective:  20-year-old white male underwent right hemicolectomy on June 20th  Over the weekend he continues with copious amounts of NG tube drainage  NG tube was scheduled to be clamped on and off every 4 hours  This morning he has increased distension and decreased urinary output  He only voided about 10-20 mL of darker colored urine in his urinal, bladder scan was performed showed 11 mL left  NG tube has been open to wall suction  Has been alternating being clamped every 4 hours  Thick pieces of debris had been hand irrigated from the NG tube in his believe when the 2 becomes occluded is when he becomes more distended and complains of pain    The patient has been requesting IV Dilaudid more often for his discomfort  NG tube output yesterday was 500 mL for 24 hour total, this morning about 150 for when the tube was left open the suction just about 5:30 a m  Scheduled Meds:  Current Facility-Administered Medications:  Adult TPN (CUSTOM BASE/CUSTOM ELECTROLYTE)  Intravenous Continuous Danitza Muller MD Last Rate: 40 9 mL/hr at 07/05/20 1847   heparin (porcine) 5,000 Units Subcutaneous [facility] Danitza Muller MD    HYDROmorphone 0 2 mg Intravenous Q4H PRN BRANDON Rivero    metoprolol 2 5 mg Intravenous Q6H Ines Paget, MD    ondansetron 4 mg Intravenous Once Anusha S RachelBRANDON    ondansetron 4 mg Intravenous Q6H PRN Ines Paget, MD    pantoprazole 40 mg Intravenous Q12H [address] Gissell Springer,     phenol 1 spray Mouth/Throat Q2H PRN Renu Jya, DO    saliva substitute 5 spray Mouth/Throat 4x Daily PRN Davian Trujillo PA-C    sodium chloride 60 mL/hr Intravenous Continuous BRANDON Robbins Last Rate: 60 mL/hr (07/05/20 1806)     Continuous Infusions:  Adult TPN (CUSTOM BASE/CUSTOM ELECTROLYTE)  Last Rate: 40 9 mL/hr at 07/05/20 1847   sodium chloride 60 mL/hr Last Rate: 60 mL/hr (07/05/20 1806)     PRN Meds:  HYDROmorphone    ondansetron    phenol    saliva substitute    Objective:     Blood pressure 126/73, pulse 77, temperature 98 3 °F (36 8 °C), resp  rate 18, height 5' 4 5" (1 638 m), weight 101 kg (223 lb 8 7 oz), SpO2 94 %  ,Body mass index is 37 78 kg/m²  Intake/Output Summary (Last 24 hours) at 7/6/2020 0733  Last data filed at 7/6/2020 0100  Gross per 24 hour   Intake 2182 55 ml   Output 700 ml   Net 1482 55 ml       I/O       07/04 0701 - 07/05 0700 07/05 0701 - 07/06 0700 07/06 0701 - 07/07 0700    P  O        I V  (mL/kg) 20 (0 2) 375 (3 7)     NG/ 30 150    TPN  1777 6     Total Intake(mL/kg) 170 (1 7) 2182 6 (21 6) 150 (1 5)    Urine (mL/kg/hr) 1237 (0 5) 200 (0 1) 150 (0 6)    Emesis/NG output 1075 500 150    Total Output 2312 700 300    Net -2142 +1482 6 -150 Invasive Devices     Peripherally Inserted Central Catheter Line            PICC Line 07/02/20 3 days          Drain            NG/OG/Enteral Tube Nasogastric 18 Fr Right nares 3 days                Physical Exam:  Patient appears awake and alert, anxious  No acute distress  Heart regular rate and rhythm  Lungs clear to auscultation  Abdomen midline surgical scar with staples intact  No wound disruption or drainage noted  Bowel sounds are hypoactive throughout  Abdomen is distended and tympanic to percussion  Nontender to palpation  NG tube drainage minimal at present, 150 mL since the tube was placed to suction this morning at 5:30 a m     Moves all 4 extremities well  No calf tenderness or peripheral edema  Lab, Imaging and other studies:  I have personally reviewed pertinent lab results    , CBC:   Lab Results   Component Value Date    WBC 5 00 07/06/2020    HGB 11 0 (L) 07/06/2020    HCT 38 5 07/06/2020    MCV 81 (L) 07/06/2020     07/06/2020    MCH 23 1 (L) 07/06/2020    MCHC 28 6 (L) 07/06/2020    RDW 24 6 (H) 07/06/2020    MPV 9 6 07/06/2020   , CMP:   Lab Results   Component Value Date    SODIUM 143 07/06/2020    K 4 1 07/06/2020     07/06/2020    CO2 31 07/06/2020    BUN 35 (H) 07/06/2020    CREATININE 1 76 (H) 07/06/2020    CALCIUM 7 7 (L) 07/06/2020    AST 20 07/06/2020    ALT 8 (L) 07/06/2020    ALKPHOS 67 07/06/2020    EGFR 34 07/06/2020     VTE Pharmacologic Prophylaxis: Heparin  VTE Mechanical Prophylaxis: sequential compression device     Chantelle Rajput PA-C

## 2020-07-06 NOTE — ASSESSMENT & PLAN NOTE
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

## 2020-07-06 NOTE — ASSESSMENT & PLAN NOTE
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  Now postop day 17 right hemicolectomy    Discussed with the patient to arrange oncology follow-up as an outpatient:

## 2020-07-06 NOTE — PROGRESS NOTES
Progress Note - Jovana  1935, 80 y o  male MRN: [id number]    Unit/Bed#: -01 Encounter: [id number]    Primary Care Provider: Lindsay Tobar DO   Date and time admitted to hospital: 6/16/2020 11:14 AM        * Duodenal obstruction  Assessment & Plan  · On EGD 6/17/2020, there was concern for possible bowel obstruction vs severe gastroparesis, given large amount of food and dark bilious fluid in stomach which could not be cleared  · Status post OR started-Exploratory laparotomy with right hemicolectomy postop day 17  · CT abdomen pelvis repeated and showed 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  NG-tube placed with suction  NPO  TPN started  Appreciate Nutrition consult  Patient NG outputs are decreasing from 500 cc per shift to 100 cc per shift  However he is having increasing abdominal pain today  Discussed with surgery  Will ask GI for repeat evaluation for possible EGD versus consideration for J-tube  Continue NG tube for now  Diabetes mellitus type 2 with complications [facility])  Assessment & Plan  Lab Results   Component Value Date    HGBA1C 8 2 (H) 06/18/2020       Recent Labs     07/05/20  2223 07/05/20  2325 07/06/20  0520 07/06/20  0920   POCGLU 106 124 217* 283*       Blood Sugar Average: Last 72 hrs:  (P) 241 [id number] type 2 diabetes mellitus on oral hypoglycemic agent at home  · Hold metformin and glipizide in the hospital  · Insulin sliding scale,  Patient's blood sugar was very uncontrolled yesterday  TPN was changed to custom TPN today  Blood sugars still high between 217-283 today but better compared to yesterday  Patient to inaccurate BMPs done yesterday which were later verified and found to be incorrect  Will adjust TPN further today and placed on insulin sliding scale algorithm 3   Depending on blood sugar trend might also try low-dose Lantus today     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[facility])  Assessment & Plan  ·  s/p exploratory laparotomy with right hemicolectomy for 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  · Need outpatient Oncology follow-up        Acute renal failure (ARF) ([facility] [address] )  Assessment & Plan  · Most likely secondary to nonoliguric ATN   Patient's baseline creatinine is 1 5-1 6  creatinine was up to 2 3  Now is back down to 1 8  · Diuretic as per nephrology  · Patient currently NPO  · Patient is currently on standard TPN  and on gentle hydration due to high NG output      Colon  neoplasm  Assessment & Plan  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  Now postop day 17 right hemicolectomy  Discussed with the patient to arrange oncology follow-up as an outpatient:    Paroxysmal atrial fibrillation ([facility] [address] )  Assessment & Plan  · Patient NPO  · Lopressor 2 5 mg IV q 6 hours  · Eliquis on hold, will place on heparin for DVT prophylaxis for now    Symptomatic anemia  Assessment & Plan  · Patient was evaluated by PCP secondary to dizziness and fatigue and has blood pressure done as an outpatient  His hemoglobin was found to be 6 2 and was sent to the emergency room  · On the day of admission hemoglobin in the emergency room was 7 1 and received 2 units of packed RBCs  · HGB stable 10-11 for now      Morbid obesity ([facility] [address] )  Assessment & Plan  · TLC as an outpatient    Hyperlipidemia  Assessment & Plan  · Hold atorvastatin while NPO    Essential hypertension  Assessment & Plan  · Continue metoprolol iv while NPO      Coronary artery disease  Assessment & Plan  · Patient with known history of coronary artery disease status post CABG in 1997 in [facility]  · Patient is on beta-blocker aspirin and statin      Chronic kidney disease, stage 3 ([address] )  Assessment & Plan  · Baseline creatinine appears to be between 1 4 and 1 6  Questionable new higher baseline   Avoid hypotension and nephrotoxic agents    Gentle hydration for now with tpn since npo    Acute on chronic diastolic congestive heart failure [facility])  Assessment & Plan  Wt Readings from Last 3 Encounters:   20 101 kg (223 lb 8 7 oz)     · Patient with known history of chronic congestive heart failure and was admitted to [facility] in January  · Lasix currently on hold as per Nephrology due to worsening renal function  Continue closely monitor for now  · Ejection fraction 65% during the last echo   · Due to increased NG outputs patient was placed on gentle hydration yesterday  So far seems okay and does not seem to be fluid overloaded                VTE Pharmacologic Prophylaxis:   Pharmacologic: Heparin  Mechanical VTE Prophylaxis in Place: Yes    Patient Centered Rounds: I have performed bedside rounds with nursing staff today  Discussions with Specialists or Other Care Team Provider:  Discussed with surgery and GI    Education and Discussions with Family / Patient:  Discussed with patient at bedside and will update the family today    Time Spent for Care: 45 minutes  More than 50% of total time spent on counseling and coordination of care as described above  Current Length of Stay: 20 day(s)    Current Patient Status: Inpatient   Certification Statement: The patient will continue to require additional inpatient hospital stay due to Duodenal obstruction    Discharge Plan:  Pending progress  Will probably need subacute rehab upon discharge    Code Status: Level 1 - Full Code      Subjective:   Patient denies any chest pain or shortness of breath at this time however he does complain of abdominal pain which she states is worse than before any quantifies it as moderate to severe  No fevers or chills reported    Objective:     Vitals:   Temp (24hrs), Av 3 °F (36 8 °C), Min:98 °F (36 7 °C), Max:98 6 °F (37 °C)    Temp:  [98 °F (36 7 °C)-98 6 °F (37 °C)] 98 °F (36 7 °C)  HR:  [] 83  Resp:  [16-18] 16  BP: ()/(58-89) 125/89  SpO2:  [86 %-94 %] 94 %  Body mass index is 37 78 kg/m²       Input and Output Summary (last 24 hours): Intake/Output Summary (Last 24 hours) at 7/6/2020 0957  Last data filed at 7/6/2020 0900  Gross per 24 hour   Intake 2302 55 ml   Output 1150 ml   Net 1152 55 ml       Physical Exam:     Physical Exam   Constitutional: He is oriented to person, place, and time  He appears well-developed  He appears distressed  HENT:   Head: Normocephalic and atraumatic  Right Ear: External ear normal    Left Ear: External ear normal    Mouth/Throat: Oropharynx is clear and moist    Eyes: Conjunctivae and EOM are normal    Neck: Normal range of motion  Neck supple  Cardiovascular: Normal rate, regular rhythm and normal heart sounds  Pulmonary/Chest: Effort normal and breath sounds normal    Abdominal: Soft  Bowel sounds are normal  He exhibits no mass  There is tenderness  There is no rebound and no guarding  Mild tenderness on palpation of the epigastric region   Genitourinary:   Genitourinary Comments: deferred   Musculoskeletal: Normal range of motion  1+ nonpitting edema bilateral upper and lower extremity   Neurological: He is alert and oriented to person, place, and time  He has normal reflexes  Cranial nerves 2-12 are normal   Normal neurological exam   Skin: Skin is warm and dry  No rash noted  Psychiatric:   Anxious   Nursing note and vitals reviewed  Additional Data:     Labs:    Results from last 7 days   Lab Units 07/06/20  0523  07/03/20  0523   WBC Thousand/uL 5 00   < > 3 70*   HEMOGLOBIN g/dL 11 0*   < > 9 5*   HEMATOCRIT % 38 5   < > 32 0*   PLATELETS Thousands/uL 216   < > 222   NEUTROS PCT %  --   --  75   LYMPHS PCT %  --   --  15   MONOS PCT %  --   --  7   EOS PCT %  --   --  1    < > = values in this interval not displayed       Results from last 7 days   Lab Units 07/06/20  0523   SODIUM mmol/L 143   POTASSIUM mmol/L 4 1   CHLORIDE mmol/L 107   CO2 mmol/L 31   BUN mg/dL 35*   CREATININE mg/dL 1 76*   ANION GAP mmol/L 5   CALCIUM mg/dL 7 7*   ALBUMIN g/dL 1 9*   TOTAL BILIRUBIN mg/dL 0 59   ALK PHOS U/L 67   ALT U/L 8*   AST U/L 20   GLUCOSE RANDOM mg/dL 232*     Results from last 7 days   Lab Units 07/03/20  0958   INR  1 22*     Results from last 7 days   Lab Units 07/06/20  0920 07/06/20  0520 07/05/20  2325 07/05/20  2223 07/05/20  1845 07/05/20  1752 07/05/20  1525 07/05/20  1133 07/05/20  0905 07/05/20  0535 07/04/20  2354 07/04/20  2027   POC GLUCOSE mg/dl 283* 217* 124 106 76 49* 114 427* 304* 280* 238* 155*                   * I Have Reviewed All Lab Data Listed Above  * Additional Pertinent Lab Tests Reviewed: Petey [facility] Admission Reviewed    Imaging:    Imaging Reports Reviewed Today Include:  None  Imaging Personally Reviewed by Myself Includes:  None    Recent Cultures (last 7 days):           Last 24 Hours Medication List:     Current Facility-Administered Medications:  Adult TPN (CUSTOM BASE/CUSTOM ELECTROLYTE)  Intravenous Continuous Teja Alvarenga MD Last Rate: 40 9 mL/hr at 07/05/20 1847   heparin (porcine) 5,000 Units Subcutaneous Q8H [facility] Teja Alvarenga MD    insulin lispro 1-6 Units Subcutaneous Q6H Teja Alvarenga MD    metoprolol 2 5 mg Intravenous Q6H Keegan Fountain MD    morphine injection 2 mg Intravenous Q4H PRN Teja Alvarenga MD    ondansetron 4 mg Intravenous Once Anusha S BRANDON Ramos    ondansetron 4 mg Intravenous Q6H PRN Keegan Fountain MD    pantoprazole 40 mg Intravenous Q12H [facility] Gissell Springer,     phenol 1 spray Mouth/Throat Q2H PRN Chidi oCntreras,     saliva substitute 5 spray Mouth/Throat 4x Daily PRN Yoshi Stack PA-C    sodium chloride 60 mL/hr Intravenous Continuous BRANDON Thomas Last Rate: 60 mL/hr (07/06/20 0835)        Today, Patient Was Seen By: Teja Alvarenga MD    ** Please Note: Dictation voice to text software may have been used in the creation of this document   **

## 2020-07-06 NOTE — ASSESSMENT & PLAN NOTE
· On EGD 6/17/2020, there was concern for possible bowel obstruction vs severe gastroparesis, given large amount of food and dark bilious fluid in stomach which could not be cleared  · Status post OR started-Exploratory laparotomy with right hemicolectomy postop day 17  · CT abdomen pelvis repeated and showed 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  NG-tube placed with suction  NPO  TPN started  Appreciate Nutrition consult  Patient NG outputs are decreasing from 500 cc per shift to 100 cc per shift  However he is having increasing abdominal pain today  Discussed with surgery  Will ask GI for repeat evaluation for possible EGD versus consideration for J-tube  Continue NG tube for now

## 2020-07-06 NOTE — ANESTHESIA PREPROCEDURE EVALUATION
Review of Systems/Medical History  Patient summary reviewed        Cardiovascular  Hyperlipidemia, Past MI , CAD , CHF ,    Pulmonary       GI/Hepatic      Comment: Scan showed pt gomez snot have bowel obstruction  Deos have divertic  Pt had fluid in stomach during EGD but has had NGT since and minimal drainage out of it         Endo/Other  Diabetes ,      GYN       Hematology   Musculoskeletal       Neurology   Psychology           Physical Exam    Airway    Mallampati score: II  TM Distance: >3 FB  Neck ROM: full     Dental       Cardiovascular      Pulmonary      Other Findings        Anesthesia Plan  ASA Score- 3     Anesthesia Type- IV sedation with anesthesia with ASA Monitors  Additional Monitors:   Airway Plan:         Plan Factors-    Induction- intravenous  Postoperative Plan-     Informed Consent- Anesthetic plan and risks discussed with patient  I personally reviewed this patient with the CRNA  Discussed and agreed on the Anesthesia Plan with the CRNA  Britney Kern

## 2020-07-06 NOTE — PROGRESS NOTES
Renal Quick Note  Patient taken to [facility] for an EGD  Kidney function is improving with a creatinine decline to 1 76 mg/dlWill order labs for tomorrow morning  Urine output is excellent I/O [id number]  (-1508 since admission)

## 2020-07-06 NOTE — PHYSICAL THERAPY NOTE
PHYSICAL THERAPY UNAVAILABLE NOTE          Patient Name: Moustapha Narayan  [id number] Date: 7/6/2020     Chart reviewed  Spoke with nursing  Patient unavailable this PM off unit for EGD  Will continue to follow and see patient as medically appropriate at a later time      Jaymie Bynum, PT,DPT

## 2020-07-06 NOTE — ASSESSMENT & PLAN NOTE
· Patient with known history of coronary artery disease status post CABG in 1997 in [facility]  · Patient is on beta-blocker aspirin and statin

## 2020-07-06 NOTE — NURSING NOTE
Landon THOMPSON made aware that patient continues with abdominal distention that is semifirm  He has faint hypoactive bowel sounds  He was bladder scanned for 11ml  He has frequent small amounts of clear kyleigh urine  He C/O abdominal pain  I flushed the NG tube with 150ml of water, and got out 300ml of thick bile colored drainage  Actual output of NG was 150ml at this time  The pain decreased after it was irrigated  There was some thick pieces that came out of the NG with the hand irrigation

## 2020-07-06 NOTE — NUTRITION
Recommend adjust TPN next bag to 600 ml (15%AA), 900 ml (D30W), 275 ml (20% Lipids) to provide qd: 1775 ml,1828 Total Kcal,1468 NPC,90 gm PRO,270 gm CHO,55 gm Fat,14 4 gm N,102:1 (NPC:N), 1 9 mg CHO/kg/min = 20% PRO,50% CHO,30% Fat Kcal mix  In light of decreased  MCV recommend check Fe++ studies  Monitor renal parameters  Recheck TRIG on 7/9

## 2020-07-06 NOTE — OCCUPATIONAL THERAPY NOTE
Occupational Therapy Cancellation Note         Patient Name: Leann Gannon  Today's Date: 7/6/2020      Chart review performed  At this time, pt unavailable, pt is off the unit for EGD  OT will follow and provide interventions as appropriate/able      [facility], OTR/L

## 2020-07-06 NOTE — ASSESSMENT & PLAN NOTE
· Most likely secondary to nonoliguric ATN   Patient's baseline creatinine is 1 5-1 6  creatinine was up to 2 3    Now is back down to 1 8  · Diuretic as per nephrology  · Patient currently NPO  · Patient is currently on standard TPN  and on gentle hydration due to high NG output

## 2020-07-06 NOTE — ASSESSMENT & PLAN NOTE
Lab Results   Component Value Date    HGBA1C 8 2 (H) 06/18/2020       Recent Labs     07/05/20  2223 07/05/20  2325 07/06/20  0520 07/06/20  0920   POCGLU 106 124 217* 283*       Blood Sugar Average: Last 72 hrs:  (P) 241 [id number] type 2 diabetes mellitus on oral hypoglycemic agent at home  · Hold metformin and glipizide in the hospital  · Insulin sliding scale,  Patient's blood sugar was very uncontrolled yesterday  TPN was changed to custom TPN today  Blood sugars still high between 217-283 today but better compared to yesterday  Patient to inaccurate BMPs done yesterday which were later verified and found to be incorrect  Will adjust TPN further today and placed on insulin sliding scale algorithm 3   Depending on blood sugar trend might also try low-dose Lantus today

## 2020-07-06 NOTE — PLAN OF CARE
Problem: Potential for Falls  Goal: Patient will remain free of falls  Description  INTERVENTIONS:  - Assess patient frequently for physical needs  -  Identify cognitive and physical deficits and behaviors that affect risk of falls    -  [place] fall precautions as indicated by assessment   - Educate patient/family on patient safety including physical limitations  - Instruct patient to call for assistance with activity based on assessment  - Modify environment to reduce risk of injury  - Consider OT/PT consult to assist with strengthening/mobility  Outcome: Progressing     Problem: CARDIOVASCULAR - ADULT  Goal: Maintains optimal cardiac output and hemodynamic stability  Description  INTERVENTIONS:  - Monitor I/O, vital signs and rhythm  - Monitor for S/S and trends of decreased cardiac output  - Administer and titrate ordered vasoactive medications to optimize hemodynamic stability  - Assess quality of pulses, skin color and temperature  - Assess for signs of decreased coronary artery perfusion  - Instruct patient to report change in severity of symptoms  Outcome: Progressing  Goal: Absence of cardiac dysrhythmias or at baseline rhythm  Description  INTERVENTIONS:  - Continuous cardiac monitoring, vital signs, obtain 12 lead EKG if ordered  - Administer antiarrhythmic and heart rate control medications as ordered  - Monitor electrolytes and administer replacement therapy as ordered  Outcome: Progressing     Problem: METABOLIC, FLUID AND ELECTROLYTES - ADULT  Goal: Glucose maintained within target range  Description  INTERVENTIONS:  - Monitor Blood Glucose as ordered  - Assess for signs and symptoms of hyperglycemia and hypoglycemia  - Administer ordered medications to maintain glucose within target range  - Assess nutritional intake and initiate nutrition service referral as needed  Outcome: Progressing     Problem: SKIN/TISSUE INTEGRITY - ADULT  Goal: Skin integrity remains intact  Description  INTERVENTIONS  - Identify patients at risk for skin breakdown  - Assess and monitor skin integrity  - Assess and monitor nutrition and hydration status  - Monitor labs (i e  albumin)  - Assess for incontinence   - Turn and reposition patient  - Assist with mobility/ambulation  - Relieve pressure over bony prominences  - Avoid friction and shearing  - Provide appropriate hygiene as needed including keeping skin clean and dry  - Evaluate need for skin moisturizer/barrier cream  - Collaborate with interdisciplinary team (i e  Nutrition, Rehabilitation, etc )   - Patient/family teaching  Outcome: Progressing     Problem: HEMATOLOGIC - ADULT  Goal: Maintains hematologic stability  Description  INTERVENTIONS  - Assess for signs and symptoms of bleeding or hemorrhage  - Monitor labs  - Administer supportive blood products/factors as ordered and appropriate  Outcome: Progressing     Problem: MUSCULOSKELETAL - ADULT  Goal: Maintain or return mobility to safest level of function  Description  INTERVENTIONS:  - Assess patient's ability to carry out ADLs; assess patient's baseline for ADL function and identify physical deficits which impact ability to perform ADLs (bathing, care of mouth/teeth, toileting, grooming, dressing, etc )  - Assess/evaluate cause of self-care deficits   - Assess range of motion  - Assess patient's mobility/assist x 2 with roller walker  - Assess patient's need for assistive devices and provide as appropriate  - Encourage maximum independence but intervene and supervise when necessary  - Involve family in performance of ADLs  - Assess for home care needs following discharge   - Consider OT consult to assist with ADL evaluation and planning for discharge  - Provide patient education as appropriate   Outcome: Progressing     Problem: Prexisting or High Potential for Compromised Skin Integrity  Goal: Skin integrity is maintained or improved  Description  INTERVENTIONS:  - Identify patients at risk for skin breakdown  - Assess and monitor skin integrity  - Assess and monitor nutrition and hydration status  - Monitor labs   - Assess for incontinence   - Turn and reposition patient  - Assist with mobility/ambulation  - Relieve pressure over bony prominences  - Avoid friction and shearing  - Provide appropriate hygiene as needed including keeping skin clean and dry  - Evaluate need for skin moisturizer/barrier cream  - Collaborate with interdisciplinary team   - Patient/family teaching  - Consider wound care consult   Outcome: Progressing     Problem: Nutrition/Hydration-ADULT  Goal: Nutrient/Hydration intake appropriate for improving, restoring or maintaining nutritional needs  Description  Monitor and assess patient's nutrition/hydration status for malnutrition  Collaborate with interdisciplinary team and initiate plan and interventions as ordered  Monitor patient's weight and dietary intake as ordered or per policy  Utilize nutrition screening tool and intervene as necessary  Determine patient's food preferences and provide high-protein, high-caloric foods as appropriate       INTERVENTIONS:  - Monitor oral intake, urinary output, labs, and treatment plans  - Assess nutrition and hydration status and recommend course of action  - Evaluate amount of meals eaten  - Assist patient with eating if necessary   - Allow adequate time for meals  - Recommend/ encourage appropriate diets, oral nutritional supplements, and vitamin/mineral supplements  - Order, calculate, and assess calorie counts as needed  Offer small frequentmeals  - Assess need for intravenous fluids  - Provide specific nutrition/hydration education as appropriate  - Include patient/family/caregiver in decisions related to nutrition   Outcome: Progressing

## 2020-07-06 NOTE — H&P
Consultation - GI   Lizette Asa 80 y o  male MRN: [id number]  Unit/Bed#: -01 Encounter: [id number]    Patient was seen previously for GI consult on June 17, 2020  So full consult not done  Chart reviewed for new request for GI evaluation  H&P EXAM - inpatient Endoscopy   Lizette Asa 80 y o  male MRN: [id number]    [address] [place] [facility]   Encounter: [id number]        Impression:   Persistent Bilious aspirate through NG tube and abdominal distension  Rule out gastric outlet obstruction vs  Ileus vs SBO  S/P ex lap for right hemicolectomy for colon cancer  S/p EGD w/o evidence of GOO in June  Plan:  EGD  If EGD neg for GOO, Consider evaluating small bowel for SBO using CT enterography  Chief Complaint:   none    Physical Exam:   Abdomen : obese/distented  Surgical incision site stapled and no discharge or redness  Chest: CTA   Heart: S1S2    Informed consent obtained from his POA  Please see consent form

## 2020-07-06 NOTE — ANESTHESIA POSTPROCEDURE EVALUATION
Post-Op Assessment Note    CV Status:  Stable  Pain Score: 0    Pain management: satisfactory to patient     Mental Status:  Arousable and lethargic   Hydration Status:  Stable   PONV Controlled:  None   Airway Patency:  Patent   Post Op Vitals Reviewed: Yes      Staff: Anesthesiologist, CRNA           BP   119/59   Temp   98 7   Pulse  82   Resp   23   SpO2   99

## 2020-07-06 NOTE — PLAN OF CARE
Problem: Potential for Falls  Goal: Patient will remain free of falls  Description  INTERVENTIONS:  - Assess patient frequently for physical needs  -  Identify cognitive and physical deficits and behaviors that affect risk of falls    -  [place] fall precautions as indicated by assessment   - Educate patient/family on patient safety including physical limitations  - Instruct patient to call for assistance with activity based on assessment  - Modify environment to reduce risk of injury  - Consider OT/PT consult to assist with strengthening/mobility  Outcome: Progressing     Problem: CARDIOVASCULAR - ADULT  Goal: Maintains optimal cardiac output and hemodynamic stability  Description  INTERVENTIONS:  - Monitor I/O, vital signs and rhythm  - Monitor for S/S and trends of decreased cardiac output  - Administer and titrate ordered vasoactive medications to optimize hemodynamic stability  - Assess quality of pulses, skin color and temperature  - Assess for signs of decreased coronary artery perfusion  - Instruct patient to report change in severity of symptoms  Outcome: Progressing  Goal: Absence of cardiac dysrhythmias or at baseline rhythm  Description  INTERVENTIONS:  - Continuous cardiac monitoring, vital signs, obtain 12 lead EKG if ordered  - Administer antiarrhythmic and heart rate control medications as ordered  - Monitor electrolytes and administer replacement therapy as ordered  Outcome: Progressing     Problem: METABOLIC, FLUID AND ELECTROLYTES - ADULT  Goal: Glucose maintained within target range  Description  INTERVENTIONS:  - Monitor Blood Glucose as ordered  - Assess for signs and symptoms of hyperglycemia and hypoglycemia  - Administer ordered medications to maintain glucose within target range  - Assess nutritional intake and initiate nutrition service referral as needed  Outcome: Progressing     Problem: SKIN/TISSUE INTEGRITY - ADULT  Goal: Skin integrity remains intact  Description  INTERVENTIONS  - Identify patients at risk for skin breakdown  - Assess and monitor skin integrity  - Assess and monitor nutrition and hydration status  - Monitor labs (i e  albumin)  - Assess for incontinence   - Turn and reposition patient  - Assist with mobility/ambulation  - Relieve pressure over bony prominences  - Avoid friction and shearing  - Provide appropriate hygiene as needed including keeping skin clean and dry  - Evaluate need for skin moisturizer/barrier cream  - Collaborate with interdisciplinary team (i e  Nutrition, Rehabilitation, etc )   - Patient/family teaching  Outcome: Progressing     Problem: HEMATOLOGIC - ADULT  Goal: Maintains hematologic stability  Description  INTERVENTIONS  - Assess for signs and symptoms of bleeding or hemorrhage  - Monitor labs  - Administer supportive blood products/factors as ordered and appropriate  Outcome: Progressing     Problem: MUSCULOSKELETAL - ADULT  Goal: Maintain or return mobility to safest level of function  Description  INTERVENTIONS:  - Assess patient's ability to carry out ADLs; assess patient's baseline for ADL function and identify physical deficits which impact ability to perform ADLs (bathing, care of mouth/teeth, toileting, grooming, dressing, etc )  - Assess/evaluate cause of self-care deficits   - Assess range of motion  - Assess patient's mobility/assist x 2 with roller walker  - Assess patient's need for assistive devices and provide as appropriate  - Encourage maximum independence but intervene and supervise when necessary  - Involve family in performance of ADLs  - Assess for home care needs following discharge   - Consider OT consult to assist with ADL evaluation and planning for discharge  - Provide patient education as appropriate   Outcome: Progressing

## 2020-07-06 NOTE — NURSING NOTE
Patient received back in room post EGD  NG tube was changed in OR  Placement of NG tube was confirmed with air bolus, then it was connected to LCS  It is draining bile colored thick fluid  His voice is slightly hoarse and he is coughing up large amounts of thick clear mucous  He has bilateral wheezes noted in lungs upon expiration  Dr Maria Victoria Robison made aware  She is also aware of the EGD findings  Patient's POA (Ifeoma) updated and at the bedside  See new orders

## 2020-07-07 NOTE — ASSESSMENT & PLAN NOTE
· Patient was evaluated by PCP secondary to dizziness and fatigue and has blood pressure done as an outpatient  His hemoglobin was found to be 6 2 and was sent to the emergency room  · On the day of admission hemoglobin in the emergency room was 7 1 and received 2 units of packed RBCs  · HGB stable 10-11 for now  · EGD showed gastric ulcers nonbleeding at this time  · Continue Protonix 40 mg IV b i d

## 2020-07-07 NOTE — ASSESSMENT & PLAN NOTE
Lab Results   Component Value Date    HGBA1C 8 2 (H) 06/18/2020       Recent Labs     07/06/20  2347 07/07/20  0506 07/07/20  0753 07/07/20  1200   POCGLU 175* 246* 239* 249*       Blood Sugar Average: Last 72 hrs:  (P) 227 75 type 2 diabetes mellitus on oral hypoglycemic agent at home  · Hold metformin and glipizide in the hospital  · Insulin sliding scale,  Patient's blood sugar was very uncontrolled yesterday  TPN was changed to custom TPN today  Blood sugars still high between mid 200s today but better compared to yesterday  Patient to inaccurate BMPs done yesterday which were later verified and found to be incorrect  Will adjust TPN further today and placed on insulin sliding scale algorithm 4   Placed on Lantus 25 units daily in the morning

## 2020-07-07 NOTE — PROGRESS NOTES
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  Renal consultation was requested for MARLENI which initially improved with aggressive IV diuresis now discontinued  Developed N/V 7/2- duodenal diverticulum noted on repeat imaging and NGT placed now on TPN  Underwent EGD yesterday significant for esophageal and gastric ulcers  NGT to suction  Did receive 20 mg IV lasix yesterday per primary team      1  Acute Kidney Injury atop CKD 3  · Renal function remains stable around 1 7-2 0 mg/dL  He received 1 dose of IV lasix yesterday  · He is non-oliguric and remained slightly negative for day yesterday and is negative about 4 liters for stay  His weights are stable  · He receives roughly 1 liter in fluid via TPN per day but has been remaining negative due to NGT output in addition to urine  Will monitor daily need for lasix administration  · Avoid nephrotoxins and wide variation in hemodynamics as able  2  Stage 3 Chronic Kidney Disease with baseline creatinine around 1 3-1 6 mg/dL  3  Hypernatremia  · Essentially resolved  Sodium corrected for glucose 143 mmol/L  He is s/p hypotonic fluid  4  Hypokalemia  · Will administer 20 mEq IV potassium today  5  Metabolic Alkalosis  · Due to gastric losses of acid  6  Obstructing Adenocarcinoma of Cecum  7  Chronic Congestive Heart Failure  · Monitor daily need for lasix administration  Received 20 mg IV lasix yesterday per primary team   8  Uncontrolled DM 2 on long-term insulin use  · Blood sugars better controlled today    ROS  Seen and examined sitting in chair  Complains of severe dry mouth  A complete 10 point review of systems have been performed and are otherwise negative         Historical Information Past Medical History:   Diagnosis Date    [facility]fib [facility])     Cardiac disease     [facility]est[facility] failure[facility] [address] [facility]     Diabetes mellitus ([facility] [address] [facility]     MI, old     Myocardial infarction ([facility] [address] )      Past Surgical History:   Procedure Laterality Date    CARDIAC SURGERY      CA PART REMOVAL COLON W ANASTOMOSIS N/A 6/20/2020    Procedure: exploratory laparatomy with right hemicolectomy;  Surgeon: Ondina Duque DO;  Location:  MAIN OR;  Service: General     Social History   Social History     Substance and Sexual Activity   Alcohol Use Not Currently     Social History     Substance and Sexual Activity   Drug Use Not Currently     Social History     Tobacco Use   Smoking Status Never Smoker   Smokeless Tobacco Never Used       Family History:   Family History   Problem Relation Age of Onset    Diabetes Mother        Medications:  Pertinent medications were reviewed    Current Facility-Administered Medications:  Adult TPN (CUSTOM BASE/CUSTOM ELECTROLYTE)  Intravenous Continuous Stephanie Musa MD Last Rate: 40 8 mL/hr at 07/06/20 1649   Adult TPN (CUSTOM BASE/CUSTOM ELECTROLYTE)  Intravenous Continuous Stephanie Musa MD    fluconazole 100 mg Intravenous Q24H Stephanie Musa MD Last Rate: 100 mg (07/06/20 1617)   heparin (porcine) 5,000 Units Subcutaneous Q8H [address] Stephanie Musa MD    [START ON 7/8/2020] insulin glargine 25 Units Subcutaneous Daily Stephanie Musa MD    insulin lispro 2-12 Units Subcutaneous Q6H Stephanie Musa MD    metoclopramide 10 mg Intravenous Q6H [address] Ondina Duque DO    metoprolol 2 5 mg Intravenous Q6H Diya Luke MD    morphine injection 2 mg Intravenous Q4H PRN Stephanie Musa MD    ondansetron 4 mg Intravenous Once Anusha S Rachel, CRNP    ondansetron 4 mg Intravenous Q6H PRN Diya Luke MD    pantoprazole 40 mg Intravenous Q12H [address] Gissell Springer DO    phenol 1 spray Mouth/Throat Q2H PRN Ondina Duque DO    saliva substitute 5 spray Mouth/Throat 4x Daily PRN Pepe Rhoades PA-C sucralfate 1,000 mg Oral BID Vivian Velazquezpayton, DO          No Known Allergies      Vitals:   /63   Pulse 75   Temp 97 8 °F (36 6 °C)   Resp 18   Ht 5' 4 5" (1 638 m)   Wt 101 kg (222 lb 10 6 oz)   SpO2 90%   BMI 37 63 kg/m²   Body mass index is 37 63 kg/m²  SpO2: 90 %,   SpO2 Activity: At Rest,   O2 Device: None (Room air)      Intake/Output Summary (Last 24 hours) at 7/7/2020 1424  Last data filed at 7/7/2020 1230  Gross per 24 hour   Intake 2080 ml   Output 2100 ml   Net -20 ml     Invasive Devices     Peripherally Inserted Central Catheter Line            PICC Line 07/02/20 4 days          Drain            NG/OG/Enteral Tube Nasogastric 18 Fr Right nares 5 days                Physical Exam  General: conscious, cooperative, in no acute distress, chronically ill appearing   Eyes: conjunctivae pink, anicteric sclerae  ENT: lips and mucous membranes moist, NGT   Neck: supple, no JVD, no masses  Chest: diminished breath sounds bilateral, no crackles, ronchus or wheezings  CVS: S1 & S2, normal rate, regular rhythm  Abdomen: soft, non-tender, non-distended, normoactive bowel sounds  Extremities: mild edema of both legs  Skin: no rash, large abdominal surgical site with staples  Neuro: awake, alert, oriented      Diagnostic Data:  Lab: I have personally reviewed pertinent lab results  ,   CBC:  Results from last 7 days   Lab Units 07/07/20  0437   WBC Thousand/uL 4 41   HEMOGLOBIN g/dL 10 8*   HEMATOCRIT % 38 2   PLATELETS Thousands/uL 189      CMP: Lab Results   Component Value Date    SODIUM 140 07/07/2020    K 3 6 07/07/2020     07/07/2020    CO2 26 07/07/2020    BUN 41 (H) 07/07/2020    CREATININE 1 95 (H) 07/07/2020    CALCIUM 7 9 (L) 07/07/2020    EGFR 30 07/07/2020   ,   PT/INR: No results found for: PT, INR,   Magnesium: No components found for: MAG,  Phosphorous: No results found for: PHOS    Microbiology:  @LABRCNTIP,(urinecx:7)@        Abner Net, BRANDON    Portions of the record may have been created with voice recognition software  Occasional wrong word or "sound a like" substitutions may have occurred due to the inherent limitations of voice recognition software  Read the chart carefully and recognize, using context, where substitutions have occurred

## 2020-07-07 NOTE — CASE MANAGEMENT
Called Dr Heber Mar office and spoke to EVELYN- cancelled 7/9 appointment as patient remains in the hospital- this will need to be rescheduled  Current DC plans remains [facility]

## 2020-07-07 NOTE — PROGRESS NOTES
Progress Note - General Surgery   Purnima Pierce 80 y o  male MRN: [id number]  Unit/Bed#: [facility]-01 Encounter: [id number]    Assessment:  - S/p R hemicolectomy 6/20/2020 for ulcerated cecal mass  - EGD (7/6):  Multiple gastric ulcers in the body along with severe gastritis, biopsies taken  Severe esophagitis  Patches of candidiasis in the esophagus  No evidence of gastric or obstruction  No evidence of obstruction in the duodenum   - Pt reports pain slightly improved  - NG output 1750mL yesterday  500mL in NG so far today  Plan:  - Continue NG  - Carafate 2 times per day through NG, hold suction for 1 hour after administration  - Removed staples today  - IV Reglan  - Continue TPN  - Monitor I/O  - Management of medical conditions as per medicine team     Subjective/Objective   Subjective: No acute events overnight  Pt states he is comfortable but reports increased abdominal pain when he moves around  Had a formed BM yesterday  Reports some bloating  Denies N/V/D/C, fever, chills, CP, SOB  Objective:    Blood pressure 116/54, pulse 74, temperature 97 8 °F (36 6 °C), resp  rate 18, height 5' 4 5" (1 638 m), weight 101 kg (222 lb 10 6 oz), SpO2 94 %  ,Body mass index is 37 63 kg/m²        Intake/Output Summary (Last 24 hours) at 7/7/2020 0843  Last data filed at 7/7/2020 1569  Gross per 24 hour   Intake 2080 ml   Output 2150 ml   Net -70 ml       Invasive Devices     Peripherally Inserted Central Catheter Line            PICC Line 07/02/20 4 days          Drain            NG/OG/Enteral Tube Nasogastric 18 Fr Right nares 4 days                Physical Exam: General appearance: alert and oriented, in no acute distress and fatigued  Lungs: clear to auscultation bilaterally  Heart: regular rate and rhythm, S1, S2 normal, no murmur, click, rub or gallop  Abdomen: normal findings: no masses palpable, no organomegaly, soft, non-tender and no rigidity or guarding, abnormal findings:  hypoactive bowel sounds and surgical incision remains well approximated without erythema, edema, ecchymosis, fluctuance, induration, bleeding, or drainage  Extremities: extremities normal, warm and well-perfused; no cyanosis, clubbing, or edema    Lab, Imaging and other studies:  I have personally reviewed pertinent lab results      CBC:   Lab Results   Component Value Date    WBC 4 41 07/07/2020    HGB 10 8 (L) 07/07/2020    HCT 38 2 07/07/2020    MCV 81 (L) 07/07/2020     07/07/2020    MCH 23 0 (L) 07/07/2020    MCHC 28 3 (L) 07/07/2020    RDW 24 0 (H) 07/07/2020    MPV 9 8 07/07/2020   CMP:   Lab Results   Component Value Date    SODIUM 140 07/07/2020    K 3 6 07/07/2020     07/07/2020    CO2 26 07/07/2020    BUN 41 (H) 07/07/2020    CREATININE 1 95 (H) 07/07/2020    CALCIUM 7 9 (L) 07/07/2020    EGFR 30 07/07/2020     VTE Pharmacologic Prophylaxis: Heparin  VTE Mechanical Prophylaxis: sequential compression device     Flavio Nunez PA-C

## 2020-07-07 NOTE — OCCUPATIONAL THERAPY NOTE
OccupationalTherapy Progress Note     Patient Name: Carlos Baptiste  Today's Date: 7/7/2020  Problem List  Principal Problem:    Duodenal obstruction  Active Problems:    Acute on chronic diastolic congestive heart failure (HCC)    Chronic kidney disease, stage 3 (HCC)    Coronary artery disease    Diabetes mellitus type 2 with complications (HCC)    Essential hypertension    Hyperlipidemia    Morbid obesity (HCC)    Symptomatic anemia    Paroxysmal atrial fibrillation (HCC)    Abnormal CT of the abdomen    Colon  neoplasm    Acute renal failure (ARF) ([facility] )    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[facility] [address] )            07/07/20 1402   Restrictions/Precautions   Other Precautions Chair Alarm; Bed Alarm;Multiple lines; Fall Risk;Pain  (NPO)   Pain Assessment   Pain Assessment Tool FLACC   Pain Location/Orientation Location: Abdomen   Pain Rating: FLACC (Rest) - Face 0   Pain Rating: FLACC (Rest) - Legs 0   Pain Rating: FLACC (Rest) - Activity 0   Pain Rating: FLACC (Rest) - Cry 0   Pain Rating: FLACC (Rest) - Consolability 0   Score: FLACC (Rest) 0   Pain Rating: FLACC (Activity) - Face 1   Pain Rating: FLACC (Activity) - Legs 1   Pain Rating: FLACC (Activity) - Activity 1   Pain Rating: FLACC (Activity) - Cry 0   Pain Rating: FLACC (Activity) - Consolability 1   Score: FLACC (Activity) 4   ADL   LB Dressing Assistance 2  Maximal Assistance   LB Dressing Deficit Don/doff R sock; Don/doff L sock   LB Dressing Comments Pt declining to participate in LB dressing at this time  Pt educated o nuse of compensatory strategies although appears uninterested at this time  Pt believes that once he goes home he will be able to complete all tasks independently   Elder Watson Steadying;Verbal cueing;Supervison/safety; Increased time to complete; Bedside commode;Clothing management up;Clothing management down;Perineal hygiene   Toileting Comments Pt completing toileting tasks with BSC and use of RW @ Min A  Pt requiring assistance for STS from [facility] as well as thorough CM and pericare  Bed Mobility   Supine to Sit 3  Moderate assistance   Additional items HOB elevated; Bedrails; Increased time required;Verbal cues;LE management  (trunk management)   Additional Comments posterior lean, requiring Mod A to scoot forward and get BLE on the ground   Transfers   Sit to Stand 4  Minimal assistance   Additional items Assist x 1; Increased time required;Verbal cues   Stand to Sit 5  Supervision   Additional items Assist x 1; Increased time required;Verbal cues   Stand pivot   (Steadying Assist)   Additional items Assist x 1; Increased time required;Verbal cues   Toilet transfer 4  Minimal assistance   Additional items Assist x 1; Increased time required;Verbal cues; Commode   Additional Comments Pt completing functional transfers with use of RW for UB support  Min A for initial STS although progressing to [address]  for SPT  Pt with good tolerance and participation this date  Requiring increased time and extended set-up d/t complexity of lines  Cognition   Overall Cognitive Status Impaired   Arousal/Participation Alert; Responsive; Cooperative   Attention Attends with cues to redirect   Orientation Level Oriented X4   Memory Within functional limits   Following Commands Follows all commands and directions without difficulty   Comments Pt has limited insight to current deficits   Activity Tolerance   Activity Tolerance Patient limited by fatigue   Medical Staff Made Aware Spoke with RN, Janak Cevrantes  Spoke with PTMendel   Assessment   Assessment Pt seen for treatment session #3 this date  Pt alert and agreeable to participate at this time  Pt with good participation and performance this date  Pt continues to demonstrate difficulties with bed mobility and initial STS transfers  Once standing, pt with increased performance with SPT and short distance ambulation   Pt continues with significant assistance for LB dressing d/t inability to reach to feet at this time  Pt has good potential to make progress towards goals, significantly limited by medical complexity  Pt continues with deficits: decrease activity tolerance, decrease standing balance, decrease sitting balance, decrease performance during ADL tasks, decrease cognition, decrease safety awareness , decrease UB MS, increased pain, decrease generalized strength, decrease activity engagement and decrease performance during functional transfers  Pt would benefit from continued acute OT services to address deficits as well as post acute rehab services upon d/c from [address] [place] [address]  At end of session, Pt seated OOB in recliner with call bell in reach, chair alarm intact and all needs met at this time  Plan   Treatment Interventions ADL retraining;Functional transfer training; Endurance training;UE strengthening/ROM; Cognitive reorientation;Patient/family training;Equipment evaluation/education; Neuromuscular reeducation; Compensatory technique education;Continued evaluation; Energy conservation; Activityengagement   Goal Expiration Date 07/17/20   OT Treatment Day 3   OT Frequency 3-5x/wk   Recommendation   Recommendation   (post acute rehab)   OT Discharge Recommendation Post-Acute Rehabilitation Services     Pt goals to be met by 7/17/2020  Pt goals have been re-assessed and continue to remain appropriate and achievable  Pt's progress has been slowed d/t medical complexity although remains appropriate         1  Pt will demonstrate ability to complete supine<>sit @ Mod I in order to increase safety and independence during ADL tasks  2  Pt will demonstrate ability to complete UB ADLs including washing/dressing @ Mod I in order to increase performance and participation during meaningful tasks  3  Pt will demonstrate ability to complete LB dressing @ Min A in order to increase safety and independence during meaningful tasks     4  Pt will demonstrate ability to aris/doff socks/shoes while sitting EOB @ Ksenia Dyer order to increase safety and independence during meaningful tasks  5  Pt will demonstrate ability to complete toileting tasks including CM and pericare @ Mod I in order to increase safety and independence during meaningful tasks  6  Pt will demonstrate ability to complete EOB, chair, toilet/commode transfers @ Mod I in order to increase performance and participation during functional tasks  7  Pt will demonstrate ability to stand for 7-8 minutes while maintaining G balance with use of RW for UB support PRN  8  Pt will demonstrate ability to tolerate 30-35 minute OT session with no vc'ing for deep breathing or use of energy conservation techniques in order to increase activity tolerance during functional tasks  9  Pt will demonstrate Good carryover of use of energy conservation/compensatory strategies during ADLs and functional tasks in order to increase safety and reduce risk for falls  10  Pt will demonstrate Good attention and participation in continued evaluation of functional ambulation house hold distances in order to assist with safe d/c planning  11  Pt will attend to continued cognitive assessments 100% of the time in order to provide most appropriate d/c recommendations  12  Pt will follow 100% simple 2-step commands and be A&O x4 consistently with environmental cues to increase participation in functional activities  13  Pt will identify 3 areas of interest/hobbies and 1 intervention on how to incorporate into daily life in order to increase interaction with environment and peers as well as increase participation in meaningful tasks     14  Pt will demonstrate 100% carryover of BUE HEP in order to increase BUE MS and increase performance during functional tasks upon d/c home         Jorge L Pearl OTR/L

## 2020-07-07 NOTE — SOCIAL WORK
Pt LOS 20 days  Pt had EGD done today showing gastric ulcers and severe gastritis  Pt continues with NG tube to low continuous suction    CM to follow

## 2020-07-07 NOTE — ASSESSMENT & PLAN NOTE
· Most likely secondary to nonoliguric ATN   Patient's baseline creatinine is 1 5-1 6  creatinine was up to 2 3    Now is back down to 1 9  · Diuretic as per nephrology  · Patient currently NPO  · Patient is currently on custom TPN

## 2020-07-07 NOTE — PLAN OF CARE
Problem: OCCUPATIONAL THERAPY ADULT  Goal: Performs self-care activities at highest level of function for planned discharge setting  See evaluation for individualized goals  Description  Treatment Interventions: ADL retraining, Functional transfer training, UE strengthening/ROM, Endurance training, Cognitive reorientation, Patient/family training, Equipment evaluation/education, Neuromuscular reeducation, Compensatory technique education, Continued evaluation, Energy conservation, Activityengagement          See flowsheet documentation for full assessment, interventions and recommendations  Outcome: Progressing  Note:   Limitation: Decreased ADL status, Decreased UE strength, Decreased Safe judgement during ADL, Decreased cognition, Decreased endurance, Decreased self-care trans, Decreased high-level ADLs  Prognosis: Good, Fair  Assessment: Pt seen for treatment session #3 this date  Pt alert and agreeable to participate at this time  Pt with good participation and performance this date  Pt continues to demonstrate difficulties with bed mobility and initial STS transfers  Once standing, pt with increased performance with SPT and short distance ambulation  Pt continues with significant assistance for LB dressing d/t inability to reach to feet at this time  Pt has good potential to make progress towards goals, significantly limited by medical complexity  Pt continues with deficits: decrease activity tolerance, decrease standing balance, decrease sitting balance, decrease performance during ADL tasks, decrease cognition, decrease safety awareness , decrease UB MS, increased pain, decrease generalized strength, decrease activity engagement and decrease performance during functional transfers  Pt would benefit from continued acute OT services to address deficits as well as post acute rehab services upon d/c from [address] [place] [address]   At end of session, Pt seated OOB in recliner with call bell in reach, chair alarm intact and all needs met at this time    Recommendation: (post acute rehab)  OT Discharge Recommendation: Post-Acute Rehabilitation Services

## 2020-07-07 NOTE — PLAN OF CARE
Problem: Potential for Falls  Goal: Patient will remain free of falls  Description  INTERVENTIONS:  - Assess patient frequently for physical needs  -  Identify cognitive and physical deficits and behaviors that affect risk of falls    -  [place] fall precautions as indicated by assessment   - Educate patient/family on patient safety including physical limitations  - Instruct patient to call for assistance with activity based on assessment  - Modify environment to reduce risk of injury  - Consider OT/PT consult to assist with strengthening/mobility  Outcome: Progressing     Problem: CARDIOVASCULAR - ADULT  Goal: Maintains optimal cardiac output and hemodynamic stability  Description  INTERVENTIONS:  - Monitor I/O, vital signs and rhythm  - Monitor for S/S and trends of decreased cardiac output  - Administer and titrate ordered vasoactive medications to optimize hemodynamic stability  - Assess quality of pulses, skin color and temperature  - Assess for signs of decreased coronary artery perfusion  - Instruct patient to report change in severity of symptoms  Outcome: Progressing  Goal: Absence of cardiac dysrhythmias or at baseline rhythm  Description  INTERVENTIONS:  - Continuous cardiac monitoring, vital signs, obtain 12 lead EKG if ordered  - Administer antiarrhythmic and heart rate control medications as ordered  - Monitor electrolytes and administer replacement therapy as ordered  Outcome: Progressing     Problem: METABOLIC, FLUID AND ELECTROLYTES - ADULT  Goal: Glucose maintained within target range  Description  INTERVENTIONS:  - Monitor Blood Glucose as ordered  - Assess for signs and symptoms of hyperglycemia and hypoglycemia  - Administer ordered medications to maintain glucose within target range  - Assess nutritional intake and initiate nutrition service referral as needed  Outcome: Progressing     Problem: SKIN/TISSUE INTEGRITY - ADULT  Goal: Skin integrity remains intact  Description  INTERVENTIONS  - Identify patients at risk for skin breakdown  - Assess and monitor skin integrity  - Assess and monitor nutrition and hydration status  - Monitor labs (i e  albumin)  - Assess for incontinence   - Turn and reposition patient  - Assist with mobility/ambulation  - Relieve pressure over bony prominences  - Avoid friction and shearing  - Provide appropriate hygiene as needed including keeping skin clean and dry  - Evaluate need for skin moisturizer/barrier cream  - Collaborate with interdisciplinary team (i e  Nutrition, Rehabilitation, etc )   - Patient/family teaching  Outcome: Progressing     Problem: HEMATOLOGIC - ADULT  Goal: Maintains hematologic stability  Description  INTERVENTIONS  - Assess for signs and symptoms of bleeding or hemorrhage  - Monitor labs  - Administer supportive blood products/factors as ordered and appropriate  Outcome: Progressing     Problem: MUSCULOSKELETAL - ADULT  Goal: Maintain or return mobility to safest level of function  Description  INTERVENTIONS:  - Assess patient's ability to carry out ADLs; assess patient's baseline for ADL function and identify physical deficits which impact ability to perform ADLs (bathing, care of mouth/teeth, toileting, grooming, dressing, etc )  - Assess/evaluate cause of self-care deficits   - Assess range of motion  - Assess patient's mobility/assist x 2 with roller walker  - Assess patient's need for assistive devices and provide as appropriate  - Encourage maximum independence but intervene and supervise when necessary  - Involve family in performance of ADLs  - Assess for home care needs following discharge   - Consider OT consult to assist with ADL evaluation and planning for discharge  - Provide patient education as appropriate   Outcome: Progressing     Problem: Prexisting or High Potential for Compromised Skin Integrity  Goal: Skin integrity is maintained or improved  Description  INTERVENTIONS:  - Identify patients at risk for skin breakdown  - Assess and monitor skin integrity  - Assess and monitor nutrition and hydration status  - Monitor labs   - Assess for incontinence   - Turn and reposition patient  - Assist with mobility/ambulation  - Relieve pressure over bony prominences  - Avoid friction and shearing  - Provide appropriate hygiene as needed including keeping skin clean and dry  - Evaluate need for skin moisturizer/barrier cream  - Collaborate with interdisciplinary team   - Patient/family teaching  - Consider wound care consult   Outcome: Progressing     Problem: Nutrition/Hydration-ADULT  Goal: Nutrient/Hydration intake appropriate for improving, restoring or maintaining nutritional needs  Description  Monitor and assess patient's nutrition/hydration status for malnutrition  Collaborate with interdisciplinary team and initiate plan and interventions as ordered  Monitor patient's weight and dietary intake as ordered or per policy  Utilize nutrition screening tool and intervene as necessary  Determine patient's food preferences and provide high-protein, high-caloric foods as appropriate       INTERVENTIONS:  - Monitor oral intake, urinary output, labs, and treatment plans  - Assess nutrition and hydration status and recommend course of action  - Evaluate amount of meals eaten  - Assist patient with eating if necessary   - Allow adequate time for meals  - Recommend/ encourage appropriate diets, oral nutritional supplements, and vitamin/mineral supplements  - Order, calculate, and assess calorie counts as needed  Offer small frequentmeals  - Assess need for intravenous fluids  - Provide specific nutrition/hydration education as appropriate  - Include patient/family/caregiver in decisions related to nutrition   Outcome: Progressing

## 2020-07-07 NOTE — PROGRESS NOTES
Progress Note - Pretty Gupta 1935, 80 y o  male MRN: [id number]    Unit/Bed#: -01 Encounter: [id number]    Primary Care Provider: Rosi Bonilla DO   Date and time admitted to hospital: 6/16/2020 11:14 AM        * Duodenal obstruction  Assessment & Plan  · On EGD 6/17/2020, there was concern for possible bowel obstruction vs severe gastroparesis, given large amount of food and dark bilious fluid in stomach which could not be cleared  · Status post OR started-Exploratory laparotomy with right hemicolectomy postop day 17  · CT abdomen pelvis repeated and showed 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  NG-tube placed with suction  NPO  TPN started  Appreciate Nutrition consult  · Patient still as high NG outputs  Seen by GI and no duodenal obstruction noted by diverticulum  · CT of the abdomen pelvis done and discussed with surgery  · The patient continues to have persistent ileus/clinical bowel obstruction may need a J-tube placement  · Continue NG suction for now    Diabetes mellitus type 2 with complications [facility])  Assessment & Plan  Lab Results   Component Value Date    HGBA1C 8 2 (H) 06/18/2020       Recent Labs     07/06/20  2347 07/07/20  0506 07/07/20  0753 07/07/20  1200   POCGLU 175* 246* 239* 249*       Blood Sugar Average: Last 72 hrs:  (P) 227 75 type 2 diabetes mellitus on oral hypoglycemic agent at home  · Hold metformin and glipizide in the hospital  · Insulin sliding scale,  Patient's blood sugar was very uncontrolled yesterday  TPN was changed to custom TPN today  Blood sugars still high between mid 200s today but better compared to yesterday  Patient to inaccurate BMPs done yesterday which were later verified and found to be incorrect  Will adjust TPN further today and placed on insulin sliding scale algorithm 4   Placed on Lantus 25 units daily in the morning    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[facility])  Assessment & Plan  ·  s/p exploratory laparotomy with right hemicolectomy for 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  · Need outpatient Oncology follow-up        Acute renal failure (ARF) ([facility] [address] )  Assessment & Plan  · Most likely secondary to nonoliguric ATN   Patient's baseline creatinine is 1 5-1 6  creatinine was up to 2 3  Now is back down to 1 9  · Diuretic as per nephrology  · Patient currently NPO  · Patient is currently on custom TPN       Colon  neoplasm  Assessment & Plan  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  Now postop day 18 right hemicolectomy  Discussed with the patient to arrange oncology follow-up as an outpatient:    Paroxysmal atrial fibrillation ([facility] [address] )  Assessment & Plan  · Patient NPO  · Lopressor 2 5 mg IV q 6 hours  · Eliquis on hold, will place on heparin for DVT prophylaxis for now    Symptomatic anemia  Assessment & Plan  · Patient was evaluated by PCP secondary to dizziness and fatigue and has blood pressure done as an outpatient  His hemoglobin was found to be 6 2 and was sent to the emergency room  · On the day of admission hemoglobin in the emergency room was 7 1 and received 2 units of packed RBCs  · HGB stable 10-11 for now  · EGD showed gastric ulcers nonbleeding at this time  · Continue Protonix 40 mg IV b i d       Morbid obesity ([facility] [address] )  Assessment & Plan  · TLC as an outpatient    Hyperlipidemia  Assessment & Plan  · Hold atorvastatin while NPO    Essential hypertension  Assessment & Plan  · Continue metoprolol iv while NPO      Coronary artery disease  Assessment & Plan  · Patient with known history of coronary artery disease status post CABG in 1997 in [facility]  · Patient is on beta-blocker aspirin and statin      Chronic kidney disease, stage 3 ([address] )  Assessment & Plan  · Baseline creatinine appears to be between 1 4 and 1 6  Questionable new higher baseline   Avoid hypotension and nephrotoxic agents    Gentle hydration for now with tpn since npo    Acute on chronic diastolic congestive heart failure (HCC)  Assessment & Plan  Wt Readings from Last 3 Encounters:   20 101 kg (222 lb 10 6 oz)     · Patient with known history of chronic congestive heart failure and was admitted to [facility] in January  · Lasix currently on hold as per Nephrology due to worsening renal function  Continue closely monitor for now  · Ejection fraction 65% during the last echo   · Cont tpn                VTE Pharmacologic Prophylaxis:   Pharmacologic: Heparin  Mechanical VTE Prophylaxis in Place: Yes    Patient Centered Rounds: I have performed bedside rounds with nursing staff today  Discussions with Specialists or Other Care Team Provider:  Discussed with surgery and GI    Education and Discussions with Family / Patient:  Discussed with patient at bedside and will update the family today    Time Spent for Care: 30 minutes  More than 50% of total time spent on counseling and coordination of care as described above  Current Length of Stay: 21 day(s)    Current Patient Status: Inpatient   Certification Statement: The patient will continue to require additional inpatient hospital stay due to Bowel obstruction    Discharge Plan:  Pending progress  Probably will require SNF placement once medically cleared    Code Status: Level 1 - Full Code      Subjective:   Patient denies any chest pain or shortness of breath or abdominal pain today  He states he feels a little better today  Still having high NG outputs  Objective:     Vitals:   Temp (24hrs), Av °F (36 7 °C), Min:97 8 °F (36 6 °C), Max:98 2 °F (36 8 °C)    Temp:  [97 8 °F (36 6 °C)-98 2 °F (36 8 °C)] 97 8 °F (36 6 °C)  HR:  [74-90] 75  Resp:  [18-27] 18  BP: (112-140)/(54-68) 123/63  SpO2:  [90 %-100 %] 90 %  Body mass index is 37 63 kg/m²  Input and Output Summary (last 24 hours):        Intake/Output Summary (Last 24 hours) at 2020 1233  Last data filed at 2020 1155  Gross per 24 hour   Intake 2080 ml Output 2100 ml   Net -20 ml       Physical Exam:     Physical Exam   Constitutional: He appears well-developed and well-nourished  HENT:   Head: Normocephalic and atraumatic  Right Ear: External ear normal    Left Ear: External ear normal    Mouth/Throat: Oropharynx is clear and moist    NG tube present   Eyes: Conjunctivae and EOM are normal    Neck: Normal range of motion  Neck supple  Cardiovascular: Normal rate, regular rhythm and normal heart sounds  Pulmonary/Chest: Effort normal    Moderate air entry bilaterally with mild decreased breath sounds bilateral bases   Abdominal: Soft  Bowel sounds are normal  He exhibits no mass  There is no tenderness  There is no rebound and no guarding  Hypoactive bowel sounds noted with mild abdominal distension noted  Genitourinary:   Genitourinary Comments: deferred   Musculoskeletal: Normal range of motion  He exhibits edema  Neurological: He is alert  He has normal reflexes  Cranial nerves 2-12 are normal   Normal neurological exam   Skin: Skin is warm and dry  No rash noted  Psychiatric: He has a normal mood and affect  Nursing note and vitals reviewed  Additional Data:     Labs:    Results from last 7 days   Lab Units 07/07/20  0437  07/03/20  0523   WBC Thousand/uL 4 41   < > 3 70*   HEMOGLOBIN g/dL 10 8*   < > 9 5*   HEMATOCRIT % 38 2   < > 32 0*   PLATELETS Thousands/uL 189   < > 222   NEUTROS PCT %  --   --  75   LYMPHS PCT %  --   --  15   MONOS PCT %  --   --  7   EOS PCT %  --   --  1    < > = values in this interval not displayed       Results from last 7 days   Lab Units 07/07/20  0437 07/06/20  0523   SODIUM mmol/L 140 143   POTASSIUM mmol/L 3 6 4 1   CHLORIDE mmol/L 107 107   CO2 mmol/L 26 31   BUN mg/dL 41* 35*   CREATININE mg/dL 1 95* 1 76*   ANION GAP mmol/L 7 5   CALCIUM mg/dL 7 9* 7 7*   ALBUMIN g/dL  --  1 9*   TOTAL BILIRUBIN mg/dL  --  0 59   ALK PHOS U/L  --  67   ALT U/L  --  8*   AST U/L  --  20   GLUCOSE RANDOM mg/dL 213* 232*     Results from last 7 days   Lab Units 07/03/20  0958   INR  1 22*     Results from last 7 days   Lab Units 07/07/20  1200 07/07/20  0753 07/07/20  0506 07/06/20  2347 07/06/20  1743 07/06/20  1313 07/06/20  1135 07/06/20  0920 07/06/20  0520 07/05/20  2325 07/05/20  2223 07/05/20  1845   POC GLUCOSE mg/dl 249* 239* 246* 175* 260* 294* 263* 283* 217* 124 106 76                   * I Have Reviewed All Lab Data Listed Above  * Additional Pertinent Lab Tests Reviewed:  [address] Admission Reviewed    Imaging:    Imaging Reports Reviewed Today Include:  CT abdomen pelvis  Imaging Personally Reviewed by Myself Includes:  CT abdomen and pelvis    Recent Cultures (last 7 days):           Last 24 Hours Medication List:     Current Facility-Administered Medications:  Adult TPN (CUSTOM BASE/CUSTOM ELECTROLYTE)  Intravenous Continuous Herminia Cross MD Last Rate: 40 8 mL/hr at 07/06/20 1649   Adult TPN (CUSTOM BASE/CUSTOM ELECTROLYTE)  Intravenous Continuous Herminia Cross MD    fluconazole 100 mg Intravenous Q24H Herminia Cross MD Last Rate: 100 mg (07/06/20 1617)   heparin (porcine) 5,000 Units Subcutaneous Q8H [address] Herminia Cross MD    insulin glargine 15 Units Subcutaneous Once Herminia Cross MD    [START ON 7/8/2020] insulin glargine 20 Units Subcutaneous Daily Herminia Cross MD    insulin lispro 2-12 Units Subcutaneous Q6H Herminia Cross MD    levalbuterol 0 63 mg Nebulization Q8H PRN Herminia Cross MD    metoclopramide 10 mg Intravenous Q6H [address] Vernell Ramires DO    metoprolol 2 5 mg Intravenous Q6H Cintia Sandoval MD    morphine injection 2 mg Intravenous Q4H PRN Herminia Cross MD    ondansetron 4 mg Intravenous Once Anusha Moyax, BRANDON    ondansetron 4 mg Intravenous Q6H PRN Cintia Sandoval MD    pantoprazole 40 mg Intravenous Q12H [address] Gissell Springer DO    phenol 1 spray Mouth/Throat Q2H PRN Vernell Ramires DO    saliva substitute 5 spray Mouth/Throat 4x Daily PRN Tanja Vazquez PA-C    sucralfate 1,000 mg Oral BID Khushi Smith DO         Today, Patient Was Seen By: Charisse Donohue MD    ** Please Note: Dictation voice to text software may have been used in the creation of this document   **

## 2020-07-07 NOTE — ASSESSMENT & PLAN NOTE
Wt Readings from Last 3 Encounters:   07/07/20 101 kg (222 lb 10 6 oz)     · Patient with known history of chronic congestive heart failure and was admitted to [facility] in January  · Lasix currently on hold as per Nephrology due to worsening renal function  Continue closely monitor for now    · Ejection fraction 65% during the last echo 1/20  · Cont tpn

## 2020-07-07 NOTE — PLAN OF CARE
Problem: Potential for Falls  Goal: Patient will remain free of falls  Description  INTERVENTIONS:  - Assess patient frequently for physical needs  -  Identify cognitive and physical deficits and behaviors that affect risk of falls    -  [place] fall precautions as indicated by assessment   - Educate patient/family on patient safety including physical limitations  - Instruct patient to call for assistance with activity based on assessment  - Modify environment to reduce risk of injury  - Consider OT/PT consult to assist with strengthening/mobility  Outcome: Progressing     Problem: CARDIOVASCULAR - ADULT  Goal: Maintains optimal cardiac output and hemodynamic stability  Description  INTERVENTIONS:  - Monitor I/O, vital signs and rhythm  - Monitor for S/S and trends of decreased cardiac output  - Administer and titrate ordered vasoactive medications to optimize hemodynamic stability  - Assess quality of pulses, skin color and temperature  - Assess for signs of decreased coronary artery perfusion  - Instruct patient to report change in severity of symptoms  Outcome: Progressing  Goal: Absence of cardiac dysrhythmias or at baseline rhythm  Description  INTERVENTIONS:  - Continuous cardiac monitoring, vital signs, obtain 12 lead EKG if ordered  - Administer antiarrhythmic and heart rate control medications as ordered  - Monitor electrolytes and administer replacement therapy as ordered  Outcome: Progressing     Problem: METABOLIC, FLUID AND ELECTROLYTES - ADULT  Goal: Glucose maintained within target range  Description  INTERVENTIONS:  - Monitor Blood Glucose as ordered  - Assess for signs and symptoms of hyperglycemia and hypoglycemia  - Administer ordered medications to maintain glucose within target range  - Assess nutritional intake and initiate nutrition service referral as needed  Outcome: Progressing     Problem: SKIN/TISSUE INTEGRITY - ADULT  Goal: Skin integrity remains intact  Description  INTERVENTIONS  - Identify patients at risk for skin breakdown  - Assess and monitor skin integrity  - Assess and monitor nutrition and hydration status  - Monitor labs (i e  albumin)  - Assess for incontinence   - Turn and reposition patient  - Assist with mobility/ambulation  - Relieve pressure over bony prominences  - Avoid friction and shearing  - Provide appropriate hygiene as needed including keeping skin clean and dry  - Evaluate need for skin moisturizer/barrier cream  - Collaborate with interdisciplinary team (i e  Nutrition, Rehabilitation, etc )   - Patient/family teaching  Outcome: Progressing     Problem: HEMATOLOGIC - ADULT  Goal: Maintains hematologic stability  Description  INTERVENTIONS  - Assess for signs and symptoms of bleeding or hemorrhage  - Monitor labs  - Administer supportive blood products/factors as ordered and appropriate  Outcome: Progressing     Problem: MUSCULOSKELETAL - ADULT  Goal: Maintain or return mobility to safest level of function  Description  INTERVENTIONS:  - Assess patient's ability to carry out ADLs; assess patient's baseline for ADL function and identify physical deficits which impact ability to perform ADLs (bathing, care of mouth/teeth, toileting, grooming, dressing, etc )  - Assess/evaluate cause of self-care deficits   - Assess range of motion  - Assess patient's mobility/assist x 2 with roller walker  - Assess patient's need for assistive devices and provide as appropriate  - Encourage maximum independence but intervene and supervise when necessary  - Involve family in performance of ADLs  - Assess for home care needs following discharge   - Consider OT consult to assist with ADL evaluation and planning for discharge  - Provide patient education as appropriate   Outcome: Progressing     Problem: Prexisting or High Potential for Compromised Skin Integrity  Goal: Skin integrity is maintained or improved  Description  INTERVENTIONS:  - Identify patients at risk for skin breakdown  - Assess and monitor skin integrity  - Assess and monitor nutrition and hydration status  - Monitor labs   - Assess for incontinence   - Turn and reposition patient  - Assist with mobility/ambulation  - Relieve pressure over bony prominences  - Avoid friction and shearing  - Provide appropriate hygiene as needed including keeping skin clean and dry  - Evaluate need for skin moisturizer/barrier cream  - Collaborate with interdisciplinary team   - Patient/family teaching  - Consider wound care consult   Outcome: Progressing     Problem: Nutrition/Hydration-ADULT  Goal: Nutrient/Hydration intake appropriate for improving, restoring or maintaining nutritional needs  Description  Monitor and assess patient's nutrition/hydration status for malnutrition  Collaborate with interdisciplinary team and initiate plan and interventions as ordered  Monitor patient's weight and dietary intake as ordered or per policy  Utilize nutrition screening tool and intervene as necessary  Determine patient's food preferences and provide high-protein, high-caloric foods as appropriate       INTERVENTIONS:  - Monitor oral intake, urinary output, labs, and treatment plans  - Assess nutrition and hydration status and recommend course of action  - Evaluate amount of meals eaten  - Assist patient with eating if necessary   - Allow adequate time for meals  - Recommend/ encourage appropriate diets, oral nutritional supplements, and vitamin/mineral supplements  - Order, calculate, and assess calorie counts as needed  Offer small frequentmeals  - Assess need for intravenous fluids  - Provide specific nutrition/hydration education as appropriate  - Include patient/family/caregiver in decisions related to nutrition   Outcome: Progressing

## 2020-07-07 NOTE — ASSESSMENT & PLAN NOTE
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  Now postop day 18 right hemicolectomy    Discussed with the patient to arrange oncology follow-up as an outpatient:

## 2020-07-07 NOTE — ASSESSMENT & PLAN NOTE
· On EGD 6/17/2020, there was concern for possible bowel obstruction vs severe gastroparesis, given large amount of food and dark bilious fluid in stomach which could not be cleared  · Status post OR started-Exploratory laparotomy with right hemicolectomy postop day 17  · CT abdomen pelvis repeated and showed 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  NG-tube placed with suction  NPO  TPN started  Appreciate Nutrition consult  · Patient still as high NG outputs  Seen by GI and no duodenal obstruction noted by diverticulum  · CT of the abdomen pelvis done and discussed with surgery  · The patient continues to have persistent ileus/clinical bowel obstruction may need a J-tube placement    · Continue NG suction for now

## 2020-07-07 NOTE — ASSESSMENT & PLAN NOTE
· Patient with known history of coronary artery disease status post CABG in 1997 in [facility]  · Patient is on beta-blocker aspirin and statin

## 2020-07-08 NOTE — PHYSICAL THERAPY NOTE
PHYSICAL THERAPY REFUSAL NOTE          Patient Name: Pretty WARDQALLEYLA Date: 7/8/2020     Chart reviewed  Spoke with patient  Attempted to see patient for PT treatment and update POC  Patient politely declined reporting he was not feeling well this date  Declined despite education  Further declined therapeutic exercise to facilitate increased strength for mobility  Stated he would try tomorrow  Will continue to follow and see patient as medically appropriate at a later time       Toshia Mccann, PT,DPT

## 2020-07-08 NOTE — ASSESSMENT & PLAN NOTE
· Most likely secondary to nonoliguric ATN   Patient's baseline creatinine is 1 5-1 6  creatinine was up to 2 3  Now is back down to 1 9  · Diuretic as per nephrology  · Patient currently NPO  may have a few ice chips if requested by patient  · Patient is currently on custom TPN     Electrolytes acceptable

## 2020-07-08 NOTE — PROGRESS NOTES
Progress Note - Nephrology   Marcelyn Aase 80 y o  male MRN: [id number]  Unit/Bed#: -01 Encounter: [id number]    A/P:  1  Acute kidney injury  Creatinine ranging between 1 8-1 9  Currently 1 95 mg/dL today  Nonoliguric 100/1075 (-4415 since admission)  Received furosemide 20mg IV x 1 on 7/6  Does not need furosemide today unless urine output declines  Weights stable  Has significant NG output via the NG tube to suction  2  Chronic kidney disease stage 3  Baseline creatinine on records review 1 3-1 6 mg/dl  3  Metabolic alkalosis  Stable with a bicarbonate of 30 today  4  Hypokalemia  Resolved - 3 8 today  5  HFpEF  EF 65% and appears compensated          Follow up reason for today's visit:  Acute kidney injury/CKD 3    Duodenal obstruction    Patient Active Problem List   Diagnosis    Acute on chronic diastolic congestive heart failure (HCC)    Chronic kidney disease, stage 3 ([facility] [address] )    Coronary artery disease    Diabetes mellitus type 2 with complications ([facility] )    Essential hypertension    Hyperlipidemia    Morbid obesity ([facility] )    Symptomatic anemia    Paroxysmal atrial fibrillation (HCC)    Duodenal obstruction    Abnormal CT of the abdomen    Duodenal diverticulum    Colon  neoplasm    Acute renal failure (ARF) (HCC)    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HCC)    Anasarca         Subjective:   Complaining of severe thirst   Denies chest pain shortness of breath nausea vomiting  Receiving TPN and NG tube is in place    Objective:     Vitals: Blood pressure 110/65, pulse 91, temperature (!) 97 2 °F (36 2 °C), resp  rate 16, height 5' 4 5" (1 638 m), weight 101 kg (222 lb 14 2 oz), SpO2 (!) 89 %  ,Body mass index is 37 67 kg/m²      Weight (last 2 days)     Date/Time   Weight    07/08/20 0545   101 (222 89)    07/07/20 0600   101 (222 66)    07/06/20 1316   101 (223 55)    07/06/20 0500   101 (223 55)                Intake/Output Summary (Last 24 hours) at 7/8/2020 0912  Last data filed at 7/8/2020 0845  Gross per 24 hour   Intake 200 ml   Output 1075 ml   Net -875 ml     I/O last 3 completed shifts: In: 350 [NG/GT:250; IV Piggyback:100]  Out: 2775 [Urine:825; Emesis/NG output:1950]         Physical Exam: /65   Pulse 91   Temp (!) 97 2 °F (36 2 °C)   Resp 16   Ht 5' 4 5" (1 638 m)   Wt 101 kg (222 lb 14 2 oz)   SpO2 (!) 89%   BMI 37 67 kg/m²     General Appearance:    Alert, cooperative, in distress, appears stated age   Head:    Normocephalic, without obvious abnormality, atraumatic   Eyes:    Conjunctiva/corneas clear   Ears:    Normal external ears   Nose:   Nares normal, septum midline, mucosa normal, no drainage    or sinus tenderness   Throat:   Lips, mucosa, and tongue normal; teeth and gums normal   Neck:   Supple, symmetrical, trachea midline, no adenopathy;        thyroid:  No enlargement/tenderness/nodules; no carotid    bruit or JVD   Back:     Symmetric, no curvature, ROM normal, no CVA tenderness   Lungs:     Decreased to auscultation bilaterally, respirations unlabored   Chest wall:    No tenderness or deformity   Heart:    Regular rate and rhythm, S1 and S2 normal, no murmur, rub   or gallop   Abdomen:     Soft, non-tender, bowel sounds active   Extremities:   Extremities normal, atraumatic, no cyanosis trace edema   Skin:   Skin color, texture, turgor normal, no rashes or lesions   Lymph nodes:   Cervical normal   Neurologic:   CNII-XII intact            Lab, Imaging and other studies: I have personally reviewed pertinent labs  CBC: No results found for: WBC, HGB, HCT, MCV, PLT, ADJUSTEDWBC, MCH, MCHC, RDW, MPV, NRBC  CMP:   Lab Results   Component Value Date    K 3 8 07/08/2020     07/08/2020    CO2 30 07/08/2020    BUN 41 (H) 07/08/2020    CREATININE 1 95 (H) 07/08/2020    CALCIUM 8 1 (L) 07/08/2020    AST 25 07/08/2020    ALT 12 07/08/2020    ALKPHOS 73 07/08/2020    EGFR 30 07/08/2020           Results from last 7 days   Lab Units 07/08/20  0443 07/07/20  3635 07/06/20  0523  07/04/20  0510   POTASSIUM mmol/L 3 8 3 6 4 1   < > 4 2   CHLORIDE mmol/L 108 107 107   < > 105   CO2 mmol/L 30 26 31   < > 29   BUN mg/dL 41* 41* 35*   < > 38*   CREATININE mg/dL 1 95* 1 95* 1 76*   < > 2 30*   CALCIUM mg/dL 8 1* 7 9* 7 7*   < > 7 5*   ALK PHOS U/L 73  --  67  --  59   ALT U/L 12  --  8*  --  12   AST U/L 25  --  20  --  10    < > = values in this interval not displayed  Phosphorus:   Lab Results   Component Value Date    PHOS 3 2 07/08/2020     Magnesium: No results found for: MG  Urinalysis: No results found for: [place] Fraction, SPECGRAV, PHUR, LEUKOCYTESUR, NITRITE, PROTEINUA, GLUCOSEU, KETONESU, BILIRUBINUR, BLOODU  Ionized Calcium: No results found for: CAION  Coagulation: No results found for: PT, INR, APTT  Troponin: No results found for: TROPONINI  ABG: No results found for: PHART, ICW2ZXN, PO2ART, NDB8DUC, Y5HIGYPA, BEART, SOURCE  Radiology review:     IMAGING  Procedure: Ct Abdomen Pelvis Wo Contrast    Result Date: 7/6/2020  Narrative: CT ABDOMEN AND PELVIS WITHOUT IV CONTRAST INDICATION:   Abdominal pain, acute, nonlocalized  COMPARISON:  CT abdomen/pelvis 7/2/2020  TECHNIQUE:  CT examination of the abdomen and pelvis was performed without intravenous contrast   Axial, sagittal, and coronal 2D reformatted images were created from the source data and submitted for interpretation  Radiation dose length product (DLP) for this visit:  859 83 mGy-cm   This examination, like all CT scans performed in the [facility], was performed utilizing techniques to minimize radiation dose exposure, including the use of iterative  reconstruction and automated exposure control  Enteric contrast was administered  FINDINGS: ABDOMEN LOWER CHEST:  Small bilateral pleural effusions and compressive atelectasis  LIVER/BILIARY TREE:  Unremarkable  GALLBLADDER:  Cholelithiasis  When compared to 7/2/2020    There are foci of air throughout the gallbladder and nondependent gallbladder fundus  SPLEEN:  Unremarkable  PANCREAS:  Unremarkable  ADRENAL GLANDS:  Unremarkable  KIDNEYS/URETERS:  Unremarkable  No hydronephrosis  STOMACH AND BOWEL:  Right hemicolectomy with ileocolic anastomosis  Oral contrast distends the 1st and 2nd portion of the duodenum with relative collapse more distally  Some contrast is seen within the additional  APPENDIX:  No findings to suggest appendicitis  ABDOMINOPELVIC CAVITY:  Moderate ascites  No pneumoperitoneum  Reactive right mesenteric adenopathy    VESSELS:  Atherosclerotic changes are present  No evidence of aneurysm  PELVIS REPRODUCTIVE ORGANS:  Unremarkable for patient's age  URINARY BLADDER:  A small amount of layering hyperdensity may indicate small bladder calculi or a small amount of clot  ABDOMINAL WALL/INGUINAL REGIONS:  Status post median laparotomy OSSEOUS STRUCTURES:  No acute fracture or destructive osseous lesion  Postoperative L4 S1 fusion  Impression: New appearance of air within the gallbladder which was not present on CT abdomen/pelvis of 7/2/2020  In the absence of findings of sepsis and given lack of apparent gallbladder wall thickening or air within the gallbladder wall, findings are felt to likely represent postprocedural changes following patient's recent endoscopy  A less likely differential that should be excluded is emphysematous cholecystitis  No evidence of small bowel obstruction  Persistent dilatation of the stomach and 1st and 2nd portions of the duodenum with some transit of contrast to the jejunum  Presence of duodenal disease was excluded on recently performed endoscopy   Workstation performed: [id number]       Current Facility-Administered Medications   Medication Dose Route Frequency    Adult 3-in-1 TPN (custom base / custom electrolytes)   Intravenous Continuous    fluconazole (DIFLUCAN) (LOW DOSE) IVPB 100 mg  100 mg Intravenous Q24H    heparin (porcine) subcutaneous injection 5,000 Units  5,000 Units Subcutaneous Q8H [facility]    insulin glargine (LANTUS) subcutaneous injection 15 Units 0 15 mL  15 Units Subcutaneous Q12H [facility]    insulin lispro (HumaLOG) 100 units/mL subcutaneous injection 2-12 Units  2-12 Units Subcutaneous Q6H    metoclopramide (REGLAN) injection 10 mg  10 mg Intravenous Q6H [facility]    metoprolol (LOPRESSOR) injection 2 5 mg  2 5 mg Intravenous Q6H    morphine injection 2 mg  2 mg Intravenous Q4H PRN    ondansetron (ZOFRAN) injection 4 mg  4 mg Intravenous Once    ondansetron (ZOFRAN) injection 4 mg  4 mg Intravenous Q6H PRN    pantoprazole (PROTONIX) injection 40 mg  40 mg Intravenous Q12H [facility]    phenol (CHLORASEPTIC) 1 4 % mucosal liquid 1 spray  1 spray Mouth/Throat Q2H PRN    saliva substitute (MOUTH KOTE) mucosal solution 5 spray  5 spray Mouth/Throat 4x Daily    sucralfate (CARAFATE) oral suspension 1,000 mg  1,000 mg Oral BID     Medications Discontinued During This Encounter   Medication Reason    Aspirin Buf,CaCarb-MgCarb-MgO, 81 MG TABS     simvastatin (ZOCOR) 80 mg tablet     pravastatin (PRAVACHOL) tablet 40 mg     metoprolol succinate (TOPROL-XL) 24 hr tablet 25 mg     metoprolol (LOPRESSOR) injection 2 5 mg     HYDROcodone-acetaminophen (NORCO) 5-325 mg per tablet 1 tablet     acetaminophen (TYLENOL) tablet 650 mg     loratadine (CLARITIN) tablet 10 mg     polyethylene glycol (GLYCOLAX) bowel prep 238 g     insulin lispro (HumaLOG) 100 units/mL subcutaneous injection 1-5 Units     insulin lispro (HumaLOG) 100 units/mL subcutaneous injection 1-5 Units     morphine injection 2 mg     bisacodyl (DULCOLAX) EC tablet 10 mg     morphine injection 2 mg     sodium chloride 0 9 % irrigation solution Patient Discharge    bupivacaine liposomal (EXPAREL) 1 3 % injection 20 mL     ciprofloxacin (CIPRO) IVPB (premix) 400 mg 200 mL     metroNIDAZOLE (FLAGYL) IVPB (premix) 500 mg 100 mL     morphine (PF) 4 mg/mL injection 4 mg     HYDROmorphone (DILAUDID) injection 0 2 mg  fentaNYL (SUBLIMAZE) injection 25 mcg     sodium chloride 0 9 % infusion     guaiFENesin (MUCINEX) 12 hr tablet 600 mg     heparin (porcine) subcutaneous injection 5,000 Units     morphine injection 2 mg     oxyCODONE (ROXICODONE) IR tablet 5 mg     insulin lispro (HumaLOG) 100 units/mL subcutaneous injection 1-5 Units     insulin lispro (HumaLOG) 100 units/mL subcutaneous injection 1-5 Units     insulin glargine (LANTUS) subcutaneous injection 5 Units 0 05 mL     metoprolol (LOPRESSOR) injection 2 5 mg     furosemide (LASIX) injection 40 mg     insulin lispro (HumaLOG) 100 units/mL subcutaneous injection 5 Units     insulin glargine (LANTUS) subcutaneous injection 10 Units 0 1 mL     ondansetron (ZOFRAN) injection 4 mg     metolazone (ZAROXOLYN) tablet 5 mg     insulin lispro (HumaLOG) 100 units/mL subcutaneous injection 7 Units     insulin glargine (LANTUS) subcutaneous injection 12 Units 0 12 mL     insulin glargine (LANTUS) subcutaneous injection 10 Units 0 1 mL     insulin lispro (HumaLOG) 100 units/mL subcutaneous injection 5 Units     insulin glargine (LANTUS) subcutaneous injection 15 Units 0 15 mL     ondansetron (ZOFRAN) injection 4 mg     furosemide (LASIX) injection 60 mg     morphine (PF) 4 mg/mL injection 4 mg     furosemide (LASIX) injection 40 mg     furosemide (LASIX) tablet 80 mg     potassium chloride (K-DUR,KLOR-CON) CR tablet 40 mEq     furosemide (LASIX) tablet 40 mg     insulin lispro (HumaLOG) 100 units/mL subcutaneous injection 7 Units     insulin glargine (LANTUS) subcutaneous injection 17 Units 0 17 mL     apixaban (ELIQUIS) tablet 2 5 mg     acetaminophen (TYLENOL) tablet 650 mg     aspirin (ECOTRIN LOW STRENGTH) EC tablet 81 mg     atorvastatin (LIPITOR) tablet 40 mg     oxyCODONE (ROXICODONE) IR tablet 5 mg     metoprolol succinate (TOPROL-XL) 24 hr tablet 25 mg     pantoprazole (PROTONIX) injection 40 mg Duplicate order    Adult 3-in-1 TPN (standard base / standard electrolytes)     furosemide (LASIX) injection 40 mg     calcium gluconate 1 g in sodium chloride 0 9% 50 mL (premix)     heparin (ACS LOW)     Adult 3-in-1 TPN (custom base / standard electrolytes)     heparin (porcine) subcutaneous injection 5,000 Units     insulin glargine (LANTUS) subcutaneous injection 10 Units 0 1 mL     insulin lispro (HumaLOG) 100 units/mL subcutaneous injection 1-5 Units     insulin glargine (LANTUS) subcutaneous injection 15 Units 0 15 mL     heparin (porcine) 25,000 units in 250 mL infusion (premix)     insulin lispro (HumaLOG) 100 units/mL subcutaneous injection 1-5 Units     insulin lispro (HumaLOG) 100 units/mL subcutaneous injection 16 Units     Adult 3-in-1 TPN (custom base / standard electrolytes)     insulin lispro (HumaLOG) 100 units/mL subcutaneous injection 1-6 Units     insulin lispro (HumaLOG) 100 units/mL subcutaneous injection 1-6 Units     furosemide (LASIX) injection 40 mg     enoxaparin (LOVENOX) subcutaneous injection 30 mg     insulin glargine (LANTUS) subcutaneous injection 20 Units 0 2 mL     multi-electrolyte (PLASMALYTE-A/ISOLYTE-S PH 7 4) IV solution     dextrose 5 % and sodium chloride 0 45 % infusion     Adult 3-in-1 TPN (custom base / custom electrolytes)     insulin glargine (LANTUS) subcutaneous injection 25 Units 0 25 mL     insulin lispro (HumaLOG) 100 units/mL subcutaneous injection 5 Units     insulin lispro (HumaLOG) 100 units/mL subcutaneous injection 8 Units     multi-electrolyte (PLASMALYTE-A/ISOLYTE-S PH 7 4) IV solution     alteplase (CATHFLO) injection 2 mg     alteplase (CATHFLO) injection 2 mg     insulin glargine (LANTUS) subcutaneous injection 30 Units 0 3 mL     insulin lispro (HumaLOG) 100 units/mL subcutaneous injection 10 Units     insulin glargine (LANTUS) subcutaneous injection 20 Units 0 2 mL     insulin lispro (HumaLOG) 100 units/mL subcutaneous injection 6 Units     morphine injection 1 mg     insulin lispro (HumaLOG) 100 units/mL subcutaneous injection 2-12 Units     HYDROmorphone (DILAUDID) injection 0 2 mg     Adult 3-in-1 TPN (custom base / custom electrolytes)     insulin lispro (HumaLOG) 100 units/mL subcutaneous injection 1-6 Units     Adult 3-in-1 TPN (custom base / custom electrolytes) Reorder    sodium chloride infusion 0 45 %     insulin glargine (LANTUS) subcutaneous injection 10 Units 0 1 mL     insulin glargine (LANTUS) subcutaneous injection 20 Units 0 2 mL     levalbuterol (XOPENEX) inhalation solution 0 63 mg     saliva substitute (MOUTH KOTE) mucosal solution 5 spray     insulin glargine (LANTUS) subcutaneous injection 25 Units 0 25 mL     Adult 3-in-1 TPN (custom base / custom electrolytes)     insulin lispro (HumaLOG) 100 units/mL subcutaneous injection 3 Units        Kristen Dan MD      This progress note was produced in part using a dictation device which may document imprecise wording from author's original intent

## 2020-07-08 NOTE — ASSESSMENT & PLAN NOTE
· Baseline creatinine appears to be between 1 4 and 1 6  Questionable new higher baseline   Avoid hypotension and nephrotoxic agents

## 2020-07-08 NOTE — ASSESSMENT & PLAN NOTE
Wt Readings from Last 3 Encounters:   07/08/20 101 kg (222 lb 14 2 oz)     · Patient with known history of chronic congestive heart failure and was admitted to [facility] in January  · Lasix currently on hold as per Nephrology due to worsening renal function  Continue closely monitor for now    · Ejection fraction 65% during the last echo 1/20  · Cont tpn

## 2020-07-08 NOTE — PROGRESS NOTES
Progress Note - Nelwsoham Dry 1935, 80 y o  male MRN: [id number]    Unit/Bed#: -01 Encounter: [id number]    Primary Care Provider: To Rosenbaum DO   Date and time admitted to hospital: 6/16/2020 11:14 AM        * Duodenal obstruction  Assessment & Plan  · On EGD 6/17/2020, there was concern for possible bowel obstruction vs severe gastroparesis, given large amount of food and dark bilious fluid in stomach which could not be cleared  · Status post OR started-Exploratory laparotomy with right hemicolectomy postop day 17  · CT abdomen pelvis repeated and showed 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  NG-tube placed with suction  NPO  TPN started  Appreciate Nutrition consult  · Patient still as high NG outputs  Seen by GI and no duodenal obstruction noted by diverticulum  · CT of the abdomen pelvis done and discussed with surgery  · The patient continues to have persistent ileus/clinical bowel obstruction may need a J-tube placement  · Continue NG suction for now    Diabetes mellitus type 2 with complications [facility])  Assessment & Plan  Lab Results   Component Value Date    HGBA1C 8 2 (H) 06/18/2020       Recent Labs     07/07/20  2127 07/07/20  2341 07/08/20  0144 07/08/20  0523   POCGLU 129 87 116 145*       Blood Sugar Average: Last 72 hrs:  (P) 149 [id number] type 2 diabetes mellitus on oral hypoglycemic agent at home  · Hold metformin and glipizide in the hospital  · Insulin sliding scale,  Patient's blood sugar are better controlled now  Continue custom TPN with 20% dextrose     Continue insulin sliding scale q 6 hours and Lantus 15 units twice daily    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HCC)  Assessment & Plan  ·  s/p exploratory laparotomy with right hemicolectomy for 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  · Need outpatient Oncology follow-up        Acute renal failure (ARF) ([facility] [address] )  Assessment & Plan  · Most likely secondary to nonoliguric ATN   Patient's baseline creatinine is 1 5-1 6  creatinine was up to 2 3  Now is back down to 1 9  · Diuretic as per nephrology  · Patient currently NPO  may have a few ice chips if requested by patient  · Patient is currently on custom TPN   Electrolytes acceptable      Paroxysmal atrial fibrillation (HCC)  Assessment & Plan  · Patient NPO  · Lopressor 2 5 mg IV q 6 hours  · Eliquis on hold, will place on heparin for DVT prophylaxis for now    Symptomatic anemia  Assessment & Plan  · Patient was evaluated by PCP secondary to dizziness and fatigue and has blood pressure done as an outpatient  His hemoglobin was found to be 6 2 and was sent to the emergency room  · On the day of admission hemoglobin in the emergency room was 7 1 and received 2 units of packed RBCs  · HGB stable 10-11 for now  · EGD showed gastric ulcers nonbleeding at this time  · Continue Protonix 40 mg IV b i d       Morbid obesity ([facility] [address] )  Assessment & Plan  · TLC as an outpatient    Hyperlipidemia  Assessment & Plan  · Hold atorvastatin while NPO    Essential hypertension  Assessment & Plan  · Continue metoprolol iv while NPO      Chronic kidney disease, stage 3 ([facility] [address] )  Assessment & Plan  · Baseline creatinine appears to be between 1 4 and 1 6  Questionable new higher baseline   Avoid hypotension and nephrotoxic agents  Acute on chronic diastolic congestive heart failure (HCC)  Assessment & Plan  Wt Readings from Last 3 Encounters:   07/08/20 101 kg (222 lb 14 2 oz)     · Patient with known history of chronic congestive heart failure and was admitted to [facility] in [facility]  · Lasix currently on hold as per Nephrology due to worsening renal function  Continue closely monitor for now    · Ejection fraction 65% during the last echo 1/20  · Cont tpn                VTE Pharmacologic Prophylaxis:   Pharmacologic: Heparin  Mechanical VTE Prophylaxis in Place: Yes    Patient Centered Rounds: I have performed bedside rounds with nursing staff today  Discussions with Specialists or Other Care Team Provider:  Discussed with surgery    Education and Discussions with Family / Patient:  Discussed with patient at bedside and also update the POA daily    Time Spent for Care: 30 minutes  More than 50% of total time spent on counseling and coordination of care as described above  Current Length of Stay: 22 day(s)    Current Patient Status: Inpatient   Certification Statement: The patient will continue to require additional inpatient hospital stay due to Recent abdominal surgery and obstruction    Discharge Plan:  Pending progress  Patient will require subacute rehab placement when medically cleared    Code Status: Level 1 - Full Code      Subjective:   Patient is pretty upset today  He states that he is not getting anything to eat or drink and has this tube in his nose which is bothering him  Denies any chest pain or shortness of breath or palpitations  Denies any abdominal pain at this time  He is oriented to person and place at this time    Objective:     Vitals:   Temp (24hrs), Av 8 °F (36 6 °C), Min:97 2 °F (36 2 °C), Max:98 4 °F (36 9 °C)    Temp:  [97 2 °F (36 2 °C)-98 4 °F (36 9 °C)] 97 2 °F (36 2 °C)  HR:  [75-92] 91  Resp:  [16-21] 16  BP: (110-123)/(57-66) 110/65  SpO2:  [89 %-94 %] 89 %  Body mass index is 37 67 kg/m²  Input and Output Summary (last 24 hours): Intake/Output Summary (Last 24 hours) at 2020 1128  Last data filed at 2020 1026  Gross per 24 hour   Intake 200 ml   Output 1175 ml   Net -975 ml       Physical Exam:     Physical Exam   Constitutional: He is oriented to person, place, and time  He appears well-developed and well-nourished  HENT:   Head: Normocephalic and atraumatic     Right Ear: External ear normal    Left Ear: External ear normal    Mouth/Throat: Oropharynx is clear and moist    NG tube present   Eyes: Conjunctivae and EOM are normal    Neck: Normal range of motion  Neck supple  Cardiovascular: Normal rate, regular rhythm and normal heart sounds  Pulmonary/Chest: Effort normal    Moderate air entry bilaterally with mild decreased breath sounds bilateral bases   Abdominal: Soft  He exhibits distension  He exhibits no mass  There is no tenderness  There is no rebound and no guarding  Hypoactive bowel sounds   Genitourinary:   Genitourinary Comments: deferred   Musculoskeletal: Normal range of motion  Neurological: He is alert and oriented to person, place, and time  He has normal reflexes  Cranial nerves 2-12 are normal   Normal neurological exam   Skin: Skin is warm and dry  No rash noted  Psychiatric: He has a normal mood and affect  Nursing note and vitals reviewed  Additional Data:     Labs:    Results from last 7 days   Lab Units 07/07/20  0437  07/03/20  0523   WBC Thousand/uL 4 41   < > 3 70*   HEMOGLOBIN g/dL 10 8*   < > 9 5*   HEMATOCRIT % 38 2   < > 32 0*   PLATELETS Thousands/uL 189   < > 222   NEUTROS PCT %  --   --  75   LYMPHS PCT %  --   --  15   MONOS PCT %  --   --  7   EOS PCT %  --   --  1    < > = values in this interval not displayed  Results from last 7 days   Lab Units 07/08/20  0443   SODIUM mmol/L 144   POTASSIUM mmol/L 3 8   CHLORIDE mmol/L 108   CO2 mmol/L 30   BUN mg/dL 41*   CREATININE mg/dL 1 95*   ANION GAP mmol/L 6   CALCIUM mg/dL 8 1*   ALBUMIN g/dL 1 9*   TOTAL BILIRUBIN mg/dL 0 61   ALK PHOS U/L 73   ALT U/L 12   AST U/L 25   GLUCOSE RANDOM mg/dL 132     Results from last 7 days   Lab Units 07/03/20  0958   INR  1 22*     Results from last 7 days   Lab Units 07/08/20  0523 07/08/20  0144 07/07/20  2341 07/07/20  2127 07/07/20  1817 07/07/20  1606 07/07/20  1200 07/07/20  0753 07/07/20  0506 07/06/20  2347 07/06/20  1743 07/06/20  1313   POC GLUCOSE mg/dl 145* 116 87 129 209* 302* 249* 239* 246* 175* 260* 294*                   * I Have Reviewed All Lab Data Listed Above    * Additional Pertinent Lab Tests Reviewed: [facility]    Imaging:    Imaging Reports Reviewed Today Include:  None  Imaging Personally Reviewed by Myself Includes:  None    Recent Cultures (last 7 days):           Last 24 Hours Medication List:     Current Facility-Administered Medications:  Adult TPN (CUSTOM BASE/CUSTOM ELECTROLYTE)  Intravenous Continuous Tod Cardenas MD Last Rate: 40 8 mL/hr at 07/07/20 1816   Adult TPN (CUSTOM BASE/CUSTOM ELECTROLYTE)  Intravenous Continuous Tod Cardenas MD    fluconazole 100 mg Intravenous Q24H Tod Cardenas MD Last Rate: 100 mg (07/07/20 1644)   heparin (porcine) 5,000 Units Subcutaneous Q8H [facility] Tod Cardenas MD    insulin glargine 15 Units Subcutaneous Q12H [facility] Tod Cardenas MD    insulin lispro 2-12 Units Subcutaneous Q6H Tod Cardenas MD    metoclopramide 10 mg Intravenous Q6H [facility] Layne Newman DO    metoprolol 2 5 mg Intravenous Q6H Kemal Jack MD    morphine injection 2 mg Intravenous Q4H PRN Tod Cardenas MD    ondansetron 4 mg Intravenous Once Anusha S Rachel, CRNP    ondansetron 4 mg Intravenous Q6H PRN Kemal Jack MD    pantoprazole 40 mg Intravenous Q12H [facility] Gissell Springer, DO    phenol 1 spray Mouth/Throat Q2H PRN Layne Newman DO    saliva substitute 5 spray Mouth/Throat 4x Daily Ngoc Hay, CRNP    sucralfate 1,000 mg Oral BID Layne Newman DO         Today, Patient Was Seen By: Tod Cardenas MD    ** Please Note: Dictation voice to text software may have been used in the creation of this document   **

## 2020-07-08 NOTE — ASSESSMENT & PLAN NOTE
Lab Results   Component Value Date    HGBA1C 8 2 (H) 06/18/2020       Recent Labs     07/07/20  2127 07/07/20  2341 07/08/20  0144 07/08/20  0523   POCGLU 129 87 116 145*       Blood Sugar Average: Last 72 hrs:  (P) 030 [id number] type 2 diabetes mellitus on oral hypoglycemic agent at home  · Hold metformin and glipizide in the hospital  · Insulin sliding scale,  Patient's blood sugar are better controlled now  Continue custom TPN with 20% dextrose     Continue insulin sliding scale q 6 hours and Lantus 15 units twice daily

## 2020-07-08 NOTE — WOUND OSTOMY CARE
Progress Note - Wound   Jovana Mc 80 y o  male MRN: [id number]  Unit/Bed#: -01 Encounter: [id number]      Assessment:   Wound care follow up on patient with HA PI  Patient has a NG tube to low wall suction, and is NPO  Patient is s/p hemicolectomy on 6/20/2020  Patient supine in bed when entered room for assessment  Discussed with RN positioning to right and left and not on pressure area  Midline incision open to air, well approximated, staples had been removed  Patient is difficult to turn onto his side, assist of 2 to turn  Patient c/o pain with repositioning  Patient is on a P-500 low air loss bed  1  Bilateral heels intact  2  Abdominal and groin folds intact  3  Evolving DTI to perirectal area, center of wound bed brown, outer area of wound bed beefy red with adherent yellow slough in the wound bed, outer edge beefy red  Suspect stage 3 or stage 4 when fully evolved  4  Midline abdominal incision, ASHOK, well approximated, no erythema     Plan:   1  Continue with low air loss bed  2  Continue with current wound orders    Wound 06/20/20 Incision Abdomen Left (Active)   Wound Description Intact;Dry 7/8/2020  3:19 PM   Anitha-wound Assessment Clean;Dry; Intact 7/8/2020  8:45 AM   Closure Approximated 7/8/2020  3:19 PM   Drainage Amount None 7/8/2020  3:19 PM   Treatments Site care 6/29/2020  7:46 AM   Dressing Open to air 7/8/2020  8:45 AM   Dressing Status Clean;Dry; Intact 7/7/2020  8:02 AM       Wound 06/23/20 Pressure Injury Buttocks (Active)   Wound Image   7/1/2020  1:57 PM   Wound Description Clean; Intact;Dry 7/8/2020  3:19 PM        Aintha-wound Assessment Dry; Intact 7/2/2020  7:30 AM   Wound Length (cm) 3 cm 7/1/2020  1:57 PM   Wound Width (cm) 1 cm 7/1/2020  1:57 PM   Wound Depth (cm) 0 7/1/2020  1:57 PM   Calculated Wound Area (cm^2) 3 cm^2 7/1/2020  1:57 PM   Calculated Wound Volume (cm^3) 0 cm^3 7/1/2020  1:57 PM   Drainage Amount None 7/2/2020  7:30 AM   Treatments Elevated 7/5/2020  9:00 PM   Dressing Open to air 7/5/2020  9:00 PM   Patient Tolerance Tolerated well 7/1/2020  3:24 PM   Dressing Status Clean;Dry; Intact 7/2/2020  8:13 PM       Wound 06/24/20 Pressure Injury Rectum (Active)   Wound Image   7/8/2020 12:12 PM   Wound Description Beefy red;Fragile; Yellow;Brown;Pink 7/8/2020 12:12 PM   Staging Deep Tissue Injury 7/8/2020 12:12 PM   Anitha-wound Assessment Intact;Dry 7/8/2020 12:12 PM   Wound Length (cm) 2 cm 7/8/2020 12:12 PM   Wound Width (cm) 2 cm 7/8/2020 12:12 PM   Wound Depth (cm) 0 1 7/8/2020 12:12 PM   Calculated Wound Area (cm^2) 4 cm^2 7/8/2020 12:12 PM   Calculated Wound Volume (cm^3) 0 4 cm^3 7/8/2020 12:12 PM   Tunneling 0 cm 7/8/2020 12:12 PM   Undermining 0 7/8/2020 12:12 PM   Drainage Amount Scant 7/8/2020 12:12 PM   Drainage Description Yellow 7/8/2020 12:12 PM   Treatments Cleansed 7/8/2020 12:12 PM   Dressing Other (Comment) 7/8/2020 12:12 PM   Patient Tolerance Tolerated well 7/8/2020 12:12 PM   Dressing Status Clean;Dry; Intact 7/2/2020  8:13 PM     Reviewed plan of care with primary RN Xochitl Holliday  Wound care to follow weekly  Questions or concerns Homero GARCIA, RN

## 2020-07-08 NOTE — PLAN OF CARE
Problem: Potential for Falls  Goal: Patient will remain free of falls  Description  INTERVENTIONS:  - Assess patient frequently for physical needs  -  Identify cognitive and physical deficits and behaviors that affect risk of falls    -  Ailey fall precautions as indicated by assessment   - Educate patient/family on patient safety including physical limitations  - Instruct patient to call for assistance with activity based on assessment  - Modify environment to reduce risk of injury  - Consider OT/PT consult to assist with strengthening/mobility  Outcome: Progressing     Problem: CARDIOVASCULAR - ADULT  Goal: Maintains optimal cardiac output and hemodynamic stability  Description  INTERVENTIONS:  - Monitor I/O, vital signs and rhythm  - Monitor for S/S and trends of decreased cardiac output  - Administer and titrate ordered vasoactive medications to optimize hemodynamic stability  - Assess quality of pulses, skin color and temperature  - Assess for signs of decreased coronary artery perfusion  - Instruct patient to report change in severity of symptoms  Outcome: Progressing  Goal: Absence of cardiac dysrhythmias or at baseline rhythm  Description  INTERVENTIONS:  - Continuous cardiac monitoring, vital signs, obtain 12 lead EKG if ordered  - Administer antiarrhythmic and heart rate control medications as ordered  - Monitor electrolytes and administer replacement therapy as ordered  Outcome: Progressing     Problem: METABOLIC, FLUID AND ELECTROLYTES - ADULT  Goal: Glucose maintained within target range  Description  INTERVENTIONS:  - Monitor Blood Glucose as ordered  - Assess for signs and symptoms of hyperglycemia and hypoglycemia  - Administer ordered medications to maintain glucose within target range  - Assess nutritional intake and initiate nutrition service referral as needed  Outcome: Progressing     Problem: SKIN/TISSUE INTEGRITY - ADULT  Goal: Skin integrity remains intact  Description  INTERVENTIONS  - Identify patients at risk for skin breakdown  - Assess and monitor skin integrity  - Assess and monitor nutrition and hydration status  - Monitor labs (i e  albumin)  - Assess for incontinence   - Turn and reposition patient  - Assist with mobility/ambulation  - Relieve pressure over bony prominences  - Avoid friction and shearing  - Provide appropriate hygiene as needed including keeping skin clean and dry  - Evaluate need for skin moisturizer/barrier cream  - Collaborate with interdisciplinary team (i e  Nutrition, Rehabilitation, etc )   - Patient/family teaching  Outcome: Progressing     Problem: HEMATOLOGIC - ADULT  Goal: Maintains hematologic stability  Description  INTERVENTIONS  - Assess for signs and symptoms of bleeding or hemorrhage  - Monitor labs  - Administer supportive blood products/factors as ordered and appropriate  Outcome: Progressing     Problem: MUSCULOSKELETAL - ADULT  Goal: Maintain or return mobility to safest level of function  Description  INTERVENTIONS:  - Assess patient's ability to carry out ADLs; assess patient's baseline for ADL function and identify physical deficits which impact ability to perform ADLs (bathing, care of mouth/teeth, toileting, grooming, dressing, etc )  - Assess/evaluate cause of self-care deficits   - Assess range of motion  - Assess patient's mobility/assist x 2 with roller walker  - Assess patient's need for assistive devices and provide as appropriate  - Encourage maximum independence but intervene and supervise when necessary  - Involve family in performance of ADLs  - Assess for home care needs following discharge   - Consider OT consult to assist with ADL evaluation and planning for discharge  - Provide patient education as appropriate   Outcome: Progressing     Problem: Prexisting or High Potential for Compromised Skin Integrity  Goal: Skin integrity is maintained or improved  Description  INTERVENTIONS:  - Identify patients at risk for skin breakdown  - Assess and monitor skin integrity  - Assess and monitor nutrition and hydration status  - Monitor labs   - Assess for incontinence   - Turn and reposition patient  - Assist with mobility/ambulation  - Relieve pressure over bony prominences  - Avoid friction and shearing  - Provide appropriate hygiene as needed including keeping skin clean and dry  - Evaluate need for skin moisturizer/barrier cream  - Collaborate with interdisciplinary team   - Patient/family teaching  - Consider wound care consult   Outcome: Progressing     Problem: Nutrition/Hydration-ADULT  Goal: Nutrient/Hydration intake appropriate for improving, restoring or maintaining nutritional needs  Description  Monitor and assess patient's nutrition/hydration status for malnutrition  Collaborate with interdisciplinary team and initiate plan and interventions as ordered  Monitor patient's weight and dietary intake as ordered or per policy  Utilize nutrition screening tool and intervene as necessary  Determine patient's food preferences and provide high-protein, high-caloric foods as appropriate       INTERVENTIONS:  - Monitor oral intake, urinary output, labs, and treatment plans  - Assess nutrition and hydration status and recommend course of action  - Evaluate amount of meals eaten  - Assist patient with eating if necessary   - Allow adequate time for meals  - Recommend/ encourage appropriate diets, oral nutritional supplements, and vitamin/mineral supplements  - Order, calculate, and assess calorie counts as needed  Offer small frequentmeals  - Assess need for intravenous fluids  - Provide specific nutrition/hydration education as appropriate  - Include patient/family/caregiver in decisions related to nutrition   Outcome: Progressing

## 2020-07-08 NOTE — PROGRESS NOTES
Progress Note - General Surgery   Ortiz Adkins 80 y o  male MRN: [id number]  Unit/Bed#: -01 Encounter: [id number]    Assessment:  - s/p R hemicolectomy 6/20 for ulcerated cecal mass  - Pt reports slight improvement of pain  - 24 hour NG output 1075mL 7AM yesterday to 7AM today (500 this AM)  - Pt is agitated regarding inability to have liquids by mouth    Plan:  - Continue NG  Consider clamp trial tomorrow  - IV reglan  - Carafate and PPI  - Continue TPN  - Encourage ambulating OOB  - Monitor I/Os  - Management of medical conditions as per medicine team  - Will speak with Dr Kayode Carlisle regarding further management  Subjective/Objective   Subjective: No acute events overnight  Pt reports pain is slightly better but still exacerbated by moving and being turned in bed  Complains mouth is dry  Bloating still present  Denies fever, chills, CP, SOB  Objective:     Blood pressure 110/65, pulse 91, temperature (!) 97 2 °F (36 2 °C), resp  rate 16, height 5' 4 5" (1 638 m), weight 101 kg (222 lb 14 2 oz), SpO2 (!) 89 %  ,Body mass index is 37 67 kg/m²        Intake/Output Summary (Last 24 hours) at 7/8/2020 0855  Last data filed at 7/8/2020 0744  Gross per 24 hour   Intake 100 ml   Output 1075 ml   Net -975 ml       Invasive Devices     Peripherally Inserted Central Catheter Line            PICC Line 07/02/20 5 days          Drain            NG/OG/Enteral Tube Nasogastric 18 Fr Right nares 5 days                Physical Exam: General appearance: alert and oriented, in no acute distress  Lungs: clear to auscultation bilaterally  Heart: regular rate and rhythm, S1, S2 normal, no murmur, click, rub or gallop  Abdomen: normal findings: no masses palpable, soft, non-tender and no rigidity or guarding, abnormal findings:  distended and hypoactive bowel sounds and surgical incision remains well approximated without erythema, fluctuance, induration, bleeding, or drainage    Lab, Imaging and other studies:  I have personally reviewed pertinent lab results      CMP:   Lab Results   Component Value Date    SODIUM 144 07/08/2020    K 3 8 07/08/2020     07/08/2020    CO2 30 07/08/2020    BUN 41 (H) 07/08/2020    CREATININE 1 95 (H) 07/08/2020    CALCIUM 8 1 (L) 07/08/2020    AST 25 07/08/2020    ALT 12 07/08/2020    ALKPHOS 73 07/08/2020    EGFR 30 07/08/2020     VTE Pharmacologic Prophylaxis: Heparin  VTE Mechanical Prophylaxis: sequential compression device    Flavio Nunez PA-C

## 2020-07-08 NOTE — PLAN OF CARE
Problem: Potential for Falls  Goal: Patient will remain free of falls  Description  INTERVENTIONS:  - Assess patient frequently for physical needs  -  Identify cognitive and physical deficits and behaviors that affect risk of falls    -  [place] fall precautions as indicated by assessment   - Educate patient/family on patient safety including physical limitations  - Instruct patient to call for assistance with activity based on assessment  - Modify environment to reduce risk of injury  - Consider OT/PT consult to assist with strengthening/mobility  Outcome: Progressing     Problem: CARDIOVASCULAR - ADULT  Goal: Maintains optimal cardiac output and hemodynamic stability  Description  INTERVENTIONS:  - Monitor I/O, vital signs and rhythm  - Monitor for S/S and trends of decreased cardiac output  - Administer and titrate ordered vasoactive medications to optimize hemodynamic stability  - Assess quality of pulses, skin color and temperature  - Assess for signs of decreased coronary artery perfusion  - Instruct patient to report change in severity of symptoms  Outcome: Progressing  Goal: Absence of cardiac dysrhythmias or at baseline rhythm  Description  INTERVENTIONS:  - Continuous cardiac monitoring, vital signs, obtain 12 lead EKG if ordered  - Administer antiarrhythmic and heart rate control medications as ordered  - Monitor electrolytes and administer replacement therapy as ordered  Outcome: Progressing     Problem: METABOLIC, FLUID AND ELECTROLYTES - ADULT  Goal: Glucose maintained within target range  Description  INTERVENTIONS:  - Monitor Blood Glucose as ordered  - Assess for signs and symptoms of hyperglycemia and hypoglycemia  - Administer ordered medications to maintain glucose within target range  - Assess nutritional intake and initiate nutrition service referral as needed  Outcome: Progressing     Problem: SKIN/TISSUE INTEGRITY - ADULT  Goal: Skin integrity remains intact  Description  INTERVENTIONS  - Identify patients at risk for skin breakdown  - Assess and monitor skin integrity  - Assess and monitor nutrition and hydration status  - Monitor labs (i e  albumin)  - Assess for incontinence   - Turn and reposition patient  - Assist with mobility/ambulation  - Relieve pressure over bony prominences  - Avoid friction and shearing  - Provide appropriate hygiene as needed including keeping skin clean and dry  - Evaluate need for skin moisturizer/barrier cream  - Collaborate with interdisciplinary team (i e  Nutrition, Rehabilitation, etc )   - Patient/family teaching  Outcome: Progressing     Problem: HEMATOLOGIC - ADULT  Goal: Maintains hematologic stability  Description  INTERVENTIONS  - Assess for signs and symptoms of bleeding or hemorrhage  - Monitor labs  - Administer supportive blood products/factors as ordered and appropriate  Outcome: Progressing     Problem: Prexisting or High Potential for Compromised Skin Integrity  Goal: Skin integrity is maintained or improved  Description  INTERVENTIONS:  - Identify patients at risk for skin breakdown  - Assess and monitor skin integrity  - Assess and monitor nutrition and hydration status  - Monitor labs   - Assess for incontinence   - Turn and reposition patient  - Assist with mobility/ambulation  - Relieve pressure over bony prominences  - Avoid friction and shearing  - Provide appropriate hygiene as needed including keeping skin clean and dry  - Evaluate need for skin moisturizer/barrier cream  - Collaborate with interdisciplinary team   - Patient/family teaching  - Consider wound care consult   Outcome: Progressing     Problem: Nutrition/Hydration-ADULT  Goal: Nutrient/Hydration intake appropriate for improving, restoring or maintaining nutritional needs  Description  Monitor and assess patient's nutrition/hydration status for malnutrition  Collaborate with interdisciplinary team and initiate plan and interventions as ordered    Monitor patient's weight and dietary intake as ordered or per policy  Utilize nutrition screening tool and intervene as necessary  Determine patient's food preferences and provide high-protein, high-caloric foods as appropriate       INTERVENTIONS:  - Monitor oral intake, urinary output, labs, and treatment plans  - Assess nutrition and hydration status and recommend course of action  - Evaluate amount of meals eaten  - Assist patient with eating if necessary   - Allow adequate time for meals  - Recommend/ encourage appropriate diets, oral nutritional supplements, and vitamin/mineral supplements  - Order, calculate, and assess calorie counts as needed  Offer small frequentmeals  - Assess need for intravenous fluids  - Provide specific nutrition/hydration education as appropriate  - Include patient/family/caregiver in decisions related to nutrition   Outcome: Progressing

## 2020-07-09 PROBLEM — B37.81 ESOPHAGEAL CANDIDIASIS (HCC): Status: ACTIVE | Noted: 2020-01-01

## 2020-07-09 NOTE — PLAN OF CARE
Problem: OCCUPATIONAL THERAPY ADULT  Goal: Performs self-care activities at highest level of function for planned discharge setting  See evaluation for individualized goals  Description  Treatment Interventions: ADL retraining, Functional transfer training, UE strengthening/ROM, Endurance training, Cognitive reorientation, Patient/family training, Equipment evaluation/education, Neuromuscular reeducation, Compensatory technique education, Continued evaluation, Energy conservation, Activityengagement          See flowsheet documentation for full assessment, interventions and recommendations  Outcome: Progressing  Note:   Limitation: Decreased ADL status, Decreased UE strength, Decreased Safe judgement during ADL, Decreased cognition, Decreased endurance, Decreased self-care trans, Decreased high-level ADLs  Prognosis: Good, Fair  Assessment: Pt seen for treatment session #4 this date  Pt alert and agreeable to participate at this time  Pt continues to make slow progress towards goals although is significantly limited by decrease activity tolerance, decrease standing balance, decrease performance during ADL tasks, decrease safety awareness , decrease UB MS, increased pain, decrease generalized strength, decrease activity engagement and decrease performance during functional transfers  Pt demonstrated difficulty this date performing thorough posterior pericare hygiene  Pt requiring Min A for toileting tasks  Pt noted to have increased SOB during functional tasks although O2 levels remained in the mid 90's  Pt has good potential to met goals with continued participation in therapy services, daily OOB and ambulation with nursing staff and self-motivation  Pt would benefit from post acute rehab services upon d/c from [address] [place] [address] [place]     Recommendation: (post acute rehab)  OT Discharge Recommendation: Post-Acute Rehabilitation Services

## 2020-07-09 NOTE — CASE MANAGEMENT
Discussed in rounds today s/p hemicolectomy from 6/20  Plan is clamp NGT trial  IV meds/ TPN/ IV antibiotics  Pt needs STR  CM received a call from [facility]  she will be assisting with decisions on dc needs for her grandfather  She is going to review STR options  A post acute care recommendation was made by your care team for STR  Discussed Freedom of Choice with POA  List of facilities given to POA via emailed  POA aware the list is custom filtered for them by zip code location and that [facility] post acute providers are designated  Emailed list to [email or web address]  Geno [employer] will be calling me back with choices  Will continue to follow    Plan: STR

## 2020-07-09 NOTE — PROGRESS NOTES
Progress Note - Jayleen Ontiveros 1935, 80 y o  male MRN: [id number]    Unit/Bed#: -01 Encounter: [id number]    Primary Care Provider: Radha Maynard DO   Date and time admitted to hospital: 6/16/2020 11:14 AM        * Duodenal obstruction  Assessment & Plan  · On EGD 6/17/2020, there was concern for possible bowel obstruction vs severe gastroparesis, given large amount of food and dark bilious fluid in stomach which could not be cleared  · Status post OR started-Exploratory laparotomy with right hemicolectomy postop day 17  · CT abdomen pelvis repeated and showed 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  NG-tube placed with suction  NPO  TPN started  Appreciate Nutrition consult  · Seen by GI and no duodenal obstruction noted by diverticulum  · CT of the abdomen pelvis done and discussed with surgery  · The patient continues to have persistent ileus/clinical bowel obstruction may need a J-tube placement  · Today he is having a trial clamping of the NG tube  Will see how he progresses  Increase activity    Diabetes mellitus type 2 with complications [facility])  Assessment & Plan  Lab Results   Component Value Date    HGBA1C 8 2 (H) 06/18/2020       Recent Labs     07/08/20  1710 07/08/20  2108 07/08/20  2338 07/09/20  0609   POCGLU 221* 154* 206* 215*       Blood Sugar Average: Last 72 hrs:  (P) 211 8 type 2 diabetes mellitus on oral hypoglycemic agent at home  · Hold metformin and glipizide in the hospital  · Insulin sliding scale,  Patient's blood sugar are better controlled now  Continue custom TPN with 20% dextrose  Continue insulin sliding scale q 6 hours and Lantus 18 units twice daily  Humalog 3 units every 6 hours in addition to the sliding scale  Try to keep blood sugar under 180    Esophageal candidiasis (HCC)  Assessment & Plan  Continue 7 day course of IV fluconazole    Esophageal candidiasis present on EGD    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[address] )  Assessment & Plan  ·  s/p exploratory laparotomy with right hemicolectomy for 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  · Need outpatient Oncology follow-up        Acute renal failure (ARF) ([facility] [address] )  Assessment & Plan  · Most likely secondary to nonoliguric ATN   Patient's baseline creatinine is 1 5-1 6  creatinine was up to 2 3  Now is back down to 2 1  · Diuretic as per nephrology  · Patient currently NPO  may have a few ice chips if requested by patient  · Patient is currently on custom TPN   Electrolytes acceptable      Colon  neoplasm  Assessment & Plan  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  Now postop day 19 right hemicolectomy  Discussed with the patient to arrange oncology follow-up as an outpatient:    Paroxysmal atrial fibrillation ([facility] [address] )  Assessment & Plan  · Patient NPO  · Lopressor 2 5 mg IV q 6 hours  · Eliquis on hold, will place on heparin for DVT prophylaxis for now    Symptomatic anemia  Assessment & Plan  · Patient was evaluated by PCP secondary to dizziness and fatigue and has blood pressure done as an outpatient  His hemoglobin was found to be 6 2 and was sent to the emergency room  · On the day of admission hemoglobin in the emergency room was 7 1 and received 2 units of packed RBCs  · HGB stable 10-11 for now  · EGD showed gastric ulcers nonbleeding at this time  · Continue Protonix 40 mg IV b i d  Coronary artery disease  Assessment & Plan  · Patient with known history of coronary artery disease status post CABG in 1997 in [facility]  · Patient is on beta-blocker aspirin and statin at home      Chronic kidney disease, stage 3 ([facility] [address] )  Assessment & Plan  · Baseline creatinine appears to be between 1 4 and 1 6  Questionable new higher baseline   Avoid hypotension and nephrotoxic agents       Acute on chronic diastolic congestive heart failure (HCC)  Assessment & Plan  Wt Readings from Last 3 Encounters:   07/09/20 100 kg (220 lb 10 9 oz)     · Patient with known history of chronic congestive heart failure and was admitted to [address] [place] [address] in January  · Lasix currently on hold as per Nephrology due to worsening renal function  Continue closely monitor for now  · Ejection fraction 65% during the last echo   · Cont tpn                VTE Pharmacologic Prophylaxis:   Pharmacologic: Heparin  Mechanical VTE Prophylaxis in Place: Yes    Patient Centered Rounds: I have performed bedside rounds with nursing staff today  Discussions with Specialists or Other Care Team Provider:  Discussed with surgery    Education and Discussions with Family / Patient:  Discussed with patient at bedside and update the family daily    Time Spent for Care: 30 minutes  More than 50% of total time spent on counseling and coordination of care as described above  Current Length of Stay: 23 day(s)    Current Patient Status: Inpatient   Certification Statement: The patient will continue to require additional inpatient hospital stay due to Small-bowel obstruction    Discharge Plan:  NG clamping to be done today  If successful NG tube may be removed tomorrow  Increase activity and out of bed to chair today    Code Status: Level 1 - Full Code      Subjective:   Patient denies any chest pain or shortness of breath or abdominal pain  Denies any nausea vomiting  He still has NG tube in and overall NG outputs have been decreasing as per nursing staff  Complains of generalized weakness and fatigue  Objective:     Vitals:   Temp (24hrs), Av 1 °F (36 7 °C), Min:97 3 °F (36 3 °C), Max:98 8 °F (37 1 °C)    Temp:  [97 3 °F (36 3 °C)-98 8 °F (37 1 °C)] 97 3 °F (36 3 °C)  HR:  [] 94  Resp:  [16-20] 16  BP: (108-140)/(53-72) 130/71  SpO2:  [90 %-93 %] 93 %  Body mass index is 37 29 kg/m²  Input and Output Summary (last 24 hours):        Intake/Output Summary (Last 24 hours) at 2020 1039  Last data filed at 2020 0617  Gross per 24 hour   Intake 0 ml   Output 1300 ml Net -1300 ml       Physical Exam:     Physical Exam   Constitutional: He appears well-developed  He appears distressed  Generalized weakness noted   HENT:   Head: Normocephalic and atraumatic  Right Ear: External ear normal    Left Ear: External ear normal    Mouth/Throat: Oropharynx is clear and moist    NG tube present   Eyes: Conjunctivae and EOM are normal    Neck: Normal range of motion  Neck supple  Cardiovascular: Normal rate, regular rhythm and normal heart sounds  Pulmonary/Chest: Effort normal and breath sounds normal    Abdominal: Soft  He exhibits no mass  There is no tenderness  There is no rebound and no guarding  Hypoactive bowel sounds with mild distention noted     Genitourinary:   Genitourinary Comments: deferred   Musculoskeletal: Normal range of motion  Neurological: He is alert  He has normal reflexes  Cranial nerves 2-12 are normal   Oriented to person and place only   Skin: Skin is warm and dry  No rash noted  Psychiatric:   Irritable   Nursing note and vitals reviewed  Additional Data:     Labs:    Results from last 7 days   Lab Units 07/09/20 0443  07/03/20  0523   WBC Thousand/uL 8 32   < > 3 70*   HEMOGLOBIN g/dL 11 9*   < > 9 5*   HEMATOCRIT % 41 8   < > 32 0*   PLATELETS Thousands/uL 253   < > 222   NEUTROS PCT %  --   --  75   LYMPHS PCT %  --   --  15   MONOS PCT %  --   --  7   EOS PCT %  --   --  1    < > = values in this interval not displayed       Results from last 7 days   Lab Units 07/09/20 0443 07/08/20  0443   SODIUM mmol/L 145 144   POTASSIUM mmol/L 3 7 3 8   CHLORIDE mmol/L 108 108   CO2 mmol/L 23 30   BUN mg/dL 41* 41*   CREATININE mg/dL 2 13* 1 95*   ANION GAP mmol/L 14* 6   CALCIUM mg/dL 8 4 8 1*   ALBUMIN g/dL  --  1 9*   TOTAL BILIRUBIN mg/dL  --  0 61   ALK PHOS U/L  --  73   ALT U/L  --  12   AST U/L  --  25   GLUCOSE RANDOM mg/dL 208* 132     Results from last 7 days   Lab Units 07/03/20  0958   INR  1 22*     Results from last 7 days   Lab Units 07/09/20  0609 07/08/20  2338 07/08/20  2108 07/08/20  1710 07/08/20  1145 07/08/20  0523 07/08/20  0144 07/07/20  2341 07/07/20  2127 07/07/20  1817 07/07/20  1606 07/07/20  1200   POC GLUCOSE mg/dl 215* 206* 154* 221* 226* 145* 116 87 129 209* 302* 249*                   * I Have Reviewed All Lab Data Listed Above  * Additional Pertinent Lab Tests Reviewed: [facility] Admission Reviewed    Imaging:    Imaging Reports Reviewed Today Include:  None  Imaging Personally Reviewed by Myself Includes:  None    Recent Cultures (last 7 days):           Last 24 Hours Medication List:     Current Facility-Administered Medications:  Adult TPN (CUSTOM BASE/CUSTOM ELECTROLYTE)  Intravenous Continuous Qiana Croft MD Last Rate: 40 8 mL/hr at 07/08/20 1820   Adult TPN (CUSTOM BASE/CUSTOM ELECTROLYTE)  Intravenous Continuous Qiana Croft MD    fluconazole 100 mg Intravenous Q24H Qiaan Croft MD    heparin (porcine) 5,000 Units Subcutaneous Q8H [facility] Qiana Crotf MD    insulin glargine 18 Units Subcutaneous Q12H [facility] Qiana Croft MD    insulin lispro 2-12 Units Subcutaneous Q6H Qiana Croft MD    insulin lispro 3 Units Subcutaneous Q6H Qiana Croft MD    metoclopramide 10 mg Intravenous Q6H [facility] Jamir Florentino, DO    metoprolol 2 5 mg Intravenous Q6H Chilo Cortez MD    morphine injection 2 mg Intravenous Q4H PRN Qiana Croft MD    ondansetron 4 mg Intravenous Once Anusha S Rachel, CRNP    ondansetron 4 mg Intravenous Q6H PRN Chilo Cortez MD    pantoprazole 40 mg Intravenous Q12H [facility] Gissell Springer, DO    phenol 1 spray Mouth/Throat Q2H PRN Primoalyn Mishel, DO    saliva substitute 5 spray Mouth/Throat 4x Daily MaryBRANDON Tovar    sucralfate 1,000 mg Oral BID Jamir Florentino, DO         Today, Patient Was Seen By: Qiana Croft MD    ** Please Note: Dictation voice to text software may have been used in the creation of this document   **

## 2020-07-09 NOTE — PROGRESS NOTES
NEPHROLOGY PROGRESS NOTE   Ortiz Adkins 80 y o  male MRN: [id number]  Unit/Bed#: -01 Encounter: [id number]    Assessment/Plan:    [de-identified] yo male with CKD 3, CHF with recent exacerbation this year, CAD s/p CABG admitted 6/16 for symptomatic anemia  Underwent ex-lap with hemicolectomy for obstructing adenocarcinoma of the cecum on 6/20/20  7/2 developed nausea, vomiting NGT with large volume output- this was discontinued by patient today  Remains on TPN  Renal consult for MARLENI initially treated with lasix but this has been on hold  He remains non-oliguric and roughly 6 liters negative, borderline hypernatremic, renal function slightly worse today  Will start gentle quarter NS  1  Acute Kidney Injury atop CKD 3  · Renal function slightly worse today (1 95 mg/dL -> 2 13 mg/dL) with worsening hypernatremia and still net negative with weight loss  Will start hypotonic fluid  · He is non-oliguric- some voids not quantified due to incontinence  · Continue to avoid nephrotoxins, wide variation in blood pressure  2  Stage 3 Chronic Kidney Disease with baseline creatinine around 1 3-1 6 mg/dL  3  Hypernatremia  · Sodium corrected for glucose 148 mmol/L  Will start on gentle quarter normal saline  4  Metabolic Alkalosis  · Due to gastric loss of acid  5  Hypokalemia  · Will replete with 20 mEq IV  6  Severe hypoalbuminemia and hypoproteinemia   7  Obstructing Adenocarcinoma of the Cecum  8  Chronic Congestive Heart Failure  · Examines compensated to dry        ROS  Seen and examined in bed  States he walked today  Less pain  Still thirsty  A complete 10 point review of systems have been performed and are otherwise negative         Historical Information   Past Medical History:   Diagnosis Date    A-fib [facility])     Cardiac disease     CHF (congestive [facility] failure) [facility] [address] )     Diabetes mellitus ([facility] [address] )     MI, old     Myocardial infarction [facility])      Past Surgical History:   Procedure Laterality Date    CARDIAC SURGERY      ME PART REMOVAL COLON W ANASTOMOSIS N/A 6/20/2020    Procedure: exploratory laparatomy with right hemicolectomy;  Surgeon: Priscilla Weldon DO;  Location:  MAIN OR;  Service: General     Social History   Social History     Substance and Sexual Activity   Alcohol Use Not Currently     Social History     Substance and Sexual Activity   Drug Use Not Currently     Social History     Tobacco Use   Smoking Status Never Smoker   Smokeless Tobacco Never Used       Family History:   Family History   Problem Relation Age of Onset    Diabetes Mother        Medications:  Pertinent medications were reviewed    Current Facility-Administered Medications:  Adult TPN (CUSTOM BASE/CUSTOM ELECTROLYTE)  Intravenous Continuous Remo Nyhan, MD Last Rate: 40 8 mL/hr at 07/08/20 1820   Adult TPN (CUSTOM BASE/CUSTOM ELECTROLYTE)  Intravenous Continuous Remo Nyhan, MD    bisacodyl 10 mg Rectal Daily Remo Nyhan, MD    fluconazole 100 mg Intravenous Q24H Remo Nyhan, MD    heparin (porcine) 5,000 Units Subcutaneous Q8H [address] Remo Nyhan, MD    insulin glargine 18 Units Subcutaneous Q12H [address] Remo Nyhan, MD    insulin lispro 2-12 Units Subcutaneous Q6H Remo Nyhan, MD    insulin lispro 3 Units Subcutaneous Q6H Remo Nyhan, MD    metoclopramide 10 mg Intravenous Q6H [address] Priscilla Weldon DO    metoprolol 2 5 mg Intravenous Q6H Kacy Lopez MD    morphine injection 2 mg Intravenous Q4H PRN Remo Nyhan, MD    ondansetron 4 mg Intravenous Once Anusha S Rachel, CRNP    ondansetron 4 mg Intravenous Q6H PRN Kacy Lopez MD    pantoprazole 40 mg Intravenous Q12H [address] Gissell Springer DO    phenol 1 spray Mouth/Throat Q2H PRN Priscilla Weldon DO    saliva substitute 5 spray Mouth/Throat 4x Daily Nicola Hard, CRNP    sucralfate 1,000 mg Oral BID Priscilla Weldon DO          No Known Allergies      Vitals:   /68   Pulse 94   Temp (!) 97 3 °F (36 3 °C)   Resp 16   Ht 5' 4 5" (1 638 m)   Wt 100 kg (220 lb 10 9 oz) SpO2 93%   BMI 37 29 kg/m²   Body mass index is 37 29 kg/m²  SpO2: 93 %,   SpO2 Activity: At Rest,   O2 Device: None (Room air)      Intake/Output Summary (Last 24 hours) at 7/9/2020 1417  Last data filed at 7/9/2020 0617  Gross per 24 hour   Intake 0 ml   Output 1300 ml   Net -1300 ml     Invasive Devices     Peripherally Inserted Central Catheter Line            PICC Line 07/02/20 6 days                Physical Exam  General: conscious, cooperative, in no acute distress, chronically ill appearing   Eyes: conjunctivae pink, anicteric sclerae  ENT: lips and mucous membranes very dry  Neck: supple, no JVD, no masses  Chest: diminished breath sounds bilateral, no crackles, ronchus or wheezings  CVS: S1 & S2, normal rate, regular rhythm  Abdomen: large, soft, non-tender, non-distended, normoactive bowel sounds  Extremities: + 1 edema of both legs  Skin: no rash, very pale, midline abd surgical incision- staples now removed  Neuro: awake, alert, oriented      Diagnostic Data:  Lab: I have personally reviewed pertinent lab results  ,   CBC:  Results from last 7 days   Lab Units 07/09/20  0443   WBC Thousand/uL 8 32   HEMOGLOBIN g/dL 11 9*   HEMATOCRIT % 41 8   PLATELETS Thousands/uL 253      CMP: Lab Results   Component Value Date    SODIUM 145 07/09/2020    K 3 7 07/09/2020     07/09/2020    CO2 23 07/09/2020    BUN 41 (H) 07/09/2020    CREATININE 2 13 (H) 07/09/2020    CALCIUM 8 4 07/09/2020    EGFR 27 07/09/2020   ,   PT/INR: No results found for: PT, INR,   Magnesium: No components found for: MAG,  Phosphorous: No results found for: PHOS    Microbiology:  @LABRCNTIP,(urinecx:7)@        Abner Adolfo, BRANDON    Portions of the record may have been created with voice recognition software  Occasional wrong word or "sound a like" substitutions may have occurred due to the inherent limitations of voice recognition software  Read the chart carefully and recognize, using context, where substitutions have occurred

## 2020-07-09 NOTE — PHYSICAL THERAPY NOTE
PHYSICAL THERAPY TREATMENT NOTE  NAME:  Leann Gannon  DATE: 07/09/20    Length Of Stay: 23  Performed at least 2 patient identifiers during session: Name and Birthday    TREATMENT:      07/09/20 1154   Pain Assessment   Pain Assessment Tool 0-10   Pain Score Worst Possible Pain   Pain Location/Orientation Location: Abdomen   Pain Rating: FLACC (Rest) - Face 0   Pain Rating: FLACC (Rest) - Legs 0   Pain Rating: FLACC (Rest) - Activity 0   Pain Rating: FLACC (Rest) - Cry 1   Pain Rating: FLACC (Rest) - Consolability 1   Score: FLACC (Rest) 2   Pain Rating: FLACC (Activity) - Face 1   Pain Rating: FLACC (Activity) - Legs 0   Pain Rating: FLACC (Activity) - Activity 0   Pain Rating: FLACC (Activity) - Cry 1   Pain Rating: FLACC (Activity) - Consolability 1   Score: FLACC (Activity) 3   Restrictions/Precautions   Other Precautions Chair Alarm; Bed Alarm; Fall Risk;Pain;Multiple lines  (NPO)   General   Chart Reviewed Yes   Additional Pertinent History Pt with NG tube removed  Nursing present on arrival     Response to Previous Treatment   (increased pain, but able to participate)   Family/Caregiver Present Yes  (POA)   Cognition   Following Commands Follows one step commands without difficulty   Subjective   Subjective "I have a lot of pain "   Bed Mobility   Supine to Sit 2  Maximal assistance   Additional items Assist x 1; Increased time required;Verbal cues;LE management  (trunk management)   Additional Comments HOB elevated 30 degrees  increased time and effort with max cues for technique and sequence  Transfers   Sit to Stand 4  Minimal assistance   Additional items Assist x 1; Increased time required;Verbal cues   Stand to Sit   (steadying assistance)   Additional items Increased time required;Verbal cues   Stand pivot   (steadying assistance)   Additional items Increased time required;Verbal cues   Additional Comments use of RW  pt pulling from RW despite verbal cues for hand placement for safety with RW  required minAx1 to achieve standing with increased time and effort  spt with RW with steadying assistance  stand ot sit with steadying assistance  Ambulation/Elevation   Gait pattern Decreased foot clearance; Short stride; Excessively slow   Gait Assistance   (minimal to steadying assistance)   Additional items Assist x 1;Verbal cues   Assistive Device Rolling walker   Distance 12'x1, 23'x1, 32'x1 with steadying to minimal assistance initially to steadying assistance  mod MEZA with ambulaiton requiring verbal cues for pursed lip breathing and taking his time to turn and sit after due to impusliveness to sit with mod MEZA  cues for increased step length and foot clearance  Balance   Static Sitting Good   Dynamic Sitting Fair   Static Standing Fair -   Dynamic Standing Poor +   Ambulatory Poor +   Endurance Deficit   Endurance Deficit Yes   Endurance Deficit Description fatigue and MEZA  SpO2 at rest 96%  poor pleth with mobility with reading in high 70s to 80s  but increased to >90% within 3'   Activity Tolerance   Activity Tolerance Patient limited by fatigue   Medical Staff Made Aware ciera HYATT   Nurse Made Aware Corazon ALVAREZ   Assessment   Prognosis Good   Problem List Decreased strength;Decreased endurance; Impaired balance;Decreased mobility; Impaired judgement;Decreased safety awareness;Decreased skin integrity;Pain   Barriers to Discharge Inaccessible home environment;Decreased caregiver support   Barriers to Discharge Comments lives alone and requires icnreased assistance   Goals   STG Expiration Date 07/19/20   PT Treatment Day 1   Plan   Treatment/Interventions Functional transfer training;LE strengthening/ROM; Therapeutic exercise; Endurance training;Elevations; Patient/family training;Equipment eval/education; Bed mobility; Compensatory technique education;Gait training;Spoke to nursing;OT   Recommendation   PT Discharge Recommendation Post-Acute Rehabilitation Services   Equipment Recommended   (walker-pt has)   PT - OK to Discharge   (when medically cleared to rehab)   Additional Comments 2 strength hip flexion 3-/5, knee extension 3+/5, knee flexion 3/5, ankle DF/PF 3+/5, hip abd/add 3/5     Pt seen this date for reassessment  He has had complex medical course with change in medical status with multiple cancellations associated with medical stability, procedure and refusal  He has required NG tube (removed accidentally this date by patient)  Pt with persistent ileus/clinical bowel obstruction  He is presenting with decreased strength, balance, endurance and increased pain  He requires increased assistance to complete bed mobility, transfers and ambulation at this time with activity tolerance limited by moderate MEZA  He will continue to benefit from PT services to maximize LOF  Updated STGs below  Goals: Pt will: Perform bed mobility tasks with minAx1 to reposition in bed and prepare for transfers  Pt will perform transfers with supervision to increase Indep in home environment, decrease burden of care, decrease risk for falls and improve activity tolerance  Pt will ambulate with RW for >/= 76' with  Supervision  to increase Indep in home environment, decrease risk for falls, improve activity tolerance and improve gait quality  Pt will complete >/= 1 step with RW with miNAx1 to increase Indep in home environment, decrease burden of care, return to home with RADHA and decrease risk for falls  Pt will increase B LE strength to >/= 1/2 MMT grade to facilitate functional mobility      Aldair Ortega, PT,DPT

## 2020-07-09 NOTE — ASSESSMENT & PLAN NOTE
Lab Results   Component Value Date    HGBA1C 8 2 (H) 06/18/2020       Recent Labs     07/08/20  1710 07/08/20  2108 07/08/20  2338 07/09/20  0609   POCGLU 221* 154* 206* 215*       Blood Sugar Average: Last 72 hrs:  (P) 211 8 type 2 diabetes mellitus on oral hypoglycemic agent at home  · Hold metformin and glipizide in the hospital  · Insulin sliding scale,  Patient's blood sugar are better controlled now  Continue custom TPN with 20% dextrose  Continue insulin sliding scale q 6 hours and Lantus 18 units twice daily  Humalog 3 units every 6 hours in addition to the sliding scale    Try to keep blood sugar under 180

## 2020-07-09 NOTE — ASSESSMENT & PLAN NOTE
· Patient with known history of coronary artery disease status post CABG in 1997 in [facility]  · Patient is on beta-blocker aspirin and statin at home

## 2020-07-09 NOTE — ASSESSMENT & PLAN NOTE
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

## 2020-07-09 NOTE — ASSESSMENT & PLAN NOTE
· Most likely secondary to nonoliguric ATN   Patient's baseline creatinine is 1 5-1 6  creatinine was up to 2 3  Now is back down to 2 1  · Diuretic as per nephrology  · Patient currently NPO  may have a few ice chips if requested by patient  · Patient is currently on custom TPN     Electrolytes acceptable

## 2020-07-09 NOTE — ASSESSMENT & PLAN NOTE
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

## 2020-07-09 NOTE — PLAN OF CARE
Problem: PHYSICAL THERAPY ADULT  Goal: Performs mobility at highest level of function for planned discharge setting  See evaluation for individualized goals  Description  Treatment/Interventions: Functional transfer training, LE strengthening/ROM, Elevations, Therapeutic exercise, Endurance training, Patient/family training, Equipment eval/education, Gait training, Bed mobility, Compensatory technique education, Spoke to nursing, Spoke to case management, OT  Equipment Recommended: (pt has RW)       See flowsheet documentation for full assessment, interventions and recommendations  Note:   Prognosis: Good  Problem List: Decreased strength, Decreased endurance, Impaired balance, Decreased mobility, Impaired judgement, Decreased safety awareness, Decreased skin integrity, Pain  Assessment: Pt seen this date for reassessment  He has had complex medical course with change in medical status with multiple cancellations associated with medical stability, procedure and refusal  He has required NG tube (removed accidentally this date by patient)  Pt with persistent ileus/clinical bowel obstruction  He is presenting with decreased strength, balance, endurance and increased pain  He requires increased assistance to complete bed mobility, transfers and ambulation at this time with activity tolerance limited by moderate MEZA  He will continue to benefit from PT services to maximize LOF  Updated STGs below  Barriers to Discharge: Inaccessible home environment, Decreased caregiver support  Barriers to Discharge Comments: lives alone and requires icnreased assistance  PT Discharge Recommendation: (S) [phone] [place]     PT - OK to Discharge: (when medically cleared to rehab)    See flowsheet documentation for full assessment

## 2020-07-09 NOTE — PLAN OF CARE
Problem: Potential for Falls  Goal: Patient will remain free of falls  Description  INTERVENTIONS:  - Assess patient frequently for physical needs  -  Identify cognitive and physical deficits and behaviors that affect risk of falls    -  [place] fall precautions as indicated by assessment   - Educate patient/family on patient safety including physical limitations  - Instruct patient to call for assistance with activity based on assessment  - Modify environment to reduce risk of injury  - Consider OT/PT consult to assist with strengthening/mobility  Outcome: Progressing     Problem: CARDIOVASCULAR - ADULT  Goal: Maintains optimal cardiac output and hemodynamic stability  Description  INTERVENTIONS:  - Monitor I/O, vital signs and rhythm  - Monitor for S/S and trends of decreased cardiac output  - Administer and titrate ordered vasoactive medications to optimize hemodynamic stability  - Assess quality of pulses, skin color and temperature  - Assess for signs of decreased coronary artery perfusion  - Instruct patient to report change in severity of symptoms  Outcome: Progressing  Goal: Absence of cardiac dysrhythmias or at baseline rhythm  Description  INTERVENTIONS:  - Continuous cardiac monitoring, vital signs, obtain 12 lead EKG if ordered  - Administer antiarrhythmic and heart rate control medications as ordered  - Monitor electrolytes and administer replacement therapy as ordered  Outcome: Progressing     Problem: METABOLIC, FLUID AND ELECTROLYTES - ADULT  Goal: Glucose maintained within target range  Description  INTERVENTIONS:  - Monitor Blood Glucose as ordered  - Assess for signs and symptoms of hyperglycemia and hypoglycemia  - Administer ordered medications to maintain glucose within target range  - Assess nutritional intake and initiate nutrition service referral as needed  Outcome: Progressing     Problem: SKIN/TISSUE INTEGRITY - ADULT  Goal: Skin integrity remains intact  Description  INTERVENTIONS  - Identify patients at risk for skin breakdown  - Assess and monitor skin integrity  - Assess and monitor nutrition and hydration status  - Monitor labs (i e  albumin)  - Assess for incontinence   - Turn and reposition patient  - Assist with mobility/ambulation  - Relieve pressure over bony prominences  - Avoid friction and shearing  - Provide appropriate hygiene as needed including keeping skin clean and dry  - Evaluate need for skin moisturizer/barrier cream  - Collaborate with interdisciplinary team (i e  Nutrition, Rehabilitation, etc )   - Patient/family teaching  Outcome: Progressing     Problem: HEMATOLOGIC - ADULT  Goal: Maintains hematologic stability  Description  INTERVENTIONS  - Assess for signs and symptoms of bleeding or hemorrhage  - Monitor labs  - Administer supportive blood products/factors as ordered and appropriate  Outcome: Progressing     Problem: MUSCULOSKELETAL - ADULT  Goal: Maintain or return mobility to safest level of function  Description  INTERVENTIONS:  - Assess patient's ability to carry out ADLs; assess patient's baseline for ADL function and identify physical deficits which impact ability to perform ADLs (bathing, care of mouth/teeth, toileting, grooming, dressing, etc )  - Assess/evaluate cause of self-care deficits   - Assess range of motion  - Assess patient's mobility/assist x 2 with roller walker  - Assess patient's need for assistive devices and provide as appropriate  - Encourage maximum independence but intervene and supervise when necessary  - Involve family in performance of ADLs  - Assess for home care needs following discharge   - Consider OT consult to assist with ADL evaluation and planning for discharge  - Provide patient education as appropriate   Outcome: Progressing     Problem: Prexisting or High Potential for Compromised Skin Integrity  Goal: Skin integrity is maintained or improved  Description  INTERVENTIONS:  - Identify patients at risk for skin breakdown  - Assess and monitor skin integrity  - Assess and monitor nutrition and hydration status  - Monitor labs   - Assess for incontinence   - Turn and reposition patient  - Assist with mobility/ambulation  - Relieve pressure over bony prominences  - Avoid friction and shearing  - Provide appropriate hygiene as needed including keeping skin clean and dry  - Evaluate need for skin moisturizer/barrier cream  - Collaborate with interdisciplinary team   - Patient/family teaching  - Consider wound care consult   Outcome: Progressing     Problem: Nutrition/Hydration-ADULT  Goal: Nutrient/Hydration intake appropriate for improving, restoring or maintaining nutritional needs  Description  Monitor and assess patient's nutrition/hydration status for malnutrition  Collaborate with interdisciplinary team and initiate plan and interventions as ordered  Monitor patient's weight and dietary intake as ordered or per policy  Utilize nutrition screening tool and intervene as necessary  Determine patient's food preferences and provide high-protein, high-caloric foods as appropriate       INTERVENTIONS:  - Monitor oral intake, urinary output, labs, and treatment plans  - Assess nutrition and hydration status and recommend course of action  - Evaluate amount of meals eaten  - Assist patient with eating if necessary   - Allow adequate time for meals  - Recommend/ encourage appropriate diets, oral nutritional supplements, and vitamin/mineral supplements  - Order, calculate, and assess calorie counts as needed  Offer small frequentmeals  - Assess need for intravenous fluids  - Provide specific nutrition/hydration education as appropriate  - Include patient/family/caregiver in decisions related to nutrition   Outcome: Progressing

## 2020-07-09 NOTE — ASSESSMENT & PLAN NOTE
Wt Readings from Last 3 Encounters:   07/09/20 100 kg (220 lb 10 9 oz)     · Patient with known history of chronic congestive heart failure and was admitted to [facility] January  · Lasix currently on hold as per Nephrology due to worsening renal function  Continue closely monitor for now    · Ejection fraction 65% during the last echo 1/20  · Cont tpn

## 2020-07-09 NOTE — OCCUPATIONAL THERAPY NOTE
OccupationalTherapy Progress Note     Patient Name: Sheridan Larsen  Today's Date: 7/9/2020  Problem List  Principal Problem:    Duodenal obstruction  Active Problems:    Acute on chronic diastolic congestive heart failure (HCC)    Chronic kidney disease, stage 3 (HCC)    Coronary artery disease    Diabetes mellitus type 2 with complications (HCC)    Essential hypertension    Hyperlipidemia    Morbid obesity (HCC)    Symptomatic anemia    Paroxysmal atrial fibrillation (HCC)    Abnormal CT of the abdomen    Colon  neoplasm    [facility] )    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[facility] )    Esophageal candidiasis ([facility] )            07/09/20 1122   Restrictions/Precautions   Other Precautions Chair Alarm; Bed Alarm; Fall Risk;Pain;Multiple lines  (NPO)   General   Family/Caregiver Present yes   Pain Assessment   Pain Assessment Tool 0-10   Pain Score Worst Possible Pain   Pain Location/Orientation Location: Abdomen   ADL   UB Dressing Assistance 5  Supervision/Setup   UB Dressing Deficit Verbal cueing;Supervision/safety; Increased time to complete; Thread RUE; Thread LUE;Pull over head;Pull around back   UB Dressing Comments increased time   Toileting Assistance  4  Minimal Assistance   Toileting Deficit Verbal cueing;Supervison/safety; Increased time to complete;Grab bar use;Clothing management up;Clothing management down;Perineal hygiene   Toileting Comments Pt requiring Min A for CM around waist and thorough posterior pericare  UB support from RW or grab bars PRN   Bed Mobility   Supine to Sit 2  Maximal assistance   Additional items Assist x 1;HOB elevated; Bedrails; Increased time required;LE management;Verbal cues  (trunk management)   Transfers   Sit to Stand 4  Minimal assistance   Additional items Assist x 1; Increased time required;Verbal cues  (Pt pulling from RW )   Stand to Sit   [employer] Assist)   Additional items Increased time required;Verbal cues   Stand pivot   (Steadying Assist)   Additional items Increased time required;Verbal cues   Additional Comments Pt utilizing RW for UB support during functional transfers  Pt pulling from RW for STS despite vc'ing from therapist to push from surface he's sitting on   Pt insisting to complete it his own way  Therapist steadying RW during STS transfers  Pt requiring Steadying Assist for SPT with RW   Cognition   Overall Cognitive Status Impaired   Arousal/Participation Alert; Responsive; Cooperative   Attention Attends with cues to redirect   Orientation Level Oriented X4   Memory Within functional limits   Following Commands Follows one step commands without difficulty   Activity Tolerance   Activity Tolerance Patient limited by fatigue;Patient limited by pain   Medical Staff Made Aware Spoke with RN, Luis Carlos Hankins  Spoke jalen PT, Rosaura   Assessment   Assessment Pt seen for treatment session #4 this date  Pt alert and agreeable to participate at this time  Pt continues to make slow progress towards goals although is significantly limited by decrease activity tolerance, decrease standing balance, decrease performance during ADL tasks, decrease safety awareness , decrease UB MS, increased pain, decrease generalized strength, decrease activity engagement and decrease performance during functional transfers  Pt demonstrated difficulty this date performing thorough posterior pericare hygiene  Pt requiring Min A for toileting tasks  Pt noted to have increased SOB during functional tasks although O2 levels remained in the mid 90's  Pt has good potential to met goals with continued participation in therapy services, daily OOB and ambulation with nursing staff and self-motivation  Pt would benefit from post acute rehab services upon d/c from [address] [place] [address]  Plan   Treatment Interventions ADL retraining;Functional transfer training;UE strengthening/ROM; Endurance training;Cognitive reorientation;Patient/family training;Equipment evaluation/education; Neuromuscular reeducation; Compensatory technique education;Continued evaluation; Energy conservation; Activityengagement   Goal Expiration Date 07/17/20   OT Treatment Day 4   OT Frequency 3-5x/wk   Recommendation   Recommendation   (post acute rehab)   OT Discharge Recommendation Post-Acute Rehabilitation Services     Pt goals to be met by 7/17/2020    1  Pt will demonstrate ability to complete supine<>sit @ Mod I in order to increase safety and independence during ADL tasks  2  Pt will demonstrate ability to complete UB ADLs including washing/dressing @ Mod I in order to increase performance and participation during meaningful tasks  3  Pt will demonstrate ability to complete LB dressing @ Min A in order to increase safety and independence during meaningful tasks  4  Pt will demonstrate ability to aris/doff socks/shoes while sitting EOB @ Min A in order to increase safety and independence during meaningful tasks  5  Pt will demonstrate ability to complete toileting tasks including CM and pericare @ Mod I in order to increase safety and independence during meaningful tasks  6  Pt will demonstrate ability to complete EOB, chair, toilet/commode transfers @ Mod I in order to increase performance and participation during functional tasks  7  Pt will demonstrate ability to stand for 7-8 minutes while maintaining G balance with use of RW for UB support PRN  8  Pt will demonstrate ability to tolerate 30-35 minute OT session with no vc'ing for deep breathing or use of energy conservation techniques in order to increase activity tolerance during functional tasks  9  Pt will demonstrate Good carryover of use of energy conservation/compensatory strategies during ADLs and functional tasks in order to increase safety and reduce risk for falls  10  Pt will demonstrate Good attention and participation in continued evaluation of functional ambulation house hold distances in order to assist with safe d/c planning    11  Pt will attend to continued cognitive assessments 100% of the time in order to provide most appropriate d/c recommendations  12  Pt will follow 100% simple 2-step commands and be A&O x4 consistently with environmental cues to increase participation in functional activities  13  Pt will identify 3 areas of interest/hobbies and 1 intervention on how to incorporate into daily life in order to increase interaction with environment and peers as well as increase participation in meaningful tasks     14  Pt will demonstrate 100% carryover of BUE HEP in order to increase BUE MS and increase performance during functional tasks upon d/c home         Bianka Saul OTR/L

## 2020-07-09 NOTE — PLAN OF CARE
Problem: Potential for Falls  Goal: Patient will remain free of falls  Description  INTERVENTIONS:  - Assess patient frequently for physical needs  -  Identify cognitive and physical deficits and behaviors that affect risk of falls    -  [place] fall precautions as indicated by assessment   - Educate patient/family on patient safety including physical limitations  - Instruct patient to call for assistance with activity based on assessment  - Modify environment to reduce risk of injury, yellow bracelet and socks bed alarm  - Consider OT/PT consult to assist with strengthening/mobility   Outcome: Progressing     Problem: CARDIOVASCULAR - ADULT  Goal: Maintains optimal cardiac output and hemodynamic stability  Description  INTERVENTIONS:  - Monitor I/O, vital signs and rhythm  - Monitor for S/S and trends of decreased cardiac output  - Administer and titrate ordered vasoactive medications to optimize hemodynamic stability  - Assess quality of pulses, skin color and temperature  - Assess for signs of decreased coronary artery perfusion  - Instruct patient to report change in severity of symptoms  Outcome: Progressing  Goal: Absence of cardiac dysrhythmias or at baseline rhythm  Description  INTERVENTIONS:  - Continuous cardiac monitoring, vital signs, obtain 12 lead EKG if ordered  - Administer antiarrhythmic and heart rate control medications as ordered  - Monitor electrolytes and administer replacement therapy as ordered  Outcome: Progressing     Problem: METABOLIC, FLUID AND ELECTROLYTES - ADULT  Goal: Glucose maintained within target range  Description  INTERVENTIONS:  - Monitor Blood Glucose as ordered  - Assess for signs and symptoms of hyperglycemia and hypoglycemia  - Administer ordered medications to maintain glucose within target range  - Assess nutritional intake and initiate nutrition service referral as needed  Outcome: Progressing     Problem: SKIN/TISSUE INTEGRITY - ADULT  Goal: Skin integrity remains intact  Description  INTERVENTIONS  - Identify patients at risk for skin breakdown  - Assess and monitor skin integrity, buttocks & sacrum Allevyn in place  - Assess and monitor nutrition and hydration status  - Monitor labs (i e  albumin)  - Assess for incontinence   - Turn and reposition patient  - Assist with mobility/ambulation  - Relieve pressure over bony prominences  - Avoid friction and shearing  - Provide appropriate hygiene as needed including keeping skin clean and dry  - Evaluate need for skin moisturizer/barrier cream  - Collaborate with interdisciplinary team (i e  Nutrition, Rehabilitation, etc )   - Patient/family teaching   Outcome: Progressing     Problem: HEMATOLOGIC - ADULT  Goal: Maintains hematologic stability  Description  INTERVENTIONS  - Assess for signs and symptoms of bleeding or hemorrhage  - Monitor labs  - Administer supportive blood products/factors as ordered and appropriate  Outcome: Progressing     Problem: MUSCULOSKELETAL - ADULT  Goal: Maintain or return mobility to safest level of function  Description  INTERVENTIONS:  - Assess patient's ability to carry out ADLs; assess patient's baseline for ADL function and identify physical deficits which impact ability to perform ADLs (bathing, care of mouth/teeth, toileting, grooming, dressing, etc )  - Assess/evaluate cause of self-care deficits   - Assess range of motion  - Assess patient's mobility/assist x 2 with roller walker  - Assess patient's need for assistive devices and provide as appropriate  - Encourage maximum independence but intervene and supervise when necessary  - Involve family in performance of ADLs  - Assess for home care needs following discharge   - Consider OT consult to assist with ADL evaluation and planning for discharge  - Provide patient education as appropriate   Outcome: Not Progressing     Problem: Prexisting or High Potential for Compromised Skin Integrity  Goal: Skin integrity is maintained or improved  Description  INTERVENTIONS:  - Identify patients at risk for skin breakdown  - Assess and monitor skin integrity  - Assess and monitor nutrition and hydration status  - Monitor labs   - Assess for incontinence   - Turn and reposition patient  - Assist with mobility/ambulation  - Relieve pressure over bony prominences  - Avoid friction and shearing  - Provide appropriate hygiene as needed including keeping skin clean and dry  - Evaluate need for skin moisturizer/barrier cream  - Collaborate with interdisciplinary team   - Patient/family teaching  - Consider wound care consult   Outcome: Progressing     Problem: GASTROINTESTINAL - ADULT  Goal: Minimal or absence of nausea and/or vomiting  Description  INTERVENTIONS:  - Administer IV fluids if ordered to ensure adequate hydration  - Maintain NPO status until nausea and vomiting are resolved  - NG discontinued monitor for nausea/vomiting  - Administer ordered antiemetic medications as needed  - Provide nonpharmacologic comfort measures as appropriate  - Advance diet as tolerated, if ordered  - Consider nutrition services referral to assist patient with adequate nutrition and appropriate food choices   Outcome: Progressing  Goal: Maintains or returns to baseline bowel function  Description  INTERVENTIONS:  - Assess bowel function  - Encourage oral fluids to ensure adequate hydration  - Administer IV fluids if ordered to ensure adequate hydration  - Administer ordered medications as needed  - Encourage mobilization and activity  - Consider nutritional services referral to assist patient with adequate nutrition and appropriate food choices  Outcome: Progressing     Problem: Nutrition/Hydration-ADULT  Goal: Nutrient/Hydration intake appropriate for improving, restoring or maintaining nutritional needs  Description  Monitor and assess patient's nutrition/hydration status for malnutrition   Collaborate with interdisciplinary team and initiate plan and interventions as ordered  Monitor patient's weight and dietary intake as ordered or per policy  Utilize nutrition screening tool and intervene as necessary  Determine patient's food preferences and provide high-protein, high-caloric foods as appropriate       INTERVENTIONS:  - Monitor oral intake, urinary output, labs, and treatment plans  - Assess nutrition and hydration status and recommend course of action    -Monitor hydration NPO status-  - Recommend/ encourage appropriate diets, oral nutritional supplements, and vitamin/mineral supplements  - Order, calculate, and assess calorie counts as needed  Offer small frequentmeals  - Assess need for intravenous fluids  - Provide specific nutrition/hydration education as appropriate  - Include patient/family/caregiver in decisions related to nutrition    Outcome: Not Progressing     Problem: Prexisting or High Potential for Compromised Skin Integrity  Goal: Skin integrity is maintained or improved  Description  INTERVENTIONS:  - Identify patients at risk for skin breakdown  - Assess and monitor skin integrity  - Assess and monitor nutrition and hydration status  - Monitor labs   - Assess for incontinence   - Turn and reposition patient  - Assist with mobility/ambulation  - Relieve pressure over bony prominences  - Avoid friction and shearing  - Provide appropriate hygiene as needed including keeping skin clean and dry  - Evaluate need for skin moisturizer/barrier cream  - Collaborate with interdisciplinary team   - Patient/family teaching  - Consider wound care consult   Outcome: Progressing

## 2020-07-09 NOTE — PROGRESS NOTES
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  - 24 hour NG output 700mL 7AM-7AM, (400mL during 7P-7A period)  - Pt reports pain decreased  - WBC 8 32    Plan:  - Clamp trial of NG 4 hours on then 4 hours off throughout the day  - Pt must ambulate OOB during the day when NG clamped  - TPN  - Continue current medications  - Monitor I/Os  - Management of medical conditions per medicine team    Subjective/Objective   Subjective: No acute events overnight  Pt is somnolent and provides limited history today  States he feels "better" No significant pain or N/V  Objective:     Blood pressure 130/71, pulse 94, temperature (!) 97 3 °F (36 3 °C), resp  rate 16, height 5' 4 5" (1 638 m), weight 100 kg (220 lb 10 9 oz), SpO2 93 %  ,Body mass index is 37 29 kg/m²  Intake/Output Summary (Last 24 hours) at 7/9/2020 0804  Last data filed at 7/9/2020 0617  Gross per 24 hour   Intake 100 ml   Output 1400 ml   Net -1300 ml       Invasive Devices     Peripherally Inserted Central Catheter Line            PICC Line 07/02/20 6 days          Drain            NG/OG/Enteral Tube Nasogastric 18 Fr Right nares 6 days                Physical Exam: General appearance: Pt is somnolent  Appearing in no acute distress  Lungs: clear to auscultation bilaterally  Heart: regular rate and rhythm, S1, S2 normal, no murmur, click, rub or gallop  Abdomen: normal findings: no masses palpable, no organomegaly and surgical incision well approximated without erythema, fluctuance, induration, edema, bleeding, or drainage  and abnormal findings:  distended, hypoactive bowel sounds and mild tenderness in the epigastrium    Lab, Imaging and other studies:  I have personally reviewed pertinent lab results      CBC:   Lab Results   Component Value Date    WBC 8 32 07/09/2020    HGB 11 9 (L) 07/09/2020    HCT 41 8 07/09/2020    MCV 79 (L) 07/09/2020     07/09/2020    MCH 22 5 (L) 07/09/2020    MCHC 28 5 (L) 07/09/2020    RDW 24 4 (H) 07/09/2020    MPV 10 3 07/09/2020   CMP:   Lab Results   Component Value Date    SODIUM 145 07/09/2020    K 3 7 07/09/2020     07/09/2020    CO2 23 07/09/2020    BUN 41 (H) 07/09/2020    CREATININE 2 13 (H) 07/09/2020    CALCIUM 8 4 07/09/2020    EGFR 27 07/09/2020     VTE Pharmacologic Prophylaxis: Heparin  VTE Mechanical Prophylaxis: sequential compression device    Flavio Nunez PA-C

## 2020-07-10 NOTE — ASSESSMENT & PLAN NOTE
Wt Readings from Last 3 Encounters:   07/10/20 99 7 kg (219 lb 12 8 oz)     · Patient with known history of chronic congestive heart failure and was admitted to [facility] in January  · Lasix currently on hold as per Nephrology due to worsening renal function  Continue closely monitor for now  · Ejection fraction 65% during the last echo 1/20  · Cont tpn and IV fluids as per Nephrology  · The patient was noted to be in CHF exacerbation with wheezing and shortness of breath worse than usual   Given a dose of 40 mg of IV Lasix x1 now  Monitor urine output    Will give a dose of albumin as well

## 2020-07-10 NOTE — PROGRESS NOTES
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  - Patient removed his NG tube yesterday  - One episode of nausea and coughing up a small amount of stomach contents last night after eating too many ice cubes  No other reported nausea or vomiting    - Distended abdomen, mildly tender in upper quadrants    Plan:  - Continue TPN  - Only a few ice chips at time, do not leave a large amount of ice chips at bedside   - Monitor I/Os  - Continue current medications  - IVF hydration  - Pain & nausea control PRN  - Patient must be ambulated OOB  - Incentive spirometry hourly, must be performed  - Spoke with nursing staff about patient plan    Subjective/Objective   Subjective: Pt removed his NG tube yesterday during clamp trial  Currently no nausea  Admits to coughing up some stomach contents after "eating too many ice chips" but he feels better since then  Spoke with nurse who confirms he reported feeling nauseous and spit up a small amount of stomach contents after eating the ice chips  No true episodes of vomiting  He is otherwise tolerating smaller amount of ice chips  Reports no increase in abdominal pain  Had one liquid BM this AM  Not passing flatus  Denies bloating, fever, chills, CP, SOB  Objective:     Blood pressure 116/66, pulse 89, temperature (!) 97 4 °F (36 3 °C), resp  rate 18, height 5' 4 5" (1 638 m), weight 99 7 kg (219 lb 12 8 oz), SpO2 91 %  ,Body mass index is 37 15 kg/m²        Intake/Output Summary (Last 24 hours) at 7/10/2020 0737  Last data filed at 7/10/2020 0001  Gross per 24 hour   Intake 996 19 ml   Output 100 ml   Net 896 19 ml       Invasive Devices     Peripherally Inserted Central Catheter Line            PICC Line 07/02/20 7 days                Physical Exam: General appearance: alert and oriented, in no acute distress, fatigued and no NG tube present  Lungs: Slight expiratory wheezes present  Heart: regular rate and rhythm, S1, S2 normal, no murmur, click, rub or gallop  Abdomen: Surgical incision well approximated without erythema, fluctuance, bleeding, or drainage  Abdomen is distended  Absent BS  Mildly tympanic to percussion  Abdomen is somewhat firm to palpation  Mild tenderness to palpation in entire upper abdomen  Lab, Imaging and other studies:  I have personally reviewed pertinent lab results     CMP:   Lab Results   Component Value Date    SODIUM 146 (H) 07/10/2020    K 3 1 (L) 07/10/2020     (H) 07/10/2020    CO2 25 07/10/2020    BUN 49 (H) 07/10/2020    CREATININE 2 18 (H) 07/10/2020    CALCIUM 8 1 (L) 07/10/2020    EGFR 27 07/10/2020     VTE Pharmacologic Prophylaxis: Heparin  VTE Mechanical Prophylaxis: sequential compression device    Flavio Nunez PA-C

## 2020-07-10 NOTE — PHYSICAL THERAPY NOTE
PHYSICAL THERAPY REFUSAL NOTE          Patient Name: Jovana Mayorga  DIYQW'U Date: 7/10/2020     Chart reviewed  Attempted to see patient for PT treatment  Patient refused stating "I'm so tired and weak " Explained importance of participation in PT services to facilitate increased strength, endurance and return to home and offered therapeutic exercise for strengthening  Pt continued to decline  Will continue to follow and see patient as medically appropriate at al later time      Luma Lacey, PT,DPT

## 2020-07-10 NOTE — ASSESSMENT & PLAN NOTE
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

## 2020-07-10 NOTE — PLAN OF CARE
Problem: Potential for Falls  Goal: Patient will remain free of falls  Description  INTERVENTIONS:  - Assess patient frequently for physical needs  -  Identify cognitive and physical deficits and behaviors that affect risk of falls    -  [place] fall precautions as indicated by assessment   - Educate patient/family on patient safety including physical limitations  - Instruct patient to call for assistance with activity based on assessment  - Modify environment to reduce risk of injury, yellow bracelet and socks bed alarm  - Consider OT/PT consult to assist with strengthening/mobility   Outcome: Progressing     Problem: CARDIOVASCULAR - ADULT  Goal: Maintains optimal cardiac output and hemodynamic stability  Description  INTERVENTIONS:  - Monitor I/O, vital signs and rhythm  - Monitor for S/S and trends of decreased cardiac output  - Administer and titrate ordered vasoactive medications to optimize hemodynamic stability  - Assess quality of pulses, skin color and temperature  - Assess for signs of decreased coronary artery perfusion  - Instruct patient to report change in severity of symptoms  Outcome: Progressing  Goal: Absence of cardiac dysrhythmias or at baseline rhythm  Description  INTERVENTIONS:  - Continuous cardiac monitoring, vital signs, obtain 12 lead EKG if ordered  - Administer antiarrhythmic and heart rate control medications as ordered  - Monitor electrolytes and administer replacement therapy as ordered  Outcome: Progressing     Problem: METABOLIC, FLUID AND ELECTROLYTES - ADULT  Goal: Glucose maintained within target range  Description  INTERVENTIONS:  - Monitor Blood Glucose as ordered  - Assess for signs and symptoms of hyperglycemia and hypoglycemia  - Administer ordered medications to maintain glucose within target range  - Assess nutritional intake and initiate nutrition service referral as needed  Outcome: Progressing     Problem: SKIN/TISSUE INTEGRITY - ADULT  Goal: Skin integrity remains intact  Description  INTERVENTIONS  - Identify patients at risk for skin breakdown  - Assess and monitor skin integrity, buttocks & sacrum Allevyn in place  - Assess and monitor nutrition and hydration status  - Monitor labs (i e  albumin)  - Assess for incontinence   - Turn and reposition patient  - Assist with mobility/ambulation  - Relieve pressure over bony prominences  - Avoid friction and shearing  - Provide appropriate hygiene as needed including keeping skin clean and dry  - Evaluate need for skin moisturizer/barrier cream  - Collaborate with interdisciplinary team (i e  Nutrition, Rehabilitation, etc )   - Patient/family teaching   Outcome: Progressing     Problem: HEMATOLOGIC - ADULT  Goal: Maintains hematologic stability  Description  INTERVENTIONS  - Assess for signs and symptoms of bleeding or hemorrhage  - Monitor labs  - Administer supportive blood products/factors as ordered and appropriate  Outcome: Progressing     Problem: MUSCULOSKELETAL - ADULT  Goal: Maintain or return mobility to safest level of function  Description  INTERVENTIONS:  - Assess patient's ability to carry out ADLs; assess patient's baseline for ADL function and identify physical deficits which impact ability to perform ADLs (bathing, care of mouth/teeth, toileting, grooming, dressing, etc )  - Assess/evaluate cause of self-care deficits   - Assess range of motion  - Assess patient's mobility/assist x 2 with roller walker  - Assess patient's need for assistive devices and provide as appropriate  - Encourage maximum independence but intervene and supervise when necessary  - Involve family in performance of ADLs  - Assess for home care needs following discharge   - Consider OT consult to assist with ADL evaluation and planning for discharge  - Provide patient education as appropriate   Outcome: Progressing     Problem: Prexisting or High Potential for Compromised Skin Integrity  Goal: Skin integrity is maintained or improved  Description  INTERVENTIONS:  - Identify patients at risk for skin breakdown  - Assess and monitor skin integrity  - Assess and monitor nutrition and hydration status  - Monitor labs   - Assess for incontinence   - Turn and reposition patient  - Assist with mobility/ambulation  - Relieve pressure over bony prominences  - Avoid friction and shearing  - Provide appropriate hygiene as needed including keeping skin clean and dry  - Evaluate need for skin moisturizer/barrier cream  - Collaborate with interdisciplinary team   - Patient/family teaching  - Consider wound care consult   Outcome: Progressing     Problem: Nutrition/Hydration-ADULT  Goal: Nutrient/Hydration intake appropriate for improving, restoring or maintaining nutritional needs  Description  Monitor and assess patient's nutrition/hydration status for malnutrition  Collaborate with interdisciplinary team and initiate plan and interventions as ordered  Monitor patient's weight and dietary intake as ordered or per policy  Utilize nutrition screening tool and intervene as necessary  Determine patient's food preferences and provide high-protein, high-caloric foods as appropriate       INTERVENTIONS:  - Monitor oral intake, urinary output, labs, and treatment plans  - Assess nutrition and hydration status and recommend course of action    -Monitor hydration NPO status-  - Recommend/ encourage appropriate diets, oral nutritional supplements, and vitamin/mineral supplements  - Order, calculate, and assess calorie counts as needed  Offer small frequentmeals  - Assess need for intravenous fluids  - Provide specific nutrition/hydration education as appropriate  - Include patient/family/caregiver in decisions related to nutrition    Outcome: Progressing     Problem: GASTROINTESTINAL - ADULT  Goal: Minimal or absence of nausea and/or vomiting  Description  INTERVENTIONS:  - Administer IV fluids if ordered to ensure adequate hydration  - Maintain NPO status until nausea and vomiting are resolved  - NG discontinued monitor for nausea/vomiting  - Administer ordered antiemetic medications as needed  - Provide nonpharmacologic comfort measures as appropriate  - Advance diet as tolerated, if ordered  - Consider nutrition services referral to assist patient with adequate nutrition and appropriate food choices   Outcome: Progressing  Goal: Maintains or returns to baseline bowel function  Description  INTERVENTIONS:  - Assess bowel function  - Encourage oral fluids to ensure adequate hydration  - Administer IV fluids if ordered to ensure adequate hydration  - Administer ordered medications as needed  - Encourage mobilization and activity  - Consider nutritional services referral to assist patient with adequate nutrition and appropriate food choices  Outcome: Progressing  Goal: Maintains adequate nutritional intake  Description  INTERVENTIONS:  - Monitor percentage of each meal consumed  - Identify factors contributing to decreased intake, treat as appropriate,   Assess bowel motility, bowel sounds  - Monitor I&O, weight, and lab values if indicated  - Obtain nutrition services referral as needed   Outcome: Progressing

## 2020-07-10 NOTE — PROGRESS NOTES
Patient has only urinated 100 cc the entire day despite getting 45 IV Lasix and on TPN and on gentle hydration as well  He also received 1 dose of albumin  No shortness of breath reported at this time and oxygen saturations are stable  Patient noted to have quite significant abdominal distension and he is adamantly refusing to have the NG tube placed back in  Will give a bolus of 500 cc of isolyte  Repeat BMP shows worsening renal function    Monitor urine output overnight and if required may be given another 500 cc bolus as well

## 2020-07-10 NOTE — CASE MANAGEMENT
Discussed in rounds:  Post op right hemicolectomy from 6/20  Pt pulled NGT out yesterday and declining a new one to be placed  Abdomen hard  Remains on TPN/ IV fluids and IV medications/ fluconazole    CM called and spoke to [address] and she reviewed the options for STR for her grandfather:  Pau's 1st choice is [facility] acute rehab program  Mariia Devlin that patient may be declined this level of care as it may be too intense  2nd choice is [facility]  Referrals were made to both of these facilities  Will continue to follow

## 2020-07-10 NOTE — ASSESSMENT & PLAN NOTE
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

## 2020-07-10 NOTE — ASSESSMENT & PLAN NOTE
Lab Results   Component Value Date    HGBA1C 8 2 (H) 06/18/2020       Recent Labs     07/09/20  2109 07/09/20  2246 07/10/20  0000 07/10/20  0551   POCGLU 104 85 100 139       Blood Sugar Average: Last 72 hrs:  (P) 182 type 2 diabetes mellitus on oral hypoglycemic agent at home  · Hold metformin and glipizide in the hospital  · Insulin sliding scale,  Patient's blood sugar are better controlled now  Continue custom TPN with 20% dextrose  Continue insulin sliding scale q 6 hours and Lantus 13 units twice daily   Try to keep blood sugar under 180

## 2020-07-10 NOTE — ASSESSMENT & PLAN NOTE
· Most likely secondary to nonoliguric ATN   Patient's baseline creatinine is 1 5-1 6  creatinine was up to 2 3  Now is back down to 2 1  · Diuretic as per nephrology  · Patient currently NPO  may have a few ice chips if requested by patient  · Patient is currently on custom TPN

## 2020-07-10 NOTE — PROGRESS NOTES
Progress Note - Nelwsoham Dry 1935, 80 y o  male MRN: [id number]    Unit/Bed#: -[facility] Encounter: [id number]    Primary Care Provider: To Rosenbaum DO   Date and time admitted to hospital: 6/16/2020 11:14 AM        * Duodenal obstruction  Assessment & Plan  · On EGD 6/17/2020, there was concern for possible bowel obstruction vs severe gastroparesis, given large amount of food and dark bilious fluid in stomach which could not be cleared  · Status post OR started-Exploratory laparotomy with right hemicolectomy postop day 17  · CT abdomen pelvis repeated and showed 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  NG-tube placed with suction  NPO  TPN started  Appreciate Nutrition consult  · Seen by GI and no duodenal obstruction noted by diverticulum  · CT of the abdomen pelvis done and discussed with surgery  · The patient continues to have persistent ileus/clinical bowel obstruction may need a J-tube placement  · Patient pulled out his NG tube yesterday and currently on x-ray seems to have abdominal distension  Diabetes mellitus type 2 with complications [facility])  Assessment & Plan  Lab Results   Component Value Date    HGBA1C 8 2 (H) 06/18/2020       Recent Labs     07/09/20  2109 07/09/20  2246 07/10/20  0000 07/10/20  0551   POCGLU 104 85 100 139       Blood Sugar Average: Last 72 hrs:  (P) 182 type 2 diabetes mellitus on oral hypoglycemic agent at home  · Hold metformin and glipizide in the hospital  · Insulin sliding scale,  Patient's blood sugar are better controlled now  Continue custom TPN with 20% dextrose  Continue insulin sliding scale q 6 hours and Lantus 13 units twice daily   Try to keep blood sugar under 180    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[facility])  Assessment & Plan  ·  s/p exploratory laparotomy with right hemicolectomy for 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  · Need outpatient Oncology follow-up        [facility]  Assessment & Plan  · Most likely secondary to nonoliguric ATN   Patient's baseline creatinine is 1 5-1 6  creatinine was up to 2 3  Now is back down to 2 1  · Diuretic as per nephrology  · Patient currently NPO  may have a few ice chips if requested by patient  · Patient is currently on custom TPN   Colon  neoplasm  Assessment & Plan  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  Now postop day 20 right hemicolectomy  Discussed with the patient to arrange oncology follow-up as an outpatient:    Paroxysmal atrial fibrillation ([facility] )  Assessment & Plan  · Patient NPO  Placed on low-dose Eliquis and metoprolol    Coronary artery disease  Assessment & Plan  · Patient with known history of coronary artery disease status post CABG in 1997 in [facility]  · Patient is on beta-blocker aspirin and statin at home      Chronic kidney disease, stage 3 ([facility] )  Assessment & Plan  · Baseline creatinine appears to be between 1 4 and 1 6  Questionable new higher baseline   Avoid hypotension and nephrotoxic agents  Acute on chronic diastolic congestive heart failure (HCC)  Assessment & Plan  Wt Readings from Last 3 Encounters:   07/10/20 99 7 kg (219 lb 12 8 oz)     · Patient with known history of chronic congestive heart failure and was admitted to [facility] in January  · Lasix currently on hold as per Nephrology due to worsening renal function  Continue closely monitor for now  · Ejection fraction 65% during the last echo 1/20  · Cont tpn and IV fluids as per Nephrology  · The patient was noted to be in CHF exacerbation with wheezing and shortness of breath worse than usual   Given a dose of 40 mg of IV Lasix x1 now  Monitor urine output    Will give a dose of albumin as well                VTE Pharmacologic Prophylaxis:   Pharmacologic: Apixaban (Eliquis)  Mechanical VTE Prophylaxis in Place: Yes    Patient Centered Rounds: I have performed bedside rounds with nursing staff today     Discussions with Specialists or Other Care Team Provider:  Discussed with surgery    Education and Discussions with Family / Patient:  Discussed with patient at bedside    Time Spent for Care: 45 minutes  More than 50% of total time spent on counseling and coordination of care as described above  Current Length of Stay: 24 day(s)    Current Patient Status: Inpatient   Certification Statement: The patient will continue to require additional inpatient hospital stay due to Shortness of breath    Discharge Plan:  Pending progress    Code Status: Level 1 - Full Code      Subjective:   Patient was noted to be having shortness of breath this morning and wheezing  Low urine outputs as per chart for the last 24 hours  Denies any abdominal pain at this time but does feel uncomfortable in his abdomen and seems distended    Objective:     Vitals:   Temp (24hrs), Av 2 °F (36 8 °C), Min:97 4 °F (36 3 °C), Max:98 8 °F (37 1 °C)    Temp:  [97 4 °F (36 3 °C)-98 8 °F (37 1 °C)] 97 4 °F (36 3 °C)  HR:  [80-93] 89  Resp:  [16-18] 18  BP: (116-129)/(66-68) 116/66  SpO2:  [91 %-99 %] 99 %  Body mass index is 37 15 kg/m²  Input and Output Summary (last 24 hours): Intake/Output Summary (Last 24 hours) at 7/10/2020 1142  Last data filed at 7/10/2020 1024  Gross per 24 hour   Intake 1619 19 ml   Output 100 ml   Net 1519 19 ml       Physical Exam:     Physical Exam   Constitutional: He appears well-developed  He appears distressed  HENT:   Head: Normocephalic and atraumatic  Right Ear: External ear normal    Left Ear: External ear normal    Mouth/Throat: Oropharynx is clear and moist    Eyes: Conjunctivae and EOM are normal    Neck: Normal range of motion  Neck supple  Cardiovascular: Normal rate, regular rhythm and normal heart sounds  Pulmonary/Chest: He is in respiratory distress  Poor air entry bilaterally with diffuse wheezing noted   Abdominal: Soft  He exhibits distension  He exhibits no mass  There is no tenderness  There is no rebound and no guarding  Hypoactive bowel sounds   Genitourinary:   Genitourinary Comments: deferred   Musculoskeletal: Normal range of motion  He exhibits edema  Neurological: He is alert  He has normal reflexes  Cranial nerves 2-12 are normal   Normal neurological exam   Skin: Skin is warm and dry  No rash noted  Psychiatric: He has a normal mood and affect  Nursing note and vitals reviewed  Additional Data:     Labs:    Results from last 7 days   Lab Units 07/09/20  0443   WBC Thousand/uL 8 32   HEMOGLOBIN g/dL 11 9*   HEMATOCRIT % 41 8   PLATELETS Thousands/uL 253     Results from last 7 days   Lab Units 07/10/20  0448  07/08/20  0443   SODIUM mmol/L 146*   < > 144   POTASSIUM mmol/L 3 1*   < > 3 8   CHLORIDE mmol/L 109*   < > 108   CO2 mmol/L 25   < > 30   BUN mg/dL 49*   < > 41*   CREATININE mg/dL 2 18*   < > 1 95*   ANION GAP mmol/L 12   < > 6   CALCIUM mg/dL 8 1*   < > 8 1*   ALBUMIN g/dL  --   --  1 9*   TOTAL BILIRUBIN mg/dL  --   --  0 61   ALK PHOS U/L  --   --  73   ALT U/L  --   --  12   AST U/L  --   --  25   GLUCOSE RANDOM mg/dL 119   < > 132    < > = values in this interval not displayed  Results from last 7 days   Lab Units 07/10/20  0551 07/10/20  0000 07/09/20  2246 07/09/20  2109 07/09/20  1951 07/09/20  1751 07/09/20  1201 07/09/20  0609 07/08/20  2338 07/08/20  2108 07/08/20  1710 07/08/20  1145   POC GLUCOSE mg/dl 139 100 85 104 145* 223* 282* 215* 206* 154* 221* 226*                   * I Have Reviewed All Lab Data Listed Above  * Additional Pertinent Lab Tests Reviewed:  Petey 66 Admission Reviewed    Imaging:    Imaging Reports Reviewed Today Include:  Portable chest x-ray  Imaging Personally Reviewed by Myself Includes:  Portable chest x-ray    Recent Cultures (last 7 days):           Last 24 Hours Medication List:     Current Facility-Administered Medications:  Adult TPN (CUSTOM BASE/CUSTOM ELECTROLYTE) Intravenous Continuous Janki Mclean MD Last Rate: 40 8 mL/hr at 07/09/20 2106   Adult TPN (CUSTOM BASE/CUSTOM ELECTROLYTE)  Intravenous Continuous Janki Mclean MD    albumin human 12 5 g Intravenous Once Janki Mclean MD    apixaban 2 5 mg Oral BID Janki Mclean MD    bisacodyl 10 mg Rectal Daily Janki Mclean MD    fluconazole 100 mg Intravenous Q24H Janki Mclean MD Last Rate: Stopped (07/09/20 1710)   insulin glargine 13 Units Subcutaneous Q12H [facility] Janki Mclean MD    insulin lispro 2-12 Units Subcutaneous Q6H Janki Mclean MD    levalbuterol 1 25 mg Nebulization Q8H PRN Janki Mclean MD    metoclopramide 10 mg Intravenous Q6H [facility] Vivian Pro DO    metoprolol 2 5 mg Intravenous Q6H Sherry Jarvis MD    morphine injection 2 mg Intravenous Q4H PRN Janki Mclean MD    ondansetron 4 mg Intravenous Once Anusha S RachelBRANDON souza    ondansetron 4 mg Intravenous Q6H PRN Sherry Jarvis MD    pantoprazole 40 mg Intravenous Q12H [facility] Gissell Springer DO    phenol 1 spray Mouth/Throat Q2H PRN Vivian Pro DO    potassium chloride 40 mEq Oral Once Janki Mclean MD    potassium chloride 20 mEq Intravenous Once Janki Mclean MD    saliva substitute 5 spray Mouth/Throat 4x Daily BRANDON Jo    sodium chloride 60 mL/hr Intravenous Continuous Fidel Gaytan MD Last Rate: 60 mL/hr (07/10/20 1038)   sucralfate 1,000 mg Oral BID Vivian Pro DO         Today, Patient Was Seen By: Janki Mclean MD    ** Please Note: Dictation voice to text software may have been used in the creation of this document   **

## 2020-07-10 NOTE — ASSESSMENT & PLAN NOTE
· Patient with known history of coronary artery disease status post CABG in 1997 in [facility]  · Patient is on beta-blocker aspirin and statin at home

## 2020-07-10 NOTE — PROGRESS NOTES
Progress Note - Nephrology   Marcelyn Aase 80 y o  male MRN: [id number]  Unit/Bed#: -01 Encounter: [id number]    A/P:  1  Acute kidney injury  Admitted with a creatinine of 1 95  Has risen to 2 18 mg/dL today  Receiving Lasix 40 IV today  He is in positive fluid balance over the last 24 hours  Continue daily weights and I/O  Check a PVR  2  Hypernatremia  Receiving gentle dilute fluids with half-normal saline at 60 mils per hour  Will add potassium to IV fluids due to hypokalemia  Potassium is 3 1 today  3  Chronic kidney disease stage 3  Baseline creatinine on records review was 1 3-1 6 mg/dL  4  Metabolic alkalosis  Improved  5  Hypoalbuminemia and hypoproteinemia  Receiving TPN in a custom solution      Follow up reason for today's visit:  Acute kidney injury    Duodenal obstruction    Patient Active Problem List   Diagnosis    Acute on chronic diastolic congestive heart failure (HCC)    Chronic kidney disease, stage 3 ([facility] )    Coronary artery disease    Diabetes mellitus type 2 with complications ([facility] )    Essential hypertension    Hyperlipidemia    Morbid obesity (HCC)    Symptomatic anemia    Paroxysmal atrial fibrillation (HCC)    Duodenal obstruction    Abnormal CT of the abdomen    Duodenal diverticulum    Colon  neoplasm    Acute renal failure (ARF) (HCC)    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HCC)    Anasarca    Esophageal candidiasis (HCC)         Subjective:   Denies chest pain shortness of breath  Has abdominal pain and pain on any kind of movement  He denies any dysuria    Objective:     Vitals: Blood pressure 116/66, pulse 89, temperature (!) 97 4 °F (36 3 °C), resp  rate 18, height 5' 4 5" (1 638 m), weight 99 7 kg (219 lb 12 8 oz), SpO2 91 %  ,Body mass index is 37 15 kg/m²      Weight (last 2 days)     Date/Time   Weight    07/10/20 0600   99 7 (219 8)    07/10/20 0455   99 7 (219 8)    07/09/20 0558   100 (220 68)    07/08/20 0545   101 (222 89)                Intake/Output Summary (Last 24 hours) at 7/10/2020 0905  Last data filed at 7/10/2020 0001  Gross per 24 hour   Intake 996 19 ml   Output 100 ml   Net 896 19 ml     I/O last 3 completed shifts: In: 996 2 [I V :249; IV Piggyback:156 7; TPN:590 5]  Out: 1000 [Urine:600; Emesis/NG output:400]         Physical Exam: /66   Pulse 89   Temp (!) 97 4 °F (36 3 °C)   Resp 18   Ht 5' 4 5" (1 638 m)   Wt 99 7 kg (219 lb 12 8 oz)   SpO2 91%   BMI 37 15 kg/m²     General Appearance:    Alert, cooperative, no distress, appears stated age   Head:    Normocephalic, without obvious abnormality, atraumatic   Eyes:    Conjunctiva/corneas clear   Ears:    Normal external ears   Nose:   Nares normal, septum midline, mucosa normal, no drainage    or sinus tenderness   Throat:   Lips, mucosa, and tongue normal; teeth and gums normal   Neck:   Supple, symmetrical, trachea midline, no adenopathy;        thyroid:  No enlargement/tenderness/nodules; no carotid    bruit or JVD   Back:     Symmetric, no curvature, ROM normal, no CVA tenderness   Lungs:      Decr to auscultation bilaterally, respirations unlabored   Chest wall:    No tenderness or deformity   Heart:    Regular rate and rhythm, S1 and S2 normal, no murmur, rub   or gallop   Abdomen:     Distended and tender, bowel sounds active   Extremities:   Extremities normal, atraumatic, no cyanosis has 1+ edema   Skin:   Skin color, texture, turgor normal, no rashes or lesions   Lymph nodes:   Cervical normal   Neurologic:   CNII-XII intact            Lab, Imaging and other studies: I have personally reviewed pertinent labs  CBC: No results found for: WBC, HGB, HCT, MCV, PLT, ADJUSTEDWBC, MCH, MCHC, RDW, MPV, NRBC  CMP:   Lab Results   Component Value Date    K 3 1 (L) 07/10/2020     (H) 07/10/2020    CO2 25 07/10/2020    BUN 49 (H) 07/10/2020    CREATININE 2 18 (H) 07/10/2020    CALCIUM 8 1 (L) 07/10/2020    EGFR 27 07/10/2020           Results from last 7 days   Lab Units 07/10/20  0448 07/09/20  0443 07/08/20  0443  07/06/20  0523  07/04/20  0510   POTASSIUM mmol/L 3 1* 3 7 3 8   < > 4 1   < > 4 2   CHLORIDE mmol/L 109* 108 108   < > 107   < > 105   CO2 mmol/L 25 23 30   < > 31   < > 29   BUN mg/dL 49* 41* 41*   < > 35*   < > 38*   CREATININE mg/dL 2 18* 2 13* 1 95*   < > 1 76*   < > 2 30*   CALCIUM mg/dL 8 1* 8 4 8 1*   < > 7 7*   < > 7 5*   ALK PHOS U/L  --   --  73  --  67  --  59   ALT U/L  --   --  12  --  8*  --  12   AST U/L  --   --  25  --  20  --  10    < > = values in this interval not displayed  Phosphorus:   Lab Results   Component Value Date    PHOS 3 3 07/10/2020     Magnesium:   Lab Results   Component Value Date    MG 2 2 07/10/2020     Urinalysis: No results found for: Matthew Harder, SPECGRAV, PHUR, LEUKOCYTESUR, NITRITE, PROTEINUA, GLUCOSEU, KETONESU, BILIRUBINUR, BLOODU  Ionized Calcium: No results found for: CAION  Coagulation: No results found for: PT, INR, APTT  Troponin: No results found for: TROPONINI  ABG: No results found for: PHART, UPR1BJM, PO2ART, OBZ2XUM, R6WSHVUD, BEART, SOURCE  Radiology review:     IMAGING  Procedure: Xr Chest Portable    Result Date: 7/10/2020  Narrative: CHEST INDICATION:   SOB  COMPARISON:  Chest radiograph from 7/2/2020  Abdomen CT from 7/6/2020  Chest CT from 6/19/2020  EXAM PERFORMED/VIEWS:  XR CHEST PORTABLE FINDINGS:  Right PICC at cavoatrial junction  Mild cardiomegaly  CABG  Persistent small left effusion with mild bibasilar atelectasis, greater on the left  Gastric distention  Osseous structures appear within normal limits for patient age  Impression: Persistent small left effusion with left greater than right bibasilar atelectasis  Gastric distention   Workstation performed: [id number]       Current Facility-Administered Medications   Medication Dose Route Frequency    Adult 3-in-1 TPN (custom base / custom electrolytes)   Intravenous Continuous    apixaban (ELIQUIS) tablet 2 5 mg  2 5 mg Oral BID    bisacodyl (DULCOLAX) rectal suppository 10 mg  10 mg Rectal Daily    fluconazole (DIFLUCAN) (LOW DOSE) IVPB 100 mg  100 mg Intravenous Q24H    furosemide (LASIX) injection 40 mg  40 mg Intravenous Once    insulin glargine (LANTUS) subcutaneous injection 18 Units 0 18 mL  18 Units Subcutaneous Q12H [address]    insulin lispro (HumaLOG) 100 units/mL subcutaneous injection 2-12 Units  2-12 Units Subcutaneous Q6H    levalbuterol (XOPENEX) inhalation solution 1 25 mg  1 25 mg Nebulization Q8H PRN    metoclopramide (REGLAN) injection 10 mg  10 mg Intravenous Q6H [address]    metoprolol (LOPRESSOR) injection 2 5 mg  2 5 mg Intravenous Q6H    morphine injection 2 mg  2 mg Intravenous Q4H PRN    ondansetron (ZOFRAN) injection 4 mg  4 mg Intravenous Once    ondansetron (ZOFRAN) injection 4 mg  4 mg Intravenous Q6H PRN    pantoprazole (PROTONIX) injection 40 mg  40 mg Intravenous Q12H [address]    phenol (CHLORASEPTIC) 1 4 % mucosal liquid 1 spray  1 spray Mouth/Throat Q2H PRN    saliva substitute (MOUTH KOTE) mucosal solution 5 spray  5 spray Mouth/Throat 4x Daily    sodium chloride infusion 0 45 %  60 mL/hr Intravenous Continuous    sucralfate (CARAFATE) oral suspension 1,000 mg  1,000 mg Oral BID     Medications Discontinued During This Encounter   Medication Reason    Aspirin Buf,CaCarb-MgCarb-MgO, 81 MG TABS     simvastatin (ZOCOR) 80 mg tablet     pravastatin (PRAVACHOL) tablet 40 mg     metoprolol succinate (TOPROL-XL) 24 hr tablet 25 mg     metoprolol (LOPRESSOR) injection 2 5 mg     HYDROcodone-acetaminophen (NORCO) 5-325 mg per tablet 1 tablet     acetaminophen (TYLENOL) tablet 650 mg     loratadine (CLARITIN) tablet 10 mg     polyethylene glycol (GLYCOLAX) bowel prep 238 g     insulin lispro (HumaLOG) 100 units/mL subcutaneous injection 1-5 Units     insulin lispro (HumaLOG) 100 units/mL subcutaneous injection 1-5 Units     morphine injection 2 mg     bisacodyl (DULCOLAX) EC tablet 10 mg     morphine injection 2 mg     sodium chloride 0 9 % irrigation solution Patient Discharge    bupivacaine liposomal (EXPAREL) 1 3 % injection 20 mL     ciprofloxacin (CIPRO) IVPB (premix) 400 mg 200 mL     metroNIDAZOLE (FLAGYL) IVPB (premix) 500 mg 100 mL     morphine (PF) 4 mg/mL injection 4 mg     HYDROmorphone (DILAUDID) injection 0 2 mg     fentaNYL (SUBLIMAZE) injection 25 mcg     sodium chloride 0 9 % infusion     guaiFENesin (MUCINEX) 12 hr tablet 600 mg     heparin (porcine) subcutaneous injection 5,000 Units     morphine injection 2 mg     oxyCODONE (ROXICODONE) IR tablet 5 mg     insulin lispro (HumaLOG) 100 units/mL subcutaneous injection 1-5 Units     insulin lispro (HumaLOG) 100 units/mL subcutaneous injection 1-5 Units     insulin glargine (LANTUS) subcutaneous injection 5 Units 0 05 mL     metoprolol (LOPRESSOR) injection 2 5 mg     furosemide (LASIX) injection 40 mg     insulin lispro (HumaLOG) 100 units/mL subcutaneous injection 5 Units     insulin glargine (LANTUS) subcutaneous injection 10 Units 0 1 mL     ondansetron (ZOFRAN) injection 4 mg     metolazone (ZAROXOLYN) tablet 5 mg     insulin lispro (HumaLOG) 100 units/mL subcutaneous injection 7 Units     insulin glargine (LANTUS) subcutaneous injection 12 Units 0 12 mL     insulin glargine (LANTUS) subcutaneous injection 10 Units 0 1 mL     insulin lispro (HumaLOG) 100 units/mL subcutaneous injection 5 Units     insulin glargine (LANTUS) subcutaneous injection 15 Units 0 15 mL     ondansetron (ZOFRAN) injection 4 mg     furosemide (LASIX) injection 60 mg     morphine (PF) 4 mg/mL injection 4 mg     furosemide (LASIX) injection 40 mg     furosemide (LASIX) tablet 80 mg     potassium chloride (K-DUR,KLOR-CON) CR tablet 40 mEq     furosemide (LASIX) tablet 40 mg     insulin lispro (HumaLOG) 100 units/mL subcutaneous injection 7 Units     insulin glargine (LANTUS) subcutaneous injection 17 Units 0 17 mL     apixaban (ELIQUIS) tablet 2 5 mg     acetaminophen (TYLENOL) tablet 650 mg     aspirin (ECOTRIN LOW STRENGTH) EC tablet 81 mg     atorvastatin (LIPITOR) tablet 40 mg     oxyCODONE (ROXICODONE) IR tablet 5 mg     metoprolol succinate (TOPROL-XL) 24 hr tablet 25 mg     pantoprazole (PROTONIX) injection 40 mg Duplicate order    Adult 3-in-1 TPN (standard base / standard electrolytes)     furosemide (LASIX) injection 40 mg     calcium gluconate 1 g in sodium chloride 0 9% 50 mL (premix)     heparin (ACS LOW)     Adult 3-in-1 TPN (custom base / standard electrolytes)     heparin (porcine) subcutaneous injection 5,000 Units     insulin glargine (LANTUS) subcutaneous injection 10 Units 0 1 mL     insulin lispro (HumaLOG) 100 units/mL subcutaneous injection 1-5 Units     insulin glargine (LANTUS) subcutaneous injection 15 Units 0 15 mL     heparin (porcine) 25,000 units in 250 mL infusion (premix)     insulin lispro (HumaLOG) 100 units/mL subcutaneous injection 1-5 Units     insulin lispro (HumaLOG) 100 units/mL subcutaneous injection 16 Units     Adult 3-in-1 TPN (custom base / standard electrolytes)     insulin lispro (HumaLOG) 100 units/mL subcutaneous injection 1-6 Units     insulin lispro (HumaLOG) 100 units/mL subcutaneous injection 1-6 Units     furosemide (LASIX) injection 40 mg     enoxaparin (LOVENOX) subcutaneous injection 30 mg     insulin glargine (LANTUS) subcutaneous injection 20 Units 0 2 mL     multi-electrolyte (PLASMALYTE-A/ISOLYTE-S PH 7 4) IV solution     dextrose 5 % and sodium chloride 0 45 % infusion     Adult 3-in-1 TPN (custom base / custom electrolytes)     insulin glargine (LANTUS) subcutaneous injection 25 Units 0 25 mL     insulin lispro (HumaLOG) 100 units/mL subcutaneous injection 5 Units     insulin lispro (HumaLOG) 100 units/mL subcutaneous injection 8 Units     multi-electrolyte (PLASMALYTE-A/ISOLYTE-S PH 7 4) IV solution     alteplase (CATHFLO) injection 2 mg     alteplase (CATHFLO) injection 2 mg     insulin glargine (LANTUS) subcutaneous injection 30 Units 0 3 mL     insulin lispro (HumaLOG) 100 units/mL subcutaneous injection 10 Units     insulin glargine (LANTUS) subcutaneous injection 20 Units 0 2 mL     insulin lispro (HumaLOG) 100 units/mL subcutaneous injection 6 Units     morphine injection 1 mg     insulin lispro (HumaLOG) 100 units/mL subcutaneous injection 2-12 Units     HYDROmorphone (DILAUDID) injection 0 2 mg     Adult 3-in-1 TPN (custom base / custom electrolytes)     insulin lispro (HumaLOG) 100 units/mL subcutaneous injection 1-6 Units     Adult 3-in-1 TPN (custom base / custom electrolytes) Reorder    sodium chloride infusion 0 45 %     insulin glargine (LANTUS) subcutaneous injection 10 Units 0 1 mL     insulin glargine (LANTUS) subcutaneous injection 20 Units 0 2 mL     levalbuterol (XOPENEX) inhalation solution 0 63 mg     saliva substitute (MOUTH KOTE) mucosal solution 5 spray     insulin glargine (LANTUS) subcutaneous injection 25 Units 0 25 mL     Adult 3-in-1 TPN (custom base / custom electrolytes)     insulin lispro (HumaLOG) 100 units/mL subcutaneous injection 3 Units     Adult 3-in-1 TPN (custom base / custom electrolytes)     insulin glargine (LANTUS) subcutaneous injection 15 Units 0 15 mL     insulin lispro (HumaLOG) 100 units/mL subcutaneous injection 2 Units     fluconazole (DIFLUCAN) (LOW DOSE) IVPB 100 mg     sodium chloride infusion 0 225 %     sodium chloride (concentrated) 38 5 mEq in sterile water 1,000 mL infusion     insulin lispro (HumaLOG) 100 units/mL subcutaneous injection 3 Units     heparin (porcine) subcutaneous injection 5,000 Units        Maximus Maldonado MD      This progress note was produced in part using a dictation device which may document imprecise wording from author's original intent

## 2020-07-11 PROBLEM — I46.9 CARDIAC ARREST (HCC): Status: ACTIVE | Noted: 2020-01-01

## 2020-07-11 PROBLEM — T17.908A ASPIRATION INTO AIRWAY: Status: ACTIVE | Noted: 2020-01-01

## 2020-07-11 NOTE — PROCEDURES
Intubation  Date/Time: 7/11/2020 6:21 AM  Performed by: Kayce Jones PA-C  Authorized by: Kayce Jones PA-C     Patient location:  Bedside  Other Assisting Provider: Yes (comment) (Respiratory therapist)    Consent:     Consent obtained:  Emergent situation  Universal protocol:     Relevant documents present and verified: yes      Radiology Images displayed and confirmed  If images not available, report reviewed: yes      Patient identity confirmed:  Arm band and hospital-assigned identification number  Pre-procedure details:     Patient status:  Unresponsive    Mallampati score:  3    Pretreatment medications:  Etomidate and see MAR for details    Paralytics:  Succinylcholine  Indications:     Indications for intubation: respiratory failure, airway protection and hypoxemia    Procedure details:     Preoxygenation:  Bag valve mask    CPR in progress: yes      Intubation method:  Oral    Oral intubation technique:  Direct    Laryngoscope blade: Mac 4    Tube size (mm):  8 0    Tube type:  Cuffed    Number of attempts:  1    Ventilation between attempts: no      Cricoid pressure: no      Tube visualized through cords: yes    Placement assessment:     ETT to lip:  24    Tube secured with:  ETT madden    Breath sounds:  Equal    Placement verification: chest rise, condensation, CXR verification, direct visualization and capnography      CXR findings:  ETT in proper place  Post-procedure details:     Patient tolerance of procedure:   Tolerated well, no immediate complications

## 2020-07-11 NOTE — NURSING NOTE
In the past five hours, pt has only output 142 ml urine despite TPN and fluids  Bladder scan showed 8 ml  NP made aware and will order bolus per MD recommendation from earlier today

## 2020-07-11 NOTE — DISCHARGE SUMMARY
Discharge Summary - Lizette Asa 80 y o  male MRN: [id number]    Unit/Bed#: -01 Encounter: [id number] PCP: Daniela Daley DO    Admission Date:   Admission Orders (From admission, onward)     Ordered        06/16/20 1229  Inpatient Admission  Once                     Admitting Diagnosis: Dizziness [R42]  Symptomatic anemia [D64 9]  Gastrointestinal hemorrhage, unspecified gastrointestinal hemorrhage type [K92 2]    1  HPI: 80 y o  Male admitted on 6/16/2020 for symptomatic systemic anemia  On 6/19/2020, he underwent an colonoscopy procedure  He subsequently underwent an endoscopy on 07/06/2020 for persistent bilious aspirate  He subsequently underwent an exploratory laparotomy with right hemicolectomy on 06/20/2020 due to ulcerated obstructing mass  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  He refused an Oncology consult at the time of diagnosis  On 07/06/2020, he again underwent an EGD due to bilious output from the NG tube  Results: Multiple gastric ulcers in the body along with severe gastritis, biopsies taken  Severe esophagitis  Patches of candidiasis in the esophagus  No evidence of gastric or obstruction  No evidence of obstruction in the duodenum             Today, 07/11/2020, a rapid response was called due to the patient pulling out his NG tube  Subsequently copious amounts of bilious fluid were vomited causing him to aspirate and go into severe respiratory distress  While attempting to maintain his airway, he had a cardiac arrest event and a code blue was called  CPR was started and an advanced airway secured  ROSC was achieved and the patient was transported to the ICU  Once in the ICU, he became hypotensive and levophed was started  Despite levophed and an advanced airway with ventilator support, he remained hypotensive and became hypoxic   The emergency contact in the chart, Mora Ainsley, was called and a message was left to call back as soon as possible  He had a second cardia arrest event  ROSC was again achieved with CPR and epinephrine  Vasopressor was added and ventilator changes were made  Despite all attempts to improve his hypoxia, he remained hypoxic  Ace-Synephrine as a third agent was added due to persistent hypotension  He had a third cardiac event and CPR was started  The emergency contact was called again and I personally spoke to her on the phone  Events of the evening were discussed and the poor prognosis were discussed  She requested time to speak to her family while CPR continued  She called back moments later and the decision was made to stop CPR  Time of death was called at 200  Procedures Performed:   Orders Placed This Encounter   Procedures    ED ECG Documentation Only    Intubation       Summary of Hospital Course: See HPI  Significant Findings, Care, Treatment and Services Provided: See HPI  Additional MARLENI       Complications: Aspiration of digestive fluids causing respiratory/cardiac arrest      Disposition:      Final Diagnosis: Respiratory failure secondary to aspiration causing cardiac arrest      Resolved Problems  Date Reviewed: 7/10/2020    None          Condition at Time of Death: Critical        Death Note:    INPATIENT DEATH NOTE  Amy Richards 80 y o  male MRN: [id number]  Unit/Bed#: -01 Encounter: [id number]         Patient's Information  Date of Death: 20  Time of Death: 0452  Pronounced by: hesham perez pac  Did the patient's death occur in the ED?: No  Did the patient's death occur in the OR?: No  Did the patient's death occur less than 10 days post-op?: No  Did the patient's death occur within 24 hours of admission?: No  Was code status DNR at the time of death?: No(yes as per family at 65 am)    PHYSICAL EXAM:  Unresponsive to noxious stimuli, Spontaneous respirations absent, Breath sounds absent, Carotid pulse absent, Heart sounds absent, Pupillary light reflex absent and Corneal blink reflex absent    Medical Examiner notification criteria:  NONE APPLICABLE   Medical Examiner's office notified?:  Yes   Medical Examiner accepted case?:  No  Name of Medical Examiner: Christo Lopez       Family Notification  Was the family notified?: Yes  Date Notified: 20  Time Notified:   Notified by: Dorette Romberg pac  Name of Family Notified of Death: serena restrepo    Relationship to Patient: Other relative(unknown)  Family Notification Route: Telephone  Was the family told to contact a  home?: Yes    Autopsy Options:  Post-mortem examination declined by next of kin    Primary Service Attending Physician notified?:  yes - Attending:  Amanda Sawant, DO    Physician/Resident responsible for completing Discharge Summary:  Denver Ohara, PA-C

## 2020-07-11 NOTE — RAPID RESPONSE
Progress Note - Rapid Response/Acceptance Note  Noelle Reina 80 y o  male MRN: [id number]    Time Called ([address] [facility] Time): 6970  Date Called: 7/11/2020  Level of Care: ICU  Room#: 313-1  Arrival Time ( Time): 0216  Event End Time ( Time): Converted to code blue 9631  Primary reason for call: Acute change in HR, Acute change in RR, Acute change in SPO2 and Acute change in mental status  Interventions:  Airway/Breathing:  Intubated  Circulation: N/A  Other Treatments: N/A       Assessment:   1  80 y o  Male admitted on 6/16/2020 for symptomatic systemic anemia  On 6/19/2020, he underwent an colonoscopy procedure  He subsequently underwent an endoscopy on 07/06/2020 for persistent bilious aspirate  He subsequently underwent an exploratory laparotomy with right hemicolectomy on 06/20/2020 due to ulcerated obstructing mass  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  He refused an Oncology consult at the time of diagnosis  On 07/06/2020, he again underwent an EGD due to bilious output from the NG tube  Results: Multiple gastric ulcers in the body along with severe gastritis, biopsies taken  Severe esophagitis  Patches of candidiasis in the esophagus  No evidence of gastric or obstruction  No evidence of obstruction in the duodenum  Today, 07/11/2020, a rapid response was called due to the patient pulling out his NG tube  Subsequently copious amounts of bilious fluid were vomited causing him to aspirate and go into severe respiratory distress  While attempting to maintain his airway, he had a cardiac arrest event and a code blue was called  CPR was started and an advanced airway secured  ROSC was achieved and the patient was transported to the ICU  Once in the ICU, he became hypotensive and levophed was started    Despite levophed and an advanced airway with ventilator support, he remained hypotensive and became hypoxic  The emergency contact in the chart, Lenna Cranker, was called and a message was left to call back as soon as possible  He had a second cardia arrest event  ROSC was again achieved with CPR and epinephrine  Vasopressor was added and ventilator changes were made  Despite all attempts to improve his hypoxia, he remained hypoxic  Ace-Synephrine as a third agent was added due to persistent hypotension  He had a third cardiac event and CPR was started  The emergency contact was called again and I personally spoke to her on the phone  Events of the evening were discussed and the poor prognosis were discussed  She requested time to speak to her family while CPR continued  She called back moments later and the decision was made to stop CPR  Time of death was called at 200  Plan:   · Initial upgrade to ICU care  · Withdrawal of care and stop CPR decision made by emergency contact, Lenna Cranker  HPI/Chief Complaint (Background/Situation):  Acute respiratory distress  Maria Petersen is a 80y o  year old male who presents with see above Assessment for details       Historical Information   Past Medical History:   Diagnosis Date    [facility]-fib [facility])     Cardiac disease     [facility]est[facility] [address] [facility]     Diabetes mellitus [facility] [address] [facility]     MI, old     Myocardial infarction [facility])      Past Surgical History:   Procedure Laterality Date    CARDIAC SURGERY      AK PART REMOVAL COLON W ANASTOMOSIS N/A 6/20/2020    Procedure: exploratory laparatomy with right hemicolectomy;  Surgeon: Gregg Sims DO;  Location:  MAIN OR;  Service: General     Social History   Social History     Substance and Sexual Activity   Alcohol Use Not Currently     Social History     Substance and Sexual Activity   Drug Use Not Currently     Social History     Tobacco Use   Smoking Status Never Smoker   Smokeless Tobacco Never Used     Family History: non-contributory    Meds/Allergies Current Facility-Administered Medications:  apixaban 2 5 mg Oral BID Dre Pizano Jr, PA-C    azithromycin 500 mg Intravenous Q24H Dre [facility] KRISTOFER Ortiz Last Rate: 500 mg (07/11/20 0344)   bisacodyl 10 mg Rectal Daily Dre Pizano Jr, PA-C    diltiazem 1-15 mg/hr Intravenous Titrated Dre Pizano Jr, PA-C    fentaNYL 50 mcg/hr Intravenous Continuous Dre [facility] KRISTOFER Ortiz Last Rate: 50 mcg/hr (07/11/20 0418)   fluconazole 100 mg Intravenous Q24H Dre Pizano Jr, PA-C Last Rate: 100 mg (07/10/20 1531)   insulin glargine 15 Units Subcutaneous Q12H [facility] Hugh Pizano Jr, PA-C    insulin lispro 2-12 Units Subcutaneous Q6H Dre Pizano Jr, PA-C    insulin lispro 3 Units Subcutaneous Q6H Dre Pizano Jr, PA-C    levalbuterol 1 25 mg Nebulization Q8H PRN Dre Pizano Jr, PA-C    metoclopramide 10 mg Intravenous Q6H [facility] Gardenia Roberts PA-C    metoprolol 2 5 mg Intravenous Q6H Dre Pizano Jr, PA-C    metroNIDAZOLE 500 mg Intravenous S2A Gardenia Roberts PA-C Last Rate: 500 mg (07/11/20 0306)   morphine injection 2 mg Intravenous Q4H PRN Dre Pizano Jr, PA-C    norepinephrine 1-30 mcg/min Intravenous Titrated Dre Dickey PA-C Last Rate: 30 mcg/min (07/11/20 0413)   ondansetron 4 mg Intravenous Once Gardenia Roberts PA-C    ondansetron 4 mg Intravenous Q6H PRN Dre Pizano Jr, PA-C    pantoprazole 40 mg Intravenous Q12H [facility] Devin Pizano Jr, PA-C    phenylephine  mcg/min Intravenous Titrated Gardenia Roberts PA-C Last Rate: 25 mcg/min (07/11/20 0448)   propofol 5-50 mcg/kg/min Intravenous Titrated Dre Dickey PA-C Last Rate: Stopped (07/11/20 0400)   saliva substitute 5 spray Mouth/Throat 4x Daily Dre Pizano Jr, PA-C    sodium chloride 60 mL/hr Intravenous Continuous Dre Pizano Jr, PA-C Last Rate: Stopped (07/11/20 0027)   sucralfate 1,000 mg Oral BID Gardenia Roberts PA-C    vasopressin 0 04 Units/min Intravenous Continuous Gordon Pizano Jr, PA-C Last Rate: 0 04 Units/min (07/11/20 0428)         diltiazem 1-15 mg/hr    fentaNYL 50 mcg/hr Last Rate: 50 mcg/hr (07/11/20 0418)   norepinephrine 1-30 mcg/min Last Rate: 30 mcg/min (07/11/20 0413)   phenylephine  mcg/min Last Rate: 25 mcg/min (07/11/20 0448)   propofol 5-50 mcg/kg/min Last Rate: Stopped (07/11/20 0400)   sodium chloride 60 mL/hr Last Rate: Stopped (07/11/20 0027)   vasopressin 0 04 Units/min Last Rate: 0 04 Units/min (07/11/20 0428)       No Known Allergies    ROS: Unable to obtain  Vitals:   Vitals:    07/11/20 0321 07/11/20 0324 07/11/20 0328 07/11/20 0333   BP: (!) 64/45 (!) 71/49 (!) 85/53 (!) 81/54   Pulse: (!) 114 (!) 116 (!) 115 (!) 115   Resp: (!) 31 (!) 30 (!) 31 (!) 32   Temp:       TempSrc:       SpO2: (!) 84% 91% (!) 89% 90%   Weight:       Height:           Physical Exam:  Physical Exam   Constitutional: He appears well-developed and well-nourished  He appears distressed  HENT:   Head: Normocephalic and atraumatic  Right Ear: External ear normal    Left Ear: External ear normal    Nose: Nose normal    Eyes: Conjunctivae are normal  Right eye exhibits no discharge  Left eye exhibits no discharge  No scleral icterus  Initially PERRLA with EOM normal   Pupils fixed and pinpoint during cardiac arrest     Neck: Neck supple  No JVD present  No tracheal deviation present  Cardiovascular:   x3 Cardiac arrest events  Family withdrew care  TOD called at 3342 with no heart sounds heard with ausculation  Confirmed with telemetry and EKG strip  Pulmonary/Chest: He is in respiratory distress  Intubated  Hypoxic with max ventilator settings  1 5 L of bilious output with NG insertion during rapid response  Bilious fluid noted in oral airway and on gown  Highly suspicious of aspiration pneumonia  Abdominal: He exhibits distension  Musculoskeletal: He exhibits no edema or deformity     Neurological: Sedated during intubation  [facility] [id number]  No reflexes present  Skin: He is diaphoretic  Intake/Output Summary (Last 24 hours) at 7/11/2020 0518  Last data filed at 7/11/2020 0018  Gross per 24 hour   Intake 623 ml   Output 292 ml   Net 331 ml       Respiratory    Lab Data (Last 4 hours)    None         O2/Vent Data (Last 4 hours)      07/11 0324          Insp Time (sec) (sec) 0 99                 Invasive Devices     Peripherally Inserted Central Catheter Line            PICC Line 07/02/20 8 days          Peripheral Intravenous Line            Peripheral IV 07/11/20 Left Foot less than 1 day          Drain            NG/OG/Enteral Tube Nasogastric 18 Fr Right nares less than 1 day          Airway            ETT  Cuffed 8 mm less than 1 day                DIAGNOSTIC DATA:    Lab: I have personally reviewed pertinent lab results  CBC:   Results from last 7 days   Lab Units 07/11/20  0322 07/11/20  0250   WBC Thousand/uL  --  11 42*   HEMOGLOBIN g/dL  --  10 4*   I STAT HEMOGLOBIN g/dl 10 9*  --    HEMATOCRIT %  --  36 6   HEMATOCRIT, ISTAT % 32*  --    PLATELETS Thousands/uL  --  236     CMP:   Results from last 7 days   Lab Units 07/11/20  0322 07/11/20  0250 07/10/20  1629 07/10/20  0448  07/08/20  0443  07/06/20  0523   POTASSIUM mmol/L  --  4 1 4 0 3 1*   < > 3 8   < > 4 1   CHLORIDE mmol/L  --  107 105 109*   < > 108   < > 107   CO2 mmol/L  --  24 20* 25   < > 30   < > 31   CO2, I-STAT mmol/L 21  --   --   --   --   --   --   --    BUN mg/dL  --  61* 58* 49*   < > 41*   < > 35*   CREATININE mg/dL  --  3 05* 2 81* 2 18*   < > 1 95*   < > 1 76*   CALCIUM mg/dL  --  7 4* 8 0* 8 1*   < > 8 1*   < > 7 7*   ALK PHOS U/L  --   --   --   --   --  73  --  67   ALT U/L  --   --   --   --   --  12  --  8*   AST U/L  --   --   --   --   --  25  --  20   GLUCOSE, ISTAT mg/dl 191*  --   --   --   --   --   --   --     < > = values in this interval not displayed       PT/INR:   No results found for: PT, INR, Magnesium: No components found for: MAG,   Phosphorous:   Lab Results   Component Value Date    PHOS 6 1 (H) 07/11/2020       Microbiology:  No results found for: SUNNY Stanley      OUTCOME:   Transferred to Critical Care Unit  Family notified of transfer: yes  Family member contacted: Amaya Tran  Code Status: Level 1 - Full Code - Family withdrew care  Critical Care Time: Total Critical Care time spent 120 minutes excluding procedures, teaching and family updates

## 2020-07-11 NOTE — NURSING NOTE
Pt was sitting up in bed spitting up brown emesis  Pt's lungs were coarse and pt was having trouble breathing in between spitting up emesis  RN asked pt about talking to NP about placing NG tube  PT agreed to NG tube placement  While grabbing supplies, RN got alert on [employer] pager about pt's O2 status  O2 was 76% on 2 LNC  RN raised oxygen  to 4 L without change in O2  Rapid response called at [address]  NG tube placed by ED RN, pt immediately output 1200 brown emesis  Pt turned pale, stopped breathing, pulse check done at carotid- no pulse and CPR was started  Code Blue called at [phone]  Meds administered, ROSC achieved, pt intubated  IV site placed by ED RN  Labs drawn  Transferred to ICU around [phone]

## 2020-07-11 NOTE — NURSING NOTE
Patient discharged via wheelchair with pca and security down to [facility] notified via family

## 2020-07-12 VITALS
SYSTOLIC BLOOD PRESSURE: 71 MMHG | RESPIRATION RATE: 14 BRPM | WEIGHT: 219.8 LBS | TEMPERATURE: 98.8 F | DIASTOLIC BLOOD PRESSURE: 35 MMHG | OXYGEN SATURATION: 70 % | BODY MASS INDEX: 36.62 KG/M2 | HEIGHT: 65 IN

## 2020-10-19 NOTE — PLAN OF CARE
Problem: Nutrition/Hydration-ADULT  Goal: Nutrient/Hydration intake appropriate for improving, restoring or maintaining nutritional needs  Description  Monitor and assess patient's nutrition/hydration status for malnutrition  Collaborate with interdisciplinary team and initiate plan and interventions as ordered  Monitor patient's weight and dietary intake as ordered or per policy  Utilize nutrition screening tool and intervene as necessary  Determine patient's food preferences and provide high-protein, high-caloric foods as appropriate       INTERVENTIONS:  - Monitor oral intake, urinary output, labs, and treatment plans  - Assess nutrition and hydration status and recommend course of action  - Evaluate amount of meals eaten  - Assist patient with eating if necessary   - Allow adequate time for meals  - Recommend/ encourage appropriate diets, oral nutritional supplements, and vitamin/mineral supplements  - Order, calculate, and assess calorie counts as needed  - Recommend, monitor, and adjust tube feedings and TPN/PPN based on assessed needs  - Assess need for intravenous fluids  - Provide specific nutrition/hydration education as appropriate  - Include patient/family/caregiver in decisions related to nutrition  Outcome: Progressing Within functional limits Within functional limits Decreased anterior-posterior movement of the bolus/Delayed oral transit time/Stasis in anterior sulcus/Palatal stasis/Lingual stasis

## 2025-07-18 NOTE — RESPIRATORY THERAPY NOTE
RT Ventilator Management Note  Lizzeth Mckay 80 y o  male MRN: [id number]  Unit/Bed#: -01 Encounter: [id number]      Daily Screen     No data found in the last 10 encounters  Physical Exam:          RT assumed care of 86yo pt day 1 vent support s/p resp arrest  - RRT called s/p desaturation to 80% post vomit with aspiration - pt with witnessed bradycardia/loss of pulse - chest compressions started immediately  Pt intubated by PA with size 8ETT 24 @ lip without incident (confirmed)  Pt transported to ICU - placed on GD in  APV -/16 +8 @ 100%  Pt suctioned for large thick brown secretions  SpO2 83%  Settings modified to 430/20 +14 @ 100% due to high PIP/low SpO2  ABG - 7 24/45/48/20/-8/76%  Pt currently hypotensive with A-fib  -  agonal breathing persists despite full support  Attending notified of pt status  Treatment of hemorrhoids:  Increase fiber to 25g-38g of fiber daily. Ie. Sunfiber, Benefiber, Metamucil, and psyllium  Increase water intake to about 2 liter a day  Hydrocortisone cream 2 times daily pea sized amount applied rectally on clean surface for a maximum of 6 days  Spend less than 5 minutes on the toilet sitting    Fiber Supplementation Instructions     Soluble fiber (such as Benefiber®) is easy to incorporate into your diet as it is tasteless and can be mixed with food or drink. Begin with 2 tsp once a day and then gradually increase to 2 tsp 3 times a day over the period of a few weeks. Stir Benefiber® into 4-8 ounces of liquid (carbonated beverages are not recommended) or mix with soft food (hot or cold). Stir well until dissolved. Increase your fluid intake as necessary to ensure you are drinking 7-8 glasses of water every day. Supplemental fiber should be taken with meals for greatest benefit. Many less-expensive generic versions of Benefiber® are available with similar active components (pictured below)      Psyllium is also another type of fiber supplementation that you can try as well. It comes in formulations of capsules, Gummies, powders.  I would read the back of the label to see the dosing of the fiber supplementation.  The goal for amount of fiber a day is 25 to 50 g of fiber daily.

## (undated) DEVICE — DRESSING MEPILEX AG BORDER 4 X 12 IN

## (undated) DEVICE — MAYO STAND COVER: Brand: CONVERTORS

## (undated) DEVICE — INTENDED FOR TISSUE SEPARATION, AND OTHER PROCEDURES THAT REQUIRE A SHARP SURGICAL BLADE TO PUNCTURE OR CUT.: Brand: BARD-PARKER ® CARBON RIB-BACK BLADES

## (undated) DEVICE — INTENDED FOR TISSUE SEPARATION, AND OTHER PROCEDURES THAT REQUIRE A SHARP SURGICAL BLADE TO PUNCTURE OR CUT.: Brand: BARD-PARKER SAFETY BLADES SIZE 10, STERILE

## (undated) DEVICE — STANDARD SURGICAL GOWN, L: Brand: CONVERTORS

## (undated) DEVICE — GLOVE SRG BIOGEL 6

## (undated) DEVICE — SPONGE LAP 18 X 18 IN

## (undated) DEVICE — SUT PDS II 1 XLH 96 IN LOOPED Z881G

## (undated) DEVICE — SUT PDS II 1 CTX 36 IN Z371T

## (undated) DEVICE — SUT VICRYL 2-0 SH 27 IN UNDYED J417H

## (undated) DEVICE — 3M™ IOBAN™ 2 ANTIMICROBIAL INCISE DRAPE 6650EZ: Brand: IOBAN™ 2

## (undated) DEVICE — WOUND RETRACTOR AND PROTECTOR: Brand: ALEXIS O WOUND PROTECTOR-RETRACTOR

## (undated) DEVICE — TUBING SUCTION 5MM X 12 FT

## (undated) DEVICE — STERILE LATEX POWDER-FREE SURGICAL GLOVESWITH NITRILE COATING: Brand: PROTEXIS

## (undated) DEVICE — PROXIMATE SKIN STAPLERS (35 WIDE) CONTAINS 35 STAINLESS STEEL STAPLES (FIXED HEAD): Brand: PROXIMATE

## (undated) DEVICE — BETHLEHEM UNIVERSAL MINOR GEN: Brand: CARDINAL HEALTH

## (undated) DEVICE — INTENDED FOR TISSUE SEPARATION, AND OTHER PROCEDURES THAT REQUIRE A SHARP SURGICAL BLADE TO PUNCTURE OR CUT.: Brand: BARD-PARKER SAFETY BLADES SIZE 15, STERILE

## (undated) DEVICE — CHLORAPREP HI-LITE 26ML ORANGE

## (undated) DEVICE — PROXIMATE RELOADABLE LINEAR STAPLER: Brand: PROXIMATE

## (undated) DEVICE — PROXIMATE RELOADABLE LINEAR CUTTER WITH SAFETY LOCK-OUT, 75MM: Brand: PROXIMATE

## (undated) DEVICE — 4-PORT MANIFOLD: Brand: NEPTUNE 2

## (undated) DEVICE — HEAVY DUTY TABLE COVER: Brand: CONVERTORS

## (undated) DEVICE — ENSEAL 20 CM SHAFT, LARGE JAW: Brand: ENSEAL X1

## (undated) DEVICE — SUT SILK 3-0 SH 30 IN K832H

## (undated) DEVICE — ELECTRODE EZ CLEAN BLADE -0012

## (undated) DEVICE — DRESSING MEPILEX BORDER 4 X 12 IN

## (undated) DEVICE — SUT VICRYL 3-0 SH 27 IN J416H

## (undated) DEVICE — PROXIMATE LINEAR CUTTER RELOAD, BLUE, 75MM: Brand: PROXIMATE

## (undated) DEVICE — MEDI-VAC YANK SUCT HNDL W/TPRD BULBOUS TIP: Brand: CARDINAL HEALTH

## (undated) DEVICE — ELECTRODE BLADE MOD  E-Z CLEAN 6.5IN -0014M

## (undated) DEVICE — 1820 FOAM BLOCK NEEDLE COUNTER: Brand: DEVON

## (undated) DEVICE — TOWEL SURG XR DETECT GREEN STRL RFD

## (undated) DEVICE — SPONGE LAP 18 X 18 IN STRL RFD

## (undated) DEVICE — PAD GROUNDING ADULT

## (undated) DEVICE — PROXIMATE LINEAR STAPLER RELOADS: Brand: PROXIMATE

## (undated) DEVICE — SUT VICRYL 3-0 CT-1 27 IN J258H

## (undated) DEVICE — BASIC SINGLE BASIN 2-LF: Brand: MEDLINE INDUSTRIES, INC.

## (undated) DEVICE — DRESSING MEPILEX AG BORDER 4 X 10 IN

## (undated) DEVICE — SUT SILK 3-0 SH CR/8 18 IN C013D

## (undated) DEVICE — ASTOUND STANDARD SURGICAL GOWN, XL: Brand: CONVERTORS

## (undated) DEVICE — SUT SILK 2-0 SH 30 IN K833H

## (undated) DEVICE — PLUMEPEN PRO 10FT

## (undated) DEVICE — TOWEL SET X-RAY

## (undated) DEVICE — SUT VICRYL 2-0 REEL 54 IN J286G

## (undated) DEVICE — POOLE SUCTION HANDLE: Brand: CARDINAL HEALTH

## (undated) DEVICE — DRAPE EQUIPMENT RF WAND

## (undated) DEVICE — TRAY FOLEY 16FR URIMETER SILICONE SURESTEP

## (undated) DEVICE — GLOVE INDICATOR PI UNDERGLOVE SZ 6.5 BLUE